# Patient Record
Sex: FEMALE | Race: WHITE | Employment: OTHER | URBAN - METROPOLITAN AREA
[De-identification: names, ages, dates, MRNs, and addresses within clinical notes are randomized per-mention and may not be internally consistent; named-entity substitution may affect disease eponyms.]

---

## 2017-01-04 RX ORDER — SIMVASTATIN 20 MG/1
TABLET, FILM COATED ORAL
Qty: 90 TAB | Refills: 0 | Status: SHIPPED | OUTPATIENT
Start: 2017-01-04 | End: 2017-04-02 | Stop reason: SDUPTHER

## 2017-01-08 DIAGNOSIS — E78.2 MIXED HYPERLIPIDEMIA: ICD-10-CM

## 2017-01-08 DIAGNOSIS — E83.52 HYPERCALCEMIA: ICD-10-CM

## 2017-01-08 DIAGNOSIS — E11.9 DIABETES MELLITUS TYPE 2, NONINSULIN DEPENDENT (HCC): Primary | ICD-10-CM

## 2017-01-08 DIAGNOSIS — R74.8 ELEVATED LIVER ENZYMES: ICD-10-CM

## 2017-01-09 NOTE — TELEPHONE ENCOUNTER
Fasting BS went up to 132 and HgBA1c about same as last check, 6.5 to 6.6 now. HDL still low, goal >50, TG still high, back up some from last check. Liver enzymes are mildly elevated. Calcium level mildly elevated. Recs:  Limit usage of OTC and rx nsaids including Mobic (Meloxicam). Limit etoh to no more than 1 small drink a day at most if any. Work hard on diet, exercise, wt reduction. Decrease vit D to 1000 units daily    Change Maxzide to combination low dose Hyzaar which will control, BP, fluid and give kidney protection and have less impact on calcium. Fasting labs in 12 weeks, OV 1 week prior. Arrange both now. I have pended follow up lab orders. But route back to me after r/w pt so I can send in medication to replace the Maxzide. Verify pharm and qty.

## 2017-01-18 ENCOUNTER — TELEPHONE (OUTPATIENT)
Dept: FAMILY MEDICINE CLINIC | Age: 62
End: 2017-01-18

## 2017-01-18 RX ORDER — LOSARTAN POTASSIUM AND HYDROCHLOROTHIAZIDE 12.5; 5 MG/1; MG/1
1 TABLET ORAL DAILY
Qty: 30 TAB | Refills: 2 | Status: SHIPPED | OUTPATIENT
Start: 2017-01-18 | End: 2017-05-15 | Stop reason: SDUPTHER

## 2017-01-19 NOTE — TELEPHONE ENCOUNTER
Me        7:00 PM   Note      Fasting BS went up to 132 and HgBA1c about same as last check, 6.5 to 6.6 now. HDL still low, goal >50, TG still high, back up some from last check. Liver enzymes are mildly elevated. Calcium level mildly elevated.      Recs:  Limit usage of OTC and rx nsaids including Mobic (Meloxicam).     Limit etoh to no more than 1 small drink a day at most if any.     Work hard on diet, exercise, wt reduction.     Decrease vit D to 1000 units daily     Change Maxzide to combination low dose Hyzaar which will control, BP, fluid and give kidney protection and have less impact on calcium.     Fasting labs in 12 weeks, OV 1 week prior. Arrange both now.      I have pended follow up lab orders. But route back to me after r/w pt so I can send in medication to replace the Maxzide. Verify pharm and qty.                   7:09 PM   You routed this conversation to Me             7:11 PM   You routed this conversation to José Luis Garcia LPN    January 9, 1966   José Luis Memory, LPN        8:31 PM   Note      Attempted to reach patient, left voicemail to call the office to review results and recommendations         January 12, 2017   José Luis Garcia LPN        3:74 PM   Note      LM for return call         January 18, 2017   José Luis Garcia LPN        13:90 AM   Note      PI of results. She states understanding. She prefers to have rx sent to local pharmacy for 30 day until she can find out how much it cost. She has a f/u already set up for 3/29. She request that she get her labs done at where she works b/c it doesn't cost her anything there. If she does it here it cost her money. Advises that she will need to p/u the order for that. I did explain that she won't be able to see labs on mychart. Pt would like to give that a try. José Luis Garcia LPN   to Me           10:25 AM   I pended the low dose hyzaar.     José Luis Memory, LPN        89:06 AM   Note      Addended by: Jamel Padron on: 1/18/2017 10:25 AM       Modules accepted: Khalida Gutiérrez        9:57 PM   Note      Addended by: Leticia Norwood on: 1/18/2017 09:57 PM       Modules accepted: Khalida

## 2017-02-07 ENCOUNTER — TELEPHONE (OUTPATIENT)
Dept: FAMILY MEDICINE CLINIC | Age: 62
End: 2017-02-07

## 2017-02-07 ENCOUNTER — OFFICE VISIT (OUTPATIENT)
Dept: FAMILY MEDICINE CLINIC | Age: 62
End: 2017-02-07

## 2017-02-07 VITALS
RESPIRATION RATE: 18 BRPM | HEIGHT: 66 IN | SYSTOLIC BLOOD PRESSURE: 120 MMHG | HEART RATE: 68 BPM | DIASTOLIC BLOOD PRESSURE: 72 MMHG | TEMPERATURE: 98.2 F | WEIGHT: 281 LBS | OXYGEN SATURATION: 98 % | BODY MASS INDEX: 45.16 KG/M2

## 2017-02-07 DIAGNOSIS — L73.9 HAIR FOLLICLE INFECTION: Primary | ICD-10-CM

## 2017-02-07 DIAGNOSIS — L73.9 FOLLICULITIS: ICD-10-CM

## 2017-02-07 RX ORDER — TRIAMTERENE/HYDROCHLOROTHIAZID 37.5-25 MG
TABLET ORAL
COMMUNITY
Start: 2016-12-07 | End: 2017-03-29

## 2017-02-07 RX ORDER — CLINDAMYCIN HYDROCHLORIDE 300 MG/1
300 CAPSULE ORAL 3 TIMES DAILY
Qty: 21 CAP | Refills: 0 | Status: SHIPPED | OUTPATIENT
Start: 2017-02-07 | End: 2017-02-14

## 2017-02-07 RX ORDER — MUPIROCIN CALCIUM 20 MG/G
CREAM TOPICAL 2 TIMES DAILY
Qty: 30 G | Refills: 1 | Status: SHIPPED | OUTPATIENT
Start: 2017-02-07 | End: 2017-02-14

## 2017-02-07 NOTE — PATIENT INSTRUCTIONS
Folliculitis: Care Instructions  Your Care Instructions    Folliculitis (say \"knm-HWW-isu-LY-tus\") is an infection of the pouches (follicles) in the skin where hair grows. It can occur on any part of the body, but it is most common on the scalp, face, armpits, and groin. Bacteria, such as those found in a hot tub, can cause folliculitis. Folliculitis begins as a red, tender area near a strand of hair. The skin can itch or burn and may drain pus or blood. Sometimes folliculitis can lead to more serious skin infections. Your doctor usually can treat mild folliculitis with an antibiotic cream or ointment. If you have folliculitis on your scalp, you may use a shampoo that kills bacteria. Antibiotics you take as pills can treat infections deeper in the skin. For stubborn cases of folliculitis, laser treatment may be an option. Laser treatment uses strong beams of light to destroy the hair follicle. But hair will no longer grow in the treated area. Follow-up care is a key part of your treatment and safety. Be sure to make and go to all appointments, and call your doctor if you are having problems. It's also a good idea to know your test results and keep a list of the medicines you take. How can you care for yourself at home? · Take your medicine exactly as prescribed. If your doctor prescribed antibiotics, take them as directed. Do not stop taking them just because you feel better. You need to take the full course of antibiotics. · Use a soap that kills bacteria to wash the infected area. If your scalp or beard is infected, use a shampoo with selenium or propylene glycol. Be careful. Do not scrub too long or too hard. · Mix 1 1/3 cup warm water and 1 tablespoon vinegar. Soak a cloth in the mixture, and place it over the infected skin until it cools off (usually 5 to 10 minutes). You can do this 3 to 6 times a day. · Do not share your razor, towel, or washcloth. That can spread folliculitis.   · Use a new blade in your razor each time you shave to keep from re-infecting your skin. · If you tend to get folliculitis, avoid using hot tubs. They can contain bacteria that cause folliculitis. When should you call for help? Call your doctor now or seek immediate medical care if:  · You have a fever not caused by the flu or some other known illness. · You have signs of infection such as:  ¨ Pain, warmth, or swelling in the skin. ¨ Red streaks near a hair follicle. ¨ Pus coming from a hair follicle. ¨ A fever. Watch closely for changes in your health, and be sure to contact your doctor if:  · Your home treatment does not help. Where can you learn more? Go to http://luanne-gail.info/. Enter M257 in the search box to learn more about \"Folliculitis: Care Instructions. \"  Current as of: February 5, 2016  Content Version: 11.1  © 9029-0224 [employer]. Care instructions adapted under license by [employer] (which disclaims liability or warranty for this information). If you have questions about a medical condition or this instruction, always ask your healthcare professional. [place] [address] any warranty or liability for your use of this information.

## 2017-02-07 NOTE — PROGRESS NOTES
HISTORY OF PRESENT ILLNESS  Monster Oconnell is a 64 y.o. female.   HPI    ROS    Physical Exam    ASSESSMENT and PLAN  {ASSESSMENT/PLAN:26691}

## 2017-02-07 NOTE — PROGRESS NOTES
HISTORY OF PRESENT ILLNESS  Tiffanie Valles is a 64 y.o. female. HPI  Awoke 1 week ago w/ sensation that cyst was developing in vaginal area again. She has gotten these in the groin on and off over the past several years. Current area seems to be progressively worsening the past week. Seems to be getting bigger and more sore, tender as the days have gone by. She has been using warm compresses but it is not helping. No drainage. No fevers. Review of Systems   Constitutional: Negative for chills and fever. Problem List  Date Reviewed: 2/7/2017          Codes Class Noted    Mixed hyperlipidemia ICD-10-CM: E78.2  ICD-9-CM: 272.2  5/31/2016        Benign essential hypertension ICD-10-CM: I10  ICD-9-CM: 401.1  9/56/1621        Umbilical hernia without obstruction and without gangrene ICD-10-CM: K42.9  ICD-9-CM: 553.1  4/22/2016        Diabetes mellitus type 2, noninsulin dependent ([facility] ICD-10-CM: E11.9  ICD-9-CM: 250.00  12/3/2015    Overview Signed 1/8/2017  7:03 PM by Dimitry Richter MD     12/2013; Cherylene Buckler Dr Halina Hinds;  Eye doctor: Dr Emanuel Lizama             Hypothyroidism ICD-10-CM: E03.9  ICD-9-CM: 244.9  7/30/2015    Overview Signed 7/30/2015 11:40 AM by Dimitry Richter MD     2002             Actinic keratosis ICD-10-CM: L57.0  ICD-9-CM: 702.0  11/20/2014        Abdominal wall bulge ICD-10-CM: R19.00  ICD-9-CM: 789.30  3/25/2013        Impaired fasting glucose ICD-10-CM: R73.01  ICD-9-CM: 790.21  3/31/2012    Overview Signed 3/31/2012  6:14 PM by Dimitry Richter MD     3/2012             Vitamin D deficiency ICD-10-CM: E55.9  ICD-9-CM: 268.9  3/2/2012    Overview Addendum 3/2/2012  9:46 AM by Dimitry Richter MD     9/2011, s/p 1 month Rx Vit D             FHx: breast cancer ICD-10-CM: Z80.3  ICD-9-CM: V16.3  3/2/2012    Overview Signed 3/2/2012  9:43 AM by Dimitry Richter MD     Several maternal relatives             History of benign left ovarian cyst, 2000 ICD-10-CM: Z87.42  ICD-9-CM: V13.29  3/2/2012        Skin cancer screening exams, Derm Dr Karla Oswald ICD-10-CM: Z12.83  ICD-9-CM: V76.43  3/2/2012        H/O Optic disc edema, [id number] ICD-10-CM: H47.10  ICD-9-CM: 377.00  3/2/2012    Overview Signed 3/2/2012  1:14 PM by Maren Maya MD     Optho Dr Soren Mccrary, MRI orbits             Hyperlipidemia ICD-10-CM: E78.5  ICD-9-CM: 272.4  3/15/2010    Overview Signed 3/2/2012  1:11 PM by Maren Maya MD                  AR (allergic rhinitis) ICD-10-CM: J30.9  ICD-9-CM: 477.9  Unknown        GERD (gastroesophageal reflux disease) ICD-10-CM: K21.9  ICD-9-CM: 530.81  Unknown    Overview Signed 3/2/2012  1:12 PM by Maren Maya MD                  H/O Mild, Intermittent Asthma ICD-10-CM: J45.909  ICD-9-CM: 493.90  Unknown    Overview Signed 3/2/2012  1:11 PM by Maren aMya MD     ~1595, no hosp or ER             Rheumatoid arthritis(714.0) ICD-9-CM: 714.0  Unknown    Overview Addendum 2015 12:30 PM by Maren Maya MD     In her late 's: feet/heels and shoulders             Postmenopause, LMP 2005, age 47 yo, s/p HRT x 6 months ICD-10-CM: Z78.0  ICD-9-CM: V49.81  Unknown    Overview Signed 3/15/2010  9:30 AM by Maren Maya MD     LMP 12/05 (HRT x 6months)             Ovarian cyst ICD-10-CM: N83.209  ICD-9-CM: 620.2  Unknown    Overview Signed 3/15/2010  9:30 AM by Maren Maya MD     Left, benign              (normal spontaneous vaginal delivery) ICD-10-CM: O80  ICD-9-CM: 650  Unknown    Overview Signed 3/15/2010  9:30 AM by Maren Maya MD                  Metabolic syndrome XWY-93-CO: P30.01  ICD-9-CM: 277.7  2010            Past Surgical History   Procedure Laterality Date    Colonoscopy  2005     normal, ok x 10 yrs, Dr Toya Remy    Hx breast lumpectomy       Left, benign (Dr. 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    Hx cervical polypectomy  7/2/14, Dr Jonathan Cisneros     Benign    Hx colonoscopy  5/2014     Dr Lashawn Bethea polyps x 3; repeat 2015    Hx colonoscopy  05/20/2015     poor prep, repeat in one year    Hx lipoma resection  ~2005     removed from left thigh    Hx other surgical  11/5/15, Dr Cline Plants     2 masses removed left lower leg    Hx hernia repair  4/22/16, Dr Sirisha Franks     robotic assisted umbilical hernia repair with mesh     Hx colonoscopy  08/2016     normal, follow up 10 yrs       Current Outpatient Prescriptions   Medication Sig    triamterene-hydroCHLOROthiazide (MAXZIDE) 37.5-25 mg per tablet Finishing this up then will be starting on the Hyzaar    simvastatin (ZOCOR) 20 mg tablet TAKE 1 TABLET NIGHTLY    JANUVIA 100 mg tablet TAKE 1 TABLET BY MOUTH EVERY DAY    INVOKANA 300 mg tablet TAKE 1 TABLET BY MOUTH DAILY    UNITHROID 75 mcg tablet TAKE 1 TABLET DAILY BEFORE BREAKFAST    cholecalciferol, vitamin D3, (VITAMIN D3) 2,000 unit tab Take 2,500 Units by mouth daily.  metoprolol succinate (TOPROL-XL) 50 mg XL tablet TAKE 1 TABLET DAILY FOR HYPERTENSION    CINNAMON BARK (CINNAMON PO) Take 350 mg by mouth daily.  coenzyme q10-vitamin e 100-100 mg-unit cap Take 200 mg by mouth daily.  multivitamin (ONE A DAY) tablet Take 1 Tab by mouth daily.  fish oil-dha-epa (FISH OIL) 1,200-144-216 mg Cap Take 1 Tab by mouth. Takes ~ twice a week    losartan-hydroCHLOROthiazide (HYZAAR) 50-12.5 mg per tablet Take 1 Tab by mouth daily.  HASNT STARTED YET, WILL START ONCE SHE FINISHES HER CURRENT BOTTLE OF MAXZIDE    meloxicam (MOBIC) 15 mg tablet TAKE 1 TABLET DAILY WITH BREAKFAST (Patient taking differently: TAKE 1 TABLET DAILY WITH BREAKFAST AS NEEDED)     No current facility-administered medications for this visit. Allergies   Allergen Reactions    Avelox [Moxifloxacin] Rash and Itching    Bactrim [Sulfamethoxazole-Trimethoprim] Rash    Doxycycline Other (comments)     Tongue discomfort, swollen glands    Lisinopril Diarrhea    Norvasc [Amlodipine] Other (comments)     edema    Penicillin G Hives, Shortness of Breath and Swelling     Social History     Social History    Marital status:      Spouse name: N/A    Number of children: 1    Years of education: N/A     Occupational History          Social History Main Topics    Smoking status: Never Smoker    Smokeless tobacco: Never Used    Alcohol use No      Comment: very rare, 1-2 drinks a year    Drug use: No    Sexual activity: Yes     Partners: Male     Birth control/ protection: None     Other Topics Concern    Caffeine Concern No     decaff tea    Special Diet No    Exercise No     Social History Narrative     Immunization History   Administered Date(s) Administered    Hepatitis A Vaccine 04/24/2001    Influenza Vaccine 10/22/2013, 11/03/2016    Pneumococcal Polysaccharide (PPSV-23) 11/04/2016    TD Vaccine 03/22/2000    TDAP Vaccine 09/20/2011       Visit Vitals    /72 (BP 1 Location: Left arm, BP Patient Position: Sitting)    Pulse 68    Temp 98.2 °F (36.8 °C) (Oral)    Resp 18    Ht 5' 5.5\" (1.664 m)    Wt 281 lb (127.5 kg)    LMP 12/24/2007    SpO2 98%    BMI 46.05 kg/m2       Physical Exam   Constitutional: No distress. Genitourinary:         Genitourinary Comments: Illustration denotes ~ 1 cm pink, inflamed papule. No pustule, crusting or exudate. No abscess formation. Vitals reviewed. ASSESSMENT and PLAN    ICD-10-CM ICD-9-CM    1. Hair follicle infection right perineal region/groin, w/o abscess formation L73.9 704.8 clindamycin (CLEOCIN) 300 mg capsule   2.  Recurrent Folliculitis O94.6 173.0 mupirocin calcium (BACTROBAN) 2 % topical cream Multiple abx allergies  Clinda 300 mg tid x 7 days  Warm compresses, epsom salt soaks  For future early outbreaks, use Bactroban cream bid x 5-7 days prn  F/u INI and prn  Return for I&D if abscess formation ensues which I do not anticipate at this time

## 2017-02-07 NOTE — PROGRESS NOTES
Chief Complaint   Patient presents with    Cyst     cyst like area on right side of groin - has had 3 other times in the past ten years and was thought to be an ingrown hair - states it feels harder this time and feels like it is spreading      1. Have you been to the ER, urgent care clinic since your last visit? Hospitalized since your last visit? No    2. Have you seen or consulted any other health care providers outside of the [address] [place] [address] since your last visit? Include any pap smears or colon screening.  No

## 2017-02-07 NOTE — TELEPHONE ENCOUNTER
Contact # is [phone]    Patient states that she has a boil on her private part and Dr MICHELE is the only one who knows how to take care of it & she is the only physician patient feels comfortable with. She states it is very sore and she has already tried to treat it herself. She is requesting to be seen by Dr Pankaj Witt as soon as possible. She states she live 5 minutes away and could come in any time.  Patient states she is in the process of finding another GYN doctor

## 2017-03-04 RX ORDER — MELOXICAM 15 MG/1
TABLET ORAL
Qty: 90 TAB | Refills: 0 | Status: SHIPPED | OUTPATIENT
Start: 2017-03-04 | End: 2017-04-07

## 2017-03-29 ENCOUNTER — OFFICE VISIT (OUTPATIENT)
Dept: FAMILY MEDICINE CLINIC | Age: 62
End: 2017-03-29

## 2017-03-29 VITALS
OXYGEN SATURATION: 98 % | BODY MASS INDEX: 45.13 KG/M2 | RESPIRATION RATE: 18 BRPM | HEART RATE: 63 BPM | TEMPERATURE: 98.1 F | HEIGHT: 66 IN | WEIGHT: 280.8 LBS | SYSTOLIC BLOOD PRESSURE: 110 MMHG | DIASTOLIC BLOOD PRESSURE: 74 MMHG

## 2017-03-29 DIAGNOSIS — E03.9 HYPOTHYROIDISM, ADULT: ICD-10-CM

## 2017-03-29 DIAGNOSIS — R74.8 ELEVATED LIVER ENZYMES: ICD-10-CM

## 2017-03-29 DIAGNOSIS — E11.9 DIABETES MELLITUS TYPE 2, NONINSULIN DEPENDENT (HCC): Primary | ICD-10-CM

## 2017-03-29 DIAGNOSIS — E83.52 HYPERCALCEMIA: ICD-10-CM

## 2017-03-29 DIAGNOSIS — I10 BENIGN ESSENTIAL HYPERTENSION: ICD-10-CM

## 2017-03-29 DIAGNOSIS — E78.2 MIXED HYPERLIPIDEMIA: ICD-10-CM

## 2017-03-29 DIAGNOSIS — R60.0 BILATERAL EDEMA OF LOWER EXTREMITY: ICD-10-CM

## 2017-03-29 NOTE — PROGRESS NOTES
Chief Complaint   Patient presents with    Hypertension     3 month fasting follow up    Diabetes    Cholesterol Problem     1. Have you been to the ER, urgent care clinic since your last visit? Hospitalized since your last visit? No    2. Have you seen or consulted any other health care providers outside of the [address] [place] [address] since your last visit? Include any pap smears or colon screening.  No

## 2017-03-29 NOTE — PATIENT INSTRUCTIONS
Leg and Ankle Edema: Care Instructions  Your Care Instructions  Swelling in the legs, ankles, and feet is called edema. It is common after you sit or stand for a while. Long plane flights or car rides often cause swelling in the legs and feet. You may also have swelling if you have to stand for long periods of time at your job. Problems with the veins in the legs (varicose veins) and changes in hormones can also cause swelling. Sometimes the swelling in the ankles and feet is caused by a more serious problem, such as heart failure, infection, blood clots, or liver or kidney disease. Follow-up care is a key part of your treatment and safety. Be sure to make and go to all appointments, and call your doctor if you are having problems. Its also a good idea to know your test results and keep a list of the medicines you take. How can you care for yourself at home? · If your doctor gave you medicine, take it as prescribed. Call your doctor if you think you are having a problem with your medicine. · Whenever you are resting, raise your legs up. Try to keep the swollen area higher than the level of your heart. · Take breaks from standing or sitting in one position. ¨ Walk around to increase the blood flow in your lower legs. ¨ Move your feet and ankles often while you stand, or tighten and relax your leg muscles. · Wear support stockings. Put them on in the morning, before swelling gets worse. · Eat a balanced diet. Lose weight if you need to. · Limit the amount of salt (sodium) in your diet. Salt holds fluid in the body and may increase swelling. When should you call for help? Call 911 anytime you think you may need emergency care. For example, call if:  · You have symptoms of a blood clot in your lung (called a pulmonary embolism). These may include:  ¨ Sudden chest pain. ¨ Trouble breathing. ¨ Coughing up blood.   Call your doctor now or seek immediate medical care if:  · You have signs of a blood clot, such as:  ¨ Pain in your calf, back of the knee, thigh, or groin. ¨ Redness and swelling in your leg or groin. · You have symptoms of infection, such as:  ¨ Increased pain, swelling, warmth, or redness. ¨ Red streaks or pus. ¨ A fever. Watch closely for changes in your health, and be sure to contact your doctor if:  · Your swelling is getting worse. · You have new or worsening pain in your legs. · You do not get better as expected. Where can you learn more? Go to http://luanne-gail.info/. Enter I267 in the search box to learn more about \"Leg and Ankle Edema: Care Instructions. \"  Current as of: May 27, 2016  Content Version: 11.2  © 3379-1142 [employer]. Care instructions adapted under license by [employer] (which disclaims liability or warranty for this information). If you have questions about a medical condition or this instruction, always ask your healthcare professional. [place] [address] any warranty or liability for your use of this information.

## 2017-03-29 NOTE — PROGRESS NOTES
HISTORY OF PRESENT ILLNESS  Trish Arroyo is a 64 y.o. female. HPI  Fasting follow up diabetes, cholesterol, hypertension, thyroid, labs and med check. Overall has been getting along pretty well. Finally got rid of an infected hair follicle which took a while to resolve. Does not check BS's. Admits she has not been dieting or exercising. Wt about same. Since we stopped Maxzide and changed to Hyzaar w/ lower dose HCTZ due to hypercalcemia, she is retaining more fluid. But she has also been taking Mobic daily the past 3 weeks due to knee pain. Her BP is good however. Review of Systems   Constitutional: Negative. Eyes: Negative. Respiratory: Negative. Negative for cough and shortness of breath. Cardiovascular: Negative for chest pain and palpitations. Gastrointestinal: Negative. Genitourinary: Negative. Musculoskeletal: Negative for myalgias. Neurological: Negative for dizziness, tingling, sensory change and headaches. Problem List  Date Reviewed: 3/29/2017          Codes Class Noted    Mixed hyperlipidemia ICD-10-CM: E78.2  ICD-9-CM: 272.2  5/31/2016        Benign essential hypertension ICD-10-CM: I10  ICD-9-CM: 401.1  7/53/6465        Umbilical hernia without obstruction and without gangrene ICD-10-CM: K42.9  ICD-9-CM: 553.1  4/22/2016        Diabetes mellitus type 2, noninsulin dependent ([facility] ICD-10-CM: E11.9  ICD-9-CM: 250.00  12/3/2015    Overview Signed 1/8/2017  7:03 PM by Susi Multani MD     12/2013; Janina Bernardo;  Eye doctor: Dr Dee Caldwell             Hypothyroidism ICD-10-CM: E03.9  ICD-9-CM: 244.9  7/30/2015    Overview Signed 7/30/2015 11:40 AM by Susi Multani MD     2002             Actinic keratosis ICD-10-CM: L57.0  ICD-9-CM: 702.0  11/20/2014        Abdominal wall bulge ICD-10-CM: R19.00  ICD-9-CM: 789.30  3/25/2013        Impaired fasting glucose ICD-10-CM: R73.01  ICD-9-CM: 790.21  3/31/2012    Overview Signed 3/31/2012  6:14 PM by Nani Green MD     3/2012             Vitamin D deficiency ICD-10-CM: E55.9  ICD-9-CM: 268.9  3/2/2012    Overview Addendum 3/2/2012  9:46 AM by Nani Green MD     2011, s/p 1 month Rx Vit D             FHx: breast cancer ICD-10-CM: Z80.3  ICD-9-CM: V16.3  3/2/2012    Overview Signed 3/2/2012  9:43 AM by Nani Green MD     Several maternal relatives             History of benign left ovarian cyst,  ICD-10-CM: Z87.42  ICD-9-CM: V13.29  3/2/2012        Skin cancer screening exams, Derm Dr Sil Roberts: Z12.83  ICD-9-CM: V76.43  3/2/2012        H/O Optic disc edema, 2793-9829 ICD-10-CM: H47.10  ICD-9-CM: 377.00  3/2/2012    Overview Signed 3/2/2012  1:14 PM by Nani Green MD     Optho Dr Skyla Marsh, MRI orbits             Hyperlipidemia ICD-10-CM: E78.5  ICD-9-CM: 272.4  3/15/2010    Overview Signed 3/2/2012  1:11 PM by Nani Green MD                  AR (allergic rhinitis) ICD-10-CM: J30.9  ICD-9-CM: 477.9  Unknown        GERD (gastroesophageal reflux disease) ICD-10-CM: K21.9  ICD-9-CM: 530.81  Unknown    Overview Signed 3/2/2012  1:12 PM by Nani Green MD                  H/O Mild, Intermittent Asthma ICD-10-CM: J45.909  ICD-9-CM: 493.90  Unknown    Overview Signed 3/2/2012  1:11 PM by Nani Green MD     ~8887, no hosp or ER             Rheumatoid arthritis(714.0) ICD-9-CM: 714.0  Unknown    Overview Addendum 2015 12:30 PM by Nani Green MD     In her late 's: feet/heels and shoulders             Postmenopause, LMP 2005, age 47 yo, s/p HRT x 6 months ICD-10-CM: Z78.0  ICD-9-CM: V49.81  Unknown    Overview Signed 3/15/2010  9:30 AM by Nani Green MD     LMP  (HRT x 6months)             Ovarian cyst ICD-10-CM: N83.209  ICD-9-CM: 620.2  Unknown    Overview Signed 3/15/2010  9:30 AM by Nani Green MD     Left, benign              (normal spontaneous vaginal delivery) ICD-10-CM: O80  ICD-9-CM: 052  Unknown    Overview Signed 3/15/2010  9:30 AM by Lizzeth Chdawick MD                  Metabolic syndrome SSX-18-EI: U54.19  ICD-9-CM: 277.7  2010            Past Surgical History:   Procedure Laterality Date    COLONOSCOPY  2005    normal, ok x 10 yrs, Dr Timothy Bull      Normal    HX BREAST LUMPECTOMY      Left, benign (Dr. Bladimir Tee)    HX CERVICAL POLYPECTOMY  14, Dr Jacklyn Centeno    Benign    HX COLONOSCOPY  2014    Dr Yaya Whitney polyps x 3; repeat     HX COLONOSCOPY  2015    poor prep, repeat in one year    HX COLONOSCOPY  2016    normal, follow up 8 yrs   Carloser Strakimberly 10    childbirth    HX HEENT  2010    tumor on vocal cord removed    HX HERNIA REPAIR  16, Dr Urszula Vivas    robotic assisted umbilical hernia repair with mesh     HX LIPOMA RESECTION  ~    removed from left thigh    HX ORTHOPAEDIC      tumor remved left leg    HX OTHER SURGICAL  11/5/15, Dr Jimmy Maxwell    2 masses removed left lower leg    MISAEL MAMMOGRAM DIGITAL BILATERAL  [facility] per Gyn    at [facility]      1-15% Stenosis Bilaterally     OB History      Para Term  AB TAB SAB Ectopic Multiple Living    1 1                Obstetric Comments     x 1; Gyn Dr. Jacklyn Centeno        Current Outpatient Prescriptions   Medication Sig    meloxicam (MOBIC) 15 mg tablet TAKE 1 TABLET DAILY WITH BREAKFAST    losartan-hydroCHLOROthiazide (HYZAAR) 50-12.5 mg per tablet Take 1 Tab by mouth daily.  simvastatin (ZOCOR) 20 mg tablet TAKE 1 TABLET NIGHTLY    JANUVIA 100 mg tablet TAKE 1 TABLET BY MOUTH EVERY DAY    INVOKANA 300 mg tablet TAKE 1 TABLET BY MOUTH DAILY    UNITHROID 75 mcg tablet TAKE 1 TABLET DAILY BEFORE BREAKFAST    metoprolol succinate (TOPROL-XL) 50 mg XL tablet TAKE 1 TABLET DAILY FOR HYPERTENSION    CINNAMON BARK (CINNAMON PO) Take 350 mg by mouth daily.     coenzyme q10-vitamin e 100-100 mg-unit cap Take 200 mg by mouth daily.  multivitamin (ONE A DAY) tablet Take 1 Tab by mouth daily. Includes vit D 1,000 units    fish oil-dha-epa (FISH OIL) 1,200-144-216 mg Cap Take 1 Tab by mouth. Takes ~ twice a week     No current facility-administered medications for this visit.       Allergies   Allergen Reactions    Avelox [Moxifloxacin] Rash and Itching    Bactrim [Sulfamethoxazole-Trimethoprim] Rash    Doxycycline Other (comments)     Tongue discomfort, swollen glands    Lisinopril Diarrhea    Norvasc [Amlodipine] Other (comments)     edema    Penicillin G Hives, Shortness of Breath and Swelling     Social History     Social History    Marital status:      Spouse name: N/A    Number of children: 1    Years of education: N/A     Occupational History          Social History Main Topics    Smoking status: Never Smoker    Smokeless tobacco: Never Used    Alcohol use No      Comment: very rare, 1-2 drinks a year    Drug use: No    Sexual activity: Yes     Partners: Male     Birth control/ protection: None     Other Topics Concern    Caffeine Concern No     decaff tea    Special Diet No    Exercise No     Social History Narrative     Immunization History   Administered Date(s) Administered    Hepatitis A Vaccine 2001    Influenza Vaccine 10/22/2013, 2016    Pneumococcal Polysaccharide (PPSV-23) 2016    TD Vaccine 2000    TDAP Vaccine 2011       Family History   Problem Relation Age of Onset    Breast Cancer Mother      diagnosed age 72, survived it    Mejia Ena Arthritis-rheumatoid Mother     Stroke Mother     Parkinsonism Mother       in nursing from colon perf or GI bleed age 80    Hypertension Mother    Mejia Sutherland Migraines Mother     Breast Cancer Maternal Grandmother       in her late 42's    Dementia Father     Heart Disease Father      CAD, PTCA    Stroke Father     Cancer Brother 68     soft tissue cancer    Cancer Brother 68 kidney and lungs    Hypertension Sister     Breast Cancer Maternal Aunt     High Cholesterol Son     Cancer Other      maternal first cousin w/ sarcoma of leg    Anesth Problems Neg Hx      Visit Vitals    /74 (BP 1 Location: Left arm, BP Patient Position: Sitting)    Pulse 63    Temp 98.1 °F (36.7 °C) (Oral)    Resp 18    Ht 5' 5.5\" (1.664 m)    Wt 280 lb 12.8 oz (127.4 kg)    LMP 12/24/2007    SpO2 98%    BMI 46.02 kg/m2       Physical Exam   Constitutional: She appears well-developed and well-nourished. No distress. Cardiovascular: Normal rate, regular rhythm and normal heart sounds. No murmur heard. Pulmonary/Chest: Effort normal and breath sounds normal. No respiratory distress. Abdominal: There is no tenderness. Obese    Musculoskeletal:   Mild-moderate edema B ankles and distal lower extremities. Scattered spider veins BLE. Skin dry. Slight stasis derm discoloration. No warmth, weeping, lesions or wounds. Vitals reviewed. ASSESSMENT and PLAN    ICD-10-CM ICD-9-CM    1. Diabetes mellitus type 2, noninsulin dependent (HCC) E11.9 250.00 HM DIABETES EYE EXAM   2. Mixed hyperlipidemia E78.2 272.2    3. Benign essential hypertension I10 401.1    4. Hypothyroidism E03.9 244.9 TSH 3RD GENERATION   5. Bilateral edema of lower extremity R60.0 782. 3      Patient has several pre-ordered fasting labs still pended that will be drawn today along w/ her TSH, see previous lab orders    Reviewed diet, exercise and weight control    Cardiovascular risk and specific lipid/LDL/BS/BP goals reviewed  Reviewed medications and side effects in detail   Since we stopped Maxzide and changed to Hyzaar w/ lower dose HCTZ due to hypercalcemia, she is retaining more fluid. But she has also been taking Mobic daily the past 3 weeks due to knee pain. Her BP is good however.    Further follow up & other recommendations pending review of labs as well

## 2017-03-30 LAB
25(OH)D3+25(OH)D2 SERPL-MCNC: 39.1 NG/ML (ref 30–100)
ALBUMIN SERPL-MCNC: 4.4 G/DL (ref 3.6–4.8)
ALP SERPL-CCNC: 91 IU/L (ref 39–117)
ALT SERPL-CCNC: 29 IU/L (ref 0–32)
AST SERPL-CCNC: 29 IU/L (ref 0–40)
BILIRUB DIRECT SERPL-MCNC: 0.09 MG/DL (ref 0–0.4)
BILIRUB SERPL-MCNC: 0.3 MG/DL (ref 0–1.2)
BUN SERPL-MCNC: 12 MG/DL (ref 8–27)
BUN/CREAT SERPL: 17 (ref 11–26)
CA-I SERPL ISE-MCNC: 5.5 MG/DL (ref 4.5–5.6)
CALCIUM SERPL-MCNC: 10 MG/DL (ref 8.7–10.3)
CHLORIDE SERPL-SCNC: 106 MMOL/L (ref 96–106)
CHOLEST SERPL-MCNC: 164 MG/DL (ref 100–199)
CO2 SERPL-SCNC: 23 MMOL/L (ref 18–29)
CREAT SERPL-MCNC: 0.72 MG/DL (ref 0.57–1)
EST. AVERAGE GLUCOSE BLD GHB EST-MCNC: 131 MG/DL
GLUCOSE SERPL-MCNC: 112 MG/DL (ref 65–99)
HBA1C MFR BLD: 6.2 % (ref 4.8–5.6)
HBV SURFACE AB SER-ACNC: <3.1 MIU/ML
HBV SURFACE AG SERPL QL IA: NEGATIVE
HCV AB S/CO SERPL IA: <0.1 S/CO RATIO (ref 0–0.9)
HDLC SERPL-MCNC: 40 MG/DL
INTERPRETATION, 910389: NORMAL
LDLC SERPL CALC-MCNC: 88 MG/DL (ref 0–99)
POTASSIUM SERPL-SCNC: 4 MMOL/L (ref 3.5–5.2)
PROT SERPL-MCNC: 7.1 G/DL (ref 6–8.5)
PTH-INTACT SERPL-MCNC: 51 PG/ML (ref 15–65)
SODIUM SERPL-SCNC: 145 MMOL/L (ref 134–144)
TRIGL SERPL-MCNC: 180 MG/DL (ref 0–149)
TSH SERPL DL<=0.005 MIU/L-ACNC: 1.25 UIU/ML (ref 0.45–4.5)
VLDLC SERPL CALC-MCNC: 36 MG/DL (ref 5–40)

## 2017-04-04 NOTE — PROGRESS NOTES
Advise pt calcium improved and in normal range now. So cont same BP regimen, that has made a difference. Liver and kidney fnc good and normal.   Hep B & C negative. Hep B non immune. Can get vaccine booster next visit. Lipids MUCH better as well. Cont Zocor, I sent in refill  Fasting BS and HgbA1c also improving! Best A1c she has had the last several checks! Great job! Everything is moving in right direction. Since things are looking so positive, I would cont same regimen. She is already scheduled for follow up in June.

## 2017-04-07 ENCOUNTER — OFFICE VISIT (OUTPATIENT)
Dept: FAMILY MEDICINE CLINIC | Age: 62
End: 2017-04-07

## 2017-04-07 VITALS
TEMPERATURE: 98.6 F | SYSTOLIC BLOOD PRESSURE: 130 MMHG | DIASTOLIC BLOOD PRESSURE: 82 MMHG | OXYGEN SATURATION: 98 % | HEIGHT: 66 IN | BODY MASS INDEX: 45.16 KG/M2 | HEART RATE: 71 BPM | RESPIRATION RATE: 18 BRPM | WEIGHT: 281 LBS

## 2017-04-07 DIAGNOSIS — J45.21 INTERMITTENT ASTHMA, WITH ACUTE EXACERBATION: ICD-10-CM

## 2017-04-07 DIAGNOSIS — G89.29 CHRONIC PAIN OF LEFT KNEE: ICD-10-CM

## 2017-04-07 DIAGNOSIS — M25.562 CHRONIC PAIN OF LEFT KNEE: ICD-10-CM

## 2017-04-07 DIAGNOSIS — J20.8 VIRAL BRONCHITIS: ICD-10-CM

## 2017-04-07 DIAGNOSIS — R05.9 COUGH: Primary | ICD-10-CM

## 2017-04-07 RX ORDER — HYDROCODONE POLISTIREX AND CHLORPHENIRAMINE POLISTIREX 10; 8 MG/5ML; MG/5ML
1 SUSPENSION, EXTENDED RELEASE ORAL
Qty: 115 ML | Refills: 0 | Status: SHIPPED | OUTPATIENT
Start: 2017-04-07 | End: 2017-07-13

## 2017-04-07 RX ORDER — BENZONATATE 100 MG/1
100 CAPSULE ORAL
Qty: 30 CAP | Refills: 0 | Status: SHIPPED | OUTPATIENT
Start: 2017-04-07 | End: 2017-04-17

## 2017-04-07 RX ORDER — NAPROXEN 500 MG/1
500 TABLET ORAL
Qty: 30 TAB | Refills: 0 | Status: SHIPPED | OUTPATIENT
Start: 2017-04-07 | End: 2017-05-31 | Stop reason: ALTCHOICE

## 2017-04-07 RX ORDER — ALBUTEROL SULFATE 90 UG/1
2 AEROSOL, METERED RESPIRATORY (INHALATION)
Qty: 1 INHALER | Refills: 1 | Status: SHIPPED | OUTPATIENT
Start: 2017-04-07 | End: 2019-06-25 | Stop reason: SDUPTHER

## 2017-04-07 NOTE — PROGRESS NOTES
[address] [address] [facility]  [address]. [facility], [address]  [phone]             Date of visit: 4/7/2017   Subjective:      History obtained from:  the patient. Will Curiel is a 64 y.o. female who presents today for sick for a week with a cold. The nasal congestion is getting better but still there. Started with bad sore throat, that is better. Main problem now is lots of cough, having thick yellow sputum. mucinex not helping. Voice raspy. Coughed all night. Chest doesn't feel heavy like when she has had bronchitis. Not terribly run down. 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 Has today off. Did go to bed at 8 last night, very tired. Denies body aches. Does have asthma, has not had it in 3 years. Does have left knee arthritis that flared up from up and down on ladder so using meloxicam prn for that. Tylenol doesn't help knee.   's naproxen worked better for her with meloxicam. Is trying to lose weight    Would like to get shingles shot    Patient Active Problem List    Diagnosis Date Noted    Mixed hyperlipidemia 05/31/2016    Benign essential hypertension 10/09/0922    Umbilical hernia without obstruction and without gangrene 04/22/2016    Diabetes mellitus type 2, noninsulin dependent ([facility] 12/03/2015    Hypothyroidism 07/30/2015    Actinic keratosis 11/20/2014    Abdominal wall bulge 03/25/2013    Impaired fasting glucose 03/31/2012    Vitamin D deficiency 03/02/2012    FHx: breast cancer 03/02/2012    History of benign left ovarian cyst, 2000 03/02/2012    Skin cancer screening exams, Derm Dr Madera Left 03/02/2012    H/O Optic disc edema, [id number] 03/02/2012    Hyperlipidemia 03/15/2010    AR (allergic rhinitis)     GERD (gastroesophageal reflux disease)     H/O Mild, Intermittent Asthma     Rheumatoid arthritis(714.0)     Postmenopause, LMP 2005, age 47 yo, s/p HRT x 6 months     Ovarian cyst      (normal spontaneous vaginal delivery)     Metabolic syndrome      Current Outpatient Prescriptions   Medication Sig Dispense Refill    naproxen (NAPROSYN) 500 mg tablet Take 1 Tab by mouth two (2) times daily as needed. Knee pain 30 Tab 0    chlorpheniramine-HYDROcodone (TUSSIONEX) 10-8 mg/5 mL suspension Take 5 mL by mouth every twelve (12) hours as needed for Cough or Congestion. 115 mL 0    albuterol (PROVENTIL HFA, VENTOLIN HFA, PROAIR HFA) 90 mcg/actuation inhaler Take 2 Puffs by inhalation every four (4) hours as needed for Wheezing. 1 Inhaler 1    benzonatate (TESSALON) 100 mg capsule Take 1 Cap by mouth three (3) times daily as needed for Cough for up to 10 days. 30 Cap 0    varicella zoster vacine live (VARICELLA-ZOSTER VACINE LIVE) 19,400 unit/0.65 mL susr injection 1 Vial by SubCUTAneous route once for 1 dose. 0.65 mL 0    simvastatin (ZOCOR) 20 mg tablet TAKE 1 TABLET NIGHTLY 90 Tab 1    losartan-hydroCHLOROthiazide (HYZAAR) 50-12.5 mg per tablet Take 1 Tab by mouth daily. 30 Tab 2    JANUVIA 100 mg tablet TAKE 1 TABLET BY MOUTH EVERY DAY 30 Tab 5    INVOKANA 300 mg tablet TAKE 1 TABLET BY MOUTH DAILY 30 Tab 3    UNITHROID 75 mcg tablet TAKE 1 TABLET DAILY BEFORE BREAKFAST 90 Tab 3    metoprolol succinate (TOPROL-XL) 50 mg XL tablet TAKE 1 TABLET DAILY FOR HYPERTENSION 90 Tab 3    CINNAMON BARK (CINNAMON PO) Take 350 mg by mouth daily.  coenzyme q10-vitamin e 100-100 mg-unit cap Take 200 mg by mouth daily.  multivitamin (ONE A DAY) tablet Take 1 Tab by mouth daily. Includes vit D 1,000 units      fish oil-dha-epa (FISH OIL) 1,200-144-216 mg Cap Take 1 Tab by mouth.  Takes ~ twice a week       Allergies   Allergen Reactions    Avelox [Moxifloxacin] Rash and Itching    Bactrim [Sulfamethoxazole-Trimethoprim] Rash    Doxycycline Other (comments)     Tongue discomfort, swollen glands    Lisinopril Diarrhea    Norvasc [Amlodipine] Other (comments)     edema    Penicillin G Hives, Shortness of Breath and Swelling     Past Medical History:   Diagnosis Date    Anemia NEC     AR (allergic rhinitis)     Asthma     mild    Benign essential hypertension 2016    Diabetes mellitus type 2, noninsulin dependent ([facility] 12/3/2015    Diabetes mellitus, type 2 ([facility] 2013    FHx: breast cancer 3/2/2012    GERD (gastroesophageal reflux disease) 11/00    H/O Mild, Intermittent Asthma     H/O Optic disc edema, 4475-9397 3/2/2012    History of benign left ovarian cyst 3/2/2012    Hyperlipidemia 2002    Hypertension     Hypothyroidism     Hypothyroidism 2015    2002    Impaired fasting glucose 3/31/2012    Mixed hyperlipidemia 2016    Morbid obesity with BMI of 45.0-49.9, adult (HCC)      (normal spontaneous vaginal delivery)         Ovarian cyst     Left, benign    Postmenopause     LMP  (HRT x 6months)    Rheumatoid arthritis ([facility]     S/P colonoscopy     Normal (10yrs)    Skin cancer screening exams, Derm Dr Acosta Leonard 3/2/2012    Vitamin D deficiency 3/2/2012     Past Surgical History:   Procedure Laterality Date    COLONOSCOPY  2005    normal, ok x 10 yrs, Dr Faheem Grace      Normal    HX BREAST LUMPECTOMY      Left, benign (Dr. Carmela Babinski)    HX CERVICAL POLYPECTOMY  14, Dr Shawn Lynn    Benign    HX COLONOSCOPY  2014    Dr Karen Reed polyps x 3; repeat     HX COLONOSCOPY  2015    poor prep, repeat in one year    HX COLONOSCOPY  2016    normal, follow up 10 yrs   Manoj Boyle 10    childbirth    HX HEENT  2010    tumor on vocal cord removed    HX HERNIA REPAIR  16, Dr Maranda Bailey    robotic assisted umbilical hernia repair with mesh     HX LIPOMA RESECTION  ~    removed from left thigh    HX ORTHOPAEDIC      tumor remved left leg    HX OTHER SURGICAL  11/5/15, Dr Myranda Matthews    2 masses removed left lower leg    MISAEL MAMMOGRAM DIGITAL BILATERAL annually per Gyn    at [facility] [address] LUCERO      1-15% Stenosis Bilaterally     Family History   Problem Relation Age of Onset   Daniel Flynn Breast Cancer Mother      diagnosed age 72, survived it    Daniel Flynn Arthritis-rheumatoid Mother     Stroke Mother     Parkinsonism Mother       in nursing from colon perf or GI bleed age 80    Hypertension Mother    Daniel Flynn Migraines Mother     Breast Cancer Maternal Grandmother       in her late 42's    Dementia Father     Heart Disease Father      CAD, PTCA    Stroke Father     Cancer Brother 68     soft tissue cancer    Cancer Brother 68     kidney and lungs    Hypertension Sister     Breast Cancer Maternal Aunt     High Cholesterol Son     Cancer Other      maternal first cousin w/ sarcoma of leg    Anesth Problems Neg Hx      Social History   Substance Use Topics    Smoking status: Never Smoker    Smokeless tobacco: Never Used    Alcohol use No      Comment: very rare, 1-2 drinks a year      Social History     Social History Narrative    Works as         Review of Systems  Pulm: denies dyspnea  CV denies chest pain  Gen: denies fever     Objective:     Vitals:    17 1048   BP: 130/82   Pulse: 71   Resp: 18   Temp: 98.6 °F (37 °C)   TempSrc: Oral   SpO2: 98%   Weight: 281 lb (127.5 kg)   Height: 5' 5.5\" (1.664 m)     Body mass index is 46.05 kg/(m^2).      General: stated age, obese, and in NAD  Neck: supple, symmetrical, trachea midline, no adenopathy and thyroid: not enlarged, symmetric, no tenderness/mass/nodules  Ears: TMs clear bilaterally, canals patent without inflammation  Nose: no drainage, turbinates red and mildly swollen  Throat: clear, no tonsillar exudate or erthema  Mouth: no lesions noted  Lungs:  Good air movement, mostly clear, just a very few exp wheezes w/normal effort and no use of accessory muscles of respiration   Heart: regular rate and rhythm, S1, S2 normal, no murmur, click, rub or gallop  Lymph: no cervical adenopathy appreciated  Ext: no edema  Skin:  No rashes or lesions     Assessment/Plan:       ICD-10-CM ICD-9-CM    1. Cough R05 786.2    2. Chronic pain of left knee M25.562 719.46     G89.29 338.29    3. Intermittent asthma, with acute exacerbation J45.21 493.92    4. Viral bronchitis J20.8 466.0         Orders Placed This Encounter    naproxen (NAPROSYN) 500 mg tablet    chlorpheniramine-HYDROcodone (TUSSIONEX) 10-8 mg/5 mL suspension    albuterol (PROVENTIL HFA, VENTOLIN HFA, PROAIR HFA) 90 mcg/actuation inhaler    benzonatate (TESSALON) 100 mg capsule    varicella zoster vacine live (VARICELLA-ZOSTER VACINE LIVE) 19,400 unit/0.65 mL susr injection       Seems to have viral bronchitis, likely some of her symptoms are due to asthma  Refill inhaler  Also try tessalon perles  Getting a little better  Advised that cough from bronchitis may take 2 more weeks, but she should not be getting worse in any way  If worsening may have to try steroids for her asthma, so she will hold off on getting zostavax until she is better  Reviewed worrisome signs or symptoms for which to call. Also gave naproxn for prn use for knee. Discussed risks of nsaids, she doesn't think she will use it often, only if pain is unbearable. Discussed the diagnosis and plan and she expressed understanding. Follow-up Disposition:  Return if symptoms worsen or fail to improve.     Ang Fuentes MD

## 2017-04-07 NOTE — PROGRESS NOTES
Chief Complaint   Patient presents with    Cough     started  with sore throat, then cough with clear mucus , now yellow to green ,chest congestion denies fever       Reviewed Record in preparation for visit and have obtained necessary documentation. Identified pt with two pt identifiers (Name @ )    Health Maintenance Due   Topic    ZOSTER VACCINE AGE 60>     EYE EXAM RETINAL OR DILATED Q1          1. Have you been to the ER, urgent care clinic since your last visit? Hospitalized since your last visit? No    2. Have you seen or consulted any other health care providers outside of the [address] [place] [address] since your last visit? Include any pap smears or colon screening.  No

## 2017-04-07 NOTE — MR AVS SNAPSHOT
Visit Information Date & Time Provider Department Dept. Phone Encounter #  
 4/7/2017 10:30 AM Adore Hinson, [address] [facility] [phone] [id number] Follow-up Instructions Return if symptoms worsen or fail to improve. Your Appointments 6/29/2017  8:15 AM  
ROUTINE CARE with Cammy Dang MD  
[facility]) Appt Note: 3 months routine f/u appt  
 [address] [phone]  
[phone]  
  
   
 [address] [address] Upcoming Health Maintenance Date Due ZOSTER VACCINE AGE 60> 8/3/2015 EYE EXAM RETINAL OR DILATED Q1 8/4/2016 MICROALBUMIN Q1 5/31/2017 HEMOGLOBIN A1C Q6M 9/29/2017 FOOT EXAM Q1 12/29/2017 LIPID PANEL Q1 3/29/2018 PAP AKA CERVICAL CYTOLOGY 7/14/2018 BREAST CANCER SCRN MAMMOGRAM 8/4/2018 DTaP/Tdap/Td series (2 - Td) 9/20/2021 COLONOSCOPY 5/20/2025 Allergies as of 4/7/2017  Review Complete On: 4/7/2017 By: Adore Hinson MD  
  
 Severity Noted Reaction Type Reactions Avelox [Moxifloxacin]  03/15/2010   Side Effect Rash, Itching Bactrim [Sulfamethoxazole-trimethoprim]  03/15/2010   Side Effect Rash Doxycycline  03/14/2013    Other (comments) Tongue discomfort, swollen glands Lisinopril  03/15/2010   Intolerance Diarrhea Norvasc [Amlodipine]  03/15/2010   Side Effect Other (comments) edema Penicillin G  02/22/2010    Hives, Shortness of Breath, Swelling Current Immunizations  Reviewed on 3/29/2017 Name Date Hepatitis A Vaccine 4/24/2001 Influenza Vaccine 11/3/2016, 10/22/2013 Pneumococcal Polysaccharide (PPSV-23) 11/4/2016 TD Vaccine 3/22/2000 TDAP Vaccine 9/20/2011 Not reviewed this visit You Were Diagnosed With   
  
 Codes Comments Cough    -  Primary ICD-10-CM: B20 ICD-9-CM: 916. 2  Chronic pain of left knee     ICD-10-CM: M25.562, G89.29 
 ICD-9-CM: 719.46, 338.29 Intermittent asthma, with acute exacerbation     ICD-10-CM: J45.21 ICD-9-CM: 935.16 Viral bronchitis     ICD-10-CM: J20.8 ICD-9-CM: 466.0 Vitals BP Pulse Temp Resp Height(growth percentile) Weight(growth percentile) 130/82 (BP 1 Location: Left arm, BP Patient Position: Sitting) 71 98.6 °F (37 °C) (Oral) 18 5' 5.5\" (1.664 m) 281 lb (127.5 kg) LMP SpO2 BMI OB Status Smoking Status 12/24/2007 98% 46.05 kg/m2 Postmenopausal Never Smoker Vitals History BMI and BSA Data Body Mass Index Body Surface Area 46.05 kg/m 2 2.43 m 2 Preferred Pharmacy Pharmacy Name Phone [id number] [address] [facility] [phone] Your Updated Medication List  
  
   
This list is accurate as of: 4/7/17 11:09 AM.  Always use your most recent med list.  
  
  
  
  
 albuterol 90 mcg/actuation inhaler Commonly known as:  PROVENTIL HFA, VENTOLIN HFA, PROAIR HFA Take 2 Puffs by inhalation every four (4) hours as needed for Wheezing. benzonatate 100 mg capsule Commonly known as:  TESSALON Take 1 Cap by mouth three (3) times daily as needed for Cough for up to 10 days. chlorpheniramine-HYDROcodone 10-8 mg/5 mL suspension Commonly known as:  Arlis Specking Take 5 mL by mouth every twelve (12) hours as needed for Cough or Congestion. CINNAMON PO Take 350 mg by mouth daily. coenzyme q10-vitamin e 100-100 mg-unit Cap Take 200 mg by mouth daily. FISH OIL 1,200-144-216 mg Cap Generic drug:  fish oil-dha-epa Take 1 Tab by mouth. Takes ~ twice a week INVOKANA 300 mg tablet Generic drug:  canagliflozin TAKE 1 TABLET BY MOUTH DAILY JANUVIA 100 mg tablet Generic drug:  SITagliptin TAKE 1 TABLET BY MOUTH EVERY DAY  
  
 losartan-hydroCHLOROthiazide 50-12.5 mg per tablet Commonly known as:  HYZAAR Take 1 Tab by mouth daily. metoprolol succinate 50 mg XL tablet Commonly known as:  TOPROL-XL  
TAKE 1 TABLET DAILY FOR HYPERTENSION  
  
 multivitamin tablet Commonly known as:  ONE A DAY Take 1 Tab by mouth daily. Includes vit D 1,000 units  
  
 naproxen 500 mg tablet Commonly known as:  NAPROSYN Take 1 Tab by mouth two (2) times daily as needed. Knee pain  
  
 simvastatin 20 mg tablet Commonly known as:  ZOCOR  
TAKE 1 TABLET NIGHTLY UNITHROID 75 mcg tablet Generic drug:  levothyroxine TAKE 1 TABLET DAILY BEFORE BREAKFAST Prescriptions Printed Refills  
 chlorpheniramine-HYDROcodone (TUSSIONEX) 10-8 mg/5 mL suspension 0 Sig: Take 5 mL by mouth every twelve (12) hours as needed for Cough or Congestion. Class: Print Route: Oral  
  
Prescriptions Sent to Pharmacy Refills  
 naproxen (NAPROSYN) 500 mg tablet 0 Sig: Take 1 Tab by mouth two (2) times daily as needed. Knee pain  
 Class: Normal  
 Pharmacy: [address] [place] [address] Ph #: [phone] Route: Oral  
 albuterol (PROVENTIL HFA, VENTOLIN HFA, PROAIR HFA) 90 mcg/actuation inhaler 1 Sig: Take 2 Puffs by inhalation every four (4) hours as needed for Wheezing. Class: Normal  
 Pharmacy: [address] [place] [address] Ph #: [phone] Route: Inhalation  
 benzonatate (TESSALON) 100 mg capsule 0 Sig: Take 1 Cap by mouth three (3) times daily as needed for Cough for up to 10 days. Class: Normal  
 Pharmacy: [address] [place] [address] Ph #: [phone] Route: Oral  
  
Follow-up Instructions Return if symptoms worsen or fail to improve. Patient Instructions Bronchitis: Care Instructions Your Care Instructions Bronchitis is inflammation of the bronchial tubes, which carry air to the lungs. The tubes swell and produce mucus, or phlegm. The mucus and inflamed bronchial tubes make you cough. You may have trouble breathing. Most cases of bronchitis are caused by viruses like those that cause colds. Antibiotics usually do not help and they may be harmful. Bronchitis usually develops rapidly and lasts about 2 to 3 weeks in otherwise healthy people. Follow-up care is a key part of your treatment and safety. Be sure to make and go to all appointments, and call your doctor if you are having problems. It's also a good idea to know your test results and keep a list of the medicines you take. How can you care for yourself at home? · Take all medicines exactly as prescribed. Call your doctor if you think you are having a problem with your medicine. · Get some extra rest. 
· Take an over-the-counter pain medicine, such as acetaminophen (Tylenol), ibuprofen (Advil, Motrin), or naproxen (Aleve) to reduce fever and relieve body aches. Read and follow all instructions on the label. · Do not take two or more pain medicines at the same time unless the doctor told you to. Many pain medicines have acetaminophen, which is Tylenol. Too much acetaminophen (Tylenol) can be harmful. · Take an over-the-counter cough medicine that contains dextromethorphan to help quiet a dry, hacking cough so that you can sleep. Avoid cough medicines that have more than one active ingredient. Read and follow all instructions on the label. · Breathe moist air from a humidifier, hot shower, or sink filled with hot water. The heat and moisture will thin mucus so you can cough it out. · Do not smoke. Smoking can make bronchitis worse. If you need help quitting, talk to your doctor about stop-smoking programs and medicines. These can increase your chances of quitting for good. When should you call for help? Call 911 anytime you think you may need emergency care. For example, call if: 
· You have severe trouble breathing. Call your doctor now or seek immediate medical care if: 
· You have new or worse trouble breathing. · You cough up dark brown or bloody mucus (sputum). · You have a new or higher fever. · You have a new rash. Watch closely for changes in your health, and be sure to contact your doctor if: 
· You cough more deeply or more often, especially if you notice more mucus or a change in the color of your mucus. · You are not getting better as expected. Where can you learn more? Go to http://luanne-gail.info/. Enter H333 in the search box to learn more about \"Bronchitis: Care Instructions. \" Current as of: May 23, 2016 Content Version: 11.2 © 4112-5676 Geodelic Systems. Care instructions adapted under license by MiName (which disclaims liability or warranty for this information). If you have questions about a medical condition or this instruction, always ask your healthcare professional. [address] any warranty or liability for your use of this information. Introducing \[facility]\"" & HEALTH SERVICES! Dear Eunice Greer: 
Thank you for requesting a CreditPoint Software account. Our records indicate that you already have an active CreditPoint Software account. You can access your account anytime at [email or web address] Did you know that you can access your hospital and ER discharge instructions at any time in CreditPoint Software? You can also review all of your test results from your hospital stay or ER visit. Additional Information If you have questions, please visit the Frequently Asked Questions section of the CreditPoint Software website at [email or web address] Remember, CreditPoint Software is NOT to be used for urgent needs. For medical emergencies, dial 911. Now available from your iPhone and Android! Please provide this summary of care documentation to your next provider. Your primary care clinician is listed as SONAM BILLY. If you have any questions after today's visit, please call [phone].

## 2017-04-07 NOTE — PATIENT INSTRUCTIONS
Bronchitis: Care Instructions  Your Care Instructions    Bronchitis is inflammation of the bronchial tubes, which carry air to the lungs. The tubes swell and produce mucus, or phlegm. The mucus and inflamed bronchial tubes make you cough. You may have trouble breathing. Most cases of bronchitis are caused by viruses like those that cause colds. Antibiotics usually do not help and they may be harmful. Bronchitis usually develops rapidly and lasts about 2 to 3 weeks in otherwise healthy people. Follow-up care is a key part of your treatment and safety. Be sure to make and go to all appointments, and call your doctor if you are having problems. It's also a good idea to know your test results and keep a list of the medicines you take. How can you care for yourself at home? · Take all medicines exactly as prescribed. Call your doctor if you think you are having a problem with your medicine. · Get some extra rest.  · Take an over-the-counter pain medicine, such as acetaminophen (Tylenol), ibuprofen (Advil, Motrin), or naproxen (Aleve) to reduce fever and relieve body aches. Read and follow all instructions on the label. · Do not take two or more pain medicines at the same time unless the doctor told you to. Many pain medicines have acetaminophen, which is Tylenol. Too much acetaminophen (Tylenol) can be harmful. · Take an over-the-counter cough medicine that contains dextromethorphan to help quiet a dry, hacking cough so that you can sleep. Avoid cough medicines that have more than one active ingredient. Read and follow all instructions on the label. · Breathe moist air from a humidifier, hot shower, or sink filled with hot water. The heat and moisture will thin mucus so you can cough it out. · Do not smoke. Smoking can make bronchitis worse. If you need help quitting, talk to your doctor about stop-smoking programs and medicines. These can increase your chances of quitting for good.   When should you call for help? Call 911 anytime you think you may need emergency care. For example, call if:  · You have severe trouble breathing. Call your doctor now or seek immediate medical care if:  · You have new or worse trouble breathing. · You cough up dark brown or bloody mucus (sputum). · You have a new or higher fever. · You have a new rash. Watch closely for changes in your health, and be sure to contact your doctor if:  · You cough more deeply or more often, especially if you notice more mucus or a change in the color of your mucus. · You are not getting better as expected. Where can you learn more? Go to http://luanne-gail.info/. Enter H333 in the search box to learn more about \"Bronchitis: Care Instructions. \"  Current as of: May 23, 2016  Content Version: 11.2  © 9555-4738 Alignent Software. Care instructions adapted under license by CubeTree (which disclaims liability or warranty for this information). If you have questions about a medical condition or this instruction, always ask your healthcare professional. [address] any warranty or liability for your use of this information.

## 2017-05-31 RX ORDER — MELOXICAM 15 MG/1
TABLET ORAL
Qty: 90 TAB | Refills: 0 | Status: SHIPPED | OUTPATIENT
Start: 2017-05-31 | End: 2017-07-13

## 2017-05-31 NOTE — TELEPHONE ENCOUNTER
Advise pt I sent in refill for the Mobic, but I see that Dr Bob Freedman rx'd Naprosyn for her back in 4-2017. Advise pt she SHOULD NOT be taking both of these.

## 2017-06-24 RX ORDER — CANAGLIFLOZIN 300 MG/1
TABLET, FILM COATED ORAL
Qty: 30 TAB | Refills: 3 | Status: SHIPPED | OUTPATIENT
Start: 2017-06-24 | End: 2017-10-07

## 2017-07-13 ENCOUNTER — OFFICE VISIT (OUTPATIENT)
Dept: FAMILY MEDICINE CLINIC | Age: 62
End: 2017-07-13

## 2017-07-13 VITALS
HEIGHT: 66 IN | OXYGEN SATURATION: 96 % | BODY MASS INDEX: 44.16 KG/M2 | SYSTOLIC BLOOD PRESSURE: 130 MMHG | WEIGHT: 274.8 LBS | RESPIRATION RATE: 18 BRPM | HEART RATE: 61 BPM | DIASTOLIC BLOOD PRESSURE: 67 MMHG | TEMPERATURE: 96.6 F

## 2017-07-13 DIAGNOSIS — E66.01 MORBID OBESITY WITH BMI OF 45.0-49.9, ADULT (HCC): ICD-10-CM

## 2017-07-13 DIAGNOSIS — M25.562 CHRONIC PAIN OF BOTH KNEES: ICD-10-CM

## 2017-07-13 DIAGNOSIS — E11.9 DIABETES MELLITUS TYPE 2, NONINSULIN DEPENDENT (HCC): Primary | ICD-10-CM

## 2017-07-13 DIAGNOSIS — G89.29 CHRONIC PAIN OF BOTH KNEES: ICD-10-CM

## 2017-07-13 DIAGNOSIS — M25.561 CHRONIC PAIN OF BOTH KNEES: ICD-10-CM

## 2017-07-13 DIAGNOSIS — L56.8 PHOTOSENSITIVITY: ICD-10-CM

## 2017-07-13 DIAGNOSIS — R21 SKIN RASH: ICD-10-CM

## 2017-07-13 RX ORDER — NAPROXEN 500 MG/1
TABLET ORAL
Refills: 0 | COMMUNITY
Start: 2017-04-07 | End: 2018-05-17 | Stop reason: SDUPTHER

## 2017-07-13 RX ORDER — TRIAMCINOLONE ACETONIDE 1 MG/G
CREAM TOPICAL
Qty: 45 G | Refills: 1 | Status: SHIPPED | OUTPATIENT
Start: 2017-07-13

## 2017-07-13 NOTE — MR AVS SNAPSHOT
Visit Information Date & Time Provider Department Dept. Phone Encounter #  
 7/13/2017  8:00 AM Billy Awan, [address] [phone] [id number] Follow-up Instructions Return in about 3 months (around 10/13/2017) for recheck weight, sugar. Upcoming Health Maintenance Date Due ZOSTER VACCINE AGE 60> 8/3/2015 EYE EXAM RETINAL OR DILATED Q1 8/4/2016 MICROALBUMIN Q1 5/31/2017 INFLUENZA AGE 9 TO ADULT 8/1/2017 HEMOGLOBIN A1C Q6M 9/29/2017 FOOT EXAM Q1 12/29/2017 LIPID PANEL Q1 3/29/2018 PAP AKA CERVICAL CYTOLOGY 7/14/2018 BREAST CANCER SCRN MAMMOGRAM 8/4/2018 DTaP/Tdap/Td series (2 - Td) 9/20/2021 COLONOSCOPY 5/20/2025 Allergies as of 7/13/2017  Review Complete On: 7/13/2017 By: Billy Awan MD  
  
 Severity Noted Reaction Type Reactions Avelox [Moxifloxacin]  03/15/2010   Side Effect Rash, Itching Bactrim [Sulfamethoxazole-trimethoprim]  03/15/2010   Side Effect Rash Doxycycline  03/14/2013    Other (comments) Tongue discomfort, swollen glands Lisinopril  03/15/2010   Intolerance Diarrhea Norvasc [Amlodipine]  03/15/2010   Side Effect Other (comments) edema Penicillin G  02/22/2010    Hives, Shortness of Breath, Swelling Current Immunizations  Reviewed on 3/29/2017 Name Date Hepatitis A Vaccine 4/24/2001 Influenza Vaccine 11/3/2016, 10/22/2013 Pneumococcal Polysaccharide (PPSV-23) 11/4/2016 TD Vaccine 3/22/2000 TDAP Vaccine 9/20/2011 Zoster Vaccine, Live 7/8/2017 Not reviewed this visit You Were Diagnosed With   
  
 Codes Comments Diabetes mellitus type 2, noninsulin dependent [facility]    -  Primary ICD-10-CM: E11.9 ICD-9-CM: 250.00 Morbid obesity with BMI of 45.0-49.9, adult (HCC)     ICD-10-CM: E66.01, Z68.42 
ICD-9-CM: 278.01, V85.42 Chronic pain of both knees     ICD-10-CM: M25.561, M25.562, G89.29 ICD-9-CM: 719.46, 338.29   
 Skin rash     ICD-10-CM: R21 
ICD-9-CM: 782.1 Photosensitivity     ICD-10-CM: L56.8 ICD-9-CM: 692.72 Vitals BP Pulse Temp Resp Height(growth percentile) Weight(growth percentile) 130/67 (BP 1 Location: Left arm, BP Patient Position: Sitting) 61 96.6 °F (35.9 °C) (Oral) 18 5' 5.5\" (1.664 m) 274 lb 12.8 oz (124.6 kg) LMP SpO2 BMI OB Status Smoking Status 12/24/2007 96% 45.03 kg/m2 Postmenopausal Never Smoker Vitals History BMI and BSA Data Body Mass Index Body Surface Area 45.03 kg/m 2 2.4 m 2 Preferred Pharmacy Pharmacy Name Phone [address] [facility] [phone] Your Updated Medication List  
  
   
This list is accurate as of: 7/13/17  8:36 AM.  Always use your most recent med list.  
  
  
  
  
 albuterol 90 mcg/actuation inhaler Commonly known as:  PROVENTIL HFA, VENTOLIN HFA, PROAIR HFA Take 2 Puffs by inhalation every four (4) hours as needed for Wheezing. CINNAMON PO Take 350 mg by mouth daily. coenzyme q10-vitamin e 100-100 mg-unit Cap Take 200 mg by mouth daily. FISH OIL 1,200-144-216 mg Cap Generic drug:  fish oil-dha-epa Take 1 Tab by mouth. Takes ~ twice a week INVOKANA 300 mg tablet Generic drug:  canagliflozin TAKE 1 TABLET EVERY DAY  
  
 JANUVIA 100 mg tablet Generic drug:  SITagliptin TAKE 1 TABLET BY MOUTH EVERY DAY  
  
 losartan-hydroCHLOROthiazide 50-12.5 mg per tablet Commonly known as:  HYZAAR  
TAKE 1 TABLET DAILY  
  
 metoprolol succinate 50 mg XL tablet Commonly known as:  TOPROL-XL  
TAKE 1 TABLET DAILY FOR HYPERTENSION  
  
 multivitamin tablet Commonly known as:  ONE A DAY Take 1 Tab by mouth daily. Includes vit D 1,000 units  
  
 naproxen 500 mg tablet Commonly known as:  NAPROSYN  
TAKE 1 TAB BY MOUTH TWO (2) TIMES DAILY AS NEEDED FOR KNEE PAIN  
  
 simvastatin 20 mg tablet Commonly known as:  ZOCOR  
 TAKE 1 TABLET NIGHTLY  
  
 triamcinolone acetonide 0.1 % topical cream  
Commonly known as:  KENALOG  
use thin layer on back twice daily UNITHROID 75 mcg tablet Generic drug:  levothyroxine TAKE 1 TABLET DAILY BEFORE BREAKFAST Prescriptions Sent to Pharmacy Refills  
 triamcinolone acetonide (KENALOG) 0.1 % topical cream 1 Sig: use thin layer on back twice daily Class: Normal  
 Pharmacy: [address], [address] [place] [address] #: [phone] We Performed the Following LEELEE QL, W/REFLEX CASCADE [[id number] Custom] C REACTIVE PROTEIN, QT [04752 CPT(R)] CBC WITH AUTOMATED DIFF [93324 CPT(R)] HEMOGLOBIN A1C WITH EAG [71210 CPT(R)] METABOLIC PANEL, BASIC [02241 CPT(R)] MICROALBUMIN, UR, RAND [id number] CPT(R)] SED RATE (ESR) [id number] CPT(R)] Follow-up Instructions Return in about 3 months (around 10/13/2017) for recheck weight, sugar. Patient Instructions DASH Diet: Care Instructions Your Care Instructions The DASH diet is an eating plan that can help lower your blood pressure. DASH stands for Dietary Approaches to Stop Hypertension. Hypertension is high blood pressure. The DASH diet focuses on eating foods that are high in calcium, potassium, and magnesium. These nutrients can lower blood pressure. The foods that are highest in these nutrients are fruits, vegetables, low-fat dairy products, nuts, seeds, and legumes. But taking calcium, potassium, and magnesium supplements instead of eating foods that are high in those nutrients does not have the same effect. The DASH diet also includes whole grains, fish, and poultry. The DASH diet is one of several lifestyle changes your doctor may recommend to lower your high blood pressure. Your doctor may also want you to decrease the amount of sodium in your diet.  Lowering sodium while following the DASH diet can lower blood pressure even further than just the DASH diet alone. Follow-up care is a key part of your treatment and safety. Be sure to make and go to all appointments, and call your doctor if you are having problems. It's also a good idea to know your test results and keep a list of the medicines you take. How can you care for yourself at home? Following the DASH diet · Eat 4 to 5 servings of fruit each day. A serving is 1 medium-sized piece of fruit, ½ cup chopped or canned fruit, 1/4 cup dried fruit, or 4 ounces (½ cup) of fruit juice. Choose fruit more often than fruit juice. · Eat 4 to 5 servings of vegetables each day. A serving is 1 cup of lettuce or raw leafy vegetables, ½ cup of chopped or cooked vegetables, or 4 ounces (½ cup) of vegetable juice. Choose vegetables more often than vegetable juice. · Get 2 to 3 servings of low-fat and fat-free dairy each day. A serving is 8 ounces of milk, 1 cup of yogurt, or 1 ½ ounces of cheese. · Eat 6 to 8 servings of grains each day. A serving is 1 slice of bread, 1 ounce of dry cereal, or ½ cup of cooked rice, pasta, or cooked cereal. Try to choose whole-grain products as much as possible. · Limit lean meat, poultry, and fish to 2 servings each day. A serving is 3 ounces, about the size of a deck of cards. · Eat 4 to 5 servings of nuts, seeds, and legumes (cooked dried beans, lentils, and split peas) each week. A serving is 1/3 cup of nuts, 2 tablespoons of seeds, or ½ cup of cooked beans or peas. · Limit fats and oils to 2 to 3 servings each day. A serving is 1 teaspoon of vegetable oil or 2 tablespoons of salad dressing. · Limit sweets and added sugars to 5 servings or less a week. A serving is 1 tablespoon jelly or jam, ½ cup sorbet, or 1 cup of lemonade. · Eat less than 2,300 milligrams (mg) of sodium a day. If you limit your sodium to 1,500 mg a day, you can lower your blood pressure even more. Tips for success · Start small.  Do not try to make dramatic changes to your diet all at once. You might feel that you are missing out on your favorite foods and then be more likely to not follow the plan. Make small changes, and stick with them. Once those changes become habit, add a few more changes. · Try some of the following: ¨ Make it a goal to eat a fruit or vegetable at every meal and at snacks. This will make it easy to get the recommended amount of fruits and vegetables each day. ¨ Try yogurt topped with fruit and nuts for a snack or healthy dessert. ¨ Add lettuce, tomato, cucumber, and onion to sandwiches. ¨ Combine a ready-made pizza crust with low-fat mozzarella cheese and lots of vegetable toppings. Try using tomatoes, squash, spinach, broccoli, carrots, cauliflower, and onions. ¨ Have a variety of cut-up vegetables with a low-fat dip as an appetizer instead of chips and dip. ¨ Sprinkle sunflower seeds or chopped almonds over salads. Or try adding chopped walnuts or almonds to cooked vegetables. ¨ Try some vegetarian meals using beans and peas. Add garbanzo or kidney beans to salads. Make burritos and tacos with mashed patricio beans or black beans. Where can you learn more? Go to http://luanne-gail.info/. Enter P459 in the search box to learn more about \"DASH Diet: Care Instructions. \" Current as of: April 3, 2017 Content Version: 11.3 © 5564-2482 [employer]ide [employer]. Care instructions adapted under license by NationBuilder (which disclaims liability or warranty for this information). If you have questions about a medical condition or this instruction, always ask your healthcare professional. [place] [address] any warranty or liability for your use of this information. Introducing [facility] & HEALTH SERVICES! Dear Evita Stevens: 
Thank you for requesting a Nextance account. Our records indicate that you already have an active Nextance account. You can access your account anytime at [email or web address] Did you know that you can access your hospital and ER discharge instructions at any time in Zapper? You can also review all of your test results from your hospital stay or ER visit. Additional Information If you have questions, please visit the Frequently Asked Questions section of the Zapper website at [email or web address] Remember, Zapper is NOT to be used for urgent needs. For medical emergencies, dial 911. Now available from your iPhone and Android! Please provide this summary of care documentation to your next provider. Your primary care clinician is listed as SONAM BILLY. If you have any questions after today's visit, please call [phone].

## 2017-07-13 NOTE — PATIENT INSTRUCTIONS

## 2017-07-13 NOTE — PROGRESS NOTES
Kelvin Maya [address] [place], [address]  [phone]             Date of visit: 7/13/2017   Subjective:      History obtained from:  the patient. Edgard Chang is a 64 y.o. female who presents today for f/u chronic problems, due for DM labs  Was out of invokana for 3 weeks, feels better on it  Still on januvia  Doesn't drink enough water; just not a big drinker  Was a little lightheaded last week but resolved  If she drinks tea drinks decaf; occasional diet drinks  Has lost some weight this summer, really trying to eat well  Gained 3 pounds back on vacation    Avoided sun on vacation  Almost a month ago came back from vacation and felt like she had a sunburn on her back because it was itchy  Felt like a rash under bra strap, still hasn't gone away  Physician at work checked her moles and back  Very itchy, has several back scratchers, tries to scratch it very lightly  Feels like on fire, nerve ending feeling  Also in very center of low back has some little bumps that itch occasionally  If she is in the sun more than 30 min gets red skin, doesn't hurt or itch, like now her arms are red from being in the sun yesterday, has had this since she was a teenager  Still has the knee problems, left worse than right, but much worse in the   Does like eating veggies but had to cut out salads due to diarrhea. She was on metformin several years ago but had cramps more than diarrhea, however she did not eat with it and wonders if it would do better if she ate with it.   Naproxen works better than meloxicam, takes it daily in the winter, about every 2-3 days in the summer  Exercises regularly  Got shingles shot on Sat    Patient Active Problem List    Diagnosis Date Noted    Mixed hyperlipidemia 05/31/2016    Benign essential hypertension 43/95/5652    Umbilical hernia without obstruction and without gangrene 04/22/2016    Diabetes mellitus type 2, noninsulin dependent ([facility] 2015    Hypothyroidism 2015    Actinic keratosis 2014    Abdominal wall bulge 2013    Vitamin D deficiency 2012    FHx: breast cancer 2012    History of benign left ovarian cyst, 2000 2012    Skin cancer screening exams, Derm Dr Zhang Yasmin 2012    H/O Optic disc edema, 4717-5211 2012    Hyperlipidemia 03/15/2010    AR (allergic rhinitis)     GERD (gastroesophageal reflux disease)     H/O Mild, Intermittent Asthma     Rheumatoid arthritis(714.0)     Postmenopause, LMP , age 49 yo, s/p HRT x 6 months     Ovarian cyst      (normal spontaneous vaginal delivery)     Metabolic syndrome      Current Outpatient Prescriptions   Medication Sig Dispense Refill    triamcinolone acetonide (KENALOG) 0.1 % topical cream use thin layer on back twice daily 45 g 1    INVOKANA 300 mg tablet TAKE 1 TABLET EVERY DAY 30 Tab 3    losartan-hydroCHLOROthiazide (HYZAAR) 50-12.5 mg per tablet TAKE 1 TABLET DAILY 30 Tab 5    albuterol (PROVENTIL HFA, VENTOLIN HFA, PROAIR HFA) 90 mcg/actuation inhaler Take 2 Puffs by inhalation every four (4) hours as needed for Wheezing. 1 Inhaler 1    simvastatin (ZOCOR) 20 mg tablet TAKE 1 TABLET NIGHTLY 90 Tab 1    JANUVIA 100 mg tablet TAKE 1 TABLET BY MOUTH EVERY DAY 30 Tab 5    UNITHROID 75 mcg tablet TAKE 1 TABLET DAILY BEFORE BREAKFAST 90 Tab 3    metoprolol succinate (TOPROL-XL) 50 mg XL tablet TAKE 1 TABLET DAILY FOR HYPERTENSION 90 Tab 3    CINNAMON BARK (CINNAMON PO) Take 350 mg by mouth daily.  coenzyme q10-vitamin e 100-100 mg-unit cap Take 200 mg by mouth daily.  multivitamin (ONE A DAY) tablet Take 1 Tab by mouth daily. Includes vit D 1,000 units      fish oil-dha-epa (FISH OIL) 1,200-144-216 mg Cap Take 1 Tab by mouth.  Takes ~ twice a week      naproxen (NAPROSYN) 500 mg tablet TAKE 1 TAB BY MOUTH TWO (2) TIMES DAILY AS NEEDED FOR KNEE PAIN  0     Allergies   Allergen Reactions    Avelox [Moxifloxacin] Rash and Itching    Bactrim [Sulfamethoxazole-Trimethoprim] Rash    Doxycycline Other (comments)     Tongue discomfort, swollen glands    Lisinopril Diarrhea    Norvasc [Amlodipine] Other (comments)     edema    Penicillin G Hives, Shortness of Breath and Swelling     Past Medical History:   Diagnosis Date    Anemia NEC     AR (allergic rhinitis)     Asthma     mild    Benign essential hypertension 2016    Diabetes mellitus type 2, noninsulin dependent ([facility] 12/3/2015    Diabetes mellitus, type 2 ([facility] 2013    FHx: breast cancer 3/2/2012    GERD (gastroesophageal reflux disease) 11/00    H/O Mild, Intermittent Asthma     H/O Optic disc edema, 2814-8741 3/2/2012    History of benign left ovarian cyst 3/2/2012    Hyperlipidemia     Hypertension     Hypothyroidism     Hypothyroidism 2015    2002    Impaired fasting glucose 3/31/2012    Mixed hyperlipidemia 2016    Morbid obesity with BMI of 45.0-49.9, [facility]      (normal spontaneous vaginal delivery)         Ovarian cyst     Left, benign    Postmenopause     LMP  (HRT x 6months)    Rheumatoid arthritis ([facility]     S/P colonoscopy     Normal (10yrs)    Skin cancer screening exams, Derm Dr Renetta Aguila 3/2/2012    Vitamin D deficiency 3/2/2012     Past Surgical History:   Procedure Laterality Date    COLONOSCOPY  2005    normal, ok x 10 yrs, Dr Van Roberts      Normal    HX BREAST LUMPECTOMY      Left, benign (Dr. 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 LUCERO      1-15% Stenosis Bilaterally     Family History   Problem Relation Age of Onset   [facility] Breast Cancer Mother      diagnosed age 72, survived it    [facility] Arthritis-rheumatoid Mother     Stroke Mother     Parkinsonism Mother       in nursing from colon perf or GI bleed age 80    Hypertension Mother    [facility] Migraines Mother     Breast Cancer Maternal Grandmother       in her late 42's    Dementia Father     Heart Disease Father      CAD, PTCA    Stroke Father     Cancer Brother 68     soft tissue cancer    Cancer Brother [facility]     kidney and lungs    Hypertension Sister     Breast Cancer Maternal Aunt     High Cholesterol Son     Cancer Other      maternal first cousin w/ sarcoma of leg    Anesth Problems Neg Hx      Social History   Substance Use Topics    Smoking status: Never Smoker    Smokeless tobacco: Never Used    Alcohol use No      Comment: very rare, 1-2 drinks a year      Social History     Social History Narrative    Works as         [employer] Systems  Card: denies chest pain  Pulm: denies shortness of breath  GI: denies hematochezia      Objective:     Vitals:    17 0808   BP: 130/67   Pulse: 61   Resp: 18   Temp: 96.6 °F (35.9 °C)   TempSrc: Oral   SpO2: 96%   Weight: 274 lb 12.8 oz (124.6 kg)   Height: 5' 5.5\" (1.664 m)     Body mass index is 45.03 kg/(m^2).      General: stated age, obese and in NAD  Neck: supple, symmetrical, trachea midline, no adenopathy and thyroid: not enlarged, symmetric, no tenderness/mass/nodules  Lungs:  clear to auscultation w/o rales, rhonchi, wheezes w/normal effort and no use of accessory muscles of respiration   Heart: regular rate and rhythm, S1, S2 normal, no murmur, click, rub or gallop  Abdomen: soft, nontender  Ext:  Trace BLE edema noted. Lymph: no cervical adenopathy appreciated  Skin:  Sun exposed parts of arms are diffusely mildly erythematous without peeling; upper back erythematous and with mild peeling  Psych: alert and oriented to person, place, time and situation and Speech: appropriate quality, quantity and organization of sentences      Lab Results   Component Value Date/Time    Hemoglobin A1c 6.2 03/29/2017 08:58 AM     Lab Results   Component Value Date/Time    Cholesterol, total 164 03/29/2017 08:58 AM    HDL Cholesterol 40 03/29/2017 08:58 AM    LDL, calculated 88 03/29/2017 08:58 AM    VLDL, calculated 36 03/29/2017 08:58 AM    Triglyceride 180 03/29/2017 08:58 AM     Lab Results   Component Value Date/Time    Sodium 145 03/29/2017 08:58 AM    Potassium 4.0 03/29/2017 08:58 AM    Chloride 106 03/29/2017 08:58 AM    CO2 23 03/29/2017 08:58 AM    Anion gap 7 04/15/2016 12:52 PM    Glucose 112 03/29/2017 08:58 AM    BUN 12 03/29/2017 08:58 AM    Creatinine 0.72 03/29/2017 08:58 AM    BUN/Creatinine ratio 17 03/29/2017 08:58 AM    GFR est  03/29/2017 08:58 AM    GFR est non-AA 91 03/29/2017 08:58 AM    Calcium 10.0 03/29/2017 08:58 AM    Bilirubin, total 0.3 03/29/2017 08:58 AM    AST (SGOT) 29 03/29/2017 08:58 AM    Alk.  phosphatase 91 03/29/2017 08:58 AM    Protein, total 7.1 03/29/2017 08:58 AM    Albumin 4.4 03/29/2017 08:58 AM    A-G Ratio 1.7 05/31/2016 01:05 PM    ALT (SGPT) 29 03/29/2017 08:58 AM     Lab Results   Component Value Date/Time    WBC 7.4 04/15/2016 12:52 PM    HGB 13.6 04/15/2016 12:52 PM    HCT 42.2 04/15/2016 12:52 PM    PLATELET 716 62/89/6299 12:52 PM    MCV 90.2 04/15/2016 12:52 PM     Lab Results   Component Value Date/Time    TSH 1.250 03/29/2017 08:58 AM    T4, Free 1.25 10/06/2014 09:23 AM     Lab Results   Component Value Date/Time    Vitamin D 25-Hydroxy 23.7 09/20/2011 11:41 AM    VITAMIN D, 25-HYDROXY 39.1 03/29/2017 08:58 AM           Assessment/Plan:       ICD-10-CM ICD-9-CM    1. Diabetes mellitus type 2, noninsulin dependent (HCC) E11.9 250.00 MICROALBUMIN, UR, RAND      HEMOGLOBIN A1C WITH EAG      CBC WITH AUTOMATED DIFF      METABOLIC PANEL, BASIC   2. Morbid obesity with BMI of 45.0-49.9, adult ([facility] E66.01 278.01     Z68.42 V85.42    3. Chronic pain of both knees M25.561 719.46 LEELEE QL, W/REFLEX CASCADE    M25.562 338.29 SED RATE (ESR)    G89.29  C REACTIVE PROTEIN, QT   4. Skin rash R21 782.1    5. Photosensitivity L56.8 692.72 LEELEE QL, W/REFLEX CASCADE      SED RATE (ESR)      C REACTIVE PROTEIN, QT       Orders Placed This Encounter    MICROALBUMIN, UR, RAND    HEMOGLOBIN A1C WITH EAG    CBC WITH AUTOMATED DIFF    METABOLIC PANEL, BASIC    LEELEE QL, W/REFLEX CASCADE    SED RATE (ESR)    C REACTIVE PROTEIN, QT    naproxen (NAPROSYN) 500 mg tablet    triamcinolone acetonide (KENALOG) 0.1 % topical cream     Sounds like DM doing well  She is really working hard on weight loss--this was encouraged  No change to meds but will check labs  She is motivated by wanting to get off some meds    Uses naproxen occasionally for knees but not needing much in the summer  Encouraged the weight loss so she can hopefuly not need naproxen this winter    The photosensitivity is chronic but rather severe  Will check labs for inflammation  May need to see a dermatologist  Try steroid cream to see if that helps it feel better    Discussed the diagnosis and plan and she expressed understanding. Follow-up Disposition:  Return in about 3 months (around 10/13/2017) for recheck weight, sugar.     Daniela Coley MD

## 2017-07-13 NOTE — PROGRESS NOTES
Chief Complaint   Patient presents with    Diabetes     3 month follow up     Back Pain     back pain, back itching X 1 month      1. Have you been to the ER, urgent care clinic since your last visit? Hospitalized since your last visit? No    2. Have you seen or consulted any other health care providers outside of the [address] [place] [address] since your last visit? Include any pap smears or colon screening.  No

## 2017-07-14 ENCOUNTER — PATIENT MESSAGE (OUTPATIENT)
Dept: FAMILY MEDICINE CLINIC | Age: 62
End: 2017-07-14

## 2017-07-14 LAB
ANA SER QL: NEGATIVE
BASOPHILS # BLD AUTO: 0 X10E3/UL (ref 0–0.2)
BASOPHILS NFR BLD AUTO: 0 %
BUN SERPL-MCNC: 15 MG/DL (ref 8–27)
BUN/CREAT SERPL: 19 (ref 12–28)
CALCIUM SERPL-MCNC: 10 MG/DL (ref 8.7–10.3)
CHLORIDE SERPL-SCNC: 103 MMOL/L (ref 96–106)
CO2 SERPL-SCNC: 23 MMOL/L (ref 18–29)
CREAT SERPL-MCNC: 0.8 MG/DL (ref 0.57–1)
CRP SERPL-MCNC: 9.8 MG/L (ref 0–4.9)
EOSINOPHIL # BLD AUTO: 0.2 X10E3/UL (ref 0–0.4)
EOSINOPHIL NFR BLD AUTO: 2 %
ERYTHROCYTE [DISTWIDTH] IN BLOOD BY AUTOMATED COUNT: 14 % (ref 12.3–15.4)
ERYTHROCYTE [SEDIMENTATION RATE] IN BLOOD BY WESTERGREN METHOD: 33 MM/HR (ref 0–40)
EST. AVERAGE GLUCOSE BLD GHB EST-MCNC: 128 MG/DL
GLUCOSE SERPL-MCNC: 114 MG/DL (ref 65–99)
HBA1C MFR BLD: 6.1 % (ref 4.8–5.6)
HCT VFR BLD AUTO: 41.1 % (ref 34–46.6)
HGB BLD-MCNC: 13.2 G/DL (ref 11.1–15.9)
IMM GRANULOCYTES # BLD: 0 X10E3/UL (ref 0–0.1)
IMM GRANULOCYTES NFR BLD: 0 %
LYMPHOCYTES # BLD AUTO: 1.7 X10E3/UL (ref 0.7–3.1)
LYMPHOCYTES NFR BLD AUTO: 25 %
MCH RBC QN AUTO: 29.7 PG (ref 26.6–33)
MCHC RBC AUTO-ENTMCNC: 32.1 G/DL (ref 31.5–35.7)
MCV RBC AUTO: 93 FL (ref 79–97)
MICROALBUMIN UR-MCNC: <3 UG/ML
MONOCYTES # BLD AUTO: 0.4 X10E3/UL (ref 0.1–0.9)
MONOCYTES NFR BLD AUTO: 6 %
NEUTROPHILS # BLD AUTO: 4.6 X10E3/UL (ref 1.4–7)
NEUTROPHILS NFR BLD AUTO: 67 %
PLATELET # BLD AUTO: 149 X10E3/UL (ref 150–379)
POTASSIUM SERPL-SCNC: 4.1 MMOL/L (ref 3.5–5.2)
RBC # BLD AUTO: 4.44 X10E6/UL (ref 3.77–5.28)
SEE BELOW:, 164879: NORMAL
SODIUM SERPL-SCNC: 142 MMOL/L (ref 134–144)
WBC # BLD AUTO: 6.9 X10E3/UL (ref 3.4–10.8)

## 2017-07-14 RX ORDER — METFORMIN HYDROCHLORIDE 500 MG/1
500 TABLET ORAL 2 TIMES DAILY WITH MEALS
Qty: 180 TAB | Refills: 3 | Status: SHIPPED | OUTPATIENT
Start: 2017-07-14 | End: 2018-02-06 | Stop reason: SINTOL

## 2017-07-14 NOTE — TELEPHONE ENCOUNTER
From: Katherine Louise MD  To: Ahsan Wood  Sent: 7/14/2017 2:08 PM EDT  Subject: labs, please respond    Hi Ms. Davida Dance,  Your labs are really very good and not suggestive of lupus. 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 It might improve over time, but I know you have had it about your whole life. The only real treatment is sun avoidance. You can also try hydrocortisone cream when it happens. I could refer you to a dermatologist for their opinion, too, if you would like. Your a1c sugar test is even better, thanks to your diet and weight loss efforts! I think one medication is plenty. Metformin is really our best diabetes medication, if you are willing to try it again. We could start with a very low dose. Just let me know. It is better for weight loss than Januvia is. Kidney, liver, electrolytes, and blood counts were good.  Keep up the excellent work!! Katherine Louise MD 7/14/2017 2:07 PM

## 2017-07-17 RX ORDER — LOSARTAN POTASSIUM AND HYDROCHLOROTHIAZIDE 12.5; 5 MG/1; MG/1
TABLET ORAL
Qty: 90 TAB | Refills: 1 | Status: SHIPPED | OUTPATIENT
Start: 2017-07-17 | End: 2017-10-07

## 2017-09-28 RX ORDER — SIMVASTATIN 20 MG/1
TABLET, FILM COATED ORAL
Qty: 90 TAB | Refills: 1 | Status: SHIPPED | OUTPATIENT
Start: 2017-09-28 | End: 2018-03-27 | Stop reason: SDUPTHER

## 2017-10-06 ENCOUNTER — OFFICE VISIT (OUTPATIENT)
Dept: FAMILY MEDICINE CLINIC | Age: 62
End: 2017-10-06

## 2017-10-06 VITALS
OXYGEN SATURATION: 97 % | DIASTOLIC BLOOD PRESSURE: 92 MMHG | HEIGHT: 66 IN | BODY MASS INDEX: 44.36 KG/M2 | HEART RATE: 75 BPM | WEIGHT: 276 LBS | TEMPERATURE: 97.3 F | SYSTOLIC BLOOD PRESSURE: 152 MMHG | RESPIRATION RATE: 18 BRPM

## 2017-10-06 DIAGNOSIS — E78.49 OTHER HYPERLIPIDEMIA: ICD-10-CM

## 2017-10-06 DIAGNOSIS — I10 BENIGN ESSENTIAL HYPERTENSION: ICD-10-CM

## 2017-10-06 DIAGNOSIS — E66.01 MORBID OBESITY WITH BMI OF 45.0-49.9, ADULT (HCC): Chronic | ICD-10-CM

## 2017-10-06 DIAGNOSIS — K21.9 GASTROESOPHAGEAL REFLUX DISEASE WITHOUT ESOPHAGITIS: ICD-10-CM

## 2017-10-06 DIAGNOSIS — E11.9 DIABETES MELLITUS TYPE 2, NONINSULIN DEPENDENT (HCC): ICD-10-CM

## 2017-10-06 DIAGNOSIS — M54.2 POSTERIOR NECK PAIN: Primary | ICD-10-CM

## 2017-10-06 DIAGNOSIS — E55.9 VITAMIN D DEFICIENCY: ICD-10-CM

## 2017-10-06 DIAGNOSIS — E03.9 HYPOTHYROIDISM, ADULT: ICD-10-CM

## 2017-10-06 RX ORDER — ZOSTER VACCINE LIVE 19400 [PFU]/.65ML
INJECTION, POWDER, LYOPHILIZED, FOR SUSPENSION SUBCUTANEOUS
Refills: 0 | COMMUNITY
Start: 2017-07-11 | End: 2017-10-07 | Stop reason: ALTCHOICE

## 2017-10-06 RX ORDER — TRIAMTERENE/HYDROCHLOROTHIAZID 37.5-25 MG
TABLET ORAL
COMMUNITY
Start: 2017-07-14 | End: 2018-01-03 | Stop reason: ALTCHOICE

## 2017-10-06 NOTE — PATIENT INSTRUCTIONS
Neck Pain: Care Instructions  Your Care Instructions  You can have neck pain anywhere from the bottom of your head to the top of your shoulders. It can spread to the upper back or arms. Injuries, painting a ceiling, sleeping with your neck twisted, staying in one position for too long, and many other activities can cause neck pain. Most neck pain gets better with home care. Your doctor may recommend medicine to relieve pain or relax your muscles. He or she may suggest exercise and physical therapy to increase flexibility and relieve stress. You may need to wear a special (cervical) collar to support your neck for a day or two. Follow-up care is a key part of your treatment and safety. Be sure to make and go to all appointments, and call your doctor if you are having problems. It's also a good idea to know your test results and keep a list of the medicines you take. How can you care for yourself at home? · Try using a heating pad on a low or medium setting for 15 to 20 minutes every 2 or 3 hours. Try a warm shower in place of one session with the heating pad. · You can also try an ice pack for 10 to 15 minutes every 2 to 3 hours. Put a thin cloth between the ice and your skin. · Take pain medicines exactly as directed. ¨ If the doctor gave you a prescription medicine for pain, take it as prescribed. ¨ If you are not taking a prescription pain medicine, ask your doctor if you can take an over-the-counter medicine. · If your doctor recommends a cervical collar, wear it exactly as directed. When should you call for help? Call your doctor now or seek immediate medical care if:  · You have new or worsening numbness in your arms, buttocks or legs. · You have new or worsening weakness in your arms or legs. (This could make it hard to stand up.)  · You lose control of your bladder or bowels.   Watch closely for changes in your health, and be sure to contact your doctor if:  · Your neck pain is getting worse.  · You are not getting better after 1 week. · You do not get better as expected. Where can you learn more? Go to http://luanne-gail.info/. Enter [id number] in the search box to learn more about \"Neck Pain: Care Instructions. \"  Current as of: March 21, 2017  Content Version: 11.3  © 8105-4878 [employer]. Care instructions adapted under license by [employer] (which disclaims liability or warranty for this information). If you have questions about a medical condition or this instruction, always ask your healthcare professional. [place] [address] any warranty or liability for your use of this information.

## 2017-10-06 NOTE — PROGRESS NOTES
\"Reviewed record in preparation for visit and have obtained the necessary documentation\"  Chief Complaint   Patient presents with    Neck Pain     possible pinched nerve on left side x 1 day. Limited ROM     Arm Pain     \"cannot lift right arm\"           1. Have you been to the ER, urgent care clinic since your last visit? Hospitalized since your last visit? No    2. Have you seen or consulted any other health care providers outside of the [address] [place] [address] since your last visit? Include any pap smears or colon screening.  No

## 2017-10-06 NOTE — MR AVS SNAPSHOT
Visit Information Date & Time Provider Department Dept. Phone Encounter #  
 10/6/2017 10:45 AM Glo Barrett MD [address] [place] [address] Road [phone] [id number] Your Appointments 10/17/2017  8:00 AM  
ROUTINE CARE with Anali Henning MD  
[facility]) Appt Note: follow up-fasting  
 [address] [phone]  
[phone]  
  
   
 [address] [address] Upcoming Health Maintenance Date Due INFLUENZA AGE 9 TO ADULT 8/1/2017 FOOT EXAM Q1 12/29/2017 HEMOGLOBIN A1C Q6M 1/13/2018 LIPID PANEL Q1 3/29/2018 MICROALBUMIN Q1 7/13/2018 PAP AKA CERVICAL CYTOLOGY 7/14/2018 BREAST CANCER SCRN MAMMOGRAM 8/4/2018 EYE EXAM RETINAL OR DILATED Q1 8/9/2018 DTaP/Tdap/Td series (2 - Td) 9/20/2021 COLONOSCOPY 5/20/2025 Allergies as of 10/6/2017  Review Complete On: 10/6/2017 By: Delaney Millard LPN Severity Noted Reaction Type Reactions Avelox [Moxifloxacin]  03/15/2010   Side Effect Rash, Itching Bactrim [Sulfamethoxazole-trimethoprim]  03/15/2010   Side Effect Rash Doxycycline  03/14/2013    Other (comments) Tongue discomfort, swollen glands Lisinopril  03/15/2010   Intolerance Diarrhea Norvasc [Amlodipine]  03/15/2010   Side Effect Other (comments) edema Penicillin G  02/22/2010    Hives, Shortness of Breath, Swelling Current Immunizations  Reviewed on 10/6/2017 Name Date Hepatitis A Vaccine 4/24/2001 Influenza Vaccine 11/3/2016, 10/22/2013 Pneumococcal Polysaccharide (PPSV-23) 11/4/2016 TD Vaccine 3/22/2000 TDAP Vaccine 9/20/2011 Zoster Vaccine, Live 7/8/2017 Reviewed by Jeanne Claudio LPN on 51/4/6642 at 78:86 AM  
Vitals BP Pulse Temp Resp Height(growth percentile) Weight(growth percentile)  (!) 152/92 (BP 1 Location: Right arm, BP Patient Position: Sitting) 75 97.3 °F (36.3 °C) (Oral) 18 5' 5.5\" (1.664 m) 276 lb (125.2 kg) LMP SpO2 BMI OB Status Smoking Status 12/24/2007 97% 45.23 kg/m2 Postmenopausal Never Smoker Vitals History BMI and BSA Data Body Mass Index Body Surface Area  
 45.23 kg/m 2 2.41 m 2 Preferred Pharmacy Pharmacy Name Phone [address] [facility] [phone] Your Updated Medication List  
  
   
This list is accurate as of: 10/6/17 11:43 AM.  Always use your most recent med list.  
  
  
  
  
 albuterol 90 mcg/actuation inhaler Commonly known as:  PROVENTIL HFA, VENTOLIN HFA, PROAIR HFA Take 2 Puffs by inhalation every four (4) hours as needed for Wheezing. CINNAMON PO Take 350 mg by mouth daily. coenzyme q10-vitamin e 100-100 mg-unit Cap Take 200 mg by mouth daily. FISH OIL 1,200-144-216 mg Cap Generic drug:  fish oil-dha-epa Take 1 Tab by mouth. Takes ~ twice a week INVOKANA 300 mg tablet Generic drug:  canagliflozin TAKE 1 TABLET EVERY DAY  
  
 JANUVIA 100 mg tablet Generic drug:  SITagliptin TAKE 1 TABLET EVERY DAY  
  
 losartan-hydroCHLOROthiazide 50-12.5 mg per tablet Commonly known as:  HYZAAR  
TAKE 1 TABLET DAILY  
  
 metFORMIN 500 mg tablet Commonly known as:  GLUCOPHAGE Take 1 Tab by mouth two (2) times daily (with meals). Replaces Januvia  
  
 metoprolol succinate 50 mg XL tablet Commonly known as:  TOPROL-XL  
TAKE 1 TABLET DAILY FOR HYPERTENSION  
  
 multivitamin tablet Commonly known as:  ONE A DAY Take 1 Tab by mouth daily. Includes vit D 1,000 units  
  
 naproxen 500 mg tablet Commonly known as:  NAPROSYN  
TAKE 1 TAB BY MOUTH TWO (2) TIMES DAILY AS NEEDED FOR KNEE PAIN  
  
 simvastatin 20 mg tablet Commonly known as:  ZOCOR  
TAKE 1 TABLET NIGHTLY  
  
 triamcinolone acetonide 0.1 % topical cream  
Commonly known as:  KENALOG  
use thin layer on back twice daily triamterene-hydroCHLOROthiazide 37.5-25 mg per tablet Commonly known as:  Rosa Lula UNITHROID 75 mcg tablet Generic drug:  levothyroxine TAKE 1 TABLET DAILY BEFORE BREAKFAST  
  
 varicella-zoster vacine live 19,400 unit/0.65 mL Susr injection Generic drug:  varicella zoster vacine live  
TO BE ADMINISTERED BY PHARMACIST FOR IMMUNIZATION Patient Instructions Neck Pain: Care Instructions Your Care Instructions You can have neck pain anywhere from the bottom of your head to the top of your shoulders. It can spread to the upper back or arms. Injuries, painting a ceiling, sleeping with your neck twisted, staying in one position for too long, and many other activities can cause neck pain. Most neck pain gets better with home care. Your doctor may recommend medicine to relieve pain or relax your muscles. He or she may suggest exercise and physical therapy to increase flexibility and relieve stress. You may need to wear a special (cervical) collar to support your neck for a day or two. Follow-up care is a key part of your treatment and safety. Be sure to make and go to all appointments, and call your doctor if you are having problems. It's also a good idea to know your test results and keep a list of the medicines you take. How can you care for yourself at home? · Try using a heating pad on a low or medium setting for 15 to 20 minutes every 2 or 3 hours. Try a warm shower in place of one session with the heating pad. · You can also try an ice pack for 10 to 15 minutes every 2 to 3 hours. Put a thin cloth between the ice and your skin. · Take pain medicines exactly as directed. ¨ If the doctor gave you a prescription medicine for pain, take it as prescribed. ¨ If you are not taking a prescription pain medicine, ask your doctor if you can take an over-the-counter medicine. · If your doctor recommends a cervical collar, wear it exactly as directed. When should you call for help? Call your doctor now or seek immediate medical care if: 
· You have new or worsening numbness in your arms, buttocks or legs. · You have new or worsening weakness in your arms or legs. (This could make it hard to stand up.) · You lose control of your bladder or bowels. Watch closely for changes in your health, and be sure to contact your doctor if: 
· Your neck pain is getting worse. · You are not getting better after 1 week. · You do not get better as expected. Where can you learn more? Go to http://luanne-gail.info/. Enter [id number] in the search box to learn more about \"Neck Pain: Care Instructions. \" Current as of: March 21, 2017 Content Version: 11.3 © 9459-1951 [employer]. Care instructions adapted under license by [employer] (which disclaims liability or warranty for this information). If you have questions about a medical condition or this instruction, always ask your healthcare professional. [place] [address] any warranty or liability for your use of this information. Introducing [facility]! Dear Junior Kim: 
Thank you for requesting a Apiphany account. Our records indicate that you already have an active Apiphany account. You can access your account anytime at [email or web address] Did you know that you can access your hospital and ER discharge instructions at any time in Apiphany? You can also review all of your test results from your hospital stay or ER visit. Additional Information If you have questions, please visit the Frequently Asked Questions section of the Apiphany website at [email or web address] Remember, Apiphany is NOT to be used for urgent needs. For medical emergencies, dial 911. Now available from your iPhone and Android! Please provide this summary of care documentation to your next provider. Your primary care clinician is listed as SONAM BILLY. If you have any questions after today's visit, please call [phone].

## 2017-10-06 NOTE — PROGRESS NOTES
HISTORY OF PRESENT ILLNESS  HPI  Stan Mariscal is a 58 y.o. female with history of HTN, GERD, DM-II, hypothyroidism, hyperlipidemia, and vitamin D deficiency who presents to office today for neck pain. Pt complains of left-sided neck pain aggravated by movement and lifting her right arm. She states the pain began yesterday after keeping her neck tilted to the left while she listened to a call from a phone she had balanced on her left shoulder. She denies radiation of the pain.       Past Medical History:   Diagnosis Date    Anemia NEC     AR (allergic rhinitis)     Asthma     mild    Benign essential hypertension 2016    Diabetes mellitus type 2, noninsulin dependent ([address] 12/3/2015    Diabetes mellitus, type 2 ([address] 2013    FHx: breast cancer 3/2/2012    GERD (gastroesophageal reflux disease) 11/00    H/O Mild, Intermittent Asthma     H/O Optic disc edema, [id number] 3/2/2012    History of benign left ovarian cyst 3/2/2012    Hx of complete eye exam 2017    Dr Marcela Carrasquillo intraoular pressure and no evidence of diabetic retinopathy    Hyperlipidemia 2002    Hypertension     Hypothyroidism     Hypothyroidism 2015    2002    Impaired fasting glucose 3/31/2012    Mixed hyperlipidemia 2016    Morbid obesity with BMI of 45.0-49.9, adult (HCC)      (normal spontaneous vaginal delivery)         Ovarian cyst     Left, benign    Postmenopause     LMP  (HRT x 6months)    Rheumatoid arthritis(714.0)     S/P colonoscopy     Normal (10yrs)    Skin cancer screening exams, Derm Dr Sheri Villatoro 3/2/2012    Vitamin D deficiency 3/2/2012     Past Surgical History:   Procedure Laterality Date    COLONOSCOPY  2005    normal, ok x 10 yrs, Dr Ze Fonseca      Normal    HX BREAST LUMPECTOMY      Left, benign (Dr. Radha Barnes)    HX CERVICAL POLYPECTOMY  14, Dr Daniel Lopez HX COLONOSCOPY  2014 Dr Vianca Adams polyps x 3; repeat 2015    HX COLONOSCOPY  05/20/2015    poor prep, repeat in one year    HX COLONOSCOPY  08/2016    normal, follow up 10 yrs   Manoj Boyle 10    childbirth    HX HEENT  5/2010    tumor on vocal cord removed    HX HERNIA REPAIR  4/22/16, Dr Johnny Ibrahim    robotic assisted umbilical hernia repair with mesh     HX LIPOMA RESECTION  ~2005    removed from left thigh    HX ORTHOPAEDIC  1985    tumor remved left leg    HX OTHER SURGICAL  11/5/15, Dr David Alford    2 masses removed left lower leg    MISAEL MAMMOGRAM DIGITAL BILATERAL  annually per Gyn    at [facility]  2007    1-15% Stenosis Bilaterally     Current Outpatient Prescriptions on File Prior to Visit   Medication Sig Dispense Refill    simvastatin (ZOCOR) 20 mg tablet TAKE 1 TABLET NIGHTLY 90 Tab 1    metoprolol succinate (TOPROL-XL) 50 mg XL tablet TAKE 1 TABLET DAILY FOR HYPERTENSION 90 Tab 0    metFORMIN (GLUCOPHAGE) 500 mg tablet Take 1 Tab by mouth two (2) times daily (with meals). Replaces Januvia 180 Tab 3    naproxen (NAPROSYN) 500 mg tablet TAKE 1 TAB BY MOUTH TWO (2) TIMES DAILY AS NEEDED FOR KNEE PAIN  0    triamcinolone acetonide (KENALOG) 0.1 % topical cream use thin layer on back twice daily 45 g 1    albuterol (PROVENTIL HFA, VENTOLIN HFA, PROAIR HFA) 90 mcg/actuation inhaler Take 2 Puffs by inhalation every four (4) hours as needed for Wheezing. 1 Inhaler 1    UNITHROID 75 mcg tablet TAKE 1 TABLET DAILY BEFORE BREAKFAST 90 Tab 3    CINNAMON BARK (CINNAMON PO) Take 350 mg by mouth daily.  coenzyme q10-vitamin e 100-100 mg-unit cap Take 200 mg by mouth daily.  multivitamin (ONE A DAY) tablet Take 1 Tab by mouth daily. Includes vit D 1,000 units      fish oil-dha-epa (FISH OIL) 1,200-144-216 mg Cap Take 1 Tab by mouth. Takes ~ twice a week       No current facility-administered medications on file prior to visit.       Allergies   Allergen Reactions    Avelox [Moxifloxacin] Rash and Itching    Bactrim [Sulfamethoxazole-Trimethoprim] Rash    Doxycycline Other (comments)     Tongue discomfort, swollen glands    Lisinopril Diarrhea    Norvasc [Amlodipine] Other (comments)     edema    Penicillin G Hives, Shortness of Breath and Swelling     Family History   Problem Relation Age of Onset    Breast Cancer Mother      diagnosed age 72, survived it    [facility] Arthritis-rheumatoid Mother    [facility] Stroke Mother     Parkinsonism Mother       in nursing from colon perf or GI bleed age 80    Hypertension Mother    [facility] Migraines Mother     Breast Cancer Maternal Grandmother       in her late 42's    Dementia Father     Heart Disease Father      CAD, PTCA    Stroke Father     Cancer Brother 68     soft tissue cancer    Cancer Brother 68     kidney and lungs    Hypertension Sister     Breast Cancer Maternal Aunt     High Cholesterol Son     Cancer Other      maternal first cousin w/ sarcoma of leg    Anesth Problems Neg Hx      Social History     Social History    Marital status:      Spouse name: N/A    Number of children: 1    Years of education: N/A     Occupational History          Social History Main Topics    Smoking status: Never Smoker    Smokeless tobacco: Never Used    Alcohol use No      Comment: very rare, 1-2 drinks a year    Drug use: No    Sexual activity: Yes     Partners: Male     Birth control/ protection: None     Other Topics Concern    Caffeine Concern No     decaff tea    Special Diet No    Exercise No     Social History Narrative    Works as              Review of Systems   Constitutional: Negative for chills, diaphoresis, fever, malaise/fatigue and weight loss. Eyes: Negative for blurred vision, double vision, pain and redness. Respiratory: Negative for cough, shortness of breath and wheezing. Cardiovascular: Negative for chest pain, palpitations, orthopnea, claudication, leg swelling and PND.    Musculoskeletal: Positive for neck pain (left). Skin: Negative for itching and rash. Neurological: Negative for dizziness, tingling, tremors, sensory change, speech change, focal weakness, seizures, loss of consciousness, weakness and headaches. Results for orders placed or performed in visit on 07/13/17   MICROALBUMIN, UR, RAND   Result Value Ref Range    Microalbumin, urine <3.0 Not Estab. ug/mL   HEMOGLOBIN A1C WITH EAG   Result Value Ref Range    Hemoglobin A1c 6.1 (H) 4.8 - 5.6 %    Estimated average glucose 128 mg/dL   CBC WITH AUTOMATED DIFF   Result Value Ref Range    WBC 6.9 3.4 - 10.8 x10E3/uL    RBC 4.44 3.77 - 5.28 x10E6/uL    HGB 13.2 11.1 - 15.9 g/dL    HCT 41.1 34.0 - 46.6 %    MCV 93 79 - 97 fL    MCH 29.7 26.6 - 33.0 pg    MCHC 32.1 31.5 - 35.7 g/dL    RDW 14.0 12.3 - 15.4 %    PLATELET 030 (L) 239 - 379 x10E3/uL    NEUTROPHILS 67 %    Lymphocytes 25 %    MONOCYTES 6 %    EOSINOPHILS 2 %    BASOPHILS 0 %    ABS. NEUTROPHILS 4.6 1.4 - 7.0 x10E3/uL    Abs Lymphocytes 1.7 0.7 - 3.1 x10E3/uL    ABS. MONOCYTES 0.4 0.1 - 0.9 x10E3/uL    ABS. EOSINOPHILS 0.2 0.0 - 0.4 x10E3/uL    ABS. BASOPHILS 0.0 0.0 - 0.2 x10E3/uL    IMMATURE GRANULOCYTES 0 %    ABS. IMM.  GRANS. 0.0 0.0 - 0.1 I09L3/XT   METABOLIC PANEL, BASIC   Result Value Ref Range    Glucose 114 (H) 65 - 99 mg/dL    BUN 15 8 - 27 mg/dL    Creatinine 0.80 0.57 - 1.00 mg/dL    GFR est non-AA 80 >59 mL/min/1.73    GFR est AA 92 >59 mL/min/1.73    BUN/Creatinine ratio 19 12 - 28    Sodium 142 134 - 144 mmol/L    Potassium 4.1 3.5 - 5.2 mmol/L    Chloride 103 96 - 106 mmol/L    CO2 23 18 - 29 mmol/L    Calcium 10.0 8.7 - 10.3 mg/dL   LEELEE QL, W/REFLEX CASCADE   Result Value Ref Range    LEELEE Direct Negative Negative    See below Comment    SED RATE (ESR)   Result Value Ref Range    Sed rate (ESR) 33 0 - 40 mm/hr   C REACTIVE PROTEIN, QT   Result Value Ref Range    C-Reactive Protein, Qt 9.8 (H) 0.0 - 4.9 mg/L             Physical Exam  Visit Vitals    BP (!) 152/92 (BP 1 Location: Right arm, BP Patient Position: Sitting)    Pulse 75    Temp 97.3 °F (36.3 °C) (Oral)    Resp 18    Ht 5' 5.5\" (1.664 m)    Wt 276 lb (125.2 kg)    LMP 12/24/2007    SpO2 97%    BMI 45.23 kg/m2     Neck: she complains of discomfort in the left posterior neck area with almost any type of rapid ROM but the worst pain comes from rotating to her left side, hyperextension is next then flexion is less painful. She has full ROM of both shoulders without any aggravation of the pain unless she turns her head at the same time, the pain does not go down into the arm at all  Neurologic: grossly non-focal        ASSESSMENT and PLAN    ICD-10-CM ICD-9-CM    1. Posterior neck pain- left side only M54.2 723.1    2. Diabetes mellitus type 2, noninsulin dependent ([facility]) E11.9 250.00    3. Other hyperlipidemia E78.4 272.4    4. Gastroesophageal reflux disease without esophagitis K21.9 530.81    5. Vitamin D deficiency E55.9 268.9    6. Hypothyroidism E03.9 244.9    7. Benign essential hypertension I10 401.1 triamterene-hydroCHLOROthiazide (MAXZIDE) 37.5-25 mg per tablet   8. Morbid obesity with BMI of 45.0-49.9, adult ([facility] E66.01 278.01     Z68.42 V85.42      Diagnoses and all orders for this visit:    1. Posterior neck pain- left side only    2. Diabetes mellitus type 2, noninsulin dependent ([facility]    3. Other hyperlipidemia    4. Gastroesophageal reflux disease without esophagitis    5. Vitamin D deficiency    6. Hypothyroidism    7. Benign essential hypertension    8. Morbid obesity with BMI of 45.0-49.9, adult ([facility]      Follow-up Disposition:  Return if symptoms worsen or fail to improve. reviewed medications and side effects in detail  Please call my office if there are any questions- [phone]. Discussed expected course/resolution/complications of diagnosis in detail with patient. Medication risks/benefits/costs/interactions/alternatives discussed with patient.    Pt was given an after visit summary which includes diagnoses, current medications & vitals. Pt expressed understanding with the diagnosis and plan. Total 25 minutes,60 % counseling re: Patient to call if no better in 3 -4 days and prn new problems. I informed pt that this appears to be due to the cartilage between some of her vertebra getting inflamed from her neck position described above. I suggested that she try ice or heat, whichever seems to help the most, and start some gentle ROM exercises, and I gave her three different types. She may use two Aleve BID. If she's not improving each day, she should call back for a referral to PT. Extra Strength Tylenol 500 mg 1-2 every 6 hours as needed for pain. NSAIDs of choice OTC to add to ES Tylenol prn. The benefit/risk, onset of action, method of taking, and side effects of these medication discussed in detail with the patient. Avoid high impact activities in general for the neck pain. Also, discussed symptoms of concern that were noted today in the note above, treatment options( including doing nothing), when to follow up before recommended time frame. Also, answered all questions. This document was written by Liudmila Keene, as dictated by Melanie Shaver MD.  I have reviewed and agree with the above note and have made corrections where appropriate Mg Lara M.D.

## 2017-10-07 PROBLEM — E66.01 MORBID OBESITY WITH BMI OF 45.0-49.9, ADULT (HCC): Chronic | Status: ACTIVE | Noted: 2017-10-07

## 2017-12-02 RX ORDER — METOPROLOL SUCCINATE 50 MG/1
TABLET, EXTENDED RELEASE ORAL
Qty: 90 TAB | Refills: 0 | Status: SHIPPED | OUTPATIENT
Start: 2017-12-02 | End: 2018-02-28 | Stop reason: SDUPTHER

## 2018-02-06 ENCOUNTER — OFFICE VISIT (OUTPATIENT)
Dept: FAMILY MEDICINE CLINIC | Age: 63
End: 2018-02-06

## 2018-02-06 VITALS
WEIGHT: 275.5 LBS | HEART RATE: 54 BPM | TEMPERATURE: 96.6 F | HEIGHT: 66 IN | OXYGEN SATURATION: 97 % | RESPIRATION RATE: 16 BRPM | SYSTOLIC BLOOD PRESSURE: 138 MMHG | DIASTOLIC BLOOD PRESSURE: 84 MMHG | BODY MASS INDEX: 44.27 KG/M2

## 2018-02-06 DIAGNOSIS — E03.9 HYPOTHYROIDISM, ADULT: ICD-10-CM

## 2018-02-06 DIAGNOSIS — M25.562 CHRONIC PAIN OF LEFT KNEE: ICD-10-CM

## 2018-02-06 DIAGNOSIS — E66.01 MORBID OBESITY WITH BMI OF 45.0-49.9, ADULT (HCC): Chronic | ICD-10-CM

## 2018-02-06 DIAGNOSIS — Z00.00 PREVENTATIVE HEALTH CARE: Primary | ICD-10-CM

## 2018-02-06 DIAGNOSIS — E78.2 MIXED HYPERLIPIDEMIA: ICD-10-CM

## 2018-02-06 DIAGNOSIS — H25.9 SENILE CATARACT OF LEFT EYE, UNSPECIFIED AGE-RELATED CATARACT TYPE: ICD-10-CM

## 2018-02-06 DIAGNOSIS — M25.551 RIGHT-SIDED ISCHIAL PAIN: ICD-10-CM

## 2018-02-06 DIAGNOSIS — G89.29 CHRONIC PAIN OF LEFT KNEE: ICD-10-CM

## 2018-02-06 DIAGNOSIS — E11.9 DIABETES MELLITUS TYPE 2, NONINSULIN DEPENDENT (HCC): ICD-10-CM

## 2018-02-06 DIAGNOSIS — I10 BENIGN ESSENTIAL HYPERTENSION: ICD-10-CM

## 2018-02-06 DIAGNOSIS — E78.49 OTHER HYPERLIPIDEMIA: ICD-10-CM

## 2018-02-06 RX ORDER — LOSARTAN POTASSIUM AND HYDROCHLOROTHIAZIDE 12.5; 1 MG/1; MG/1
1 TABLET ORAL DAILY
Qty: 30 TAB | Refills: 0 | Status: SHIPPED | OUTPATIENT
Start: 2018-02-06 | End: 2018-03-24 | Stop reason: SDUPTHER

## 2018-02-06 RX ORDER — LOSARTAN POTASSIUM AND HYDROCHLOROTHIAZIDE 12.5; 1 MG/1; MG/1
1 TABLET ORAL DAILY
Qty: 90 TAB | Refills: 3 | Status: SHIPPED | OUTPATIENT
Start: 2018-02-06 | End: 2018-02-06 | Stop reason: SDUPTHER

## 2018-02-06 NOTE — PROGRESS NOTES
Chief Complaint   Patient presents with    Complete Physical     fasting today       Reviewed Record in preparation for visit and have obtained necessary documentation. Identified pt with two pt identifiers (Name @ )    Health Maintenance Due   Topic    Influenza Age 5 to Adult     FOOT EXAM Q1     HEMOGLOBIN A1C Q6M          1. Have you been to the ER, urgent care clinic since your last visit? Hospitalized since your last visit? No    2. Have you seen or consulted any other health care providers outside of the [address] [place] [address] since your last visit? Include any pap smears or colon screening.  No

## 2018-02-06 NOTE — MR AVS SNAPSHOT
[address] 
[phone] Patient: Venita Mendez MRN: [id number] SWO:[id number] Visit Information Date & Time Provider Department Dept. Phone Encounter #  
 2/6/2018  9:15 AM Devyn Chung, [address] [facility] [phone] [id number] Follow-up Instructions Return in about 3 months (around 5/6/2018) for Follow up. Upcoming Health Maintenance Date Due  
 FOOT EXAM Q1 12/29/2017 HEMOGLOBIN A1C Q6M 1/13/2018 LIPID PANEL Q1 3/29/2018 MICROALBUMIN Q1 7/13/2018 BREAST CANCER SCRN MAMMOGRAM 8/4/2018 EYE EXAM RETINAL OR DILATED Q1 8/9/2018 PAP AKA CERVICAL CYTOLOGY 7/14/2020 DTaP/Tdap/Td series (2 - Td) 9/20/2021 COLONOSCOPY 5/20/2025 Allergies as of 2/6/2018  Review Complete On: 2/6/2018 By: Devyn Chung MD  
  
 Severity Noted Reaction Type Reactions Avelox [Moxifloxacin]  03/15/2010   Side Effect Rash, Itching Bactrim [Sulfamethoxazole-trimethoprim]  03/15/2010   Side Effect Rash Doxycycline  03/14/2013    Other (comments) Tongue discomfort, swollen glands Lisinopril  03/15/2010   Intolerance Diarrhea Norvasc [Amlodipine]  03/15/2010   Side Effect Other (comments) edema Penicillin G  02/22/2010    Hives, Shortness of Breath, Swelling Current Immunizations  Reviewed on 2/1/2018 Name Date Hepatitis A Vaccine 4/24/2001 Influenza Vaccine 11/3/2016, 10/22/2013 Pneumococcal Polysaccharide (PPSV-23) 11/4/2016 TD Vaccine 3/22/2000 TDAP Vaccine 9/20/2011 Zoster Vaccine, Live 7/11/2017 12:00 AM  
  
 Not reviewed this visit You Were Diagnosed With   
  
 Codes Comments Preventative health care    -  Primary ICD-10-CM: Z00.00 ICD-9-CM: V70.0 Diabetes mellitus type 2, noninsulin dependent ([facility]     ICD-10-CM: E11.9 ICD-9-CM: 250.00  Morbid obesity with BMI of 45.0-49.9, adult (HCC)     ICD-10-CM: E66.01, T54.33 
ICD-9-CM: 278.01, V85.42 Benign essential hypertension     ICD-10-CM: I10 
ICD-9-CM: 401.1 Mixed hyperlipidemia     ICD-10-CM: E78.2 ICD-9-CM: 272.2 Hypothyroidism, adult     ICD-10-CM: E03.9 ICD-9-CM: 570. 9 Chronic pain of left knee     ICD-10-CM: M25.562, G89.29 ICD-9-CM: 719.46, 338.29 Other hyperlipidemia     ICD-10-CM: E78.4 ICD-9-CM: 272.4 Senile cataract of left eye, unspecified age-related cataract type     ICD-10-CM: H25.9 ICD-9-CM: 366.10 Right-sided ischial pain     ICD-10-CM: M25.551 ICD-9-CM: 719.45 Vitals BP Pulse Temp Resp Height(growth percentile) Weight(growth percentile) 138/84 (!) 54 96.6 °F (35.9 °C) (Oral) 16 5' 5.5\" (1.664 m) 275 lb 8 oz (125 kg) LMP SpO2 BMI OB Status Smoking Status 12/24/2007 97% 45.15 kg/m2 Postmenopausal Never Smoker Vitals History BMI and BSA Data Body Mass Index Body Surface Area  
 45.15 kg/m 2 2.4 m 2 Preferred Pharmacy Pharmacy Name Phone [id number] [phone] Your Updated Medication List  
  
   
This list is accurate as of: 2/6/18 10:24 AM.  Always use your most recent med list.  
  
  
  
  
 albuterol 90 mcg/actuation inhaler Commonly known as:  PROVENTIL HFA, VENTOLIN HFA, PROAIR HFA Take 2 Puffs by inhalation every four (4) hours as needed for Wheezing. CINNAMON PO Take 350 mg by mouth daily. coenzyme q10-vitamin e 100-100 mg-unit Cap Take 200 mg by mouth daily. dulaglutide 0.75 mg/0.5 mL sub-q pen Commonly known as:  TRULICITY  
0.5 mL by SubCUTAneous route every seven (7) days. FISH OIL 1,200-144-216 mg Cap Generic drug:  fish oil-dha-epa Take 1 Tab by mouth. Takes ~ twice a week  
  
 losartan-hydroCHLOROthiazide 100-12.5 mg per tablet Commonly known as:  HYZAAR Take 1 Tab by mouth daily. metoprolol succinate 50 mg XL tablet Commonly known as:  TOPROL-XL  
 TAKE 1 TABLET DAILY FOR HYPERTENSION  
  
 multivitamin tablet Commonly known as:  ONE A DAY Take 1 Tab by mouth daily. Includes vit D 1,000 units  
  
 naproxen 500 mg tablet Commonly known as:  NAPROSYN  
TAKE 1 TAB BY MOUTH TWO (2) TIMES DAILY AS NEEDED FOR KNEE PAIN  
  
 simvastatin 20 mg tablet Commonly known as:  ZOCOR  
TAKE 1 TABLET NIGHTLY  
  
 triamcinolone acetonide 0.1 % topical cream  
Commonly known as:  KENALOG  
use thin layer on back twice daily UNITHROID 75 mcg tablet Generic drug:  levothyroxine TAKE 1 TABLET DAILY BEFORE BREAKFAST Prescriptions Sent to Pharmacy Refills  
 dulaglutide (TRULICITY) 5.76 UG/6.7 mL sub-q pen 6 Si.5 mL by SubCUTAneous route every seven (7) days. Class: Normal  
 Pharmacy: [address] [place] [address] Ph #: [phone] Route: SubCUTAneous  
 losartan-hydroCHLOROthiazide (HYZAAR) 100-12.5 mg per tablet 0 Sig: Take 1 Tab by mouth daily. Class: Normal  
 Pharmacy: [address] [place] [address] Ph #: [phone] Route: Oral  
  
We Performed the Following HEMOGLOBIN A1C WITH EAG [38367 CPT(R)]  DIABETES FOOT EXAM [7 Custom] LIPID PANEL [17388 CPT(R)] METABOLIC PANEL, COMPREHENSIVE [97238 CPT(R)] TSH 3RD GENERATION [02040 CPT(R)] Follow-up Instructions Return in about 3 months (around 2018) for Follow up. Patient Instructions Try taking your metoprolol at night and see if that helps Go back to your eye doctor if that doesn't help. Increase the losartan to 100/12.5mg 
 
  
Learning About Meal Planning for Diabetes Why plan your meals? Meal planning can be a key part of managing diabetes. Planning meals and snacks with the right balance of carbohydrate, protein, and fat can help you keep your blood sugar at the target level you set with your doctor. You don't have to eat special foods. You can eat what your family eats, including sweets once in a while. But you do have to pay attention to how often you eat and how much you eat of certain foods. You may want to work with a dietitian or a certified diabetes educator. He or she can give you tips and meal ideas and can answer your questions about meal planning. This health professional can also help you reach a healthy weight if that is one of your goals. What plan is right for you? Your dietitian or diabetes educator may suggest that you start with the plate format or carbohydrate counting. The plate format The plate format is a simple way to help you manage how you eat. You plan meals by learning how much space each food should take on a plate. Using the plate format helps you spread carbohydrate throughout the day. It can make it easier to keep your blood sugar level within your target range. It also helps you see if you're eating healthy portion sizes. To use the plate format, you put non-starchy vegetables on half your plate. Add meat or meat substitutes on one-quarter of the plate. Put a grain or starchy vegetable (such as brown rice or a potato) on the final quarter of the plate. You can add a small piece of fruit and some low-fat or fat-free milk or yogurt, depending on your carbohydrate goal for each meal. 
Here are some tips for using the plate format: · Make sure that you are not using an oversized plate. A 9-inch plate is best. Many restaurants use larger plates. · Get used to using the plate format at home. Then you can use it when you eat out. · Write down your questions about using the plate format. Talk to your doctor, a dietitian, or a diabetes educator about your concerns. Carbohydrate counting With carbohydrate counting, you plan meals based on the amount of carbohydrate in each food.  Carbohydrate raises blood sugar higher and more quickly than any other nutrient. It is found in desserts, breads and cereals, and fruit. It's also found in starchy vegetables such as potatoes and corn, grains such as rice and pasta, and milk and yogurt. Spreading carbohydrate throughout the day helps keep your blood sugar levels within your target range. Your daily amount depends on several things, including your weight, how active you are, which diabetes medicines you take, and what your goals are for your blood sugar levels. A registered dietitian or diabetes educator can help you plan how much carbohydrate to include in each meal and snack. A guideline for your daily amount of carbohydrate is: · 45 to 60 grams at each meal. That's about the same as 3 to 4 carbohydrate servings. · 15 to 20 grams at each snack. That's about the same as 1 carbohydrate serving. The Nutrition Facts label on packaged foods tells you how much carbohydrate is in a serving of the food. First, look at the serving size on the food label. Is that the amount you eat in a serving? All of the nutrition information on a food label is based on that serving size. So if you eat more or less than that, you'll need to adjust the other numbers. Total carbohydrate is the next thing you need to look for on the label. If you count carbohydrate servings, one serving of carbohydrate is 15 grams. For foods that don't come with labels, such as fresh fruits and vegetables, you'll need a guide that lists carbohydrate in these foods. Ask your doctor, dietitian, or diabetes educator about books or other nutrition guides you can use. If you take insulin, you need to know how many grams of carbohydrate are in a meal. This lets you know how much rapid-acting insulin to take before you eat. If you use an insulin pump, you get a constant rate of insulin during the day. So the pump must be programmed at meals to give you extra insulin to cover the rise in blood sugar after meals. When you know how much carbohydrate you will eat, you can take the right amount of insulin. Or, if you always use the same amount of insulin, you need to make sure that you eat the same amount of carbohydrate at meals. If you need more help to understand carbohydrate counting and food labels, ask your doctor, dietitian, or diabetes educator. How do you get started with meal planning? Here are some tips to get started: 
· Plan your meals a week at a time. Don't forget to include snacks too. · Use cookbooks or online recipes to plan several main meals. Plan some quick meals for busy nights. You also can double some recipes that freeze well. Then you can save half for other busy nights when you don't have time to cook. · Make sure you have the ingredients you need for your recipes. If you're running low on basic items, put these items on your shopping list too. · List foods that you use to make breakfasts, lunches, and snacks. List plenty of fruits and vegetables. · Post this list on the refrigerator. Add to it as you think of more things you need. · Take the list to the store to do your weekly shopping. Follow-up care is a key part of your treatment and safety. Be sure to make and go to all appointments, and call your doctor if you are having problems. It's also a good idea to know your test results and keep a list of the medicines you take. Where can you learn more? Go to http://luanne-gail.info/. Abeba Argueta in the search box to learn more about \"Learning About Meal Planning for Diabetes. \" Current as of: March 13, 2017 Content Version: 11.4 © 3273-7773 [employer]. Care instructions adapted under license by [employer] (which disclaims liability or warranty for this information).  If you have questions about a medical condition or this instruction, always ask your healthcare professional. [employer] disclaims any warranty or liability for your use of this information. Introducing [facility] [place] [facility]! Dear Nilda Singh: 
Thank you for requesting a [employer] account. Our records indicate that you already have an active [employer] account. You can access your account anytime at [email or web address] Did you know that you can access your hospital and ER discharge instructions at any time in [employer]? You can also review all of your test results from your hospital stay or ER visit. Additional Information If you have questions, please visit the Frequently Asked Questions section of the [employer] website at [email or web address] Remember, [employer] is NOT to be used for urgent needs. For medical emergencies, dial 911. Now available from your iPhone and Android! Please provide this summary of care documentation to your next provider. Your primary care clinician is listed as SONAM BILLY. If you have any questions after today's visit, please call [phone].

## 2018-02-06 NOTE — PROGRESS NOTES
Kelvin [address] [facility] Kimberly. [place], [address]  [phone]             Date of visit: 2/6/18   Subjective:      History obtained from:  the patient. Aleida Rock is a 58 y.o. female who presents today for physical, diabetes    Used to be on Zoe   had stopped them due to low a1c, no longer needed,w as willing to try metformin again last time I saw her. Switched to metformin but could never get used to it, just lost of diarrhea  Even one per day was too much  Interested in trying trulicity, which several of her friends have done well with    Weight stable but wanting to lose    Doesn't eat a lot of candy  Eating more fruit than she should have, maybe some blueberries daily and at least half an apple  Tries to eat only 2 bananas per week  Occasional pears and peaches  When she snacks she snacks on fruit or a 4 pack nabs that is her lunch    Knows she needs to lose weight    Allergies are starting, this is her time, usually a cough drop takes care of it      Left knee still bothers her but not in the summer  Uses naproxen every other day in the winter, works well, understands it has some risks but needs it to function. meloxicam doesn't work for her  Does still intend to lose weight    Right ischial pain since April on and off but when she is doing heavy work at home up and down the ladder it will flare up. Points to ischial tuberosity    Not much exercise although do 3 flights steps at work regularly, trying to do more, going to 4 flights, several times per day, at least 10x per day, knee doesn't hurt going up but does hurt going down so takes elevator to go down.   Has a gym at work she could use    Patient Active Problem List    Diagnosis Date Noted    Morbid obesity with BMI of 45.0-49.9, adult ([facility] [address] 10/07/2017    Mixed hyperlipidemia 05/31/2016    Benign essential hypertension 01/63/5044    Umbilical hernia without obstruction and without gangrene 2016    Diabetes mellitus type 2, noninsulin dependent ([facility] [address] 2015    Hypothyroidism 2015    Actinic keratosis 2014    Abdominal wall bulge 2013    Vitamin D deficiency 2012    FHx: breast cancer 2012    History of benign left ovarian cyst, 2000 2012    Skin cancer screening exams, Derm Dr Burt Jenkins 2012    H/O Optic disc edema, [id number]    Hyperlipidemia 03/15/2010    AR (allergic rhinitis)     GERD (gastroesophageal reflux disease)     H/O Mild, Intermittent Asthma     Rheumatoid arthritis(714.0)     Postmenopause, LMP , age 49 yo, s/p HRT x 6 months     Ovarian cyst      (normal spontaneous vaginal delivery)     Metabolic syndrome      Current Outpatient Prescriptions on File Prior to Visit   Medication Sig Dispense Refill    metoprolol succinate (TOPROL-XL) 50 mg XL tablet TAKE 1 TABLET DAILY FOR HYPERTENSION 90 Tab 0    UNITHROID 75 mcg tablet TAKE 1 TABLET DAILY BEFORE BREAKFAST 90 Tab 2    simvastatin (ZOCOR) 20 mg tablet TAKE 1 TABLET NIGHTLY 90 Tab 1    naproxen (NAPROSYN) 500 mg tablet TAKE 1 TAB BY MOUTH TWO (2) TIMES DAILY AS NEEDED FOR KNEE PAIN  0    triamcinolone acetonide (KENALOG) 0.1 % topical cream use thin layer on back twice daily 45 g 1    albuterol (PROVENTIL HFA, VENTOLIN HFA, PROAIR HFA) 90 mcg/actuation inhaler Take 2 Puffs by inhalation every four (4) hours as needed for Wheezing. 1 Inhaler 1    CINNAMON BARK (CINNAMON PO) Take 350 mg by mouth daily.  coenzyme q10-vitamin e 100-100 mg-unit cap Take 200 mg by mouth daily.  multivitamin (ONE A DAY) tablet Take 1 Tab by mouth daily. Includes vit D 1,000 units      fish oil-dha-epa (FISH OIL) 1,200-144-216 mg Cap Take 1 Tab by mouth. Takes ~ twice a week       No current facility-administered medications on file prior to visit.       Allergies   Allergen Reactions    Avelox [Moxifloxacin] Rash and Itching    Bactrim [Sulfamethoxazole-Trimethoprim] Rash    Doxycycline Other (comments)     Tongue discomfort, swollen glands    Lisinopril Diarrhea    Norvasc [Amlodipine] Other (comments)     edema    Penicillin G Hives, Shortness of Breath and Swelling     Past Medical History:   Diagnosis Date    Anemia NEC     AR (allergic rhinitis)     Asthma     mild    Benign essential hypertension 2016    Diabetes mellitus type 2, noninsulin dependent ([facility] [address] 12/3/2015    Diabetes mellitus, type 2 ([facility] 2013    FHx: breast cancer 3/2/2012    GERD (gastroesophageal reflux disease) 11/00    H/O Mild, Intermittent Asthma     H/O Optic disc edema, 0548-2712 3/2/2012    History of benign left ovarian cyst 3/2/2012    Hx of complete eye exam 2017    Dr Bailey Churchill intraoular pressure and no evidence of diabetic retinopathy    Hyperlipidemia     Hypertension     Hypothyroidism     Hypothyroidism 2015    Impaired fasting glucose 3/31/2012    Mixed hyperlipidemia 2016    Morbid obesity with BMI of 45.0-49.9, adult ([facility]      (normal spontaneous vaginal delivery)         Ovarian cyst     Left, benign    Postmenopause     LMP  (HRT x 6months)    Rheumatoid arthritis(714.0)     S/P colonoscopy     Normal (10yrs)    Skin cancer screening exams, Derm Dr Mekhi Brock 3/2/2012    Vitamin D deficiency 3/2/2012     Past Surgical History:   Procedure Laterality Date    COLONOSCOPY  2005    normal, ok x 10 yrs, Dr Casandra West      Normal    HX BREAST LUMPECTOMY      Left, benign (Dr. 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 LUCERO      1-15% Stenosis Bilaterally     Family History   Problem Relation Age of Onset   Malcolm Morton Breast Cancer Mother      diagnosed age 72, survived it    Malcolm Morton Arthritis-rheumatoid Mother     Stroke Mother     Parkinsonism Mother       in nursing from colon perf or GI bleed age 80    Hypertension Mother    Malcolm Morton Migraines Mother     Breast Cancer Maternal Grandmother       in her late 42's    Dementia Father     Heart Disease Father      CAD, PTCA    Stroke Father     Cancer Brother 68     soft tissue cancer    Cancer Brother 68     kidney and lungs    Hypertension Sister     Dementia Sister     Breast Cancer Maternal Aunt     High Cholesterol Son     Cancer Other      maternal first cousin w/ sarcoma of leg    Anesth Problems Neg Hx      Social History   Substance Use Topics    Smoking status: Never Smoker    Smokeless tobacco: Never Used    Alcohol use No      Comment: very rare, 1-2 drinks a year      Social History     Social History Narrative    Works as           Review of Systems  Card: denies chest pain  GI: denies hematochezia        Objective:     Vitals:    18 0924 18 1012   BP: 161/69 138/84   Pulse: (!) 54    Resp: 16    Temp: 96.6 °F (35.9 °C)    TempSrc: Oral    SpO2: 97%    Weight: 275 lb 8 oz (125 kg)    Height: 5' 5.5\" (1.664 m)      Body mass index is 45.15 kg/(m^2).      General: stated age, obese and in NAD  Neck: supple, symmetrical, trachea midline, no adenopathy and thyroid: not enlarged, symmetric, no tenderness/mass/nodules  Ears: TMs clear bilaterally, canals patent without inflammation  Nose: no drainage, turbinates normal  Throat: clear, no tonsillar exudate or erthema  Mouth: no lesions noted  Lungs:  clear to auscultation w/o rales, rhonchi, wheezes w/normal effort and no use of accessory muscles of respiration   Heart: regular rate and rhythm, S1, S2 normal, no murmur, click, rub or gallop  Lymph: no cervical adenopathy appreciated  Ext: 1+ BLE edema, feet normal sensation to monofilament, no large callouses or deformities, 2+ DP pulses bilaterally  MSK: no tenderness or swelling of left knee  Skin:  No rashes or lesions     Lab Results   Component Value Date/Time    Hemoglobin A1c 6.7 (H) 02/06/2018 10:50 AM     Lab Results   Component Value Date/Time    Cholesterol, total 169 02/06/2018 10:50 AM    HDL Cholesterol 37 (L) 02/06/2018 10:50 AM    LDL, calculated 92 02/06/2018 10:50 AM    VLDL, calculated 40 02/06/2018 10:50 AM    Triglyceride 199 (H) 02/06/2018 10:50 AM     Lab Results   Component Value Date/Time    Sodium 143 02/06/2018 10:50 AM    Potassium 4.4 02/06/2018 10:50 AM    Chloride 102 02/06/2018 10:50 AM    CO2 27 02/06/2018 10:50 AM    Anion gap 7 04/15/2016 12:52 PM    Glucose 110 (H) 02/06/2018 10:50 AM    BUN 13 02/06/2018 10:50 AM    Creatinine 0.82 02/06/2018 10:50 AM    BUN/Creatinine ratio 16 02/06/2018 10:50 AM    GFR est AA 89 02/06/2018 10:50 AM    GFR est non-AA 77 02/06/2018 10:50 AM    Calcium 10.1 02/06/2018 10:50 AM    Bilirubin, total 0.5 02/06/2018 10:50 AM    AST (SGOT) 30 02/06/2018 10:50 AM    Alk.  phosphatase 98 02/06/2018 10:50 AM    Protein, total 7.1 02/06/2018 10:50 AM    Albumin 4.1 02/06/2018 10:50 AM    A-G Ratio 1.4 02/06/2018 10:50 AM    ALT (SGPT) 24 02/06/2018 10:50 AM     Lab Results   Component Value Date/Time    WBC 6.9 07/13/2017 08:43 AM    HGB 13.2 07/13/2017 08:43 AM    HCT 41.1 07/13/2017 08:43 AM    PLATELET 226 (L) 03/44/2859 08:43 AM    MCV 93 07/13/2017 08:43 AM     Lab Results   Component Value Date/Time    TSH 1.120 02/06/2018 10:50 AM    T4, Free 1.25 10/06/2014 09:23 AM     Lab Results   Component Value Date/Time    Vitamin D 25-Hydroxy 23.7 (L) 09/20/2011 11:41 AM    VITAMIN D, 25-HYDROXY 39.1 03/29/2017 08:58 AM         Assessment/Plan:       ICD-10-CM ICD-9-CM    1. Preventative health care Z00.00 V70.0 HEMOGLOBIN A1C WITH EAG      TSH 3RD GENERATION      LIPID PANEL      METABOLIC PANEL, COMPREHENSIVE   2. Diabetes mellitus type 2, noninsulin dependent (HCC) E11.9 250.00 HEMOGLOBIN A1C WITH EAG      LIPID PANEL      METABOLIC PANEL, COMPREHENSIVE       DIABETES FOOT EXAM   3. Morbid obesity with BMI of 45.0-49.9, adult (HCC) E66.01 278.01     Z68.42 V85.42    4. Benign essential hypertension I10 401.1    5. Mixed hyperlipidemia E78.2 272.2    6. Hypothyroidism E03.9 244.9 TSH 3RD GENERATION   7. Chronic pain of left knee M25.562 719.46     G89.29 338.29    8. Other hyperlipidemia E78.4 272.4    9. Senile cataract of left eye, unspecified age-related cataract type H25.9 366.10    10.  Right-sided ischial pain M25.551 719.45        Orders Placed This Encounter    HEMOGLOBIN A1C WITH EAG    TSH 3RD GENERATION    LIPID PANEL    METABOLIC PANEL, COMPREHENSIVE    CVD REPORT     DIABETES FOOT EXAM    dulaglutide (TRULICITY) 4.58 ZA/3.4 mL sub-q pen    DISCONTD: losartan-hydroCHLOROthiazide (HYZAAR) 100-12.5 mg per tablet    losartan-hydroCHLOROthiazide (HYZAAR) 100-12.5 mg per tablet     Health Maintenance   Topic Date Due    MICROALBUMIN Q1  07/13/2018    BREAST CANCER SCRN MAMMOGRAM  08/04/2018    HEMOGLOBIN A1C Q6M  08/06/2018    EYE EXAM RETINAL OR DILATED Q1  08/09/2018    FOOT EXAM Q1  02/06/2019    LIPID PANEL Q1  02/06/2019    PAP AKA CERVICAL CYTOLOGY  07/14/2020    DTaP/Tdap/Td series (2 - Td) 09/20/2021    COLONOSCOPY  05/20/2025    Hepatitis C Screening  Addressed    ZOSTER VACCINE AGE 60>  Completed    Pneumococcal 19-64 Medium Risk  Addressed    Influenza Age 5 to Adult  Addressed Health maintenance up to date. Sent in Shingrix vaccine    Chronic medical problems stable  Can't tolerate even a little metformin  I agree with her that Trulicity would be great to try, andreia if not too costly for her  She is doing very well with diet, having to limit fruit a bit more but at least not eating junk  Encouraged her continued attempts at weight loss. Winter knee pain/arthritis tolerable with prn naproxen, continue  Other medical problems stable, no med changes made  Routine lab monitoring. Reassured the intermittent ischial pain when she is climbing ladders muscular. Discussed the diagnosis and plan and she expressed understanding. Follow-up Disposition:  Return in about 3 months (around 5/6/2018) for Follow up.     Angel Harper MD

## 2018-02-06 NOTE — PATIENT INSTRUCTIONS
Try taking your metoprolol at night and see if that helps  Go back to your eye doctor if that doesn't help. Increase the losartan to 100/12.5mg       Learning About Meal Planning for Diabetes  Why plan your meals? Meal planning can be a key part of managing diabetes. Planning meals and snacks with the right balance of carbohydrate, protein, and fat can help you keep your blood sugar at the target level you set with your doctor. You don't have to eat special foods. You can eat what your family eats, including sweets once in a while. But you do have to pay attention to how often you eat and how much you eat of certain foods. You may want to work with a dietitian or a certified diabetes educator. He or she can give you tips and meal ideas and can answer your questions about meal planning. This health professional can also help you reach a healthy weight if that is one of your goals. What plan is right for you? Your dietitian or diabetes educator may suggest that you start with the plate format or carbohydrate counting. The plate format  The plate format is a simple way to help you manage how you eat. You plan meals by learning how much space each food should take on a plate. Using the plate format helps you spread carbohydrate throughout the day. It can make it easier to keep your blood sugar level within your target range. It also helps you see if you're eating healthy portion sizes. To use the plate format, you put non-starchy vegetables on half your plate. Add meat or meat substitutes on one-quarter of the plate. Put a grain or starchy vegetable (such as brown rice or a potato) on the final quarter of the plate. You can add a small piece of fruit and some low-fat or fat-free milk or yogurt, depending on your carbohydrate goal for each meal.  Here are some tips for using the plate format:  · Make sure that you are not using an oversized plate. A 9-inch plate is best. Many restaurants use larger plates.   · Get used to using the plate format at home. Then you can use it when you eat out. · Write down your questions about using the plate format. Talk to your doctor, a dietitian, or a diabetes educator about your concerns. Carbohydrate counting  With carbohydrate counting, you plan meals based on the amount of carbohydrate in each food. Carbohydrate raises blood sugar higher and more quickly than any other nutrient. It is found in desserts, breads and cereals, and fruit. It's also found in starchy vegetables such as potatoes and corn, grains such as rice and pasta, and milk and yogurt. Spreading carbohydrate throughout the day helps keep your blood sugar levels within your target range. Your daily amount depends on several things, including your weight, how active you are, which diabetes medicines you take, and what your goals are for your blood sugar levels. A registered dietitian or diabetes educator can help you plan how much carbohydrate to include in each meal and snack. A guideline for your daily amount of carbohydrate is:  · 45 to 60 grams at each meal. That's about the same as 3 to 4 carbohydrate servings. · 15 to 20 grams at each snack. That's about the same as 1 carbohydrate serving. The Nutrition Facts label on packaged foods tells you how much carbohydrate is in a serving of the food. First, look at the serving size on the food label. Is that the amount you eat in a serving? All of the nutrition information on a food label is based on that serving size. So if you eat more or less than that, you'll need to adjust the other numbers. Total carbohydrate is the next thing you need to look for on the label. If you count carbohydrate servings, one serving of carbohydrate is 15 grams. For foods that don't come with labels, such as fresh fruits and vegetables, you'll need a guide that lists carbohydrate in these foods.  Ask your doctor, dietitian, or diabetes educator about books or other nutrition guides you can use.  If you take insulin, you need to know how many grams of carbohydrate are in a meal. This lets you know how much rapid-acting insulin to take before you eat. If you use an insulin pump, you get a constant rate of insulin during the day. So the pump must be programmed at meals to give you extra insulin to cover the rise in blood sugar after meals. When you know how much carbohydrate you will eat, you can take the right amount of insulin. Or, if you always use the same amount of insulin, you need to make sure that you eat the same amount of carbohydrate at meals. If you need more help to understand carbohydrate counting and food labels, ask your doctor, dietitian, or diabetes educator. How do you get started with meal planning? Here are some tips to get started:  · Plan your meals a week at a time. Don't forget to include snacks too. · Use cookbooks or online recipes to plan several main meals. Plan some quick meals for busy nights. You also can double some recipes that freeze well. Then you can save half for other busy nights when you don't have time to cook. · Make sure you have the ingredients you need for your recipes. If you're running low on basic items, put these items on your shopping list too. · List foods that you use to make breakfasts, lunches, and snacks. List plenty of fruits and vegetables. · Post this list on the refrigerator. Add to it as you think of more things you need. · Take the list to the store to do your weekly shopping. Follow-up care is a key part of your treatment and safety. Be sure to make and go to all appointments, and call your doctor if you are having problems. It's also a good idea to know your test results and keep a list of the medicines you take. Where can you learn more? Go to http://luanne-gail.info/. Adilene Buenrostro in the search box to learn more about \"Learning About Meal Planning for Diabetes. \"  Current as of: March 13, 2017  Content Version: 11.4  © 2106-9227 [employer]. Care instructions adapted under license by [employer] (which disclaims liability or warranty for this information). If you have questions about a medical condition or this instruction, always ask your healthcare professional. [address] any warranty or liability for your use of this information.

## 2018-02-07 LAB
ALBUMIN SERPL-MCNC: 4.1 G/DL (ref 3.6–4.8)
ALBUMIN/GLOB SERPL: 1.4 {RATIO} (ref 1.2–2.2)
ALP SERPL-CCNC: 98 IU/L (ref 39–117)
ALT SERPL-CCNC: 24 IU/L (ref 0–32)
AST SERPL-CCNC: 30 IU/L (ref 0–40)
BILIRUB SERPL-MCNC: 0.5 MG/DL (ref 0–1.2)
BUN SERPL-MCNC: 13 MG/DL (ref 8–27)
BUN/CREAT SERPL: 16 (ref 12–28)
CALCIUM SERPL-MCNC: 10.1 MG/DL (ref 8.7–10.3)
CHLORIDE SERPL-SCNC: 102 MMOL/L (ref 96–106)
CHOLEST SERPL-MCNC: 169 MG/DL (ref 100–199)
CO2 SERPL-SCNC: 27 MMOL/L (ref 18–29)
CREAT SERPL-MCNC: 0.82 MG/DL (ref 0.57–1)
EST. AVERAGE GLUCOSE BLD GHB EST-MCNC: 146 MG/DL
GFR SERPLBLD CREATININE-BSD FMLA CKD-EPI: 77 ML/MIN/1.73
GFR SERPLBLD CREATININE-BSD FMLA CKD-EPI: 89 ML/MIN/1.73
GLOBULIN SER CALC-MCNC: 3 G/DL (ref 1.5–4.5)
GLUCOSE SERPL-MCNC: 110 MG/DL (ref 65–99)
HBA1C MFR BLD: 6.7 % (ref 4.8–5.6)
HDLC SERPL-MCNC: 37 MG/DL
INTERPRETATION, 910389: NORMAL
LDLC SERPL CALC-MCNC: 92 MG/DL (ref 0–99)
POTASSIUM SERPL-SCNC: 4.4 MMOL/L (ref 3.5–5.2)
PROT SERPL-MCNC: 7.1 G/DL (ref 6–8.5)
SODIUM SERPL-SCNC: 143 MMOL/L (ref 134–144)
TRIGL SERPL-MCNC: 199 MG/DL (ref 0–149)
TSH SERPL DL<=0.005 MIU/L-ACNC: 1.12 UIU/ML (ref 0.45–4.5)
VLDLC SERPL CALC-MCNC: 40 MG/DL (ref 5–40)

## 2018-02-28 RX ORDER — METOPROLOL SUCCINATE 50 MG/1
TABLET, EXTENDED RELEASE ORAL
Qty: 90 TAB | Refills: 0 | Status: SHIPPED | OUTPATIENT
Start: 2018-02-28 | End: 2018-05-29 | Stop reason: SDUPTHER

## 2018-03-27 RX ORDER — SIMVASTATIN 20 MG/1
TABLET, FILM COATED ORAL
Qty: 90 TAB | Refills: 0 | Status: SHIPPED | OUTPATIENT
Start: 2018-03-27 | End: 2018-06-27 | Stop reason: SDUPTHER

## 2018-03-27 NOTE — TELEPHONE ENCOUNTER
Pharmacy on file verified  Requested Prescriptions     Pending Prescriptions Disp Refills    simvastatin (ZOCOR) 20 mg tablet 90 Tab 1     LOV-2/6/2018  NOV-5/8/2018

## 2018-03-29 RX ORDER — LOSARTAN POTASSIUM AND HYDROCHLOROTHIAZIDE 12.5; 1 MG/1; MG/1
TABLET ORAL
Qty: 90 TAB | Refills: 1 | Status: SHIPPED | OUTPATIENT
Start: 2018-03-29 | End: 2019-01-09 | Stop reason: SDUPTHER

## 2018-05-17 ENCOUNTER — TELEPHONE (OUTPATIENT)
Dept: FAMILY MEDICINE CLINIC | Age: 63
End: 2018-05-17

## 2018-05-17 ENCOUNTER — OFFICE VISIT (OUTPATIENT)
Dept: FAMILY MEDICINE CLINIC | Age: 63
End: 2018-05-17

## 2018-05-17 VITALS
BODY MASS INDEX: 45.48 KG/M2 | WEIGHT: 273 LBS | TEMPERATURE: 98.2 F | OXYGEN SATURATION: 96 % | HEIGHT: 65 IN | HEART RATE: 68 BPM | DIASTOLIC BLOOD PRESSURE: 71 MMHG | SYSTOLIC BLOOD PRESSURE: 127 MMHG | RESPIRATION RATE: 18 BRPM

## 2018-05-17 DIAGNOSIS — I10 BENIGN ESSENTIAL HYPERTENSION: ICD-10-CM

## 2018-05-17 DIAGNOSIS — E66.01 MORBID OBESITY WITH BMI OF 45.0-49.9, ADULT (HCC): Chronic | ICD-10-CM

## 2018-05-17 DIAGNOSIS — I83.93 SPIDER VEINS OF BOTH LOWER EXTREMITIES: ICD-10-CM

## 2018-05-17 DIAGNOSIS — E78.2 MIXED HYPERLIPIDEMIA: ICD-10-CM

## 2018-05-17 DIAGNOSIS — I83.90 VARICOSE VEIN OF LEG: ICD-10-CM

## 2018-05-17 DIAGNOSIS — Z12.31 ENCOUNTER FOR SCREENING MAMMOGRAM FOR BREAST CANCER: ICD-10-CM

## 2018-05-17 DIAGNOSIS — E11.9 DIABETES MELLITUS TYPE 2, NONINSULIN DEPENDENT (HCC): Primary | ICD-10-CM

## 2018-05-17 PROBLEM — E11.3599 TYPE 2 DIABETES MELLITUS WITH PROLIFERATIVE RETINOPATHY (HCC): Status: RESOLVED | Noted: 2018-05-17 | Resolved: 2018-05-17

## 2018-05-17 PROBLEM — E11.3599 TYPE 2 DIABETES MELLITUS WITH PROLIFERATIVE RETINOPATHY (HCC): Status: ACTIVE | Noted: 2018-05-17

## 2018-05-17 RX ORDER — NAPROXEN 500 MG/1
500 TABLET ORAL
Qty: 180 TAB | Refills: 0 | Status: SHIPPED | OUTPATIENT
Start: 2018-05-17 | End: 2018-07-28 | Stop reason: SDUPTHER

## 2018-05-17 NOTE — MR AVS SNAPSHOT
[address] [place] Jayashree Buitrago 13 
[phone] Patient: Dagmar Cunha MRN: [id number] SZB:[id number] Visit Information Date & Time Provider Department Dept. Phone Encounter #  
 5/17/2018  8:00 AM Oswald Jolly, [address] [phone] [id number] Follow-up Instructions Return in about 3 months (around 8/17/2018) for Follow up. Upcoming Health Maintenance Date Due  
 BREAST CANCER SCRN MAMMOGRAM 8/4/2018 MICROALBUMIN Q1 7/13/2018 Influenza Age 5 to Adult 8/1/2018 HEMOGLOBIN A1C Q6M 8/6/2018 EYE EXAM RETINAL OR DILATED Q1 8/9/2018 FOOT EXAM Q1 2/6/2019 LIPID PANEL Q1 2/6/2019 PAP AKA CERVICAL CYTOLOGY 7/14/2020 DTaP/Tdap/Td series (2 - Td) 9/20/2021 COLONOSCOPY 5/20/2025 Allergies as of 5/17/2018  Review Complete On: 5/17/2018 By: Oswald Jolly MD  
  
 Severity Noted Reaction Type Reactions Avelox [Moxifloxacin]  03/15/2010   Side Effect Rash, Itching Bactrim [Sulfamethoxazole-trimethoprim]  03/15/2010   Side Effect Rash Doxycycline  03/14/2013    Other (comments) Tongue discomfort, swollen glands Lisinopril  03/15/2010   Intolerance Diarrhea Norvasc [Amlodipine]  03/15/2010   Side Effect Other (comments) edema Penicillin G  02/22/2010    Hives, Shortness of Breath, Swelling Current Immunizations  Reviewed on 2/1/2018 Name Date Hepatitis A Vaccine 4/24/2001 Influenza Vaccine 11/3/2016, 10/22/2013 Pneumococcal Polysaccharide (PPSV-23) 11/4/2016 TD Vaccine 3/22/2000 TDAP Vaccine 9/20/2011 Zoster Vaccine, Live 7/11/2017 12:00 AM  
  
 Not reviewed this visit You Were Diagnosed With   
  
 Codes Comments Diabetes mellitus type 2, noninsulin dependent ([facility]    -  Primary ICD-10-CM: E11.9 ICD-9-CM: 250.00 Mixed hyperlipidemia     ICD-10-CM: E78.2 ICD-9-CM: 272.2 Benign essential hypertension     ICD-10-CM: I10 
ICD-9-CM: 401.1 Morbid obesity with BMI of 45.0-49.9, adult (HCC)     ICD-10-CM: E66.01, Z68.42 
ICD-9-CM: 278.01, V85.42 Encounter for screening mammogram for breast cancer     ICD-10-CM: Z12.31 
ICD-9-CM: V76.12 Vitals BP Pulse Temp Resp Height(growth percentile) Weight(growth percentile) 127/71 (BP 1 Location: Left arm, BP Patient Position: Sitting) 68 98.2 °F (36.8 °C) (Oral) 18 5' 5\" (1.651 m) 273 lb (123.8 kg) LMP SpO2 BMI OB Status Smoking Status 12/24/2007 96% 45.43 kg/m2 Postmenopausal Never Smoker BMI and BSA Data Body Mass Index Body Surface Area 45.43 kg/m 2 2.38 m 2 Preferred Pharmacy Pharmacy Name Phone [address] [facility] [phone] Your Updated Medication List  
  
   
This list is accurate as of 5/17/18  8:54 AM.  Always use your most recent med list.  
  
  
  
  
 albuterol 90 mcg/actuation inhaler Commonly known as:  PROVENTIL HFA, VENTOLIN HFA, PROAIR HFA Take 2 Puffs by inhalation every four (4) hours as needed for Wheezing. CINNAMON PO Take 350 mg by mouth daily. coenzyme q10-vitamin e 100-100 mg-unit Cap Take 200 mg by mouth daily. dulaglutide 1.5 mg/0.5 mL sub-q pen Commonly known as:  TRULICITY  
0.5 mL by SubCUTAneous route every seven (7) days. FISH OIL 1,200-144-216 mg Cap Generic drug:  fish oil-dha-epa Take 1 Tab by mouth. Takes ~ twice a week  
  
 losartan-hydroCHLOROthiazide 100-12.5 mg per tablet Commonly known as:  HYZAAR  
TAKE 1 TABLET BY MOUTH EVERY DAY  
  
 metoprolol succinate 50 mg XL tablet Commonly known as:  TOPROL-XL  
TAKE 1 TABLET DAILY FOR HYPERTENSION  
  
 multivitamin tablet Commonly known as:  ONE A DAY Take 1 Tab by mouth daily. Includes vit D 1,000 units  
  
 naproxen 500 mg tablet Commonly known as:  NAPROSYN  
 Take 1 Tab by mouth two (2) times daily as needed. simvastatin 20 mg tablet Commonly known as:  ZOCOR  
TAKE 1 TABLET NIGHTLY  
  
 triamcinolone acetonide 0.1 % topical cream  
Commonly known as:  KENALOG  
use thin layer on back twice daily UNITHROID 75 mcg tablet Generic drug:  levothyroxine TAKE 1 TABLET DAILY BEFORE BREAKFAST Prescriptions Sent to Pharmacy Refills  
 dulaglutide (TRULICITY) 1.5 RK/7.1 mL sub-q pen 1 Si.5 mL by SubCUTAneous route every seven (7) days. Class: Normal  
 Pharmacy: [address] [facility], [address] [facility] Ph #: [phone] Route: SubCUTAneous  
 naproxen (NAPROSYN) 500 mg tablet 0 Sig: Take 1 Tab by mouth two (2) times daily as needed. Class: Normal  
 Pharmacy: [address], [address] [facility] Ph #: [phone] Route: Oral  
  
We Performed the Following HEMOGLOBIN A1C WITH EAG [10542 CPT(R)] METABOLIC PANEL, BASIC [12669 CPT(R)] Follow-up Instructions Return in about 3 months (around 2018) for Follow up. To-Do List   
 2018 Imaging:  MISAEL MAMMO BI SCREENING INCL CAD Patient Instructions Learning About Meal Planning for Diabetes Why plan your meals? Meal planning can be a key part of managing diabetes. Planning meals and snacks with the right balance of carbohydrate, protein, and fat can help you keep your blood sugar at the target level you set with your doctor. You don't have to eat special foods. You can eat what your family eats, including sweets once in a while. But you do have to pay attention to how often you eat and how much you eat of certain foods. You may want to work with a dietitian or a certified diabetes educator. He or she can give you tips and meal ideas and can answer your questions about meal planning.  This health professional can also help you reach a healthy weight if that is one of your goals. What plan is right for you? Your dietitian or diabetes educator may suggest that you start with the plate format or carbohydrate counting. The plate format The plate format is a simple way to help you manage how you eat. You plan meals by learning how much space each food should take on a plate. Using the plate format helps you spread carbohydrate throughout the day. It can make it easier to keep your blood sugar level within your target range. It also helps you see if you're eating healthy portion sizes. To use the plate format, you put non-starchy vegetables on half your plate. Add meat or meat substitutes on one-quarter of the plate. Put a grain or starchy vegetable (such as brown rice or a potato) on the final quarter of the plate. You can add a small piece of fruit and some low-fat or fat-free milk or yogurt, depending on your carbohydrate goal for each meal. 
Here are some tips for using the plate format: · Make sure that you are not using an oversized plate. A 9-inch plate is best. Many restaurants use larger plates. · Get used to using the plate format at home. Then you can use it when you eat out. · Write down your questions about using the plate format. Talk to your doctor, a dietitian, or a diabetes educator about your concerns. Carbohydrate counting With carbohydrate counting, you plan meals based on the amount of carbohydrate in each food. Carbohydrate raises blood sugar higher and more quickly than any other nutrient. It is found in desserts, breads and cereals, and fruit. It's also found in starchy vegetables such as potatoes and corn, grains such as rice and pasta, and milk and yogurt. Spreading carbohydrate throughout the day helps keep your blood sugar levels within your target range.  
Your daily amount depends on several things, including your weight, how active you are, which diabetes medicines you take, and what your goals are for your blood sugar levels. A registered dietitian or diabetes educator can help you plan how much carbohydrate to include in each meal and snack. A guideline for your daily amount of carbohydrate is: · 45 to 60 grams at each meal. That's about the same as 3 to 4 carbohydrate servings. · 15 to 20 grams at each snack. That's about the same as 1 carbohydrate serving. The Nutrition Facts label on packaged foods tells you how much carbohydrate is in a serving of the food. First, look at the serving size on the food label. Is that the amount you eat in a serving? All of the nutrition information on a food label is based on that serving size. So if you eat more or less than that, you'll need to adjust the other numbers. Total carbohydrate is the next thing you need to look for on the label. If you count carbohydrate servings, one serving of carbohydrate is 15 grams. For foods that don't come with labels, such as fresh fruits and vegetables, you'll need a guide that lists carbohydrate in these foods. Ask your doctor, dietitian, or diabetes educator about books or other nutrition guides you can use. If you take insulin, you need to know how many grams of carbohydrate are in a meal. This lets you know how much rapid-acting insulin to take before you eat. If you use an insulin pump, you get a constant rate of insulin during the day. So the pump must be programmed at meals to give you extra insulin to cover the rise in blood sugar after meals. When you know how much carbohydrate you will eat, you can take the right amount of insulin. Or, if you always use the same amount of insulin, you need to make sure that you eat the same amount of carbohydrate at meals. If you need more help to understand carbohydrate counting and food labels, ask your doctor, dietitian, or diabetes educator. How do you get started with meal planning? Here are some tips to get started: · Plan your meals a week at a time. Don't forget to include snacks too. · Use cookbooks or online recipes to plan several main meals. Plan some quick meals for busy nights. You also can double some recipes that freeze well. Then you can save half for other busy nights when you don't have time to cook. · Make sure you have the ingredients you need for your recipes. If you're running low on basic items, put these items on your shopping list too. · List foods that you use to make breakfasts, lunches, and snacks. List plenty of fruits and vegetables. · Post this list on the refrigerator. Add to it as you think of more things you need. · Take the list to the store to do your weekly shopping. Follow-up care is a key part of your treatment and safety. Be sure to make and go to all appointments, and call your doctor if you are having problems. It's also a good idea to know your test results and keep a list of the medicines you take. Where can you learn more? Go to http://luanne-gail.info/. Marck Yin in the search box to learn more about \"Learning About Meal Planning for Diabetes. \" Current as of: March 13, 2017 Content Version: 11.4 © 0079-9516 Healthwise, [employer]. Care instructions adapted under license by [employer] (which disclaims liability or warranty for this information). If you have questions about a medical condition or this instruction, always ask your healthcare professional. [place] [address] any warranty or liability for your use of this information. Introducing [address]! Dear Earl Balderas: 
Thank you for requesting a [employer] account. Our records indicate that you already have an active [employer] account. You can access your account anytime at [email or web address] Did you know that you can access your hospital and ER discharge instructions at any time in [employer]?   You can also review all of your test results from your hospital stay or ER visit. Additional Information If you have questions, please visit the Frequently Asked Questions section of the [employer] website at [email or web address] Remember, [employer] is NOT to be used for urgent needs. For medical emergencies, dial 911. Now available from your iPhone and Android! Please provide this summary of care documentation to your next provider. Your primary care clinician is listed as SONAM BILLY. If you have any questions after today's visit, please call [phone].

## 2018-05-17 NOTE — PATIENT INSTRUCTIONS

## 2018-05-17 NOTE — PROGRESS NOTES
[facility]  [address] [facility], [address]  [phone]             Date of visit: 5/17/2018   Subjective:      History obtained from:  the patient. Edgard Chang is a 58 y.o. female who presents today for f/u chronic problems  Started trulicity 2 weeks ago  Cost not an issue but was concerned about giving herself injection, but realizes now that is not a problem  No side effects but can't say she is less hungry  Giving shot on Thursday  Hasn't checked sugars  Feels some better, less tired at night, thinks it is helping  Weight down 2lb  Watching her diet  Weighing at house, since she has been on Trulicity has lost 5lb. Walks stairs at work but getting hot this time of year, usually does 3 flights    Vulvar skin tag removed 7 years ago but coming back, painful when wiping, feels like it is ripping off. Larger than it was before.     Sees eye doc, no retinopathy  Does have vision problems she thinks from cataracts, wants them removed but eye doc didn't think it was time  Considering getting another opinion  Can't read fine print    Patient Active Problem List    Diagnosis Date Noted    Spider veins of both lower extremities 05/17/2018    Varicose vein of leg 05/17/2018    Morbid obesity with BMI of 45.0-49.9, adult ([address] 10/07/2017    Mixed hyperlipidemia 05/31/2016    Benign essential hypertension 79/29/7607    Umbilical hernia without obstruction and without gangrene 04/22/2016    Diabetes mellitus type 2, noninsulin dependent ([address] 12/03/2015    Hypothyroidism 07/30/2015    Actinic keratosis 11/20/2014    Abdominal wall bulge 03/25/2013    Vitamin D deficiency 03/02/2012    FHx: breast cancer 03/02/2012    History of benign left ovarian cyst, 2000 03/02/2012    Skin cancer screening exams, Derm Dr Aime Shelton 03/02/2012    H/O Optic disc edema, [id number] 03/02/2012    Hyperlipidemia 03/15/2010    AR (allergic rhinitis)     GERD (gastroesophageal reflux disease)     H/O Mild, Intermittent Asthma     Rheumatoid arthritis(714.0)     Postmenopause, LMP 2005, age 49 yo, s/p HRT x 6 months     Ovarian cyst      (normal spontaneous vaginal delivery)     Metabolic syndrome      Current Outpatient Prescriptions   Medication Sig Dispense Refill    dulaglutide (TRULICITY) 1.5 WF/8.1 mL sub-q pen 0.5 mL by SubCUTAneous route every seven (7) days. 2 mL 1    naproxen (NAPROSYN) 500 mg tablet Take 1 Tab by mouth two (2) times daily as needed. 180 Tab 0    losartan-hydroCHLOROthiazide (HYZAAR) 100-12.5 mg per tablet TAKE 1 TABLET BY MOUTH EVERY DAY 90 Tab 1    simvastatin (ZOCOR) 20 mg tablet TAKE 1 TABLET NIGHTLY 90 Tab 0    metoprolol succinate (TOPROL-XL) 50 mg XL tablet TAKE 1 TABLET DAILY FOR HYPERTENSION 90 Tab 0    UNITHROID 75 mcg tablet TAKE 1 TABLET DAILY BEFORE BREAKFAST 90 Tab 2    triamcinolone acetonide (KENALOG) 0.1 % topical cream use thin layer on back twice daily 45 g 1    albuterol (PROVENTIL HFA, VENTOLIN HFA, PROAIR HFA) 90 mcg/actuation inhaler Take 2 Puffs by inhalation every four (4) hours as needed for Wheezing. 1 Inhaler 1    CINNAMON BARK (CINNAMON PO) Take 350 mg by mouth daily.  coenzyme q10-vitamin e 100-100 mg-unit cap Take 200 mg by mouth daily.  fish oil-dha-epa (FISH OIL) 1,200-144-216 mg Cap Take 1 Tab by mouth. Takes ~ twice a week      multivitamin (ONE A DAY) tablet Take 1 Tab by mouth daily.  Includes vit D 1,000 units       Allergies   Allergen Reactions    Avelox [Moxifloxacin] Rash and Itching    Bactrim [Sulfamethoxazole-Trimethoprim] Rash    Doxycycline Other (comments)     Tongue discomfort, swollen glands    Lisinopril Diarrhea    Norvasc [Amlodipine] Other (comments)     edema    Penicillin G Hives, Shortness of Breath and Swelling     Past Medical History:   Diagnosis Date    Anemia NEC     AR (allergic rhinitis)     Asthma     mild    Benign essential hypertension 2016    Diabetes mellitus type 2, noninsulin dependent ([facility] 12/3/2015    Diabetes mellitus, type 2 ([facility] 2013    FHx: breast cancer 3/2/2012    GERD (gastroesophageal reflux disease) 11/00    H/O Mild, Intermittent Asthma     H/O Optic disc edema, [id number] 3/2/2012    History of benign left ovarian cyst 3/2/2012    Hx of complete eye exam 2017    Dr Baylee Montalvo intraoular pressure and no evidence of diabetic retinopathy    Hyperlipidemia 2002    Hypertension     Hypothyroidism     Hypothyroidism 2015    2002    Impaired fasting glucose 3/31/2012    Mixed hyperlipidemia 2016    Morbid obesity with BMI of 45.0-49.9, adult (HCC)      (normal spontaneous vaginal delivery)         Ovarian cyst     Left, benign    Postmenopause     LMP  (HRT x 6months)    Rheumatoid arthritis(714.0)     S/P colonoscopy     Normal (10yrs)    Skin cancer screening exams, Derm Dr Juliane Acosta 3/2/2012    Vitamin D deficiency 3/2/2012     Past Surgical History:   Procedure Laterality Date    COLONOSCOPY  2005    normal, ok x 10 yrs, Dr Marquis Burroughs      Normal    HX BREAST LUMPECTOMY      Left, benign (Dr. Sarah Mariano)    HX CERVICAL POLYPECTOMY  14, Dr Satish Raymond    Benign    HX COLONOSCOPY  2014    Dr Micaela Chandler polyps x 3; repeat     HX COLONOSCOPY  2015    poor prep, repeat in one year    HX COLONOSCOPY  2016    normal, follow up 8 yrs   Manoj Boyle 10    childbirth    HX HEENT  2010    tumor on vocal cord removed    HX HERNIA REPAIR  16, Dr Fabi Govea    robotic assisted umbilical hernia repair with mesh     HX LIPOMA RESECTION  ~    removed from left thigh    HX ORTHOPAEDIC      tumor remved left leg    HX OTHER SURGICAL  11/5/15, Dr Celestino Stearns    2 masses removed left lower leg    MISAEL MAMMOGRAM DIGITAL BILATERAL  annually per Gyn    at [address] [facility] [address] LUCERO      1-15% Stenosis Bilaterally     Family History   Problem Relation Age of Onset   [facility] Breast Cancer Mother      diagnosed age 72, survived it    [facility] Arthritis-rheumatoid Mother    [facility] Stroke Mother     Parkinsonism Mother       in nursing from colon perf or GI bleed age 80    Hypertension Mother    [facility] Migraines Mother     Breast Cancer Maternal Grandmother       in her late 42's    Dementia Father     Heart Disease Father      CAD, PTCA    Stroke Father     Cancer Brother 68     soft tissue cancer    Cancer Brother 68     kidney and lungs    Hypertension Sister     Dementia Sister     Breast Cancer Maternal Aunt     High Cholesterol Son     Cancer Other      maternal first cousin w/ sarcoma of leg    Anesth Problems Neg Hx      Social History   Substance Use Topics    Smoking status: Never Smoker    Smokeless tobacco: Never Used    Alcohol use No      Comment: very rare, 1-2 drinks a year      Social History     Social History Narrative    Works as         Review of Systems  Card: denies chest pain  Pulm: denies shortness of breath     Objective:     Vitals:    18 0832   BP: 127/71   Pulse: 68   Resp: 18   Temp: 98.2 °F (36.8 °C)   TempSrc: Oral   SpO2: 96%   Weight: 273 lb (123.8 kg)   Height: 5' 5\" (1.651 m)     Body mass index is 45.43 kg/(m^2). General: stated age, obese and in NAD  Neck: supple, symmetrical, trachea midline, no adenopathy and thyroid: not enlarged, symmetric, no tenderness/mass/nodules  Lungs:  clear to auscultation w/o rales, rhonchi, wheezes w/normal effort and no use of accessory muscles of respiration   Heart: regular rate and rhythm, S1, S2 normal, no murmur, click, rub or gallop  Ext:  No edema noted.    Lymph: no cervical adenopathy appreciated  Skin:  Normal. and no rash or abnormalities other than numerous spider veins and few varicose veins in legs, nontender  Psych: alert and oriented to person, place, time and situation and Speech: appropriate quality, quantity and organization of sentences      Assessment/Plan:       ICD-10-CM ICD-9-CM    1. Diabetes mellitus type 2, noninsulin dependent (HCC) E11.9 250.00 HEMOGLOBIN A1C WITH EAG      METABOLIC PANEL, BASIC   2. Mixed hyperlipidemia E78.2 272.2    3. Benign essential hypertension I10 401.1    4. Morbid obesity with BMI of 45.0-49.9, adult (HCC) E66.01 278.01     Z68.42 V85.42    5. Varicose vein of leg I83.90 454.9    6. Spider veins of both lower extremities I83.93 454.9    7. Encounter for screening mammogram for breast cancer Z12.31 V76.12 MISAEL MAMMO BI SCREENING INCL CAD        Orders Placed This Encounter    MISAEL MAMMO BI SCREENING INCL CAD    HEMOGLOBIN A1C WITH EAG    METABOLIC PANEL, BASIC    dulaglutide (TRULICITY) 1.5 LN/9.2 mL sub-q pen    naproxen (NAPROSYN) 500 mg tablet    DISCONTD: varicella-zoster recombinant, PF, (SHINGRIX, PF,) 50 mcg/0.5 mL susr injection       Doing well on trulicity  Will up dose  Continue diet, exercise plan  Gradually losing weight, and hopefully this will help  Other chronic problems stable, no med changes  discused treatments varicose/spider veins  Going to see another eye doc for another opinion on her catartacts    Discussed the diagnosis and plan and she expressed understanding. Follow-up Disposition:  Return in about 3 months (around 8/17/2018) for Follow up.     Andrea Crawford MD

## 2018-05-18 LAB
BUN SERPL-MCNC: 13 MG/DL (ref 8–27)
BUN/CREAT SERPL: 17 (ref 12–28)
CALCIUM SERPL-MCNC: 10.6 MG/DL (ref 8.7–10.3)
CHLORIDE SERPL-SCNC: 103 MMOL/L (ref 96–106)
CO2 SERPL-SCNC: 24 MMOL/L (ref 18–29)
CREAT SERPL-MCNC: 0.78 MG/DL (ref 0.57–1)
EST. AVERAGE GLUCOSE BLD GHB EST-MCNC: 160 MG/DL
GFR SERPLBLD CREATININE-BSD FMLA CKD-EPI: 82 ML/MIN/1.73
GFR SERPLBLD CREATININE-BSD FMLA CKD-EPI: 94 ML/MIN/1.73
GLUCOSE SERPL-MCNC: 120 MG/DL (ref 65–99)
HBA1C MFR BLD: 7.2 % (ref 4.8–5.6)
POTASSIUM SERPL-SCNC: 4.1 MMOL/L (ref 3.5–5.2)
SODIUM SERPL-SCNC: 144 MMOL/L (ref 134–144)

## 2018-05-30 RX ORDER — METOPROLOL SUCCINATE 50 MG/1
TABLET, EXTENDED RELEASE ORAL
Qty: 90 TAB | Refills: 0 | Status: SHIPPED | OUTPATIENT
Start: 2018-05-30 | End: 2018-08-28 | Stop reason: SDUPTHER

## 2018-05-30 NOTE — TELEPHONE ENCOUNTER
Patient seeing Dr Ernesto Castaneda.   Future Appointments:  8/17/2018  8:00 AM    James Peña MD     [facility]

## 2018-06-27 RX ORDER — SIMVASTATIN 20 MG/1
TABLET, FILM COATED ORAL
Qty: 90 TAB | Refills: 0 | Status: SHIPPED | OUTPATIENT
Start: 2018-06-27 | End: 2018-09-23 | Stop reason: SDUPTHER

## 2018-07-04 RX ORDER — LEVOTHYROXINE SODIUM 75 UG/1
TABLET ORAL
Qty: 90 TAB | Refills: 2 | Status: SHIPPED | OUTPATIENT
Start: 2018-07-04 | End: 2019-03-31 | Stop reason: SDUPTHER

## 2018-07-28 RX ORDER — NAPROXEN 500 MG/1
TABLET ORAL
Qty: 180 TAB | Refills: 0 | Status: SHIPPED | OUTPATIENT
Start: 2018-07-28 | End: 2018-10-26 | Stop reason: SDUPTHER

## 2018-08-22 ENCOUNTER — OFFICE VISIT (OUTPATIENT)
Dept: FAMILY MEDICINE CLINIC | Age: 63
End: 2018-08-22

## 2018-08-22 VITALS
HEART RATE: 60 BPM | TEMPERATURE: 98 F | WEIGHT: 278.4 LBS | OXYGEN SATURATION: 98 % | BODY MASS INDEX: 46.38 KG/M2 | SYSTOLIC BLOOD PRESSURE: 130 MMHG | HEIGHT: 65 IN | DIASTOLIC BLOOD PRESSURE: 57 MMHG | RESPIRATION RATE: 18 BRPM

## 2018-08-22 DIAGNOSIS — J06.9 VIRAL UPPER RESPIRATORY TRACT INFECTION: Primary | ICD-10-CM

## 2018-08-22 NOTE — MR AVS SNAPSHOT
[address] [facility] 
 
 
 [address] [place] [address] 
[phone] Patient: Dante Phoenix MRN: [id number] Visit Information Date & Time Provider Department Dept. Phone Encounter #  
 8/22/2018 11:45 AM Adela Lassiter  [facility] [phone] [id number] Follow-up Instructions Return if symptoms worsen or fail to improve, for Follow Up. Your Appointments 9/11/2018  8:30 AM  
ROUTINE CARE with Asha Jansen MD  
[facility]) Appt Note: 3 months f/u appt; r/s 3 month f/up sc 8/13/18  
 [address] [phone]  
[phone]  
  
   
 [address] [address] Upcoming Health Maintenance Date Due MICROALBUMIN Q1 7/13/2018 BREAST CANCER SCRN MAMMOGRAM 8/4/2018 EYE EXAM RETINAL OR DILATED Q1 8/9/2018 Influenza Age 5 to Adult 3/31/2019* HEMOGLOBIN A1C Q6M 11/17/2018 FOOT EXAM Q1 2/6/2019 LIPID PANEL Q1 2/6/2019 PAP AKA CERVICAL CYTOLOGY 7/14/2020 DTaP/Tdap/Td series (2 - Td) 9/20/2021 COLONOSCOPY 5/20/2025 *Topic was postponed. The date shown is not the original due date. Allergies as of 8/22/2018  Review Complete On: 8/22/2018 By: Ryan Franco LPN Severity Noted Reaction Type Reactions Avelox [Moxifloxacin]  03/15/2010   Side Effect Rash, Itching Bactrim [Sulfamethoxazole-trimethoprim]  03/15/2010   Side Effect Rash Doxycycline  03/14/2013    Other (comments) Tongue discomfort, swollen glands Lisinopril  03/15/2010   Intolerance Diarrhea Norvasc [Amlodipine]  03/15/2010   Side Effect Other (comments) edema Penicillin G  02/22/2010    Hives, Shortness of Breath, Swelling Current Immunizations  Reviewed on 2/1/2018 Name Date Hepatitis A Vaccine 4/24/2001 Influenza Vaccine 11/3/2016, 10/22/2013 Pneumococcal Polysaccharide (PPSV-23) 11/4/2016 TD Vaccine 3/22/2000 TDAP Vaccine 9/20/2011 Zoster Vaccine, Live 7/11/2017 12:00 AM  
  
 Not reviewed this visit You Were Diagnosed With   
  
 Codes Comments Viral upper respiratory tract infection    -  Primary ICD-10-CM: J06.9 ICD-9-CM: 465.9 Vitals BP Pulse Temp Resp Height(growth percentile) Weight(growth percentile) 130/57 (BP 1 Location: Left arm, BP Patient Position: Sitting) 60 98 °F (36.7 °C) (Oral) 18 5' 5\" (1.651 m) 278 lb 6.4 oz (126.3 kg) LMP SpO2 BMI OB Status Smoking Status 12/24/2007 98% 46.33 kg/m2 Postmenopausal Never Smoker BMI and BSA Data Body Mass Index Body Surface Area  
 46.33 kg/m 2 2.41 m 2 Preferred Pharmacy Pharmacy Name Phone [id number]- [id number], [address] [facility] [address] [phone] Your Updated Medication List  
  
   
This list is accurate as of 8/22/18 12:09 PM.  Always use your most recent med list.  
  
  
  
  
 albuterol 90 mcg/actuation inhaler Commonly known as:  PROVENTIL HFA, VENTOLIN HFA, PROAIR HFA Take 2 Puffs by inhalation every four (4) hours as needed for Wheezing. CINNAMON PO Take 350 mg by mouth daily. coenzyme q10-vitamin e 100-100 mg-unit Cap Take 200 mg by mouth daily. dulaglutide 1.5 mg/0.5 mL sub-q pen Commonly known as:  TRULICITY  
0.5 mL by SubCUTAneous route every seven (7) days. FISH OIL 1,200-144-216 mg Cap Generic drug:  fish oil-dha-epa Take 1 Tab by mouth. Takes ~ twice a week  
  
 losartan-hydroCHLOROthiazide 100-12.5 mg per tablet Commonly known as:  HYZAAR  
TAKE 1 TABLET BY MOUTH EVERY DAY  
  
 metoprolol succinate 50 mg XL tablet Commonly known as:  TOPROL-XL  
TAKE 1 TABLET DAILY FOR HYPERTENSION  
  
 multivitamin tablet Commonly known as:  ONE A DAY Take 1 Tab by mouth daily. Includes vit D 1,000 units  
  
 naproxen 500 mg tablet Commonly known as:  NAPROSYN  
 TAKE 1 TABLET TWICE A DAY AS NEEDED  
  
 simvastatin 20 mg tablet Commonly known as:  ZOCOR  
TAKE 1 TABLET NIGHTLY  
  
 triamcinolone acetonide 0.1 % topical cream  
Commonly known as:  KENALOG  
use thin layer on back twice daily UNITHROID 75 mcg tablet Generic drug:  levothyroxine TAKE 1 TABLET DAILY BEFORE BREAKFAST Follow-up Instructions Return if symptoms worsen or fail to improve, for Follow Up. Patient Instructions For your symptoms: Your symptoms may improve with an oral antihistamine. These are available over the counter and include: 
Loratadine/claritin Cetirizine/Zyrtec Fexofenadine/Allegra Levocetirizine/Xyzal 
 
· Your symptoms may improve with a nasal steroid. These are available over the counter and include: · Flonase (aka fluticasone) · Nasocort (aka triamcinolone) · Nasonex (aka mometasone) · Rhinocort (aka budesonide) · Increase fluid intake, especially water to thin mucous and boost the immune system. · Avoid sugar and dairy while congested since they thicken mucous. · Get plenty of rest!   
· Gargle 3 times daily and as needed in Listerine or warm salt water vinegar solutions (1 tsp salt, 1 tsp vinegar in 1 cup lukewarm water.) · Use OTC nasal saline spray up each nostril four times daily. You could also consider using a netipot with distilled water. · Use humidifier at bedtime. · Use OTC Mucinex 600 mg twice daily to loosen mucous. · Use OTC Tylenol (up to 650mg every 6 hours) or Ibuprofen (up to 800 mg every 8 hours) as needed for pain, fever or headaches. ·  Avoid decongestants and Ibuprofen if you have high blood pressure! Return to the doctor for evaluation: · If mucous is consistently discolored yellow or green throughout the day for more than a week · If you develop worsening facial pain · If you develop a fever that will not go away · If your symptoms worsen instead of improve Saline Nasal Washes: Care Instructions Your Care Instructions Saline nasal washes help keep the nasal passages open by washing out thick or dried mucus. This simple remedy can help relieve symptoms of allergies, sinusitis, and colds. It also can make the nose feel more comfortable by keeping the mucous membranes moist. You may notice a little burning sensation in your nose the first few times you use the solution, but this usually gets better in a few days. Follow-up care is a key part of your treatment and safety. Be sure to make and go to all appointments, and call your doctor if you are having problems. It's also a good idea to know your test results and keep a list of the medicines you take. How can you care for yourself at home? · You can buy premixed saline solution in a squeeze bottle or other sinus rinse products at a drugstore. Read and follow the instructions on the label. · You also can make your own saline solution by adding 1 teaspoon of salt and 1 teaspoon of baking soda to 2 cups of distilled water. · If you use a homemade solution, pour a small amount into a clean bowl. Using a rubber bulb syringe, squeeze the syringe and place the tip in the salt water. Pull a small amount of the salt water into the syringe by relaxing your hand. · Sit down with your head tilted slightly back. Do not lie down. Put the tip of the bulb syringe or the squeeze bottle a little way into one of your nostrils. Gently drip or squirt a few drops into the nostril. Repeat with the other nostril. Some sneezing and gagging are normal at first. 
· Gently blow your nose. · Wipe the syringe or bottle tip clean after each use. · Repeat this 2 or 3 times a day. · Use nasal washes gently if you have nosebleeds often. When should you call for help? Watch closely for changes in your health, and be sure to contact your doctor if: 
  · You often get nosebleeds.  
  · You have problems doing the nasal washes. Where can you learn more? Go to http://luanne-gail.info/. Enter [id number] [phone] in the search box to learn more about \"Saline Nasal Washes: Care Instructions. \" Current as of: May 12, 2017 Content Version: 11.7 © 9461-3042 [employer]. Care instructions adapted under license by [employer] (which disclaims liability or warranty for this information). If you have questions about a medical condition or this instruction, always ask your healthcare professional. [address] any warranty or liability for your use of this information. Introducing [facility]! Dear Junior Kim: 
Thank you for requesting a Organ[employer]Wis[employer] account. Our records indicate that you already have an active OrganizedWisdom account. You can access your account anytime at [email or web address] Did you know that you can access your hospital and ER discharge instructions at any time in OrganizedWisdom? You can also review all of your test results from your hospital stay or ER visit. Additional Information If you have questions, please visit the Frequently Asked Questions section of the OrganizedWisdom website at [email or web address] Remember, OrganizedWisdom is NOT to be used for urgent needs. For medical emergencies, dial 911. Now available from your iPhone and Android! Please provide this summary of care documentation to your next provider. Your primary care clinician is listed as Ibrahima Jones. If you have any questions after today's visit, please call [phone].

## 2018-08-22 NOTE — PATIENT INSTRUCTIONS
For your symptoms: Your symptoms may improve with an oral antihistamine. These are available over the counter and include:  Loratadine/claritin  Cetirizine/Zyrtec  Fexofenadine/Allegra  Levocetirizine/Xyzal    · Your symptoms may improve with a nasal steroid. These are available over the counter and include:  · Flonase (aka fluticasone)  · Nasocort (aka triamcinolone)  · Nasonex (aka mometasone)  · Rhinocort (aka budesonide)    · Increase fluid intake, especially water to thin mucous and boost the immune system. · Avoid sugar and dairy while congested since they thicken mucous. · Get plenty of rest!    · Gargle 3 times daily and as needed in Listerine or warm salt water vinegar solutions (1 tsp salt, 1 tsp vinegar in 1 cup lukewarm water.)    · Use OTC nasal saline spray up each nostril four times daily. You could also consider using a netipot with distilled water. · Use humidifier at bedtime. · Use OTC Mucinex 600 mg twice daily to loosen mucous. · Use OTC Tylenol (up to 650mg every 6 hours) or Ibuprofen (up to 800 mg every 8 hours) as needed for pain, fever or headaches. ·  Avoid decongestants and Ibuprofen if you have high blood pressure! Return to the doctor for evaluation:  · If mucous is consistently discolored yellow or green throughout the day for more than a week  · If you develop worsening facial pain  · If you develop a fever that will not go away  · If your symptoms worsen instead of improve             Saline Nasal Washes: Care Instructions  Your Care Instructions  Saline nasal washes help keep the nasal passages open by washing out thick or dried mucus. This simple remedy can help relieve symptoms of allergies, sinusitis, and colds. It also can make the nose feel more comfortable by keeping the mucous membranes moist. You may notice a little burning sensation in your nose the first few times you use the solution, but this usually gets better in a few days.   Follow-up care is a key part of your treatment and safety. Be sure to make and go to all appointments, and call your doctor if you are having problems. It's also a good idea to know your test results and keep a list of the medicines you take. How can you care for yourself at home? · You can buy premixed saline solution in a squeeze bottle or other sinus rinse products at a drugstore. Read and follow the instructions on the label. · You also can make your own saline solution by adding 1 teaspoon of salt and 1 teaspoon of baking soda to 2 cups of distilled water. · If you use a homemade solution, pour a small amount into a clean bowl. Using a rubber bulb syringe, squeeze the syringe and place the tip in the salt water. Pull a small amount of the salt water into the syringe by relaxing your hand. · Sit down with your head tilted slightly back. Do not lie down. Put the tip of the bulb syringe or the squeeze bottle a little way into one of your nostrils. Gently drip or squirt a few drops into the nostril. Repeat with the other nostril. Some sneezing and gagging are normal at first.  · Gently blow your nose. · Wipe the syringe or bottle tip clean after each use. · Repeat this 2 or 3 times a day. · Use nasal washes gently if you have nosebleeds often. When should you call for help? Watch closely for changes in your health, and be sure to contact your doctor if:    · You often get nosebleeds.     · You have problems doing the nasal washes. Where can you learn more? Go to http://luanne-gail.info/. Enter [phone] in the search box to learn more about \"Saline Nasal Washes: Care Instructions. \"  Current as of: May 12, 2017  Content Version: 11.7  © 3306-5275 [employer]. Care instructions adapted under license by [employer] (which disclaims liability or warranty for this information).  If you have questions about a medical condition or this instruction, always ask your healthcare professional. [employer] disclaims any warranty or liability for your use of this information.

## 2018-08-22 NOTE — PROGRESS NOTES
[facility] Note      Subjective:     Chief Complaint   Patient presents with    Sore Throat     x 2 days     Cough     Cough is productive with yellow thick mucus. Patient does have some post nasal drip. Started on x 2 days      Trish Arroyo is a 61y.o. year old female who presents for evaluation of the following:    Sore throat  Onset 1 day ago  Onset Monday  Hoarse  yellow phlegm, think   Post nasal drip  Denies facial pressure, chest pain, shortness of breath      Review of Systems   Pertinent positives and negative per HPI. All other systems  reviewed are negative for a Comprehensive ROS (10+).        Past Medical History:   Diagnosis Date    Anemia NEC     AR (allergic rhinitis)     Asthma     mild    Benign essential hypertension 2016    Diabetes mellitus type 2, noninsulin dependent ([address] 12/3/2015    Diabetes mellitus, type 2 ([address] 2013    FHx: breast cancer 3/2/2012    GERD (gastroesophageal reflux disease) 11/00    H/O Mild, Intermittent Asthma     H/O Optic disc edema, 6815-7428 3/2/2012    History of benign left ovarian cyst 3/2/2012    Hx of complete eye exam 2017    Dr Hammer Race intraoular pressure and no evidence of diabetic retinopathy    Hyperlipidemia     Hypertension     Hypothyroidism     Hypothyroidism 2015    2002    Impaired fasting glucose 3/31/2012    Mixed hyperlipidemia 2016    Morbid obesity with BMI of 45.0-49.9, adult (HCC)      (normal spontaneous vaginal delivery)         Ovarian cyst 2000    Left, benign    Postmenopause     LMP  (HRT x 6months)    Rheumatoid arthritis(714.0)     S/P colonoscopy 7    Normal (10yrs)    Skin cancer screening exams, Derm Dr Chase Ion 3/2/2012    Vitamin D deficiency 3/2/2012        Social History     Social History    Marital status:      Spouse name: N/A    Number of children: 1    Years of education: N/A Occupational History          Social History Main Topics    Smoking status: Never Smoker    Smokeless tobacco: Never Used    Alcohol use No      Comment: very rare, 1-2 drinks a year    Drug use: No    Sexual activity: Yes     Partners: Male     Birth control/ protection: None     Other Topics Concern    Caffeine Concern No     decaff tea    Special Diet No    Exercise No     Social History Narrative    Works as        Current Outpatient Prescriptions   Medication Sig    naproxen (NAPROSYN) 500 mg tablet TAKE 1 TABLET TWICE A DAY AS NEEDED    UNITHROID 75 mcg tablet TAKE 1 TABLET DAILY BEFORE BREAKFAST    simvastatin (ZOCOR) 20 mg tablet TAKE 1 TABLET NIGHTLY    metoprolol succinate (TOPROL-XL) 50 mg XL tablet TAKE 1 TABLET DAILY FOR HYPERTENSION    dulaglutide (TRULICITY) 1.5 AC/7.9 mL sub-q pen 0.5 mL by SubCUTAneous route every seven (7) days.  losartan-hydroCHLOROthiazide (HYZAAR) 100-12.5 mg per tablet TAKE 1 TABLET BY MOUTH EVERY DAY    triamcinolone acetonide (KENALOG) 0.1 % topical cream use thin layer on back twice daily    albuterol (PROVENTIL HFA, VENTOLIN HFA, PROAIR HFA) 90 mcg/actuation inhaler Take 2 Puffs by inhalation every four (4) hours as needed for Wheezing.  CINNAMON BARK (CINNAMON PO) Take 350 mg by mouth daily.  coenzyme q10-vitamin e 100-100 mg-unit cap Take 200 mg by mouth daily.  multivitamin (ONE A DAY) tablet Take 1 Tab by mouth daily. Includes vit D 1,000 units    fish oil-dha-epa (FISH OIL) 1,200-144-216 mg Cap Take 1 Tab by mouth. Takes ~ twice a week     No current facility-administered medications for this visit. Objective:     Vitals:    08/22/18 1156   BP: 130/57   Pulse: 60   Resp: 18   Temp: 98 °F (36.7 °C)   TempSrc: Oral   SpO2: 98%   Weight: 278 lb 6.4 oz (126.3 kg)   Height: 5' 5\" (1.651 m)       Physical Examination:  General: Alert, cooperative, no distress, appears stated age.   Eyes: Conjunctivae clear. PERRL, EOMs intact. Ears: Normal external ear canals both ears. Nose: Nares normal. Septum midline. Mucosa normal. No drainage or sinus tenderness. Mouth/Throat: Lips, mucosa, and tongue normal.   Neck: Supple, symmetrical, trachea midline, no adenopathy. No thyroid enlargement/tenderness/nodules  Back: Symmetric, no curvature. Lungs: Clear to auscultation bilaterally. Normal inspiratory and expiratory ratio. Heart: Regular rate and rhythm, S1, S2 normal, no murmur, click, rub or gallop. Abdomen: Soft, non-tender. Extremities: Extremities normal, atraumatic, no cyanosis or edema. Pulses: 2+ and symmetric all extremities. Skin: Skin color, texture, turgor normal. No rashes or lesions on exposed skin. Lymph nodes: Cervical, supraclavicular nodes normal.  Neurologic: CNII-XII intact. No visits with results within 3 Month(s) from this visit. Latest known visit with results is:    Office Visit on 05/17/2018   Component Date Value Ref Range Status    Hemoglobin A1c 05/17/2018 7.2* 4.8 - 5.6 % Final    Comment:          Pre-diabetes: 5.7 - 6.4           Diabetes: >6.4           Glycemic control for adults with diabetes: <7.0      Estimated average glucose 05/17/2018 160  mg/dL Final    Glucose 05/17/2018 120* 65 - 99 mg/dL Final    BUN 05/17/2018 13  8 - 27 mg/dL Final    Creatinine 05/17/2018 0.78  0.57 - 1.00 mg/dL Final    GFR est non-AA 05/17/2018 82  >59 mL/min/1.73 Final    GFR est AA 05/17/2018 94  >59 mL/min/1.73 Final    BUN/Creatinine ratio 05/17/2018 17  12 - 28 Final    Sodium 05/17/2018 144  134 - 144 mmol/L Final    Potassium 05/17/2018 4.1  3.5 - 5.2 mmol/L Final    Chloride 05/17/2018 103  96 - 106 mmol/L Final    CO2 05/17/2018 24  18 - 29 mmol/L Final    Calcium 05/17/2018 10.6* 8.7 - 10.3 mg/dL Final           Assessment/ Plan:   Diagnoses and all orders for this visit:    1. Viral upper respiratory tract infection      Mild. No SIRS.  Trial otc meds for symptom relief discussed and listed in patient instructions- mucinex + antihistamine + sinus rinse + otc analgesia + humidifier prn      Educated patient on red flag symptoms to warrant return to clinic or emergency room visit. I have discussed the diagnosis with the patient and the intended plan as seen in the above orders. The patient has been offered or received an after-visit summary and questions were answered concerning future plans. I have discussed medication side effects and warnings with the patient as well. Follow-up Disposition:  Return if symptoms worsen or fail to improve, for Follow Up.       Signed,    Montez Norris MD  8/22/2018

## 2018-08-22 NOTE — PROGRESS NOTES
Chief Complaint   Patient presents with    Sore Throat     x 2 days     Cough     Cough is productive with yellow thick mucus. Patient does have some post nasal drip. Started on x 2 days      1. Have you been to the ER, urgent care clinic since your last visit? Hospitalized since your last visit? No    2. Have you seen or consulted any other health care providers outside of the [address] [place] [address] since your last visit? Include any pap smears or colon screening.  No

## 2018-08-28 RX ORDER — METOPROLOL SUCCINATE 50 MG/1
TABLET, EXTENDED RELEASE ORAL
Qty: 90 TAB | Refills: 0 | Status: SHIPPED | OUTPATIENT
Start: 2018-08-28 | End: 2018-11-26 | Stop reason: SDUPTHER

## 2018-09-21 ENCOUNTER — OFFICE VISIT (OUTPATIENT)
Dept: FAMILY MEDICINE CLINIC | Age: 63
End: 2018-09-21

## 2018-09-21 VITALS
HEART RATE: 80 BPM | RESPIRATION RATE: 18 BRPM | OXYGEN SATURATION: 98 % | BODY MASS INDEX: 44.55 KG/M2 | SYSTOLIC BLOOD PRESSURE: 134 MMHG | DIASTOLIC BLOOD PRESSURE: 84 MMHG | TEMPERATURE: 98 F | HEIGHT: 65 IN | WEIGHT: 267.4 LBS

## 2018-09-21 DIAGNOSIS — Z12.31 ENCOUNTER FOR SCREENING MAMMOGRAM FOR BREAST CANCER: ICD-10-CM

## 2018-09-21 DIAGNOSIS — Z23 ENCOUNTER FOR IMMUNIZATION: ICD-10-CM

## 2018-09-21 DIAGNOSIS — J30.2 SEASONAL ALLERGIC RHINITIS, UNSPECIFIED TRIGGER: ICD-10-CM

## 2018-09-21 DIAGNOSIS — E11.9 DIABETES MELLITUS TYPE 2, NONINSULIN DEPENDENT (HCC): Primary | ICD-10-CM

## 2018-09-21 DIAGNOSIS — R63.1 INCREASED THIRST: ICD-10-CM

## 2018-09-21 DIAGNOSIS — E66.01 MORBID OBESITY WITH BMI OF 45.0-49.9, ADULT (HCC): Chronic | ICD-10-CM

## 2018-09-21 DIAGNOSIS — E03.9 HYPOTHYROIDISM, ADULT: ICD-10-CM

## 2018-09-21 DIAGNOSIS — R35.89 POLYURIA: ICD-10-CM

## 2018-09-21 NOTE — MR AVS SNAPSHOT
[address] 
[phone] Patient: Moses Raphael MRN: [id number] SAK:2/0/3800 Visit Information Date & Time Provider Department Dept. Phone Encounter #  
 9/21/2018 10:30 AM Christian Robert [facility] [phone] [id number] Follow-up Instructions Return in about 3 months (around 12/21/2018) for Follow up. Upcoming Health Maintenance Date Due MICROALBUMIN Q1 7/13/2018 BREAST CANCER SCRN MAMMOGRAM 8/4/2018 EYE EXAM RETINAL OR DILATED Q1 8/9/2018 Influenza Age 5 to Adult 3/31/2019* HEMOGLOBIN A1C Q6M 11/17/2018 FOOT EXAM Q1 2/6/2019 LIPID PANEL Q1 2/6/2019 PAP AKA CERVICAL CYTOLOGY 7/14/2020 DTaP/Tdap/Td series (2 - Td) 9/20/2021 COLONOSCOPY 5/20/2025 *Topic was postponed. The date shown is not the original due date. Allergies as of 9/21/2018  Review Complete On: 9/21/2018 By: Heddy Rubinstein, LPN Severity Noted Reaction Type Reactions Avelox [Moxifloxacin]  03/15/2010   Side Effect Rash, Itching Bactrim [Sulfamethoxazole-trimethoprim]  03/15/2010   Side Effect Rash Doxycycline  03/14/2013    Other (comments) Tongue discomfort, swollen glands Lisinopril  03/15/2010   Intolerance Diarrhea Norvasc [Amlodipine]  03/15/2010   Side Effect Other (comments) edema Penicillin G  02/22/2010    Hives, Shortness of Breath, Swelling Current Immunizations  Reviewed on 9/21/2018 Name Date Hepatitis A Vaccine 4/24/2001 Influenza Vaccine 11/3/2016, 10/22/2013 Influenza Vaccine (Quad) PF 9/21/2018 Pneumococcal Polysaccharide (PPSV-23) 11/4/2016 TD Vaccine 3/22/2000 TDAP Vaccine 9/20/2011 Zoster Vaccine, Live 7/11/2017 12:00 AM  
  
 Reviewed by Heddy Rubinstein, LPN on 2/80/7490 at 57:90 AM  
You Were Diagnosed With   
  
 Codes Comments Diabetes mellitus type 2, noninsulin dependent ([facility]    -  Primary ICD-10-CM: E11.9 ICD-9-CM: 250.00 Polyuria     ICD-10-CM: R35.8 ICD-9-CM: 788.42 Increased thirst     ICD-10-CM: R63.1 ICD-9-CM: 783.5 Morbid obesity with BMI of 45.0-49.9, adult (HCC)     ICD-10-CM: E66.01, Z68.42 
ICD-9-CM: 278.01, V85.42 Seasonal allergic rhinitis, unspecified trigger     ICD-10-CM: J30.2 ICD-9-CM: 477.9 Hypothyroidism, adult     ICD-10-CM: E03.9 ICD-9-CM: 244.9 Encounter for immunization     ICD-10-CM: C35 ICD-9-CM: V03.89 Encounter for screening mammogram for breast cancer     ICD-10-CM: Z12.31 
ICD-9-CM: V76.12 Vitals BP Pulse Temp Resp Height(growth percentile) Weight(growth percentile) 134/84 (BP 1 Location: Right arm, BP Patient Position: Sitting) 80 98 °F (36.7 °C) (Oral) 18 5' 5\" (1.651 m) 267 lb 6.4 oz (121.3 kg) LMP SpO2 BMI OB Status Smoking Status 12/24/2007 98% 44.5 kg/m2 Postmenopausal Never Smoker Vitals History BMI and BSA Data Body Mass Index Body Surface Area 44.5 kg/m 2 2.36 m 2 Preferred Pharmacy Pharmacy Name Phone [id number]- [id number], [address] [phone] Your Updated Medication List  
  
   
This list is accurate as of 9/21/18 11:14 AM.  Always use your most recent med list.  
  
  
  
  
 albuterol 90 mcg/actuation inhaler Commonly known as:  PROVENTIL HFA, VENTOLIN HFA, PROAIR HFA Take 2 Puffs by inhalation every four (4) hours as needed for Wheezing. CINNAMON PO Take 350 mg by mouth daily. coenzyme q10-vitamin e 100-100 mg-unit Cap Take 200 mg by mouth daily. dulaglutide 1.5 mg/0.5 mL sub-q pen Commonly known as:  TRULICITY  
0.5 mL by SubCUTAneous route every seven (7) days. FISH OIL 1,200-144-216 mg Cap Generic drug:  fish oil-dha-epa Take 1 Tab by mouth. Takes ~ twice a week  
  
 losartan-hydroCHLOROthiazide 100-12.5 mg per tablet Commonly known as:  HYZAAR  
TAKE 1 TABLET BY MOUTH EVERY DAY  
  
 metoprolol succinate 50 mg XL tablet Commonly known as:  TOPROL-XL  
TAKE 1 TABLET DAILY FOR HYPERTENSION  
  
 multivitamin tablet Commonly known as:  ONE A DAY Take 1 Tab by mouth daily. Includes vit D 1,000 units  
  
 naproxen 500 mg tablet Commonly known as:  NAPROSYN  
TAKE 1 TABLET TWICE A DAY AS NEEDED  
  
 simvastatin 20 mg tablet Commonly known as:  ZOCOR  
TAKE 1 TABLET NIGHTLY  
  
 triamcinolone acetonide 0.1 % topical cream  
Commonly known as:  KENALOG  
use thin layer on back twice daily UNITHROID 75 mcg tablet Generic drug:  levothyroxine TAKE 1 TABLET DAILY BEFORE BREAKFAST We Performed the Following CBC WITH AUTOMATED DIFF [64188 CPT(R)] HEMOGLOBIN A1C WITH EAG [06828 CPT(R)] INFLUENZA VIRUS VAC QUAD,SPLIT,PRESV FREE SYRINGE IM [id number] CPT(R)] METABOLIC PANEL, COMPREHENSIVE [20446 CPT(R)] MICROALBUMIN, UR, RAND W/ MICROALB/CREAT RATIO [id number] CPT(R)] OH IMMUNIZ ADMIN,1 SINGLE/COMB VAC/TOXOID [id number] CPT(R)] TSH 3RD GENERATION [89696 CPT(R)] URINALYSIS W/ RFLX MICROSCOPIC [51149 CPT(R)] Follow-up Instructions Return in about 3 months (around 12/21/2018) for Follow up. To-Do List   
 09/21/2018 Imaging:  MISAEL MAMMO BI SCREENING INCL CAD Patient Instructions Frequent Urination: Care Instructions Your Care Instructions An urge to urinate frequently but usually passing only small amounts of urine is a common symptom of urinary problems, such as urinary tract infections. The bladder may become inflamed. This can cause the urge to urinate. You may try to urinate more often than usual to try to soothe that urge. Frequent urination also may be caused by sexually transmitted infections (STIs) or kidney stones.  Or it may happen when something irritates the tube that carries urine from the bladder to the outside of the body (urethra). It may also be a sign of diabetes. The cause may be hard to find. You may need tests. Follow-up care is a key part of your treatment and safety. Be sure to make and go to all appointments, and call your doctor if you are having problems. It's also a good idea to know your test results and keep a list of the medicines you take. How can you care for yourself at home? · Drink extra water for the next day or two. This will help make the urine less concentrated. (If you have kidney, heart, or liver disease and have to limit fluids, talk with your doctor before you increase the amount of fluids you drink.) · Avoid drinks that are carbonated or have caffeine. They can irritate the bladder. For women: · Urinate right after you have sex. · After you go to the bathroom, wipe from front to back. · Avoid douches, bubble baths, and feminine hygiene sprays. And avoid other feminine hygiene products that have deodorants. When should you call for help? Call your doctor now or seek immediate medical care if: 
  · You have new symptoms, such as fever, nausea, or vomiting.  
  · You have new or worse symptoms of a urinary problem. For example: ¨ You have blood or pus in your urine. ¨ You have chills or body aches. ¨ It hurts to urinate. ¨ You have groin or belly pain. ¨ You have pain in your back just below your rib cage (the flank area).  
 Watch closely for changes in your health, and be sure to contact your doctor if you feel thirstier than usual. 
Where can you learn more? Go to http://luanne-gail.info/. Enter 198 0330 in the search box to learn more about \"Frequent Urination: Care Instructions. \" Current as of: May 12, 2017 Content Version: 11.7 © 9571-3135 Healthwise, Incorporated. Care instructions adapted under license by [employer] (which disclaims liability or warranty for this information).  If you have questions about a medical condition or this instruction, always ask your healthcare professional. [address]  any warranty or liability for your use of this information. Introducing \[facility]\"" & HEALTH SERVICES! Dear Jazmine Smith: 
Thank you for requesting a [employer] Genie account. Our records indicate that you already have an active [employer]ie account. You can access your account anytime at https://"[employer]. [employer] Did you know that you can access your hospital and ER discharge instructions at any time in [employer] Genie? You can also review all of your test results from your hospital stay or ER visit. Additional Information If you have questions, please visit the Frequently Asked Questions section of the [employer]ie website at https://"[employer]. [employer]/. Remember, [employer]ie is NOT to be used for urgent needs. For medical emergencies, dial 911. Now available from your iPhone and Android! Please provide this summary of care documentation to your next provider. Your primary care clinician is listed as Ledy Dickson. If you have any questions after today's visit, please call [phone].

## 2018-09-21 NOTE — PATIENT INSTRUCTIONS
Frequent Urination: Care Instructions  Your Care Instructions  An urge to urinate frequently but usually passing only small amounts of urine is a common symptom of urinary problems, such as urinary tract infections. The bladder may become inflamed. This can cause the urge to urinate. You may try to urinate more often than usual to try to soothe that urge. Frequent urination also may be caused by sexually transmitted infections (STIs) or kidney stones. Or it may happen when something irritates the tube that carries urine from the bladder to the outside of the body (urethra). It may also be a sign of diabetes. The cause may be hard to find. You may need tests. Follow-up care is a key part of your treatment and safety. Be sure to make and go to all appointments, and call your doctor if you are having problems. It's also a good idea to know your test results and keep a list of the medicines you take. How can you care for yourself at home? · Drink extra water for the next day or two. This will help make the urine less concentrated. (If you have kidney, heart, or liver disease and have to limit fluids, talk with your doctor before you increase the amount of fluids you drink.)  · Avoid drinks that are carbonated or have caffeine. They can irritate the bladder. For women:  · Urinate right after you have sex. · After you go to the bathroom, wipe from front to back. · Avoid douches, bubble baths, and feminine hygiene sprays. And avoid other feminine hygiene products that have deodorants. When should you call for help? Call your doctor now or seek immediate medical care if:    · You have new symptoms, such as fever, nausea, or vomiting.     · You have new or worse symptoms of a urinary problem. For example:  ¨ You have blood or pus in your urine. ¨ You have chills or body aches. ¨ It hurts to urinate. ¨ You have groin or belly pain. ¨ You have pain in your back just below your rib cage (the flank area).  Watch closely for changes in your health, and be sure to contact your doctor if you feel thirstier than usual.  Where can you learn more? Go to http://luanne-gail.info/. Enter 562 [id number] in the search box to learn more about \"Frequent Urination: Care Instructions. \"  Current as of: May 12, 2017  Content Version: 11.7  © 8838-1388 [employer]. Care instructions adapted under license by [employer] (which disclaims liability or warranty for this information). If you have questions about a medical condition or this instruction, always ask your healthcare professional. [place] [address] any warranty or liability for your use of this information.

## 2018-09-21 NOTE — PROGRESS NOTES
Kelvin Maya [address] [facility] [place]. [place], [address]  [phone]             Date of visit: 9/21/2018   Subjective:      History obtained from:  the patient. Flip Vaz is a 61 y.o. female who presents today for f/u chronic problems    Has to clear her throat a lot this time of year due to fall allergies but doesn't bother her much    Last week at work was having to urinate 10-12x per day.w as thirsty, couldn't get enough liquid. Was up 6x at night thirsty and having to urinate  taht lasted a whole week  Then last Sat night had cramps and tingling pain in right leg. Felt like electrical impulses. Never had that before  When she got up with granddaughter who was visiting didn't feel right. Her varicose veins and her hands (front and back of legs and hands) veins were all sticking out. The week before this happened was on vacation and was very bad. Gained 11lb. When she got on the scales after seeing her vein her weight was down over 10 pounds. Then started trying to take in more salt. Doesn't usually add salt  Drinking some low aaron gatorade to maintain the weight. Just felt a little tingling in right leg just now, hasn't felt that all week    Has lost another pound this week. Taking in 1gal tea and 1 gal water daily. Urinating a lot,very clear. Does think this week is a little bit better than last week    Just started the trulicity 1 week ago  Didn't cause any side effects    Doesn't want to say dizzy but felt \"off\" and like \"equilibrium was not perfect. \"  No falling or off balance. If anything was hard to clutch tiny items like a pen, can't do multitasking    Doesn't have a glucometer to check her sugar.     Ate well this week; proteins and salads  No carbs    Feels dry in mouth, feels like she is dehydrated    [place]  If needs rx can get at [facility] tomorrow, going to beach next week    Willing to get flu shot today    Had eye exam 1 mo ago, asked them to send records. Patient Active Problem List    Diagnosis Date Noted    Spider veins of both lower extremities 2018    Varicose vein of leg 2018    Morbid obesity with BMI of 45.0-49.9, adult ([facility] 10/07/2017    Mixed hyperlipidemia 2016    Benign essential hypertension     Umbilical hernia without obstruction and without gangrene 2016    Diabetes mellitus type 2, noninsulin dependent ([facility] 2015    Hypothyroidism 2015    Actinic keratosis 2014    Abdominal wall bulge 2013    Vitamin D deficiency 2012    FHx: breast cancer 2012    History of benign left ovarian cyst, 2000 2012    Skin cancer screening exams, Derm Dr James Alonso 2012    H/O Optic disc edema, [id number]    Hyperlipidemia 03/15/2010    AR (allergic rhinitis)     GERD (gastroesophageal reflux disease)     H/O Mild, Intermittent Asthma     Rheumatoid arthritis(714.0)     Postmenopause, LMP , age 49 yo, s/p HRT x 6 months     Ovarian cyst      (normal spontaneous vaginal delivery)     Metabolic syndrome      Current Outpatient Prescriptions   Medication Sig Dispense Refill    metoprolol succinate (TOPROL-XL) 50 mg XL tablet TAKE 1 TABLET DAILY FOR HYPERTENSION 90 Tab 0    naproxen (NAPROSYN) 500 mg tablet TAKE 1 TABLET TWICE A DAY AS NEEDED 180 Tab 0    UNITHROID 75 mcg tablet TAKE 1 TABLET DAILY BEFORE BREAKFAST 90 Tab 2    simvastatin (ZOCOR) 20 mg tablet TAKE 1 TABLET NIGHTLY 90 Tab 0    dulaglutide (TRULICITY) 1.5 CO/6.5 mL sub-q pen 0.5 mL by SubCUTAneous route every seven (7) days.  2 mL 1    losartan-hydroCHLOROthiazide (HYZAAR) 100-12.5 mg per tablet TAKE 1 TABLET BY MOUTH EVERY DAY 90 Tab 1    triamcinolone acetonide (KENALOG) 0.1 % topical cream use thin layer on back twice daily 45 g 1    albuterol (PROVENTIL HFA, VENTOLIN HFA, PROAIR HFA) 90 mcg/actuation inhaler Take 2 Puffs by inhalation every four (4) hours as needed for Wheezing. 1 Inhaler 1    CINNAMON BARK (CINNAMON PO) Take 350 mg by mouth daily.  coenzyme q10-vitamin e 100-100 mg-unit cap Take 200 mg by mouth daily.  multivitamin (ONE A DAY) tablet Take 1 Tab by mouth daily. Includes vit D 1,000 units      fish oil-dha-epa (FISH OIL) 1,200-144-216 mg Cap Take 1 Tab by mouth.  Takes ~ twice a week       Allergies   Allergen Reactions    Avelox [Moxifloxacin] Rash and Itching    Bactrim [Sulfamethoxazole-Trimethoprim] Rash    Doxycycline Other (comments)     Tongue discomfort, swollen glands    Lisinopril Diarrhea    Norvasc [Amlodipine] Other (comments)     edema    Penicillin G Hives, Shortness of Breath and Swelling     Past Medical History:   Diagnosis Date    Anemia NEC     AR (allergic rhinitis)     Asthma     mild    Benign essential hypertension 2016    Diabetes mellitus type 2, noninsulin dependent ([facility] 12/3/2015    Diabetes mellitus, type 2 ([facility] 2013    FHx: breast cancer 3/2/2012    GERD (gastroesophageal reflux disease) 11/00    H/O Mild, Intermittent Asthma     H/O Optic disc edema, [id number] 3/2/2012    History of benign left ovarian cyst 3/2/2012    Hx of complete eye exam 2017    Dr Junie Chowdhury intraoular pressure and no evidence of diabetic retinopathy    Hyperlipidemia 2002    Hypertension     Hypothyroidism     Hypothyroidism 2015    2002    Impaired fasting glucose 3/31/2012    Mixed hyperlipidemia 2016    Morbid obesity with BMI of 45.0-49.9, adult ([facility]      (normal spontaneous vaginal delivery)         Ovarian cyst     Left, benign    Postmenopause     LMP  (HRT x 6months)    Rheumatoid arthritis(714.0)     S/P colonoscopy     Normal (10yrs)    Skin cancer screening exams, Derm Dr Clayton Kay 3/2/2012    Vitamin D deficiency 3/2/2012     Past Surgical History:   Procedure Laterality Date    COLONOSCOPY  2005 normal, ok x 10 yrs, Dr Chacorta Franco      Normal    HX BREAST LUMPECTOMY      Left, benign (Dr. 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 LUCERO      1-15% Stenosis Bilaterally     Family History   Problem Relation Age of Onset   [facility] Breast Cancer Mother      diagnosed age 72, survived it    [facility] Arthritis-rheumatoid Mother     Stroke Mother     Parkinsonism Mother       in nursing from colon perf or GI bleed age 80    Hypertension Mother     Migraines Mother     Breast Cancer Maternal Grandmother       in her late 42's    Dementia Father     Heart Disease Father      CAD, PTCA    Stroke Father     Cancer Brother 68     soft tissue cancer    Cancer Brother 68     kidney and lungs    Hypertension Sister     Dementia Sister     Breast Cancer Maternal Aunt     High Cholesterol Son     Cancer Other      maternal first cousin w/ sarcoma of leg    Anesth Problems Neg Hx      Social History   Substance Use Topics    Smoking status: Never Smoker    Smokeless tobacco: Never Used    Alcohol use No      Comment: very rare, 1-2 drinks a year      Social History     Social History Narrative    Works as         Review of Systems  Gen: denies fever   denies urinary pain  GI denies pain or diarrhea        Objective:     Vitals:    09/21/18 1032   BP: 134/84   Pulse: 80   Resp: 18   Temp: 98 °F (36.7 °C)   TempSrc: Oral   SpO2: 98%   Weight: 267 lb 6.4 oz (121.3 kg)   Height: 5' 5\" (1.651 m)     Body mass index is 44.5 kg/(m^2). General: stated age, well developed, well nourished and in NAD  Mouth: appears dry, no lesions  Throat: no redness or exudate  Eyes: no rednes or drainage, PERRL, EOMI  Neck: supple, symmetrical, trachea midline, no adenopathy and thyroid: not enlarged, symmetric, no tenderness/mass/nodules  Lungs:  clear to auscultation w/o rales, rhonchi, wheezes w/normal effort and no use of accessory muscles of respiration   Heart: regular rate and rhythm, S1, S2 normal, no murmur, click, rub or gallop  Abdomen: soft, nontender  Ext:  No edema noted. Lymph: no cervical adenopathy appreciated  Skin:  Normal. and no rash or abnormalities   Psych: alert and oriented to person, place, time and situation and Speech: appropriate quality, quantity and organization of sentences  Neuro: CN 2-12 intact, strength 5/5, patellar reflexes 2+ bilaterally, sensation intact to light touch bilaterally, gait normal     Assessment/Plan:       ICD-10-CM ICD-9-CM    1. Diabetes mellitus type 2, noninsulin dependent ([facility]) E11.9 250.00 MICROALBUMIN, UR, RAND W/ MICROALB/CREAT RATIO      HEMOGLOBIN A1C WITH EAG      METABOLIC PANEL, COMPREHENSIVE      CBC WITH AUTOMATED DIFF   2. Polyuria R35.8 788.42 URINALYSIS W/ RFLX MICROSCOPIC   3. Increased thirst R63.1 783.5    4. Morbid obesity with BMI of 45.0-49.9, adult ([facility]) E66.01 278.01     Z68.42 V85.42    5. Seasonal allergic rhinitis, unspecified trigger J30.2 477.9    6. Hypothyroidism E03.9 244.9 TSH 3RD GENERATION   7. Encounter for immunization Z23 V03.89 INFLUENZA VIRUS VAC QUAD,SPLIT,PRESV FREE SYRINGE IM      IA IMMUNIZ ADMIN,1 SINGLE/COMB VAC/TOXOID   8.  Encounter for screening mammogram for breast cancer Z12.31 V76.12 [facility] MAMMO BI SCREENING INCL CAD Orders Placed This Encounter    MISAEL MAMMO BI SCREENING INCL CAD    Influenza virus vaccine (QUADRIVALENT PRES FREE SYRINGE) IM (64499)    MICROALBUMIN, UR, RAND W/ MICROALB/CREAT RATIO    URINALYSIS W/ RFLX MICROSCOPIC    HEMOGLOBIN A1C WITH EAG    METABOLIC PANEL, COMPREHENSIVE    CBC WITH AUTOMATED DIFF    TSH 3RD GENERATION    SD IMMUNIZ ADMIN,1 SINGLE/COMB VAC/TOXOID       New symptoms concerning for high sugars  Check labs as above, she will get results tomorrow on mychart and will go from there  Has drastically changed diet this week and is motivated to continue that  Feels some better with cutting out carbs  Tolerating new Trulicity  Advised stopping gatorade; if truly dehydrated we could stop hctz instead  Flu shot today    Discussed the diagnosis and plan and she expressed understanding. Follow-up Disposition:  Return in about 3 months (around 12/21/2018) for Follow up.  or sooner prn if not feeling better    Manuel Jolly MD

## 2018-09-22 DIAGNOSIS — E11.9 TYPE 2 DIABETES MELLITUS WITH INSULIN THERAPY (HCC): Primary | ICD-10-CM

## 2018-09-22 DIAGNOSIS — Z79.4 TYPE 2 DIABETES MELLITUS WITH INSULIN THERAPY (HCC): Primary | ICD-10-CM

## 2018-09-22 LAB
ALBUMIN SERPL-MCNC: 4.3 G/DL (ref 3.6–4.8)
ALBUMIN/CREAT UR: 8.3 MG/G CREAT (ref 0–30)
ALBUMIN/GLOB SERPL: 1.5 {RATIO} (ref 1.2–2.2)
ALP SERPL-CCNC: 120 IU/L (ref 39–117)
ALT SERPL-CCNC: 32 IU/L (ref 0–32)
APPEARANCE UR: CLEAR
AST SERPL-CCNC: 37 IU/L (ref 0–40)
BACTERIA #/AREA URNS HPF: ABNORMAL /[HPF]
BASOPHILS # BLD AUTO: 0 X10E3/UL (ref 0–0.2)
BASOPHILS NFR BLD AUTO: 1 %
BILIRUB SERPL-MCNC: 0.3 MG/DL (ref 0–1.2)
BILIRUB UR QL STRIP: NEGATIVE
BUN SERPL-MCNC: 17 MG/DL (ref 8–27)
BUN/CREAT SERPL: 20 (ref 12–28)
CALCIUM SERPL-MCNC: 10.6 MG/DL (ref 8.7–10.3)
CASTS URNS MICRO: ABNORMAL
CASTS URNS QL MICRO: PRESENT /LPF
CHLORIDE SERPL-SCNC: 97 MMOL/L (ref 96–106)
CO2 SERPL-SCNC: 24 MMOL/L (ref 20–29)
COLOR UR: YELLOW
CREAT SERPL-MCNC: 0.83 MG/DL (ref 0.57–1)
CREAT UR-MCNC: 139.1 MG/DL
CRYSTALS URNS MICRO: ABNORMAL
EOSINOPHIL # BLD AUTO: 0.1 X10E3/UL (ref 0–0.4)
EOSINOPHIL NFR BLD AUTO: 2 %
EPI CELLS #/AREA URNS HPF: ABNORMAL /HPF
ERYTHROCYTE [DISTWIDTH] IN BLOOD BY AUTOMATED COUNT: 13.9 % (ref 12.3–15.4)
EST. AVERAGE GLUCOSE BLD GHB EST-MCNC: 266 MG/DL
GLOBULIN SER CALC-MCNC: 2.9 G/DL (ref 1.5–4.5)
GLUCOSE SERPL-MCNC: 315 MG/DL (ref 65–99)
GLUCOSE UR QL: ABNORMAL
HBA1C MFR BLD: 10.9 % (ref 4.8–5.6)
HCT VFR BLD AUTO: 42.3 % (ref 34–46.6)
HGB BLD-MCNC: 13.2 G/DL (ref 11.1–15.9)
HGB UR QL STRIP: NEGATIVE
IMM GRANULOCYTES # BLD: 0 X10E3/UL (ref 0–0.1)
IMM GRANULOCYTES NFR BLD: 0 %
KETONES UR QL STRIP: NEGATIVE
LEUKOCYTE ESTERASE UR QL STRIP: ABNORMAL
LYMPHOCYTES # BLD AUTO: 1.9 X10E3/UL (ref 0.7–3.1)
LYMPHOCYTES NFR BLD AUTO: 28 %
MCH RBC QN AUTO: 28.9 PG (ref 26.6–33)
MCHC RBC AUTO-ENTMCNC: 31.2 G/DL (ref 31.5–35.7)
MCV RBC AUTO: 93 FL (ref 79–97)
MICRO URNS: ABNORMAL
MICROALBUMIN UR-MCNC: 11.6 UG/ML
MONOCYTES # BLD AUTO: 0.4 X10E3/UL (ref 0.1–0.9)
MONOCYTES NFR BLD AUTO: 6 %
MUCOUS THREADS URNS QL MICRO: PRESENT
NEUTROPHILS # BLD AUTO: 4.3 X10E3/UL (ref 1.4–7)
NEUTROPHILS NFR BLD AUTO: 63 %
NITRITE UR QL STRIP: POSITIVE
PH UR STRIP: 5 [PH] (ref 5–7.5)
PLATELET # BLD AUTO: 141 X10E3/UL (ref 150–379)
POTASSIUM SERPL-SCNC: 4 MMOL/L (ref 3.5–5.2)
PROT SERPL-MCNC: 7.2 G/DL (ref 6–8.5)
PROT UR QL STRIP: NEGATIVE
RBC # BLD AUTO: 4.57 X10E6/UL (ref 3.77–5.28)
RBC #/AREA URNS HPF: ABNORMAL /HPF
SODIUM SERPL-SCNC: 137 MMOL/L (ref 134–144)
SP GR UR: >=1.03 (ref 1–1.03)
TSH SERPL DL<=0.005 MIU/L-ACNC: 1.31 UIU/ML (ref 0.45–4.5)
UNIDENT CRYS URNS QL MICRO: PRESENT
UROBILINOGEN UR STRIP-MCNC: 0.2 MG/DL (ref 0.2–1)
WBC # BLD AUTO: 6.7 X10E3/UL (ref 3.4–10.8)
WBC #/AREA URNS HPF: ABNORMAL /HPF

## 2018-09-22 RX ORDER — LANCETS
EACH MISCELLANEOUS
Qty: 300 EACH | Refills: 3 | Status: SHIPPED | OUTPATIENT
Start: 2018-09-22 | End: 2020-05-01 | Stop reason: SDUPTHER

## 2018-09-22 RX ORDER — INSULIN PUMP SYRINGE, 3 ML
EACH MISCELLANEOUS
Qty: 1 KIT | Refills: 0 | Status: SHIPPED | OUTPATIENT
Start: 2018-09-22

## 2018-09-22 RX ORDER — INSULIN GLARGINE 100 [IU]/ML
16 INJECTION, SOLUTION SUBCUTANEOUS DAILY
Qty: 15 ML | Refills: 3 | Status: SHIPPED | OUTPATIENT
Start: 2018-09-22 | End: 2020-01-14

## 2018-09-24 RX ORDER — SIMVASTATIN 20 MG/1
TABLET, FILM COATED ORAL
Qty: 90 TAB | Refills: 0 | Status: SHIPPED | OUTPATIENT
Start: 2018-09-24 | End: 2018-12-23 | Stop reason: SDUPTHER

## 2018-10-04 ENCOUNTER — PATIENT MESSAGE (OUTPATIENT)
Dept: FAMILY MEDICINE CLINIC | Age: 63
End: 2018-10-04

## 2018-10-05 NOTE — TELEPHONE ENCOUNTER
From: Bertram Cornelius  To: Pernell Strauss MD  Sent: 10/4/2018 9:48 PM EDT  Subject: Non-Urgent Medical Question    Dr. Jayjay Yu - The entire week I have limited my dietary intake to green beans and October beans. I did not realize until tonight that the October beans are high in carbs so I really messed up my diet for this week. My glucose levels were lower on 10/4 because I never had time to eat until I arrived home and, of course, I ate October beans. I cannot take the insulin during the day at work because it upsets my stomach and/or I have diarreha. How late at night can I inject the insulin? Thank you for your response.      Emil Ervin [phone]

## 2018-10-26 RX ORDER — NAPROXEN 500 MG/1
TABLET ORAL
Qty: 180 TAB | Refills: 0 | Status: SHIPPED | OUTPATIENT
Start: 2018-10-26 | End: 2019-01-24 | Stop reason: SDUPTHER

## 2018-11-26 RX ORDER — METOPROLOL SUCCINATE 50 MG/1
TABLET, EXTENDED RELEASE ORAL
Qty: 90 TAB | Refills: 0 | Status: SHIPPED | OUTPATIENT
Start: 2018-11-26 | End: 2019-02-24 | Stop reason: SDUPTHER

## 2018-11-26 NOTE — TELEPHONE ENCOUNTER
Pharmacy requesting medication refill     Requested Prescriptions     Pending Prescriptions Disp Refills    dulaglutide (TRULICITY) 1.5 YP/6.6 mL sub-q pen 2 mL 1     Si.5 mL by SubCUTAneous route every seven (7) days.      Pharmacy on file verified

## 2018-12-24 RX ORDER — SIMVASTATIN 20 MG/1
TABLET, FILM COATED ORAL
Qty: 90 TAB | Refills: 0 | Status: SHIPPED | OUTPATIENT
Start: 2018-12-24 | End: 2019-03-23 | Stop reason: SDUPTHER

## 2019-01-09 RX ORDER — LOSARTAN POTASSIUM AND HYDROCHLOROTHIAZIDE 12.5; 1 MG/1; MG/1
TABLET ORAL
Qty: 90 TAB | Refills: 1 | Status: SHIPPED | OUTPATIENT
Start: 2019-01-09 | End: 2019-06-20 | Stop reason: SDUPTHER

## 2019-01-11 ENCOUNTER — OFFICE VISIT (OUTPATIENT)
Dept: FAMILY MEDICINE CLINIC | Age: 64
End: 2019-01-11

## 2019-01-11 VITALS
WEIGHT: 267 LBS | TEMPERATURE: 97.9 F | RESPIRATION RATE: 16 BRPM | HEIGHT: 65 IN | BODY MASS INDEX: 44.48 KG/M2 | HEART RATE: 83 BPM | DIASTOLIC BLOOD PRESSURE: 86 MMHG | SYSTOLIC BLOOD PRESSURE: 120 MMHG | OXYGEN SATURATION: 98 %

## 2019-01-11 DIAGNOSIS — M79.672 LEFT FOOT PAIN: Primary | ICD-10-CM

## 2019-01-11 DIAGNOSIS — Z79.4 TYPE 2 DIABETES MELLITUS WITH INSULIN THERAPY (HCC): Primary | ICD-10-CM

## 2019-01-11 DIAGNOSIS — J04.0 LARYNGITIS: ICD-10-CM

## 2019-01-11 DIAGNOSIS — J32.9 CHRONIC SINUSITIS, UNSPECIFIED LOCATION: ICD-10-CM

## 2019-01-11 DIAGNOSIS — R09.81 NASAL CONGESTION: ICD-10-CM

## 2019-01-11 DIAGNOSIS — E11.9 TYPE 2 DIABETES MELLITUS WITH INSULIN THERAPY (HCC): Primary | ICD-10-CM

## 2019-01-11 DIAGNOSIS — S76.011A MUSCLE STRAIN OF RIGHT GLUTEAL REGION, INITIAL ENCOUNTER: ICD-10-CM

## 2019-01-11 NOTE — PROGRESS NOTES
[facility] Note Subjective: Chief Complaint Patient presents with  
 Hoarse  
  symptoms of no voice since 19.  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  
  right side buttocks pain with activity pain begins ( severe activity ) pain level at 9   
 Foot Pain  
  left foot callus at ball of foot Justo Paulino is a 61y.o. year old female who presents for evaluation of the following: 
 
Nasal Congestion Onset  - Associated hoarseness since Monday which is imrpvoing Tx: None Endorses history of Vocal Cord Nodule Denies recent yelling Foot Pain: Onset 2018 Left heel States she has a callus there. Has diabetes Buttock pain Chronic since  
(pontinig to inferior right gluteus bob) Occurs at end of the day Tx: None No regular stretching Review of Systems Pertinent positives and negative per HPI. All other systems  reviewed are negative for a Comprehensive ROS (10+). Past Medical History:  
Diagnosis Date  Anemia NEC  AR (allergic rhinitis)  Asthma   
 mild  Benign essential hypertension 2016  Diabetes mellitus type 2, noninsulin dependent ([address] 12/3/2015  Diabetes mellitus, type 2 ([address] 2013  FHx: breast cancer 3/2/2012  GERD (gastroesophageal reflux disease) 11/00  
 H/O Mild, Intermittent Asthma  H/O Optic disc edema, [id number] 3/2/2012  History of benign left ovarian cyst 3/2/2012  Hx of complete eye exam 2017 Dr Anel Cunningham intraoular pressure and no evidence of diabetic retinopathy  Hyperlipidemia 2002  Hypertension  Hypothyroidism 2/  Hypothyroidism 2015  
 2002  Impaired fasting glucose 3/31/2012  Mixed hyperlipidemia 2016  Morbid obesity with BMI of 45.0-49.9, adult ([address]   (normal spontaneous vaginal delivery)   
 Ovarian cyst  Left, benign  Postmenopause LMP 12/05 (HRT x 6months)  Rheumatoid arthritis(714.0)  S/P colonoscopy 7/05 Normal (10yrs)  Skin cancer screening exams, Derm Dr Simeon Farris 3/2/2012  Vitamin D deficiency 3/2/2012 Social History Socioeconomic History  Marital status:  Spouse name: Not on file  Number of children: 1  Years of education: Not on file  Highest education level: Not on file Social Needs  Financial resource strain: Not on file  Food insecurity - worry: Not on file  Food insecurity - inability: Not on file  Transportation needs - medical: Not on file  Transportation needs - non-medical: Not on file Occupational History  Occupation:  Tobacco Use  Smoking status: Never Smoker  Smokeless tobacco: Never Used Substance and Sexual Activity  Alcohol use: No  
  Alcohol/week: 0.0 oz  
  Comment: very rare, 1-2 drinks a year  Drug use: No  
 Sexual activity: Yes  
  Partners: Male Birth control/protection: None Other Topics Concern [address] Service Not Asked  Blood Transfusions Not Asked  Caffeine Concern No  
  Comment: decaff tea  Occupational Exposure Not Asked Simmie June Hazards Not Asked  Sleep Concern Not Asked  Stress Concern Not Asked  Weight Concern Not Asked  Special Diet No  
 Back Care Not Asked  Exercise No  
 Bike Helmet Not Asked  Seat Belt Not Asked  Self-Exams Not Asked Social History Narrative Works as  Current Outpatient Medications Medication Sig  
 losartan-hydroCHLOROthiazide (HYZAAR) 100-12.5 mg per tablet TAKE 1 TABLET DAILY  simvastatin (ZOCOR) 20 mg tablet TAKE 1 TABLET NIGHTLY  metoprolol succinate (TOPROL-XL) 50 mg XL tablet TAKE 1 TABLET DAILY FOR HYPERTENSION  
 naproxen (NAPROSYN) 500 mg tablet TAKE 1 TABLET TWICE A DAY AS NEEDED  
 insulin glargine (LANTUS,BASAGLAR) 100 unit/mL (3 mL) inpn 16 Units by SubCUTAneous route daily. Plan to titrate up gradually  Insulin Needles, Disposable, 30 gauge x 1/3\" Use daily with lantus  Blood-Glucose Meter monitoring kit Test up to 3x daily for type 2 diabetes uncontrolled on insulin, (E11.9,  Z79.4) Type 2 diabetes mellitus with insulin therapy  glucose blood VI test strips (BLOOD GLUCOSE TEST) strip Test up to 3x daily for type 2 diabetes uncontrolled on insulin, (E11.9,  Z79.4) Type 2 diabetes mellitus with insulin therapy  Lancets misc Test up to 3x daily for type 2 diabetes uncontrolled on insulin, (E11.9,  Z79.4) Type 2 diabetes mellitus with insulin therapy  UNITHROID 75 mcg tablet TAKE 1 TABLET DAILY BEFORE BREAKFAST  triamcinolone acetonide (KENALOG) 0.1 % topical cream use thin layer on back twice daily  albuterol (PROVENTIL HFA, VENTOLIN HFA, PROAIR HFA) 90 mcg/actuation inhaler Take 2 Puffs by inhalation every four (4) hours as needed for Wheezing.  CINNAMON BARK (CINNAMON PO) Take 350 mg by mouth daily.  coenzyme q10-vitamin e 100-100 mg-unit cap Take 200 mg by mouth daily.  multivitamin (ONE A DAY) tablet Take 1 Tab by mouth daily. Includes vit D 1,000 units  fish oil-dha-epa (FISH OIL) 1,200-144-216 mg Cap Take 1 Tab by mouth. Takes ~ twice a week  dulaglutide (TRULICITY) 1.5 RA/8.6 mL sub-q pen 0.5 mL by SubCUTAneous route every seven (7) days. No current facility-administered medications for this visit. Objective:  
 
Vitals:  
 01/11/19 0816 BP: 120/86 Pulse: 83 Resp: 16 Temp: 97.9 °F (36.6 °C) TempSrc: Oral  
SpO2: 98% Weight: 267 lb (121.1 kg) Height: 5' 5\" (1.651 m) Physical Examination: 
General: Alert, cooperative, no distress, appears stated age. Obese Eyes: Conjunctivae clear. PERRL, EOMs intact. Ears: Normal external ear canals both ears. Nose: Nares normal. Septum midline. Mucosa normal. No drainage or sinus tenderness. Mouth/Throat: Lips, mucosa, and tongue normal. Minimal hoarse voice Neck: Supple, symmetrical, trachea midline, no adenopathy. No thyroid enlargement/tenderness/nodules Lungs: Clear to auscultation bilaterally. Normal inspiratory and expiratory ratio. Heart: Regular rate and rhythm, S1, S2 normal, no murmur, click, rub or gallop. Extremities: Bilateral ball of foot callus, left tender. BMSK: Full ROM back, hips. Negative straight leg raise. Non tender gluteus Skin: Skin color, texture, turgor normal. No rashes or lesions on exposed skin. Lymph nodes: Cervical, supraclavicular nodes normal. 
Neurologic: CNII-XII intact. No visits with results within 3 Month(s) from this visit. Latest known visit with results is:  
Office Visit on 09/21/2018 Component Date Value Ref Range Status  Specific Gravity 09/21/2018      >=1.030* 1.005 - 1.030 Final  
 pH (UA) 09/21/2018 5.0  5.0 - 7.5 Final  
 Color 09/21/2018 Yellow  Yellow Final  
 Appearance 09/21/2018 Clear  Clear Final  
 Leukocyte Esterase 09/21/2018 Trace* Negative Final  
 Protein 09/21/2018 Negative  Negative/Trace Final  
 Glucose 09/21/2018 3+* Negative Final  
 Ketone 09/21/2018 Negative  Negative Final  
 Blood 09/21/2018 Negative  Negative Final  
 Bilirubin 09/21/2018 Negative  Negative Final  
 Urobilinogen 09/21/2018 0.2  0.2 - 1.0 mg/dL Final  
 Nitrites 09/21/2018 Positive* Negative Final  
 Microscopic Examination 09/21/2018 See additional order   Final  
 Microscopic was indicated and was performed.  Hemoglobin A1c 09/21/2018 10.9* 4.8 - 5.6 % Final  
 Comment:          Prediabetes: 5.7 - 6.4 Diabetes: >6.4 Glycemic control for adults with diabetes: <7.0  Estimated average glucose 09/21/2018 266  mg/dL Final  
 Glucose 09/21/2018 315* 65 - 99 mg/dL Final  
 BUN 09/21/2018 17  8 - 27 mg/dL Final  
 Creatinine 09/21/2018 0.83  0.57 - 1.00 mg/dL Final  
  GFR est non-AA 09/21/2018 75  >59 mL/min/1.73 Final  
 GFR est AA 09/21/2018 87  >59 mL/min/1.73 Final  
 BUN/Creatinine ratio 09/21/2018 20  12 - 28 Final  
 Sodium 09/21/2018 137  134 - 144 mmol/L Final  
 Potassium 09/21/2018 4.0  3.5 - 5.2 mmol/L Final  
 Chloride 09/21/2018 97  96 - 106 mmol/L Final  
 CO2 09/21/2018 24  20 - 29 mmol/L Final  
 Calcium 09/21/2018 10.6* 8.7 - 10.3 mg/dL Final  
 Protein, total 09/21/2018 7.2  6.0 - 8.5 g/dL Final  
 Albumin 09/21/2018 4.3  3.6 - 4.8 g/dL Final  
 GLOBULIN, TOTAL 09/21/2018 2.9  1.5 - 4.5 g/dL Final  
 A-G Ratio 09/21/2018 1.5  1.2 - 2.2 Final  
 Bilirubin, total 09/21/2018 0.3  0.0 - 1.2 mg/dL Final  
 Alk. phosphatase 09/21/2018 120* 39 - 117 IU/L Final  
 AST (SGOT) 09/21/2018 37  0 - 40 IU/L Final  
 ALT (SGPT) 09/21/2018 32  0 - 32 IU/L Final  
 WBC 09/21/2018 6.7  3.4 - 10.8 x10E3/uL Final  
 RBC 09/21/2018 4.57  3.77 - 5.28 x10E6/uL Final  
 HGB 09/21/2018 13.2  11.1 - 15.9 g/dL Final  
 HCT 09/21/2018 42.3  34.0 - 46.6 % Final  
 MCV 09/21/2018 93  79 - 97 fL Final  
 MCH 09/21/2018 28.9  26.6 - 33.0 pg Final  
 MCHC 09/21/2018 31.2* 31.5 - 35.7 g/dL Final  
 RDW 09/21/2018 13.9  12.3 - 15.4 % Final  
 PLATELET 56/78/5769 967* 150 - 379 x10E3/uL Final  
 NEUTROPHILS 09/21/2018 63  Not Estab. % Final  
 Lymphocytes 09/21/2018 28  Not Estab. % Final  
 MONOCYTES 09/21/2018 6  Not Estab. % Final  
 EOSINOPHILS 09/21/2018 2  Not Estab. % Final  
 BASOPHILS 09/21/2018 1  Not Estab. % Final  
 ABS. NEUTROPHILS 09/21/2018 4.3  1.4 - 7.0 x10E3/uL Final  
 Abs Lymphocytes 09/21/2018 1.9  0.7 - 3.1 x10E3/uL Final  
 ABS. MONOCYTES 09/21/2018 0.4  0.1 - 0.9 x10E3/uL Final  
 ABS. EOSINOPHILS 09/21/2018 0.1  0.0 - 0.4 x10E3/uL Final  
 ABS. BASOPHILS 09/21/2018 0.0  0.0 - 0.2 x10E3/uL Final  
 IMMATURE GRANULOCYTES 09/21/2018 0  Not Estab. % Final  
 ABS. IMM.  GRANS. 09/21/2018 0.0  0.0 - 0.1 x10E3/uL Final  
  TSH 09/21/2018 1.310  0.450 - 4.500 uIU/mL Final  
 WBC 09/21/2018 11-30* 0 - 5 /hpf Final  
 RBC 09/21/2018 0-2  0 - 2 /hpf Final  
 Epithelial cells 09/21/2018 0-10  0 - 10 /hpf Final  
 Casts 09/21/2018 Present* None seen /lpf Final  
 Cast type 09/21/2018 Hyaline casts  N/A Final  
 Crystals 09/21/2018 Present* N/A Final  
 Crystal type 09/21/2018 Calcium Oxalate  N/A Final  
 Mucus 09/21/2018 Present  Not Estab. Final  
 Bacteria 09/21/2018 Few  None seen/Few Final  
 Creatinine, urine 09/21/2018 139.1  Not Estab. mg/dL Final  
 Microalbumin, urine 09/21/2018 11.6  Not Estab. ug/mL Final  
 Microalb/Creat ratio (ug/mg creat.) 09/21/2018 8.3  0.0 - 30.0 mg/g creat Final  
 Comment:                      Normal:                0.0 -  30.0 Albuminuria:          31.0 - 300.0 Clinical albuminuria:       >300.0 Assessment/ Plan:  
Diagnoses and all orders for this visit: 1. Left foot pain 
-     REFERRAL TO PODIATRY 2. Muscle strain of right gluteal region, initial encounter 3. Nasal congestion 4. Laryngitis 5. Chronic sinusitis, unspecified location Left foot pain likely callus. See foot specialist and try [employer] store for comfortable shoes  That take pressure off your area of foot pain Muscle strain of right gluteus bob. Start daily stretches. Gluteal strain rehab exercises provided. See foot specialist and try [employer] store for comfortable shoes  That take pressure off your area of foot pain Chronic sinusitis with increased nasal congestion. Unclear if allergic versus viral etiology. Laryngitis improving, suspect will resolve within 1 week. Return to clinic or see ENT if not improved. Educated patient on red flag symptoms to warrant return to clinic or emergency room visit.  
 
I have discussed the diagnosis with the patient and the intended plan as seen in the above orders. The patient has been offered or received an after-visit summary and questions were answered concerning future plans. I have discussed medication side effects and warnings with the patient as well. Follow-up Disposition: 
Return if symptoms worsen or fail to improve. Signed, Scotty Hanson MD 
1/11/2019

## 2019-01-11 NOTE — PATIENT INSTRUCTIONS
See foot specialist and try [employer] store for comfortable shoes  That take pressure off your area of foot pain For your symptoms: Your symptoms may improve with an oral antihistamine. These are available over the counter and include: 
Loratadine/claritin Cetirizine/Zyrtec Fexofenadine/Allegra Levocetirizine/Xyzal 
 
· Your symptoms may improve with a nasal steroid. These are available over the counter and include: · Flonase (aka fluticasone) · Nasocort (aka triamcinolone) · Nasonex (aka mometasone) · Rhinocort (aka budesonide) · Increase fluid intake, especially water to thin mucous and boost the immune system. · Avoid sugar and dairy while congested since they thicken mucous. · Get plenty of rest!   
· Gargle 3 times daily and as needed in Listerine or warm salt water vinegar solutions (1 tsp salt, 1 tsp vinegar in 1 cup lukewarm water.) · Use OTC nasal saline spray up each nostril four times daily. You could also consider using a netipot with distilled water. · Use humidifier at bedtime. · Use OTC Mucinex 600 mg twice daily to loosen mucous. · Use OTC Tylenol  (up to 650mg every 6 hours) or Ibuprofen (up to 800 mg every 8 hours) as needed for pain, fever or headaches. ·  Avoid decongestants and Ibuprofen if you have high blood pressure! Return to the doctor for evaluation: · If mucous is consistently discolored yellow or green throughout the day for more than a week · If you develop worsening facial pain · If you develop a fever that will not go away · If your symptoms worsen instead of improve Gluteal Strain: Rehab Exercises Your Care Instructions Here are some examples of typical rehabilitation exercises for your condition. Start each exercise slowly. Ease off the exercise if you start to have pain. Your doctor or physical therapist will tell you when you can start these exercises and which ones will work best for you. How to do the exercises Hip rotator stretch 5. Lie on your back with both knees bent and your feet flat on the floor. 6. Put the ankle of your affected leg on your opposite thigh near your knee. 7. Use your hand to gently push the knee of your affected leg away from your body until you feel a gentle stretch around your hip. 8. Hold the stretch for 15 to 30 seconds. 9. Repeat 2 to 4 times. 10. Repeat steps 1 through 5, but this time use your hand to gently pull your knee toward your opposite shoulder. 11. Switch legs and repeat steps 1 through 6, even if only one hip is sore. Seated hip rotator stretch 5. Sit in a sturdy chair. 6. Cross your affected leg over your knee, resting your foot on top of your knee. 7. Keep your back straight, and slowly lean forward until you feel a stretch in your hip. 8. Hold for 15 to 30 seconds. 9. Switch legs and repeat steps 1 through 4 on your other side. 10. Repeat 2 to 4 times. Hamstring stretch (lying down) 10. Lie flat on your back with your legs straight. If you feel discomfort in your back, place a small towel roll under your lower back. 11. Holding the back of your affected leg, lift your leg straight up and toward your body until you feel a stretch at the back of your thigh. 12. Hold the stretch for at least 30 seconds. 13. Repeat 2 to 4 times. 14. Switch legs and repeat steps 1 through 4, even if only one hip is sore. Bridging 2. Lie on your back with both knees bent. Your knees should be bent about 90 degrees. 3. Then push your feet into the floor, squeeze your buttocks, and lift your hips off the floor until your shoulders, hips, and knees are all in a straight line. 4. Hold for about 6 seconds as you continue to breathe normally, and then slowly lower your hips back down to the floor and rest for up to 10 seconds. 5. Repeat 8 to 12 times. Single-leg bridge 1. Lie on your back, with your arms at your sides. 2. Bend one knee, and keep that foot flat on the floor. The other leg should be straight. 3. Raise the straight leg up so that the knee is level with the bent knee. 4. Tighten your belly muscles by pulling your belly button in toward your spine. Lift your buttocks up and be careful not to let your hips drop down. 5. Hold for about 6 seconds as you continue to breathe normally, and then slowly lower your hips back down to the floor. 6. Switch legs and repeat steps 1 though 5. 
7. Repeat 8 to 12 times. Follow-up care is a key part of your treatment and safety. Be sure to make and go to all appointments, and call your doctor if you are having problems. It's also a good idea to know your test results and keep a list of the medicines you take. Where can you learn more? Go to http://luanne-gail.info/. Enter N266 in the search box to learn more about \"Gluteal Strain: Rehab Exercises. \" Current as of: November 29, 2017 Content Version: 11.8 © 7122-2617 [employer]. Care instructions adapted under license by [employer] (which disclaims liability or warranty for this information). If you have questions about a medical condition or this instruction, always ask your healthcare professional. [address] any warranty or liability for your use of this information.

## 2019-01-11 NOTE — TELEPHONE ENCOUNTER
----- Message from Nadia Weiner sent at 1/11/2019 12:30 PM EST -----  Regarding: Dr. Brian Campa / Tad Rodriguez  Pt is requesting for \"trulicity\" 5.8DI  90 day supply to be called into [facility] and not local pharmacy.      Best Contact:  [phone]

## 2019-01-11 NOTE — PROGRESS NOTES
Chief Complaint Patient presents with  
 Hoarse  
  symptoms of no voice since Sunday 1/6/19.  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  
  right side buttocks pain with activity pain begins ( severe activity ) pain level at 9   
 Foot Pain  
  left foot callus at ball of foot 1. Have you been to the ER, urgent care clinic since your last visit? Hospitalized since your last visit? No 
 
2. Have you seen or consulted any other health care providers outside of the [address] [place] [address] since your last visit? Include any pap smears or colon screening.  No

## 2019-01-24 RX ORDER — NAPROXEN 500 MG/1
TABLET ORAL
Qty: 180 TAB | Refills: 0 | Status: SHIPPED | OUTPATIENT
Start: 2019-01-24 | End: 2019-04-24 | Stop reason: SDUPTHER

## 2019-01-26 ENCOUNTER — OFFICE VISIT (OUTPATIENT)
Dept: FAMILY MEDICINE CLINIC | Age: 64
End: 2019-01-26

## 2019-01-26 VITALS
WEIGHT: 269 LBS | RESPIRATION RATE: 18 BRPM | SYSTOLIC BLOOD PRESSURE: 129 MMHG | OXYGEN SATURATION: 99 % | BODY MASS INDEX: 44.82 KG/M2 | HEART RATE: 70 BPM | TEMPERATURE: 96.6 F | DIASTOLIC BLOOD PRESSURE: 69 MMHG | HEIGHT: 65 IN

## 2019-01-26 DIAGNOSIS — M79.674 PAIN OF TOE OF RIGHT FOOT: Primary | ICD-10-CM

## 2019-01-26 NOTE — PROGRESS NOTES
Chief Complaint   Patient presents with    Foot Pain     right foot callus was removed on Tuesday saw podiatrist and now its painful, it was oozing, left foot is fine        Spoke to patient Identified pt with two pt identifiers (Name @ )    1. Have you been to the ER, urgent care clinic since your last visit? Hospitalized since your last visit? No    2. Have you seen or consulted any other health care providers outside of the [address] [place] [address] since your last visit? Include any pap smears or colon screening.  Yes Podiatrist 2018    \"REVIEWED RECORD IN PREPARATION FOR VISIT AND HAVE OBTAINED NECESSARY DOCUMENTATION\"

## 2019-01-26 NOTE — PROGRESS NOTES
HISTORY OF PRESENT ILLNESS  Moshe Marsh is a 61 y.o. female. HPI  C/o discomfort in right 5th toe. Had corn removed 5 days ago by podiatrist.  Reports area is still uncomfortable. Had small amount serosanguinous oozing from area. Soaked in epsom salts with some sx relief. No swelling, fever/chills. History of T2DM, well controlled. Past medical history, social history, family history and medications were reviewed and updated. Blood pressure 129/69, pulse 70, temperature 96.6 °F (35.9 °C), temperature source Oral, resp. rate 18, height 5' 5\" (1.651 m), weight 269 lb (122 kg), last menstrual period 12/24/2007, SpO2 99 %. Review of Systems   Constitutional: Negative for chills and fever. Skin:        Right 5th toe wound. All other systems reviewed and are negative. Physical Exam   Constitutional: No distress. Skin:   Right lateral 5th toe with approximately 6mm superficial scabbed area. Mildly TTP. No surrounding erythema, edema or warmth. No wound drainage. ASSESSMENT and PLAN  Diagnoses and all orders for this visit:    1. Pain of toe of right foot    Localized irritation. Recommend wash daily with soap and water. Apply triple antibiotic ointment bid. May continue epsom salt soaks. Contact podiatrist if sx do not improve over the next 48 hours.

## 2019-02-25 RX ORDER — METOPROLOL SUCCINATE 50 MG/1
TABLET, EXTENDED RELEASE ORAL
Qty: 90 TAB | Refills: 0 | Status: SHIPPED | OUTPATIENT
Start: 2019-02-25 | End: 2019-05-25 | Stop reason: SDUPTHER

## 2019-03-23 RX ORDER — SIMVASTATIN 20 MG/1
TABLET, FILM COATED ORAL
Qty: 90 TAB | Refills: 0 | Status: SHIPPED | OUTPATIENT
Start: 2019-03-23 | End: 2019-06-21 | Stop reason: SDUPTHER

## 2019-04-24 RX ORDER — NAPROXEN 500 MG/1
TABLET ORAL
Qty: 180 TAB | Refills: 0 | Status: SHIPPED | OUTPATIENT
Start: 2019-04-24 | End: 2019-07-23 | Stop reason: SDUPTHER

## 2019-05-25 RX ORDER — METOPROLOL SUCCINATE 50 MG/1
TABLET, EXTENDED RELEASE ORAL
Qty: 90 TAB | Refills: 0 | Status: SHIPPED | OUTPATIENT
Start: 2019-05-25 | End: 2019-08-23 | Stop reason: SDUPTHER

## 2019-06-20 RX ORDER — LOSARTAN POTASSIUM AND HYDROCHLOROTHIAZIDE 12.5; 1 MG/1; MG/1
TABLET ORAL
Qty: 90 TAB | Refills: 1 | Status: SHIPPED | OUTPATIENT
Start: 2019-06-20 | End: 2019-12-17 | Stop reason: SDUPTHER

## 2019-06-21 RX ORDER — SIMVASTATIN 20 MG/1
TABLET, FILM COATED ORAL
Qty: 90 TAB | Refills: 0 | Status: SHIPPED | OUTPATIENT
Start: 2019-06-21 | End: 2019-09-19 | Stop reason: SDUPTHER

## 2019-06-25 ENCOUNTER — OFFICE VISIT (OUTPATIENT)
Dept: FAMILY MEDICINE CLINIC | Age: 64
End: 2019-06-25

## 2019-06-25 VITALS
HEART RATE: 66 BPM | DIASTOLIC BLOOD PRESSURE: 56 MMHG | WEIGHT: 268 LBS | HEIGHT: 65 IN | TEMPERATURE: 98.6 F | SYSTOLIC BLOOD PRESSURE: 129 MMHG | RESPIRATION RATE: 18 BRPM | BODY MASS INDEX: 44.65 KG/M2 | OXYGEN SATURATION: 95 %

## 2019-06-25 DIAGNOSIS — E66.01 MORBID OBESITY WITH BMI OF 45.0-49.9, ADULT (HCC): Chronic | ICD-10-CM

## 2019-06-25 DIAGNOSIS — J45.20 MILD INTERMITTENT ASTHMA WITHOUT COMPLICATION: ICD-10-CM

## 2019-06-25 DIAGNOSIS — E11.9 TYPE 2 DIABETES MELLITUS WITH INSULIN THERAPY (HCC): Primary | ICD-10-CM

## 2019-06-25 DIAGNOSIS — E11.3593 TYPE 2 DIABETES MELLITUS WITH PROLIFERATIVE RETINOPATHY OF BOTH EYES, WITHOUT LONG-TERM CURRENT USE OF INSULIN, MACULAR EDEMA PRESENCE UNSPECIFIED, UNSPECIFIED PROLIFERATIVE RETINOPATHY TYPE (HCC): ICD-10-CM

## 2019-06-25 DIAGNOSIS — Z79.4 TYPE 2 DIABETES MELLITUS WITH INSULIN THERAPY (HCC): Primary | ICD-10-CM

## 2019-06-25 DIAGNOSIS — E03.9 HYPOTHYROIDISM, ADULT: ICD-10-CM

## 2019-06-25 DIAGNOSIS — E78.49 OTHER HYPERLIPIDEMIA: ICD-10-CM

## 2019-06-25 DIAGNOSIS — I10 BENIGN ESSENTIAL HYPERTENSION: ICD-10-CM

## 2019-06-25 PROBLEM — E11.3599 TYPE 2 DIABETES MELLITUS WITH PROLIFERATIVE RETINOPATHY (HCC): Status: ACTIVE | Noted: 2019-06-25

## 2019-06-25 NOTE — PATIENT INSTRUCTIONS
It is essential that you stop the orange juice  Herbal tea might be a good substitute  The berries are probably not too bad for your sugar    Keep up the good work exercising  Consider water aerobics, or swimming, which would be a great work out and good for your knees    Let me know when you are ready to change the Trulicity to Victoza (better medicine for weight loss)               Learning About Meal Planning for Diabetes  Why plan your meals? Meal planning can be a key part of managing diabetes. Planning meals and snacks with the right balance of carbohydrate, protein, and fat can help you keep your blood sugar at the target level you set with your doctor. You don't have to eat special foods. You can eat what your family eats, including sweets once in a while. But you do have to pay attention to how often you eat and how much you eat of certain foods. You may want to work with a dietitian or a certified diabetes educator. He or she can give you tips and meal ideas and can answer your questions about meal planning. This health professional can also help you reach a healthy weight if that is one of your goals. What plan is right for you? Your dietitian or diabetes educator may suggest that you start with the plate format or carbohydrate counting. The plate format  The plate format is a simple way to help you manage how you eat. You plan meals by learning how much space each food should take on a plate. Using the plate format helps you spread carbohydrate throughout the day. It can make it easier to keep your blood sugar level within your target range. It also helps you see if you're eating healthy portion sizes. To use the plate format, you put non-starchy vegetables on half your plate. Add meat or meat substitutes on one-quarter of the plate. Put a grain or starchy vegetable (such as brown rice or a potato) on the final quarter of the plate.  You can add a small piece of fruit and some low-fat or fat-free milk or yogurt, depending on your carbohydrate goal for each meal.  Here are some tips for using the plate format:  · Make sure that you are not using an oversized plate. A 9-inch plate is best. Many restaurants use larger plates. · Get used to using the plate format at home. Then you can use it when you eat out. · Write down your questions about using the plate format. Talk to your doctor, a dietitian, or a diabetes educator about your concerns. Carbohydrate counting  With carbohydrate counting, you plan meals based on the amount of carbohydrate in each food. Carbohydrate raises blood sugar higher and more quickly than any other nutrient. It is found in desserts, breads and cereals, and fruit. It's also found in starchy vegetables such as potatoes and corn, grains such as rice and pasta, and milk and yogurt. Spreading carbohydrate throughout the day helps keep your blood sugar levels within your target range. Your daily amount depends on several things, including your weight, how active you are, which diabetes medicines you take, and what your goals are for your blood sugar levels. A registered dietitian or diabetes educator can help you plan how much carbohydrate to include in each meal and snack. A guideline for your daily amount of carbohydrate is:  · 45 to 60 grams at each meal. That's about the same as 3 to 4 carbohydrate servings. · 15 to 20 grams at each snack. That's about the same as 1 carbohydrate serving. The Nutrition Facts label on packaged foods tells you how much carbohydrate is in a serving of the food. First, look at the serving size on the food label. Is that the amount you eat in a serving? All of the nutrition information on a food label is based on that serving size. So if you eat more or less than that, you'll need to adjust the other numbers. Total carbohydrate is the next thing you need to look for on the label.  If you count carbohydrate servings, one serving of carbohydrate is 15 grams.  For foods that don't come with labels, such as fresh fruits and vegetables, you'll need a guide that lists carbohydrate in these foods. Ask your doctor, dietitian, or diabetes educator about books or other nutrition guides you can use. If you take insulin, you need to know how many grams of carbohydrate are in a meal. This lets you know how much rapid-acting insulin to take before you eat. If you use an insulin pump, you get a constant rate of insulin during the day. So the pump must be programmed at meals to give you extra insulin to cover the rise in blood sugar after meals. When you know how much carbohydrate you will eat, you can take the right amount of insulin. Or, if you always use the same amount of insulin, you need to make sure that you eat the same amount of carbohydrate at meals. If you need more help to understand carbohydrate counting and food labels, ask your doctor, dietitian, or diabetes educator. How do you get started with meal planning? Here are some tips to get started:  · Plan your meals a week at a time. Don't forget to include snacks too. · Use cookbooks or online recipes to plan several main meals. Plan some quick meals for busy nights. You also can double some recipes that freeze well. Then you can save half for other busy nights when you don't have time to cook. · Make sure you have the ingredients you need for your recipes. If you're running low on basic items, put these items on your shopping list too. · List foods that you use to make breakfasts, lunches, and snacks. List plenty of fruits and vegetables. · Post this list on the refrigerator. Add to it as you think of more things you need. · Take the list to the store to do your weekly shopping. Follow-up care is a key part of your treatment and safety. Be sure to make and go to all appointments, and call your doctor if you are having problems.  It's also a good idea to know your test results and keep a list of the medicines you take. Where can you learn more? Go to http://luanne-gail.info/. Deshawn Villar in the search box to learn more about \"Learning About Meal Planning for Diabetes. \"  Current as of: July 25, 2018  Content Version: 11.9  © 6536-8653 [employer]. Care instructions adapted under license by [employer] (which disclaims liability or warranty for this information). If you have questions about a medical condition or this instruction, always ask your healthcare professional. [address] any warranty or liability for your use of this information.

## 2019-06-25 NOTE — PROGRESS NOTES
[facility]  [address] [place], [address]  [phone]             Date of visit: 6/25/2019   Subjective:      History obtained from:  the patient.   Mitchel Miller is a 61 y.o. female who presents today for f/u chronic problems, DM    Has to type all day long on her job and is painful with sticking her fingers  Wants to get freestyle Vivek Zhou    About 5 years ago had colonoscopy  Having the same symptoms again with diarrhea with veggies  That symptom resolved with removal of polyps  Also had some diverticular disease  Fruit doesn't bother her  Last colonoscopy 2016 normal told to repeat in 10 years    Doesn't think it is the Trulicity because she has been off it the past 3 weeks due to traveling to [facility] and bowels are the same  Feels better when she is on the Trulicity  Could not tolerate metformin, bad stomach ache    Has been off fish oil for a month as well  Fasting today    Has a lot of Trulicity at home  Did lose some weight and gained about 10 back when she was at [place] lots of fruit, blueberries, strawberries  Drank 1/2 cup oj this am    Has insulin but hasn't used it since January    History of RA    Has done DM edu years ago  Thinks she doesn't need to repeat but might want to if sugars high    Patient Active Problem List    Diagnosis Date Noted    Type 2 diabetes mellitus with proliferative retinopathy ([address] 06/25/2019    Type 2 diabetes mellitus with insulin therapy ([address] 09/22/2018    Spider veins of both lower extremities 05/17/2018    Varicose vein of leg 05/17/2018    Morbid obesity with BMI of 45.0-49.9, adult ([address] 10/07/2017    Mixed hyperlipidemia 05/31/2016    Benign essential hypertension 33/39/7764    Umbilical hernia without obstruction and without gangrene 04/22/2016    Hypothyroidism 07/30/2015    Actinic keratosis 11/20/2014    Abdominal wall bulge 03/25/2013    Vitamin D deficiency 03/02/2012    FHx: breast cancer 03/02/2012  History of benign left ovarian cyst, 2012    Skin cancer screening exams, Derm Dr Kayla Beavers 2012    H/O Optic disc edema, [id number]    Hyperlipidemia 03/15/2010    AR (allergic rhinitis)     GERD (gastroesophageal reflux disease)     H/O Mild, Intermittent Asthma     Rheumatoid arthritis ([address]     Postmenopause, LMP 2005, age 47 yo, s/p HRT x 6 months     Ovarian cyst      (normal spontaneous vaginal delivery)     Metabolic syndrome      Current Outpatient Medications   Medication Sig Dispense Refill    varicella-zoster recombinant, PF, (SHINGRIX, PF,) 50 mcg/0.5 mL susr injection 0.5 mL by IntraMUSCular route once for 1 dose. 0.5 mL 1    flash glucose scanning reader (FREESTYLE ANOOP 14 DAY READER) misc Use for close monitoring sugar, uncontrolled DM(E11.9,  Z79.4) Type 2 diabetes mellitus with insulin therapy 10 Each 12    flash glucose sensor (FREESTYLE ANOOP 14 DAY SENSOR) kit Use for close monitoring sugar, uncontrolled DM(E11.9,  Z79.4) Type 2 diabetes mellitus with insulin therapy 10 Kit 12    albuterol sulfate (PROAIR RESPICLICK) 90 mcg/actuation aepb Take 2 Puffs by inhalation every four (4) hours as needed (sob/wheeze). 1 Inhaler 1    simvastatin (ZOCOR) 20 mg tablet TAKE 1 TABLET NIGHTLY 90 Tab 0    losartan-hydroCHLOROthiazide (HYZAAR) 100-12.5 mg per tablet TAKE 1 TABLET DAILY 90 Tab 1    metoprolol succinate (TOPROL-XL) 50 mg XL tablet TAKE 1 TABLET DAILY FOR HYPERTENSION 90 Tab 0    naproxen (NAPROSYN) 500 mg tablet TAKE 1 TABLET TWICE A DAY AS NEEDED 180 Tab 0    UNITHROID 75 mcg tablet TAKE 1 TABLET DAILY BEFORE BREAKFAST 90 Tab 1    dulaglutide (TRULICITY) 1.5 KH/1.2 mL sub-q pen 0.5 mL by SubCUTAneous route every seven (7) days. 6 mL 1    insulin glargine (LANTUS,BASAGLAR) 100 unit/mL (3 mL) inpn 16 Units by SubCUTAneous route daily.  Plan to titrate up gradually 15 mL 3    Insulin Needles, Disposable, 30 gauge x 1/3\" Use daily with lantus 1 Package 3    Blood-Glucose Meter monitoring kit Test up to 3x daily for type 2 diabetes uncontrolled on insulin, (E11.9,  Z79.4) Type 2 diabetes mellitus with insulin therapy 1 Kit 0    glucose blood VI test strips (BLOOD GLUCOSE TEST) strip Test up to 3x daily for type 2 diabetes uncontrolled on insulin, (E11.9,  Z79.4) Type 2 diabetes mellitus with insulin therapy 300 Strip 3    Lancets misc Test up to 3x daily for type 2 diabetes uncontrolled on insulin, (E11.9,  Z79.4) Type 2 diabetes mellitus with insulin therapy 300 Each 3    triamcinolone acetonide (KENALOG) 0.1 % topical cream use thin layer on back twice daily 45 g 1    CINNAMON BARK (CINNAMON PO) Take 350 mg by mouth daily.  coenzyme q10-vitamin e 100-100 mg-unit cap Take 200 mg by mouth daily.  multivitamin (ONE A DAY) tablet Take 1 Tab by mouth daily. Includes vit D 1,000 units      fish oil-dha-epa (FISH OIL) 1,200-144-216 mg Cap Take 1 Tab by mouth.  Takes ~ twice a week       Allergies   Allergen Reactions    Avelox [Moxifloxacin] Rash and Itching    Bactrim [Sulfamethoxazole-Trimethoprim] Rash    Doxycycline Other (comments)     Tongue discomfort, swollen glands    Lisinopril Diarrhea    Norvasc [Amlodipine] Other (comments)     edema    Penicillin G Hives, Shortness of Breath and Swelling     Past Medical History:   Diagnosis Date    Anemia NEC     AR (allergic rhinitis)     Asthma     mild    Benign essential hypertension 5/31/2016    Diabetes mellitus type 2, noninsulin dependent ([facility] 12/3/2015    Diabetes mellitus, type 2 ([facility] 12/18/2013 12/2013    FHx: breast cancer 3/2/2012    GERD (gastroesophageal reflux disease) 11/00    H/O Mild, Intermittent Asthma     H/O Optic disc edema, [id number] 3/2/2012    History of benign left ovarian cyst 3/2/2012    Hx of complete eye exam 08/09/2017    Dr Carlos Brantley intraoular pressure and no evidence of diabetic retinopathy    Hyperlipidemia 2002    Hypertension     Hypothyroidism     Hypothyroidism 2015    2002    Impaired fasting glucose 3/31/2012    Mixed hyperlipidemia 2016    Morbid obesity with BMI of 45.0-49.9, adult (HCC)      (normal spontaneous vaginal delivery)         Ovarian cyst     Left, benign    Postmenopause     LMP  (HRT x 6months)    Rheumatoid arthritis(714.0)     S/P colonoscopy     Normal (10yrs)    Skin cancer screening exams, Derm Dr Roland Kahn 3/2/2012    Vitamin D deficiency 3/2/2012     Past Surgical History:   Procedure Laterality Date    COLONOSCOPY  2005    normal, ok x 10 yrs, Dr Lela Colin      Normal    HX BREAST LUMPECTOMY      Left, benign (Dr. Debra Lieberman)    HX CERVICAL POLYPECTOMY  14, Dr La Fernandez HX COLONOSCOPY  2014    Dr Olaf Melendez polyps x 3; repeat     HX COLONOSCOPY  2015    poor prep, repeat in one year    HX COLONOSCOPY  2016    normal, follow up 8 yrs   [facility]    childbirth    HX HEENT  2010    tumor on vocal cord removed    HX HERNIA REPAIR  16, Dr Adrian Samaniego    robotic assisted umbilical hernia repair with mesh     HX LIPOMA RESECTION  ~    removed from left thigh    HX ORTHOPAEDIC      tumor remved left leg    HX OTHER SURGICAL  11/5/15, Dr Kathy Watkins    2 masses removed left lower leg    MISAEL MAMMOGRAM DIGITAL BILATERAL  annually per Gyn    at [address] [facility] [address]. LUCERO      1-15% Stenosis Bilaterally     Family History   Problem Relation Age of Onset   [facility] Breast Cancer Mother         diagnosed age 72, survived it    [facility] Arthritis-rheumatoid Mother     Stroke Mother     Parkinsonism Mother          in nursing from colon perf or GI bleed age 80    Hypertension Mother     Migraines Mother     Breast Cancer Maternal Grandmother          in her late 42's    Dementia Father     Heart Disease Father         CAD, PTCA    Stroke Father     Cancer Brother 68 soft tissue cancer    Cancer Brother 68        kidney and lungs    Hypertension Sister     Dementia Sister     Breast Cancer Maternal Aunt     High Cholesterol Son     Cancer Other         maternal first cousin w/ sarcoma of leg    Anesth Problems Neg Hx      Social History     Tobacco Use    Smoking status: Never Smoker    Smokeless tobacco: Never Used   Substance Use Topics    Alcohol use: No     Alcohol/week: 0.0 oz     Comment: very rare, 1-2 drinks a year      Social History     Social History Narrative    Works as         Review of Systems  Card: denies chest pain  Pulm: denies shortness of breath       Objective:     Vitals:    06/25/19 0905   BP: 129/56   Pulse: 66   Resp: 18   Temp: 98.6 °F (37 °C)   TempSrc: Oral   SpO2: 95%   Weight: 268 lb (121.6 kg)   Height: 5' 5\" (1.651 m)     Body mass index is 44.6 kg/m². General: stated age, obese and in NAD  Neck: supple, symmetrical, trachea midline, no adenopathy and thyroid: not enlarged, symmetric, no tenderness/mass/nodules  Lungs:  clear to auscultation w/o rales, rhonchi, wheezes w/normal effort and no use of accessory muscles of respiration   Heart: regular rate and rhythm, S1, S2 normal, no murmur, click, rub or gallop  Abdomen: soft, nontender, no masses  Ext:  Trace BLE edema noted. Lymph: no cervical adenopathy appreciated  Skin:  Normal. and no rash or abnormalities   Psych: alert and oriented to person, place, time and situation and Speech: appropriate quality, quantity and organization of sentences      Assessment/Plan:       ICD-10-CM ICD-9-CM    1. Type 2 diabetes mellitus with insulin therapy ([facility]) O63.5 269.78 METABOLIC PANEL, COMPREHENSIVE    Z79.4 V58.67 LIPID PANEL      HEMOGLOBIN A1C WITH EAG      flash glucose scanning reader (FREESTYLE ANOOP 14 DAY READER) misc      flash glucose sensor (FREESTYLE ANOOP 14 DAY SENSOR) kit   2.  Morbid obesity with BMI of 45.0-49.9, adult ([facility]) E66.01 278.01     Z68.42 V85.42    3. Benign essential hypertension I10 401.1    4. Hypothyroidism E03.9 244.9 TSH 3RD GENERATION   5. Other hyperlipidemia E78.49 272.4    6. Mild intermittent asthma without complication G96.73 342.07    7.  Type 2 diabetes mellitus with proliferative retinopathy of both eyes, without long-term current use of insulin, macular edema presence unspecified, unspecified proliferative retinopathy type ([address] Y76.6594 250.50      362.02         Orders Placed This Encounter    METABOLIC PANEL, COMPREHENSIVE    LIPID PANEL    HEMOGLOBIN A1C WITH EAG    TSH 3RD GENERATION    varicella-zoster recombinant, PF, (SHINGRIX, PF,) 50 mcg/0.5 mL susr injection    flash glucose scanning reader (FREESTYLE ANOOP 14 DAY READER) misc    flash glucose sensor (FREESTYLE ANOOP 14 DAY SENSOR) kit    albuterol sulfate (PROAIR RESPICLICK) 90 mcg/actuation aepb       She is not as concerned about her DM as I am  Has been off her insulin for about 6 mo and off her Trulicity for 3 weeks  Will see what a1c is and go from there but will likely have to put her back on insulin and will encourage her to repeat the DM edu course  Will try to get the freestyle anoop; needs to be checking sugar often, and I think that would be motivational to her  Asked her to update me on sugars weekly on the portal  She has lots of trulicity at home and doesn't want to waste it but will eventually switch to victoza  Refill inhaler for occasional use when in hot/humid weather but usually no pulmonary symptoms    Patient Instructions     It is essential that you stop the orange juice  Herbal tea might be a good substitute  The berries are probably not too bad for your sugar    Keep up the good work exercising  Consider water aerobics, or swimming, which would be a great work out and good for your knees    Let me know when you are ready to change the Trulicity to Victoza (better medicine for weight loss)    Discussed the diagnosis and plan and she expressed understanding. Follow-up and Dispositions    · Return in about 3 months (around 9/25/2019) for Follow up.          Arleen Cervantes MD

## 2019-06-25 NOTE — PROGRESS NOTES
Chief Complaint   Patient presents with    Diabetes     follow up    Cholesterol Problem     follow up         1. Have you been to the ER, urgent care clinic since your last visit? Hospitalized since your last visit? No    2. Have you seen or consulted any other health care providers outside of the [address] [place] [address] since your last visit? Include any pap smears or colon screening.  No

## 2019-06-26 LAB
ALBUMIN SERPL-MCNC: 4 G/DL (ref 3.6–4.8)
ALBUMIN/GLOB SERPL: 1.4 {RATIO} (ref 1.2–2.2)
ALP SERPL-CCNC: 97 IU/L (ref 39–117)
ALT SERPL-CCNC: 18 IU/L (ref 0–32)
AST SERPL-CCNC: 23 IU/L (ref 0–40)
BILIRUB SERPL-MCNC: 0.4 MG/DL (ref 0–1.2)
BUN SERPL-MCNC: 17 MG/DL (ref 8–27)
BUN/CREAT SERPL: 21 (ref 12–28)
CALCIUM SERPL-MCNC: 10.4 MG/DL (ref 8.7–10.3)
CHLORIDE SERPL-SCNC: 108 MMOL/L (ref 96–106)
CHOLEST SERPL-MCNC: 149 MG/DL (ref 100–199)
CO2 SERPL-SCNC: 23 MMOL/L (ref 20–29)
CREAT SERPL-MCNC: 0.82 MG/DL (ref 0.57–1)
EST. AVERAGE GLUCOSE BLD GHB EST-MCNC: 128 MG/DL
GLOBULIN SER CALC-MCNC: 2.9 G/DL (ref 1.5–4.5)
GLUCOSE SERPL-MCNC: 109 MG/DL (ref 65–99)
HBA1C MFR BLD: 6.1 % (ref 4.8–5.6)
HDLC SERPL-MCNC: 38 MG/DL
INTERPRETATION, 910389: NORMAL
LDLC SERPL CALC-MCNC: 80 MG/DL (ref 0–99)
POTASSIUM SERPL-SCNC: 4.5 MMOL/L (ref 3.5–5.2)
PROT SERPL-MCNC: 6.9 G/DL (ref 6–8.5)
SODIUM SERPL-SCNC: 145 MMOL/L (ref 134–144)
TRIGL SERPL-MCNC: 155 MG/DL (ref 0–149)
TSH SERPL DL<=0.005 MIU/L-ACNC: 1.54 UIU/ML (ref 0.45–4.5)
VLDLC SERPL CALC-MCNC: 31 MG/DL (ref 5–40)

## 2019-07-23 RX ORDER — NAPROXEN 500 MG/1
TABLET ORAL
Qty: 180 TAB | Refills: 0 | Status: SHIPPED | OUTPATIENT
Start: 2019-07-23 | End: 2019-12-26 | Stop reason: SDUPTHER

## 2019-07-23 NOTE — TELEPHONE ENCOUNTER
Future Appointments:         Future Appointments   Date Time Provider Nadia Kern   9/26/2019  8:00 AM Sylvester Kaufman MD [facility]

## 2019-08-23 RX ORDER — METOPROLOL SUCCINATE 50 MG/1
TABLET, EXTENDED RELEASE ORAL
Qty: 90 TAB | Refills: 4 | Status: SHIPPED | OUTPATIENT
Start: 2019-08-23 | End: 2020-05-01 | Stop reason: SDUPTHER

## 2019-08-28 ENCOUNTER — PATIENT MESSAGE (OUTPATIENT)
Dept: FAMILY MEDICINE CLINIC | Age: 64
End: 2019-08-28

## 2019-09-19 RX ORDER — SIMVASTATIN 20 MG/1
TABLET, FILM COATED ORAL
Qty: 90 TAB | Refills: 4 | Status: SHIPPED | OUTPATIENT
Start: 2019-09-19 | End: 2020-05-01 | Stop reason: SDUPTHER

## 2019-09-30 RX ORDER — LEVOTHYROXINE SODIUM 75 UG/1
TABLET ORAL
Qty: 90 TAB | Refills: 4 | Status: SHIPPED | OUTPATIENT
Start: 2019-09-30 | End: 2020-05-01 | Stop reason: SDUPTHER

## 2019-10-11 ENCOUNTER — OFFICE VISIT (OUTPATIENT)
Dept: FAMILY MEDICINE CLINIC | Age: 64
End: 2019-10-11

## 2019-10-11 VITALS
HEART RATE: 78 BPM | SYSTOLIC BLOOD PRESSURE: 152 MMHG | OXYGEN SATURATION: 96 % | WEIGHT: 275 LBS | DIASTOLIC BLOOD PRESSURE: 84 MMHG | HEIGHT: 65 IN | RESPIRATION RATE: 19 BRPM | BODY MASS INDEX: 45.82 KG/M2 | TEMPERATURE: 98 F

## 2019-10-11 DIAGNOSIS — E11.9 TYPE 2 DIABETES MELLITUS WITH INSULIN THERAPY (HCC): ICD-10-CM

## 2019-10-11 DIAGNOSIS — R04.2 HEMOPTYSIS: ICD-10-CM

## 2019-10-11 DIAGNOSIS — J20.9 ACUTE BRONCHITIS, UNSPECIFIED ORGANISM: Primary | ICD-10-CM

## 2019-10-11 DIAGNOSIS — I10 BENIGN ESSENTIAL HYPERTENSION: ICD-10-CM

## 2019-10-11 DIAGNOSIS — Z23 ENCOUNTER FOR IMMUNIZATION: ICD-10-CM

## 2019-10-11 DIAGNOSIS — Z79.4 TYPE 2 DIABETES MELLITUS WITH INSULIN THERAPY (HCC): ICD-10-CM

## 2019-10-11 PROBLEM — Z87.39 HISTORY OF RHEUMATOID ARTHRITIS: Status: ACTIVE | Noted: 2019-10-11

## 2019-10-11 RX ORDER — AZITHROMYCIN 250 MG/1
TABLET, FILM COATED ORAL
Qty: 6 TAB | Refills: 0 | Status: SHIPPED | OUTPATIENT
Start: 2019-10-11 | End: 2019-10-14 | Stop reason: SDUPTHER

## 2019-10-11 RX ORDER — BENZONATATE 100 MG/1
100 CAPSULE ORAL
Qty: 30 CAP | Refills: 0 | Status: SHIPPED | OUTPATIENT
Start: 2019-10-11 | End: 2019-10-21

## 2019-10-11 NOTE — PROGRESS NOTES
[facility] includes the [facility]  [place]. [place], [address]  [phone]             Date of visit: 10/11/2019   Subjective:      History obtained from:  the patient.   Mica Haile is a 59 y.o. 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0-49.9, adult ([facility] [facility] 10/07/2017    Mixed hyperlipidemia 2016    Benign essential hypertension     Umbilical hernia without obstruction and without gangrene 2016    Hypothyroidism 2015    Actinic keratosis 2014    Abdominal wall bulge 2013    Vitamin D deficiency 2012    FHx: breast cancer 2012    History of benign left ovarian cyst, 2000 2012    Skin cancer screening exams, Derm Dr Lemus Shows 2012    H/O Optic disc edema, [id number]    Hyperlipidemia 03/15/2010    AR (allergic rhinitis)     GERD (gastroesophageal reflux disease)     H/O Mild, Intermittent Asthma     Postmenopause, LMP 2005, age 47 yo, s/p HRT x 6 months     Ovarian cyst      (normal spontaneous vaginal delivery)     Metabolic syndrome 38/07/2980     Current Outpatient Medications   Medication Sig Dispense Refill    azithromycin (ZITHROMAX) 250 mg tablet Take 2 tablets today, then take 1 tablet daily 6 Tab 0    benzonatate (TESSALON) 100 mg capsule Take 1 Cap by mouth three (3) times daily as needed for Cough for up to 10 days. 30 Cap 0    liraglutide (VICTOZA) 0.6 mg/0.1 mL (18 mg/3 mL) pnij 1.2 mg by SubCUTAneous route daily. Replaces trulicity 9 mL 3    Insulin Needles, Disposable, 30 gauge x 1/3\" Use daily with lantus and victoza 3 Package 3    UNITHROID 75 mcg tablet TAKE 1 TABLET DAILY BEFORE BREAKFAST 90 Tab 4    simvastatin (ZOCOR) 20 mg tablet TAKE 1 TABLET NIGHTLY 90 Tab 4    metoprolol succinate (TOPROL-XL) 50 mg XL tablet TAKE 1 TABLET DAILY FOR HYPERTENSION 90 Tab 4    naproxen (NAPROSYN) 500 mg tablet TAKE 1 TABLET TWICE A DAY AS NEEDED 180 Tab 0    albuterol sulfate (PROAIR RESPICLICK) 90 mcg/actuation aepb Take 2 Puffs by inhalation every four (4) hours as needed (sob/wheeze). 1 Inhaler 1    losartan-hydroCHLOROthiazide (HYZAAR) 100-12.5 mg per tablet TAKE 1 TABLET DAILY 90 Tab 1    CINNAMON BARK (CINNAMON PO) Take 350 mg by mouth daily.  coenzyme q10-vitamin e 100-100 mg-unit cap Take 200 mg by mouth daily.  multivitamin (ONE A DAY) tablet Take 1 Tab by mouth daily. Includes vit D 1,000 units      flash glucose scanning reader (FREESTYLE ANOOP 14 DAY READER) [facility] Use for close monitoring sugar, uncontrolled DM(E11.9,  Z79.4) Type 2 diabetes mellitus with insulin therapy 10 Each 12    flash glucose sensor (FREESTYLE ANOOP 14 DAY SENSOR) kit Use for close monitoring sugar, uncontrolled DM(E11.9,  Z79.4) Type 2 diabetes mellitus with insulin therapy 10 Kit 12    insulin glargine (LANTUS,BASAGLAR) 100 unit/mL (3 mL) inpn 16 Units by SubCUTAneous route daily.  Plan to titrate up gradually 15 mL 3    Blood-Glucose Meter monitoring kit Test up to 3x daily for type 2 diabetes uncontrolled on insulin, (E11.9,  Z79.4) Type 2 diabetes mellitus with insulin therapy 1 Kit 0    glucose blood VI test strips (BLOOD GLUCOSE TEST) strip Test up to 3x daily for type 2 diabetes uncontrolled on insulin, (E11.9,  Z79.4) Type 2 diabetes mellitus with insulin therapy 300 Strip 3    Lancets misc Test up to 3x daily for type 2 diabetes uncontrolled on insulin, (E11.9,  Z79.4) Type 2 diabetes mellitus with insulin therapy 300 Each 3    triamcinolone acetonide (KENALOG) 0.1 % topical cream use thin layer on back twice daily 45 g 1    fish oil-dha-epa (FISH OIL) 1,200-144-216 mg Cap Take 1 Tab by mouth.  Takes ~ twice a week       Allergies   Allergen Reactions    Avelox [Moxifloxacin] Rash and Itching    Bactrim [Sulfamethoxazole-Trimethoprim] Rash    Doxycycline Other (comments)     Tongue discomfort, swollen glands    Lisinopril Diarrhea    Norvasc [Amlodipine] Other (comments)     edema    Penicillin G Hives, Shortness of Breath and Swelling     Past Medical History:   Diagnosis Date    Anemia NEC     AR (allergic rhinitis)     Asthma     mild    Benign essential hypertension 2016    Diabetes mellitus type 2, noninsulin dependent ([facility] 12/3/2015    Diabetes mellitus, type 2 ([facility] 2013    FHx: breast cancer 3/2/2012    GERD (gastroesophageal reflux disease) 11/00    H/O Mild, Intermittent Asthma     H/O Optic disc edema, [id number] 3/2/2012    History of benign left ovarian cyst 3/2/2012    Hx of complete eye exam 2017    Dr Asael Angulo intraoular pressure and no evidence of diabetic retinopathy    Hyperlipidemia 2002    Hypertension     Hypothyroidism /    Hypothyroidism 2015    2002    Impaired fasting glucose 3/31/2012    Mixed hyperlipidemia 2016    Morbid obesity with BMI of 45.0-49.9, adult (HCC)      (normal spontaneous vaginal delivery)         Ovarian cyst     Left, benign    Postmenopause     LMP  (HRT x 6months)    Rheumatoid arthritis(714.0)     S/P colonoscopy     Normal (10yrs)    Skin cancer screening exams, Derm Dr Jessica Zayas 3/2/2012    Vitamin D deficiency 3/2/2012     Past Surgical History:   Procedure Laterality Date    COLONOSCOPY  2005    normal, ok x 10 yrs, Dr Jackson Rojas      Normal    HX BREAST LUMPECTOMY      Left, benign (Dr. Freida Huddleston)    HX CERVICAL POLYPECTOMY  14, Dr Gomez Smaller    Benign    HX COLONOSCOPY  2014    Dr Nix Nurse polyps x 3; repeat     HX COLONOSCOPY  2015    poor prep, repeat in one year    HX COLONOSCOPY  2016    normal, follow up 8 yrs   [address]    childbirth    HX HEENT  2010    tumor on vocal cord removed    HX HERNIA REPAIR  16, Dr Leonard Mart    robotic assisted umbilical hernia repair with mesh     HX LIPOMA RESECTION  ~    removed from left thigh    HX ORTHOPAEDIC      tumor remved left leg    HX OTHER SURGICAL  11/5/15, Dr Juno Lynn    2 masses removed left lower leg    HX REFRACTIVE SURGERY Bilateral 2019    MISAEL MAMMOGRAM DIGITAL BILATERAL  annually per Gyn    at [facility] [address]. LUCERO      1-15% Stenosis Bilaterally     Family History   Problem Relation Age of Onset   Emy Allen Breast Cancer Mother         diagnosed age 72, survived it    Emy Allen Arthritis-rheumatoid Mother     Stroke Mother     Parkinsonism Mother          in nursing from colon perf or GI bleed age 80    Hypertension Mother     Migraines Mother     Breast Cancer Maternal Grandmother          in her late 42's    Dementia Father     Heart Disease Father         CAD, PTCA    Stroke Father     Cancer Brother 68        soft tissue cancer    Cancer Brother 68        kidney and lungs    Hypertension Sister     Dementia Sister     Breast Cancer Maternal Aunt     High Cholesterol Son     Cancer Other         maternal first cousin w/ sarcoma of leg    Anesth Problems Neg Hx      Social History     Tobacco Use    Smoking status: Never Smoker    Smokeless tobacco: Never Used   Substance Use Topics    Alcohol use: No     Alcohol/week: 0.0 standard drinks     Comment: very rare, 1-2 drinks a year      Social History     Social History Narrative    Works as         [employer]  Gen: denies fever  Pulm: denies sob  ENT denies ear pain         Objective:     Vitals:    10/11/19 1441 10/11/19 1536   BP: 141/85 152/84   Pulse: 78    Resp: 19    Temp: 98 °F (36.7 °C)    TempSrc: Oral    SpO2: 96%    Weight: 275 lb (124.7 kg)    Height: 5' 5\" (1.651 m)      Body mass index is 45.76 kg/m². General: stated age, well nourished, and in NAD  Neck: supple, symmetrical, trachea midline, no adenopathy and thyroid: not enlarged, symmetric, no tenderness/mass/nodules  Ears: TMs clear bilaterally, canals patent without inflammation  Nose: no drainage, turbinates normal  Throat: clear, no tonsillar exudate or erthema  Mouth: no lesions noted  Lungs:  clear to auscultation w/o rales, rhonchi, wheezes w/normal effort and no use of accessory muscles of respiration   Heart: regular rate and rhythm, S1, S2 normal, no murmur, click, rub or gallop  Lymph: no cervical adenopathy appreciated  Ext: no edema  Skin:  No rashes or lesions      Assessment/Plan:       ICD-10-CM ICD-9-CM    1. Acute bronchitis, unspecified organism J20.9 466.0    2. Benign essential hypertension I10 401.1    3. Type 2 diabetes mellitus with insulin therapy (HCC) E11.9 250.00 HEMOGLOBIN A1C WITH EAG    L75.8 R72.74 METABOLIC PANEL, BASIC   4. Hemoptysis R04.2 786.30    5.  Encounter for immunization Z23 V03.89         Orders Placed This Encounter    HEMOGLOBIN A1C WITH EAG    METABOLIC PANEL, BASIC    azithromycin (ZITHROMAX) 250 mg tablet    benzonatate (TESSALON) 100 mg capsule    liraglutide (VICTOZA) 0.6 mg/0.1 mL (18 mg/3 mL) pnij    Insulin Needles, Disposable, 30 gauge x 1/3\" Concerning that sputum getting green, occasionally bloody, cough getting worse after over 10 days  Giving abx  Encouraged close follow up on mychart andreia if any more bleeding after finishing antibiotic  Try tessalon for cough, hopefully that will help her not need so much dayquill/nyquil    Dm not controlled  Will come back to check labs, overdue  Still has not changed truliciy to victoza but almost out of trulicity so will do that soon  Discussed starting at medium dose, if feeling ok after 1-2 weeks we could raise it  Start on day she would have been due for trulicity  She expressed understanding. Wanted flu shot but no time, will get at work    bp high today but probably due to dayquil/nyquil, will not change her meds    Discussed the diagnosis and plan and she expressed understanding. Follow-up and Dispositions    · Return in about 3 months (around 1/11/2020) for Follow up diabetes.          Salo Hinson MD

## 2019-10-11 NOTE — PATIENT INSTRUCTIONS
Cough: Care Instructions  Your Care Instructions    A cough is your body's response to something that bothers your throat or airways. Many things can cause a cough. You might cough because of a cold or the flu, bronchitis, or asthma. Smoking, postnasal drip, allergies, and stomach acid that backs up into your throat also can cause coughs. A cough is a symptom, not a disease. Most coughs stop when the cause, such as a cold, goes away. You can take a few steps at home to cough less and feel better. Follow-up care is a key part of your treatment and safety. Be sure to make and go to all appointments, and call your doctor if you are having problems. It's also a good idea to know your test results and keep a list of the medicines you take. How can you care for yourself at home? · Drink lots of water and other fluids. This helps thin the mucus and soothes a dry or sore throat. Honey or lemon juice in hot water or tea may ease a dry cough. · Take cough medicine as directed by your doctor. · Prop up your head on pillows to help you breathe and ease a dry cough. · Try cough drops to soothe a dry or sore throat. Cough drops don't stop a cough. Medicine-flavored cough drops are no better than candy-flavored drops or hard candy. · Do not smoke. Avoid secondhand smoke. If you need help quitting, talk to your doctor about stop-smoking programs and medicines. These can increase your chances of quitting for good. When should you call for help? Call 911 anytime you think you may need emergency care.  For example, call if:    · You have severe trouble breathing.    Call your doctor now or seek immediate medical care if:    · You cough up blood.     · You have new or worse trouble breathing.     · You have a new or higher fever.     · You have a new rash.    Watch closely for changes in your health, and be sure to contact your doctor if:    · You cough more deeply or more often, especially if you notice more mucus or a change in the color of your mucus.     · You have new symptoms, such as a sore throat, an earache, or sinus pain.     · You do not get better as expected. Where can you learn more? Go to http://luanne-gail.info/. Enter D279 in the search box to learn more about \"Cough: Care Instructions. \"  Current as of: June 9, 2019  Content Version: 12.2  © 8519-0075 Dr. TATTOFF. Care instructions adapted under license by [employer] (which disclaims liability or warranty for this information). If you have questions about a medical condition or this instruction, always ask your healthcare professional. [address] any warranty or liability for your use of this information.

## 2019-10-11 NOTE — PROGRESS NOTES
Chief Complaint   Patient presents with    Nasal Congestion     patient states shes had chest congestion x3 weeks    Cough     x3 weeks with dark sputum     Wheezing     1. Have you been to the ER, urgent care clinic since your last visit? Hospitalized since your last visit? No    2. Have you seen or consulted any other health care providers outside of the [address] [place] [address] since your last visit? Include any pap smears or colon screening.  No

## 2019-10-14 ENCOUNTER — TELEPHONE (OUTPATIENT)
Dept: FAMILY MEDICINE CLINIC | Age: 64
End: 2019-10-14

## 2019-10-14 RX ORDER — AZITHROMYCIN 250 MG/1
TABLET, FILM COATED ORAL
Qty: 6 TAB | Refills: 0 | Status: SHIPPED | OUTPATIENT
Start: 2019-10-14 | End: 2019-10-19

## 2019-10-14 NOTE — TELEPHONE ENCOUNTER
PA initiated for medication  liraglutide (VICTOZA) 0.6 mg/0.1 mL (18 mg/3 mL) pnij     Waiting on approval/ denial from insurance
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
## 2019-11-05 ENCOUNTER — OFFICE VISIT (OUTPATIENT)
Dept: FAMILY MEDICINE CLINIC | Age: 64
End: 2019-11-05

## 2019-11-05 VITALS
BODY MASS INDEX: 46.75 KG/M2 | TEMPERATURE: 97.8 F | HEIGHT: 65 IN | SYSTOLIC BLOOD PRESSURE: 142 MMHG | RESPIRATION RATE: 19 BRPM | DIASTOLIC BLOOD PRESSURE: 80 MMHG | HEART RATE: 65 BPM | OXYGEN SATURATION: 97 % | WEIGHT: 280.6 LBS

## 2019-11-05 DIAGNOSIS — E11.3593 TYPE 2 DIABETES MELLITUS WITH PROLIFERATIVE RETINOPATHY OF BOTH EYES, WITHOUT LONG-TERM CURRENT USE OF INSULIN, MACULAR EDEMA PRESENCE UNSPECIFIED, UNSPECIFIED PROLIFERATIVE RETINOPATHY TYPE (HCC): ICD-10-CM

## 2019-11-05 DIAGNOSIS — I78.1 SPIDER VEINS: ICD-10-CM

## 2019-11-05 DIAGNOSIS — Z12.31 ENCOUNTER FOR SCREENING MAMMOGRAM FOR BREAST CANCER: ICD-10-CM

## 2019-11-05 DIAGNOSIS — J41.1 BRONCHITIS, MUCOPURULENT RECURRENT (HCC): Primary | ICD-10-CM

## 2019-11-05 DIAGNOSIS — E66.01 MORBID OBESITY WITH BMI OF 45.0-49.9, ADULT (HCC): Chronic | ICD-10-CM

## 2019-11-05 RX ORDER — AZITHROMYCIN 250 MG/1
TABLET, FILM COATED ORAL
Qty: 6 TAB | Refills: 0 | Status: SHIPPED | OUTPATIENT
Start: 2019-11-05 | End: 2019-11-10

## 2019-11-05 RX ORDER — BENZONATATE 100 MG/1
100 CAPSULE ORAL
Qty: 30 CAP | Refills: 0 | Status: SHIPPED | OUTPATIENT
Start: 2019-11-05 | End: 2019-11-15

## 2019-11-05 NOTE — PROGRESS NOTES
Chief Complaint   Patient presents with    Cough     improved since office visit on 10/11 then worsened again yesterday, coughing up thick, yellow mucus     Sore Throat     x2 days      1. Have you been to the ER, urgent care clinic since your last visit? Hospitalized since your last visit? No    2. Have you seen or consulted any other health care providers outside of the [address] [place] [address] since your last visit? Include any pap smears or colon screening.  No

## 2019-11-05 NOTE — LETTER
NOTIFICATION RETURN TO WORK / SCHOOL 
 
11/5/2019 10:00 AM 
 
Ms. aHmzah Swartz [facility] [place] [phone] To Whom It May Concern: 
 
Hamzah Swartz is currently under the care of JAK Sepulveda. She will return to work/school on: 11/11/19; is ok to work from home this week. If there are questions or concerns please have the patient contact our office. Sincerely, Francis Wilhelm MD

## 2019-11-05 NOTE — PATIENT INSTRUCTIONS

## 2019-11-05 NOTE — PROGRESS NOTES
Kelvin Maya [facility]  [id number] [place] [address] [address] [facility], [address]  [phone]             Date of visit: 11/5/2019   Subjective:      History obtained from:  the patient.   Frances Goddard is a 59 y.o. female who presents today for cough again    I had seen her 3 weeks ago with acute bronchitis  She had gotten mostly better other than still some cough, but then kept her 1year-old granddaughter for 4 days  Didn't sleep well for several nights  Started feeling bad Sunday night  Yesterday decided to wait but feeling bad  No energy    The coughing is the worst symptom  hoarsness a problem at work  Coughing up thick/yellow stuff instead of clear (went that way overnight)    The zpack did work really well for her    After running out of tessalon was using nyquil/dayquil for the cough  Did help some  When it ran out Sunday didn't get more due to the calories in it  Had gained weight despite eating less she thinks from the cough syrups    bp high here  Consistently 120-130 at work  Was a little high when she was taking the dayquill consistently    Did get her flu shot at pharmacy    Has some spider veins she wants to get treated  Some sensitivity in left lateral thigh only when she touches it but radiates down to foot, thought maybe sciatica    Wiling to get mammogram    Patient Active Problem List    Diagnosis Date Noted    History of rheumatoid arthritis 10/11/2019    Type 2 diabetes mellitus with proliferative retinopathy ([facility] 06/25/2019    Type 2 diabetes mellitus with insulin therapy ([address] 09/22/2018    Spider veins of both lower extremities 05/17/2018    Varicose vein of leg 05/17/2018    Morbid obesity with BMI of 45.0-49.9, adult ([address] 10/07/2017    Mixed hyperlipidemia 05/31/2016    Benign essential hypertension 67/11/6878    Umbilical hernia without obstruction and without gangrene 04/22/2016    Hypothyroidism 07/30/2015    Actinic keratosis 11/20/2014    Abdominal wall bulge 2013    Vitamin D deficiency 2012    FHx: breast cancer 2012    History of benign left ovarian cyst, 2000 2012    Skin cancer screening exams, Derm Dr Thomas Holding 2012    H/O Optic disc edema, 5175-7014 2012    Hyperlipidemia 03/15/2010    AR (allergic rhinitis)     GERD (gastroesophageal reflux disease)     H/O Mild, Intermittent Asthma     Postmenopause, LMP 2005, age 49 yo, s/p HRT x 6 months     Ovarian cyst      (normal spontaneous vaginal delivery)     Metabolic syndrome      Current Outpatient Medications   Medication Sig Dispense Refill    azithromycin (ZITHROMAX) 250 mg tablet Take 2 tablets today, then take 1 tablet daily 6 Tab 0    benzonatate (TESSALON) 100 mg capsule Take 1 Cap by mouth three (3) times daily as needed for Cough for up to 10 days. 30 Cap 0    liraglutide (VICTOZA) 0.6 mg/0.1 mL (18 mg/3 mL) pnij 1.2 mg by SubCUTAneous route daily. Replaces trulicity 9 mL 3    Insulin Needles, Disposable, 30 gauge x 1/3\" Use daily with lantus and victoza 3 Package 3    UNITHROID 75 mcg tablet TAKE 1 TABLET DAILY BEFORE BREAKFAST 90 Tab 4    simvastatin (ZOCOR) 20 mg tablet TAKE 1 TABLET NIGHTLY 90 Tab 4    metoprolol succinate (TOPROL-XL) 50 mg XL tablet TAKE 1 TABLET DAILY FOR HYPERTENSION 90 Tab 4    naproxen (NAPROSYN) 500 mg tablet TAKE 1 TABLET TWICE A DAY AS NEEDED 180 Tab 0    flash glucose scanning reader (FREESTYLE ANOOP 14 DAY READER) misc Use for close monitoring sugar, uncontrolled DM(E11.9,  Z79.4) Type 2 diabetes mellitus with insulin therapy 10 Each 12    flash glucose sensor (FREESTYLE ANOOP 14 DAY SENSOR) kit Use for close monitoring sugar, uncontrolled DM(E11.9,  Z79.4) Type 2 diabetes mellitus with insulin therapy 10 Kit 12    albuterol sulfate (PROAIR RESPICLICK) 90 mcg/actuation aepb Take 2 Puffs by inhalation every four (4) hours as needed (sob/wheeze).  1 Inhaler 1    losartan-hydroCHLOROthiazide (HYZAAR) 100-12.5 mg per tablet TAKE 1 TABLET DAILY 90 Tab 1    insulin glargine (LANTUS,BASAGLAR) 100 unit/mL (3 mL) inpn 16 Units by SubCUTAneous route daily. Plan to titrate up gradually 15 mL 3    Blood-Glucose Meter monitoring kit Test up to 3x daily for type 2 diabetes uncontrolled on insulin, (E11.9,  Z79.4) Type 2 diabetes mellitus with insulin therapy 1 Kit 0    glucose blood VI test strips (BLOOD GLUCOSE TEST) strip Test up to 3x daily for type 2 diabetes uncontrolled on insulin, (E11.9,  Z79.4) Type 2 diabetes mellitus with insulin therapy 300 Strip 3    Lancets misc Test up to 3x daily for type 2 diabetes uncontrolled on insulin, (E11.9,  Z79.4) Type 2 diabetes mellitus with insulin therapy 300 Each 3    triamcinolone acetonide (KENALOG) 0.1 % topical cream use thin layer on back twice daily 45 g 1    CINNAMON BARK (CINNAMON PO) Take 350 mg by mouth daily.  coenzyme q10-vitamin e 100-100 mg-unit cap Take 200 mg by mouth daily.  multivitamin (ONE A DAY) tablet Take 1 Tab by mouth daily. Includes vit D 1,000 units      fish oil-dha-epa (FISH OIL) 1,200-144-216 mg Cap Take 1 Tab by mouth.  Takes ~ twice a week       Allergies   Allergen Reactions    Avelox [Moxifloxacin] Rash and Itching    Bactrim [Sulfamethoxazole-Trimethoprim] Rash    Doxycycline Other (comments)     Tongue discomfort, swollen glands    Lisinopril Diarrhea    Norvasc [Amlodipine] Other (comments)     edema    Penicillin G Hives, Shortness of Breath and Swelling     Past Medical History:   Diagnosis Date    Anemia NEC     AR (allergic rhinitis)     Asthma     mild    Benign essential hypertension 5/31/2016    Diabetes mellitus type 2, noninsulin dependent ([facility] [address] 12/3/2015    Diabetes mellitus, type 2 ([facility] [address] 12/18/2013 12/2013    FHx: breast cancer 3/2/2012    GERD (gastroesophageal reflux disease) 11/00    H/O Mild, Intermittent Asthma     H/O Optic disc edema, 6902-8520 3/2/2012    History of benign left ovarian cyst 3/2/2012    Hx of complete eye exam 2017    Dr Rosado Shadow intraoular pressure and no evidence of diabetic retinopathy    Hyperlipidemia     Hypertension     Hypothyroidism     Hypothyroidism 2015    2002    Impaired fasting glucose 3/31/2012    Mixed hyperlipidemia 2016    Morbid obesity with BMI of 45.0-49.9, adult (HCC)      (normal spontaneous vaginal delivery)         Ovarian cyst     Left, benign    Postmenopause     LMP  (HRT x 6months)    Rheumatoid arthritis(714.0)     S/P colonoscopy     Normal (10yrs)    Skin cancer screening exams, Derm Dr Grant April 3/2/2012    Vitamin D deficiency 3/2/2012     Past Surgical History:   Procedure Laterality Date    COLONOSCOPY  2005    normal, ok x 10 yrs, Dr Lola Pagan      Normal    HX BREAST LUMPECTOMY      Left, benign (Dr. Yonny Martinez)    HX CERVICAL POLYPECTOMY  14, Dr Shane Box HX COLONOSCOPY  2014    Dr Fernando Mcleod polyps x 3; repeat     HX COLONOSCOPY  2015    poor prep, repeat in one year    HX COLONOSCOPY  2016    normal, follow up 8 yrs   Manoj Boyle 10    childbirth    HX HEENT  2010    tumor on vocal cord removed    HX HERNIA REPAIR  16, Dr Kennedy Paul    robotic assisted umbilical hernia repair with mesh     HX LIPOMA RESECTION  ~    removed from left thigh    HX ORTHOPAEDIC      tumor remved left leg    HX OTHER SURGICAL  11/5/15, Dr Krysten Watt    2 masses removed left lower leg    HX REFRACTIVE SURGERY Bilateral 2019    MISAEL MAMMOGRAM DIGITAL BILATERAL  annually per Gyn    at [address] [facility] [address] LUCERO      1-15% Stenosis Bilaterally     Family History   Problem Relation Age of Onset   [address] [facility] Breast Cancer Mother         diagnosed age 72, survived it    [address] [facility] Arthritis-rheumatoid Mother     Stroke Mother     Parkinsonism Mother          in nursing from colon perf or GI bleed age 80    Hypertension Mother    [facility] Migraines Mother     Breast Cancer Maternal Grandmother          in her late 42's    Dementia Father     Heart Disease Father         CAD, PTCA    Stroke Father     Cancer Brother 68        soft tissue cancer    Cancer Brother 68        kidney and lungs    Hypertension Sister     Dementia Sister     Breast Cancer Maternal Aunt     High Cholesterol Son     Cancer Other         maternal first cousin w/ sarcoma of leg    Anesth Problems Neg Hx      Social History     Tobacco Use    Smoking status: Never Smoker    Smokeless tobacco: Never Used   Substance Use Topics    Alcohol use: No     Alcohol/week: 0.0 standard drinks     Comment: very rare, 1-2 drinks a year      Social History     Social History Narrative    Works as         Review of Systems  Gen: denies fever  CV denies chest pain, has some pain in neck         Objective:     Vitals:    19 0928 19 0954   BP: 173/68 142/80   Pulse: 65    Resp: 19    Temp: 97.8 °F (36.6 °C)    TempSrc: Oral    SpO2: 97%    Weight: 280 lb 9.6 oz (127.3 kg)    Height: 5' 5\" (1.651 m)      Body mass index is 46.69 kg/m². General: stated age, obese and in NAD  Neck: supple, symmetrical, trachea midline, no adenopathy and thyroid: not enlarged, symmetric, no tenderness/mass/nodules  Nose: no drainage, turbinates normal  Throat: clear, no tonsillar exudate or erthema  Mouth: no lesions noted  Lungs:  clear to auscultation w/o rales, rhonchi, wheezes w/normal effort and no use of accessory muscles of respiration   Heart: regular rate and rhythm, S1, S2 normal, no murmur, click, rub or gallop  Lymph: no cervical adenopathy appreciated  Ext: no edema. Normal sensation bilaterally to monofilament; no callouses or deformities; 2+ DP pulse  Skin:  No rashes or lesions, clusters of large spider veins on right thigh, lower legs    Assessment/Plan:       ICD-10-CM ICD-9-CM    1.  Bronchitis, mucopurulent recurrent ([address] J41.1 491.1 XR CHEST PA LAT   2. Type 2 diabetes mellitus with proliferative retinopathy of both eyes, without long-term current use of insulin, macular edema presence unspecified, unspecified proliferative retinopathy type ([facility] L73.1812 726.44 METABOLIC PANEL, BASIC     362.02 HEMOGLOBIN A1C WITH EAG      MICROALBUMIN, UR, RAND W/ MICROALB/CREAT RATIO       DIABETES FOOT EXAM   3. Spider veins I78.1 448.1 REFERRAL TO VASCULAR SURGERY   4. Morbid obesity with BMI of 45.0-49.9, adult ([facility] E66.01 278.01     Z68.42 V85.42    5. Encounter for screening mammogram for breast cancer Z12.31 V76.12 MISAEL MAMMO BI SCREENING INCL CAD        Orders Placed This Encounter    XR CHEST PA LAT    MISAEL MAMMO BI SCREENING INCL CAD    METABOLIC PANEL, BASIC    HEMOGLOBIN A1C WITH EAG    MICROALBUMIN, UR, RAND W/ MICROALB/CREAT RATIO    REFERRAL TO VASCULAR SURGERY     DIABETES FOOT EXAM    DISCONTD: Phenylephrine-DM-Acetaminophen (VICKS DAYQUIL COLD-FLU RELIEF) 5- mg/15 mL liqd    DISCONTD: DM/p-ephed/acetaminoph/doxylam (NYQUIL PO)    azithromycin (ZITHROMAX) 250 mg tablet    benzonatate (TESSALON) 100 mg capsule       Bronchitis never got all the way better, then suddenly worsened (after keeping gradnddaughter) with purulent mucus  Advised to stay at home and rest this week, she can work from home  Will try zpack again but get xray if not better within a week  Refill tessalon  Advised hold off on dayquil/nyquil for now even though they work better for her cough as her bp is up  If she does take them in the future advised the gel caps so she is not getting all the sugar in the syrups  Weight is up but she is confident she can lose 10lb quickly with cutting out bread again  Dm labs while here  Refer vein specialist for spider veins as requested    Discussed the diagnosis and plan and she expressed understanding. Follow-up and Dispositions    · Return in about 3 months (around 2/5/2020).          Hilda Quach Meryl Najjar, MD

## 2019-11-06 LAB
ALBUMIN/CREAT UR: 23.5 MG/G CREAT (ref 0–30)
BUN SERPL-MCNC: 13 MG/DL (ref 8–27)
BUN/CREAT SERPL: 17 (ref 12–28)
CALCIUM SERPL-MCNC: 9.9 MG/DL (ref 8.7–10.3)
CHLORIDE SERPL-SCNC: 100 MMOL/L (ref 96–106)
CO2 SERPL-SCNC: 25 MMOL/L (ref 20–29)
CREAT SERPL-MCNC: 0.76 MG/DL (ref 0.57–1)
CREAT UR-MCNC: 79 MG/DL
EST. AVERAGE GLUCOSE BLD GHB EST-MCNC: 146 MG/DL
GLUCOSE SERPL-MCNC: 222 MG/DL (ref 65–99)
HBA1C MFR BLD: 6.7 % (ref 4.8–5.6)
MICROALBUMIN UR-MCNC: 18.6 UG/ML
POTASSIUM SERPL-SCNC: 4.1 MMOL/L (ref 3.5–5.2)
SODIUM SERPL-SCNC: 139 MMOL/L (ref 134–144)

## 2019-12-17 RX ORDER — LOSARTAN POTASSIUM AND HYDROCHLOROTHIAZIDE 12.5; 1 MG/1; MG/1
1 TABLET ORAL DAILY
Qty: 90 TAB | Refills: 3 | Status: SHIPPED | OUTPATIENT
Start: 2019-12-17 | End: 2020-05-01 | Stop reason: SDUPTHER

## 2019-12-17 NOTE — TELEPHONE ENCOUNTER
.Pharmacy is requesting a refill on the medication. .  Requested Prescriptions     Pending Prescriptions Disp Refills    losartan-hydroCHLOROthiazide (HYZAAR) 100-12.5 mg per tablet 90 Tab 1           . Pharmacy on file verified        LastRefill:6/20/19        LOV: Tuesday, November 05, 2019   UOV:  Tuesday, January 14, 2020

## 2019-12-17 NOTE — TELEPHONE ENCOUNTER
PCP: Kassy Villalba MD    Last appt: 11/5/2019  Future Appointments   Date Time Provider Nadia Kern   1/14/2020 11:15 AM Vera Kaufman  Sasha Velasquez [facility]   2/4/2020  9:00 AM Vera Kaufman MD [facility]UEL SCHED       Requested Prescriptions     Pending Prescriptions Disp Refills    losartan-hydroCHLOROthiazide (HYZAAR) 100-12.5 mg per tablet 90 Tab 1

## 2019-12-24 RX ORDER — NAPROXEN 500 MG/1
TABLET ORAL
Qty: 180 TAB | Refills: 4 | OUTPATIENT
Start: 2019-12-24

## 2019-12-26 RX ORDER — NAPROXEN 500 MG/1
TABLET ORAL
Qty: 180 TAB | Refills: 0 | Status: SHIPPED | OUTPATIENT
Start: 2019-12-26 | End: 2020-05-01 | Stop reason: SDUPTHER

## 2019-12-30 ENCOUNTER — OFFICE VISIT (OUTPATIENT)
Dept: FAMILY MEDICINE CLINIC | Age: 64
End: 2019-12-30

## 2019-12-30 VITALS
HEART RATE: 75 BPM | SYSTOLIC BLOOD PRESSURE: 130 MMHG | OXYGEN SATURATION: 95 % | HEIGHT: 65 IN | BODY MASS INDEX: 45.32 KG/M2 | TEMPERATURE: 98.4 F | DIASTOLIC BLOOD PRESSURE: 72 MMHG | WEIGHT: 272 LBS | RESPIRATION RATE: 16 BRPM

## 2019-12-30 DIAGNOSIS — E11.65 UNCONTROLLED TYPE 2 DIABETES MELLITUS WITH HYPERGLYCEMIA (HCC): Primary | ICD-10-CM

## 2019-12-31 NOTE — PROGRESS NOTES
HISTORY OF PRESENT ILLNESS  Zoe Fernandes is a 59 y.o. female. HPI  Reports recent elevated blood glucose. Followed by PCP for type 2 DM. Reports she was on trulicity then switched to victoza for better glycemic control. Last A1C was 6.7 about 6 weeks ago. Patient reports she had difficulty with victoza injections so stopped medication completely. Began to feel bad over past few weeks. Check her blood sugar and it was up in the 400 range. Resumed trulicity 2 days ago. Blood sugars have not yet improved so she resumed lantus which she had been on in the past at 8 units bid. FBS has begun to improve but still in 300 range. Reports she is starting to feel better. Has follow up scheduled with PCP in 2 weeks.   Past Medical History:   Diagnosis Date    Anemia NEC     AR (allergic rhinitis)     Asthma     mild    Benign essential hypertension 2016    Diabetes mellitus type 2, noninsulin dependent ([address] 12/3/2015    Diabetes mellitus, type 2 ([address] 2013    FHx: breast cancer 3/2/2012    GERD (gastroesophageal reflux disease) 11/00    H/O Mild, Intermittent Asthma     H/O Optic disc edema, 9669-9960 3/2/2012    History of benign left ovarian cyst 3/2/2012    Hx of complete eye exam 2017    Dr Jennifer Huff intraoular pressure and no evidence of diabetic retinopathy    Hyperlipidemia 2002    Hypertension     Hypothyroidism     Hypothyroidism 2015    2002    Impaired fasting glucose 3/31/2012    Mixed hyperlipidemia 2016    Morbid obesity with BMI of 45.0-49.9, adult (HCC)      (normal spontaneous vaginal delivery)         Ovarian cyst 2000    Left, benign    Postmenopause     LMP  (HRT x 6months)    Rheumatoid arthritis(714.0)     S/P colonoscopy     Normal (10yrs)    Skin cancer screening exams, Derm Dr Florence Ulloa 3/2/2012    Vitamin D deficiency 3/2/2012     Patient Active Problem List   Diagnosis Code    Metabolic syndrome E88.81    Hyperlipidemia E78.5    AR (allergic rhinitis) J30.9    GERD (gastroesophageal reflux disease) K21.9    H/O Mild, Intermittent Asthma J45.909    Postmenopause, LMP 2005, age 47 yo, s/p HRT x 6 months Z78.0    Ovarian cyst N83.209     (normal spontaneous vaginal delivery) O80    Vitamin D deficiency E55.9    FHx: breast cancer Z80.3    History of benign left ovarian cyst,  Z87.42    Skin cancer screening exams, Derm Dr Marin Guallpa Z12.83    H/O Optic disc edema, 9221-7036 H47.10    Abdominal wall bulge R19.00    Actinic keratosis L57.0    Hypothyroidism N22.2    Umbilical hernia without obstruction and without gangrene K42.9    Mixed hyperlipidemia E78.2    Benign essential hypertension I10    Morbid obesity with BMI of 45.0-49.9, adult ([facility]) E66.01, Z68.42    Spider veins of both lower extremities I83.93    Varicose vein of leg I83.90    Type 2 diabetes mellitus with insulin therapy ([facility]) E11.9, Z79.4    Type 2 diabetes mellitus with proliferative retinopathy ([facility] [address] E11.3599    History of rheumatoid arthritis Z87.39     Current Outpatient Medications   Medication Sig    APPLE CIDER VINEGAR PO Take  by mouth.  naproxen (NAPROSYN) 500 mg tablet TAKE 1 TABLET TWICE A DAY AS NEEDED    losartan-hydroCHLOROthiazide (HYZAAR) 100-12.5 mg per tablet Take 1 Tab by mouth daily.  liraglutide (VICTOZA) 0.6 mg/0.1 mL (18 mg/3 mL) pnij 1.2 mg by SubCUTAneous route daily.  Replaces trulicity    Insulin Needles, Disposable, 30 gauge x 1/3\" Use daily with lantus and victoza    UNITHROID 75 mcg tablet TAKE 1 TABLET DAILY BEFORE BREAKFAST    simvastatin (ZOCOR) 20 mg tablet TAKE 1 TABLET NIGHTLY    metoprolol succinate (TOPROL-XL) 50 mg XL tablet TAKE 1 TABLET DAILY FOR HYPERTENSION    flash glucose scanning reader ([employer] ANOOP 14 DAY READER) [facility] Use for close monitoring sugar, uncontrolled DM(E11.9,  Z79.4) Type 2 diabetes mellitus with insulin therapy    flash glucose sensor (FREESTYLE ANOOP 14 DAY SENSOR) kit Use for close monitoring sugar, uncontrolled DM(E11.9,  Z79.4) Type 2 diabetes mellitus with insulin therapy    albuterol sulfate (PROAIR RESPICLICK) 90 mcg/actuation aepb Take 2 Puffs by inhalation every four (4) hours as needed (sob/wheeze).  insulin glargine (LANTUS,BASAGLAR) 100 unit/mL (3 mL) inpn 16 Units by SubCUTAneous route daily. Plan to titrate up gradually    Blood-Glucose Meter monitoring kit Test up to 3x daily for type 2 diabetes uncontrolled on insulin, (E11.9,  Z79.4) Type 2 diabetes mellitus with insulin therapy    glucose blood VI test strips (BLOOD GLUCOSE TEST) strip Test up to 3x daily for type 2 diabetes uncontrolled on insulin, (E11.9,  Z79.4) Type 2 diabetes mellitus with insulin therapy    Lancets misc Test up to 3x daily for type 2 diabetes uncontrolled on insulin, (E11.9,  Z79.4) Type 2 diabetes mellitus with insulin therapy    triamcinolone acetonide (KENALOG) 0.1 % topical cream use thin layer on back twice daily    CINNAMON BARK (CINNAMON PO) Take 350 mg by mouth daily.  coenzyme q10-vitamin e 100-100 mg-unit cap Take 200 mg by mouth daily.  multivitamin (ONE A DAY) tablet Take 1 Tab by mouth daily. Includes vit D 1,000 units    fish oil-dha-epa (FISH OIL) 1,200-144-216 mg Cap Take 1 Tab by mouth. Takes ~ twice a week     No current facility-administered medications for this visit. Social History     Tobacco Use    Smoking status: Never Smoker    Smokeless tobacco: Never Used   Substance Use Topics    Alcohol use: No     Alcohol/week: 0.0 standard drinks     Comment: very rare, 1-2 drinks a year    Drug use: No     Blood pressure 130/72, pulse 75, temperature 98.4 °F (36.9 °C), temperature source Oral, resp. rate 16, height 5' 5\" (1.651 m), weight 272 lb (123.4 kg), last menstrual period 12/24/2007, SpO2 95 %. Review of Systems   Constitutional: Positive for malaise/fatigue. Negative for chills and fever.    Eyes: Negative. Respiratory: Negative for cough and shortness of breath. Cardiovascular: Negative for chest pain, palpitations and leg swelling. Genitourinary: Negative. Neurological: Negative for dizziness and headaches. Endo/Heme/Allergies: Positive for polydipsia. All other systems reviewed and are negative. Physical Exam  Constitutional:       General: She is not in acute distress. Appearance: She is obese. Neck:      Musculoskeletal: Neck supple. Cardiovascular:      Rate and Rhythm: Normal rate and regular rhythm. Heart sounds: Normal heart sounds. Pulmonary:      Effort: Pulmonary effort is normal.      Breath sounds: Normal breath sounds. Abdominal:      General: There is no distension. Palpations: Abdomen is soft. Tenderness: There is no tenderness. Musculoskeletal:      Right lower leg: No edema. Left lower leg: No edema. Lymphadenopathy:      Cervical: No cervical adenopathy. Skin:     General: Skin is warm and dry. Neurological:      General: No focal deficit present. Psychiatric:         Mood and Affect: Mood normal.         ASSESSMENT and PLAN  Diagnoses and all orders for this visit:    1. Uncontrolled type 2 diabetes mellitus with hyperglycemia ([address]    Secondary to non adherence to medication regimen. Continue trulicity as directed. Continue lantus 16 units every pm.  Continue interim BS monitoring. Report if FBS remains above 160 over next 3-5 days. Reviewed diabetic diet and carbohydrate restriction. Follow up with PCP as scheduled. Greater than 50% of 30 minute visit spent counseling regarding importance of taking prescribed medications, potential complications of uncontrolled T2DM and coordinating care.

## 2019-12-31 NOTE — PROGRESS NOTES
Chief Complaint   Patient presents with    Blood sugar problem    Skin Exam     dry lips and mouth     Nasal Discharge     nasal yellow discharge and coughing up mucus     1. Have you been to the ER, urgent care clinic since your last visit? Hospitalized since your last visit? No    2. Have you seen or consulted any other health care providers outside of the [address] [place] [address] since your last visit? Include any pap smears or colon screening.  No\

## 2020-01-13 NOTE — PROGRESS NOTES
[facility]  [place]. [place], [address]  [phone]             Date of visit: 1/14/2020   Subjective:      History obtained from:  the patient. Ara Lane is a 59 y.o. female who presents today for physical, chronic problems  Feeling pretty well now    Was here 2 weeks ago with sugars around 400 after she stopped [employer], had problems with the injections, could not get it to inject right so they think she got a faulty pen.   Previously had been on Trulicity; I had advised the change to see if that helped her weight loss, but back to Trulicity now  Phone would not accept nemo for the Freestyle meter so ordered a new phone  So stilusing old meter  Sugars have been 300-400s at home consistently  Started out with 8 units, got up to 11 and sugars finally came down to low 100s, taking 12 units insulin now  Has had metal taste in mouth  This am was 129    Decided since was coming in was not going to take anything    Metallic taste in her mouth makes her not want to eat  Has had it for 2-3 weeks now  Yesterday was really bad, and her sugar was ok yesterday  Even water tasted bad  No new meds or herbs, not feeling heartburn    Was having urinary frequency when sugar was high,  Could not et enough fluid  Usually not a big fluid drinker    Around 1year old granddaughter a lot  She is not sick right now but pt constantly sick with a cold  Started getting another cold yesterday    Started feeling bad in Nov after she was here  So she finally started checking her sugars and it was so high it would not register    Weight down 10lb in 2 months    Last a1c 6.7 2 mo ago, but has been above 10 in the past    Eating really low carb, almost none  Was not even eating carbs at [place] due to the taste issue    Did take zicam for a couple of days recently but not back in December    Exercise had been until 2 mo ago, feeling too bad    Blood pressure 130/50 range at home, sometimes up to 145      Patient Active Problem List    Diagnosis Date Noted    History of rheumatoid arthritis 10/11/2019    Type 2 diabetes mellitus with proliferative retinopathy ([facility] 2019    Type 2 diabetes mellitus with insulin therapy ([facility] 2018    Spider veins of both lower extremities 2018    Varicose vein of leg 2018    Morbid obesity with BMI of 45.0-49.9, adult ([facility] 10/07/2017    Mixed hyperlipidemia 2016    Benign essential hypertension     Umbilical hernia without obstruction and without gangrene 2016    Hypothyroidism 2015    Actinic keratosis 2014    Abdominal wall bulge 2013    Vitamin D deficiency 2012    FHx: breast cancer 2012    History of benign left ovarian cyst, 2012    Skin cancer screening exams, Derm Dr James Alonso 2012    H/O Optic disc edema, [id number]    AR (allergic rhinitis)     GERD (gastroesophageal reflux disease)     H/O Mild, Intermittent Asthma     Postmenopause, LMP , age 47 yo, s/p HRT x 6 months     Ovarian cyst      (normal spontaneous vaginal delivery)     Metabolic syndrome      Current Outpatient Medications   Medication Sig Dispense Refill    insulin glargine (LANTUS,BASAGLAR) 100 unit/mL (3 mL) inpn 12 Units by SubCUTAneous route daily. 15 mL 3    varicella-zoster recombinant, PF, (SHINGRIX, PF,) 50 mcg/0.5 mL susr injection 0.5 mL by IntraMUSCular route once for 1 dose. 0.5 mL 1    APPLE CIDER VINEGAR PO Take  by mouth.  naproxen (NAPROSYN) 500 mg tablet TAKE 1 TABLET TWICE A DAY AS NEEDED 180 Tab 0    losartan-hydroCHLOROthiazide (HYZAAR) 100-12.5 mg per tablet Take 1 Tab by mouth daily.  90 Tab 3    Insulin Needles, Disposable, 30 gauge x 1/3\" Use daily with lantus and victoza 3 Package 3    UNITHROID 75 mcg tablet TAKE 1 TABLET DAILY BEFORE BREAKFAST 90 Tab 4    simvastatin (ZOCOR) 20 mg tablet TAKE 1 TABLET NIGHTLY 90 Tab 4    metoprolol succinate (TOPROL-XL) 50 mg XL tablet TAKE 1 TABLET DAILY FOR HYPERTENSION 90 Tab 4    albuterol sulfate (PROAIR RESPICLICK) 90 mcg/actuation aepb Take 2 Puffs by inhalation every four (4) hours as needed (sob/wheeze). 1 Inhaler 1    Blood-Glucose Meter monitoring kit Test up to 3x daily for type 2 diabetes uncontrolled on insulin, (E11.9,  Z79.4) Type 2 diabetes mellitus with insulin therapy 1 Kit 0    glucose blood VI test strips (BLOOD GLUCOSE TEST) strip Test up to 3x daily for type 2 diabetes uncontrolled on insulin, (E11.9,  Z79.4) Type 2 diabetes mellitus with insulin therapy 300 Strip 3    Lancets misc Test up to 3x daily for type 2 diabetes uncontrolled on insulin, (E11.9,  Z79.4) Type 2 diabetes mellitus with insulin therapy 300 Each 3    triamcinolone acetonide (KENALOG) 0.1 % topical cream use thin layer on back twice daily 45 g 1    CINNAMON BARK (CINNAMON PO) Take 350 mg by mouth daily.  coenzyme q10-vitamin e 100-100 mg-unit cap Take 200 mg by mouth daily.  multivitamin (ONE A DAY) tablet Take 1 Tab by mouth daily. Includes vit D 1,000 units      fish oil-dha-epa (FISH OIL) 1,200-144-216 mg Cap Take 1 Tab by mouth. Takes ~ twice a week      liraglutide (VICTOZA) 0.6 mg/0.1 mL (18 mg/3 mL) pnij 1.2 mg by SubCUTAneous route daily.  Replaces trulicity 6 mL 3    flash glucose scanning reader (FREESTYLE ANOOP 14 DAY READER) misc Use for close monitoring sugar, uncontrolled DM(E11.9,  Z79.4) Type 2 diabetes mellitus with insulin therapy 10 Each 12    flash glucose sensor (FREESTYLE ANOOP 14 DAY SENSOR) kit Use for close monitoring sugar, uncontrolled DM(E11.9,  Z79.4) Type 2 diabetes mellitus with insulin therapy 10 Kit 12     Allergies   Allergen Reactions    Avelox [Moxifloxacin] Rash and Itching    Bactrim [Sulfamethoxazole-Trimethoprim] Rash    Doxycycline Other (comments)     Tongue discomfort, swollen glands    Lisinopril Diarrhea    Norvasc [Amlodipine] Other (comments)     edema    Penicillin G Hives, Shortness of Breath and Swelling     Past Medical History:   Diagnosis Date    Anemia NEC     AR (allergic rhinitis)     Asthma     mild    Benign essential hypertension 2016    Diabetes mellitus type 2, noninsulin dependent ([facility] 12/3/2015    Diabetes mellitus, type 2 ([facility] 2013    FHx: breast cancer 3/2/2012    GERD (gastroesophageal reflux disease) 11/00    H/O Mild, Intermittent Asthma     H/O Optic disc edema, [id number] 3/2/2012    History of benign left ovarian cyst 3/2/2012    Hx of complete eye exam 2017    Dr Brianna Pineda intraoular pressure and no evidence of diabetic retinopathy    Hyperlipidemia     Hypertension     Hypothyroidism     Hypothyroidism 2015    2002    Impaired fasting glucose 3/31/2012    Mixed hyperlipidemia 2016    Morbid obesity with BMI of 45.0-49.9, adult (HCC)      (normal spontaneous vaginal delivery)         Ovarian cyst     Left, benign    Postmenopause     LMP  (HRT x 6months)    Rheumatoid arthritis(714.0)     S/P colonoscopy     Normal (10yrs)    Skin cancer screening exams, Derm Dr Taylor Atkinson 3/2/2012    Vitamin D deficiency 3/2/2012     Past Surgical History:   Procedure Laterality Date    COLONOSCOPY  2005    normal, ok x 10 yrs, Dr Fernando Cabrera      Normal    HX BREAST LUMPECTOMY      Left, benign (Dr. Delaney Govea)    HX CERVICAL POLYPECTOMY  14, Dr Golden Montanez    Benign    HX COLONOSCOPY  2014    Dr Tor Vaca polyps x 3; repeat     HX COLONOSCOPY  2015    poor prep, repeat in one year    HX COLONOSCOPY  2016    normal, follow up 10 yrs   Manoj Boyle 10    childbirth    HX HEENT  2010    tumor on vocal cord removed    HX HERNIA REPAIR  16, Dr Evan Berry    robotic assisted umbilical hernia repair with mesh     HX LIPOMA RESECTION  ~    removed from left thigh    HX ORTHOPAEDIC  1985    tumor remved left leg    HX OTHER SURGICAL  11/5/15, Dr Evert Hernandez    2 masses removed left lower leg    HX REFRACTIVE SURGERY Bilateral 2019    MISAEL MAMMOGRAM DIGITAL BILATERAL  annually per Gyn    at [address] LUC[address]      1-15% Stenosis Bilaterally     Family History   Problem Relation Age of Onset   [facility] Breast Cancer Mother         diagnosed age 72, survived it    [facility] Arthritis-rheumatoid Mother     Stroke Mother     Parkinsonism Mother          in nursing from colon perf or GI bleed age 80    Hypertension Mother    [facility] Migraines Mother     Breast Cancer Maternal Grandmother          in her late 42's    Dementia Father     Heart Disease Father         CAD, PTCA    Stroke Father     Cancer Brother 68        soft tissue cancer    Cancer Brother 68        kidney and lungs    Hypertension Sister     Dementia Sister     Breast Cancer Maternal Aunt     High Cholesterol Son     Cancer Other         maternal first cousin w/ sarcoma of leg    Anesth Problems Neg Hx      Social History     Tobacco Use    Smoking status: Never Smoker    Smokeless tobacco: Never Used   Substance Use Topics    Alcohol use: No     Alcohol/week: 0.0 standard drinks     Comment: very rare, 1-2 drinks a year      Social History     Patient does not qualify to have social determinant information on file (likely too young). Social History Narrative    Works as         [employer] Systems  GI: denies hematochezia or acid reflux  Card: denies chest pain  Pulm: denies shortness of breath other than being congested with the head cold      Objective:     Vitals:    20 1133 20 1217   BP: 165/85 142/88   Pulse: 67    Resp: 17    Temp: 97.7 °F (36.5 °C)    TempSrc: Oral    SpO2: 97%    Weight: 270 lb 9.6 oz (122.7 kg)    Height: 5' 5\" (1.651 m)      Body mass index is 45.03 kg/m².      General: stated age,obese, and in NAD  Ears: TMs clear, little bit of bubbles bilaterally. No wax  Eyes: PERRL, EOMI, no redness or drainage  Nose: no drainage  Mouth: no lesions  Throat: no erythema, exudate or swelling  Neck: supple, symmetrical, trachea midline, no adenopathy and thyroid: not enlarged, symmetric, no tenderness/mass/nodules  Lungs:  clear to auscultation w/o rales, rhonchi, wheezes w/normal effort and no use of accessory muscles of respiration   Heart: regular rate and rhythm, S1, S2 normal, no murmur, click, rub or gallop  Abdomen: soft, nontender, no masses  Ext:  No edema noted. Lymph: no cervical adenopathy appreciated  Skin:  Normal. and no rash or abnormalities   Neuro: normal gait, CN 2-12 intact  Psych: alert and oriented to person, place, time and situation and Speech: appropriate quality, quantity and organization of sentences    Assessment/Plan:       ICD-10-CM ICD-9-CM    1. Encounter for preventive care Z00.00 V70.0 CULTURE, URINE      URINALYSIS W/ RFLX MICROSCOPIC      CBC WITH AUTOMATED DIFF      METABOLIC PANEL, COMPREHENSIVE   2. Benign essential hypertension I10 401.1    3. Type 2 diabetes mellitus with proliferative retinopathy of both eyes, without long-term current use of insulin, macular edema presence unspecified, unspecified proliferative retinopathy type ([facility] E11.3593 250.50      362.02    4. Mixed hyperlipidemia E78.2 272.2    5. Morbid obesity with BMI of 45.0-49.9, adult ([facility]) E66.01 278.01     Z68.42 V85.42    6. Type 2 diabetes mellitus with insulin therapy ([facility]) E11.9 250.00     Z79.4 V58.67    7. Urinary frequency R35.0 788.41 CULTURE, URINE      URINALYSIS W/ RFLX MICROSCOPIC   8. Altered taste R43.2 781.1 CBC WITH AUTOMATED DIFF      METABOLIC PANEL, COMPREHENSIVE   9.  URI with cough and congestion J06.9 465.9         Orders Placed This Encounter    CULTURE, URINE    URINALYSIS W/ RFLX MICROSCOPIC    CBC WITH AUTOMATED DIFF    METABOLIC PANEL, COMPREHENSIVE    insulin glargine (LANTUS,BASAGLAR) 100 unit/mL (3 mL) inpn    varicella-zoster recombinant, PF, (SHINGRIX, PF,) 50 mcg/0.5 mL susr injection       Preventive mostly up to date  Will schedule mammo  Getting back into exercise, will do elliptical and mild weight lifting at gym  Not sure why sugars skyrocketed but had not been taking insulin consistently  Labs as above, I worry she could have infection  However, doing better back on insulin  Advised to continue that and either victoza or trulicity, victoza preferred, she agreed to do that  Encouraged her to update me on sugars on the portal  Not sure why taste off; started before she used the zycam  Maybe dry mouth from high sugars? Encouraged her to keep me posted  She has also had a lot of colds with taking care of her 1year old granddaughter who goes to   Told her that is to be expected    Discussed the diagnosis and plan and she expressed understanding. Follow-up and Dispositions    · Return in about 4 weeks (around 2/11/2020).          Geraldine Bo MD

## 2020-01-13 NOTE — PATIENT INSTRUCTIONS
To schedule the mammogram, please call:  [phone] [facility] scheduling)           Learning About Diabetes Food Guidelines  Your Care Instructions    Meal planning is important to manage diabetes. It helps keep your blood sugar at a target level (which you set with your doctor). You don't have to eat special foods. You can eat what your family eats, including sweets once in a while. But you do have to pay attention to how often you eat and how much you eat of certain foods. You may want to work with a dietitian or a certified diabetes educator (CDE) to help you plan meals and snacks. A dietitian or CDE can also help you lose weight if that is one of your goals. What should you know about eating carbs? Managing the amount of carbohydrate (carbs) you eat is an important part of healthy meals when you have diabetes. Carbohydrate is found in many foods. · Learn which foods have carbs. And learn the amounts of carbs in different foods. ? Bread, cereal, pasta, and rice have about 15 grams of carbs in a serving. A serving is 1 slice of bread (1 ounce), ½ cup of cooked cereal, or 1/3 cup of cooked pasta or rice. ? Fruits have 15 grams of carbs in a serving. A serving is 1 small fresh fruit, such as an apple or orange; ½ of a banana; ½ cup of cooked or canned fruit; ½ cup of fruit juice; 1 cup of melon or raspberries; or 2 tablespoons of dried fruit. ? Milk and no-sugar-added yogurt have 15 grams of carbs in a serving. A serving is 1 cup of milk or 2/3 cup of no-sugar-added yogurt. ? Starchy vegetables have 15 grams of carbs in a serving. A serving is ½ cup of mashed potatoes or sweet potato; 1 cup winter squash; ½ of a small baked potato; ½ cup of cooked beans; or ½ cup cooked corn or green peas. · Learn how much carbs to eat each day and at each meal. A dietitian or CDE can teach you how to keep track of the amount of carbs you eat. This is called carbohydrate counting.   · If you are not sure how to count carbohydrate grams, use the Plate Method to plan meals. It is a good, quick way to make sure that you have a balanced meal. It also helps you spread carbs throughout the day. ? Divide your plate by types of foods. Put non-starchy vegetables on half the plate, meat or other protein food on one-quarter of the plate, and a grain or starchy vegetable in the final quarter of the plate. To this you can add a small piece of fruit and 1 cup of milk or yogurt, depending on how many carbs you are supposed to eat at a meal.  · Try to eat about the same amount of carbs at each meal. Do not \"save up\" your daily allowance of carbs to eat at one meal.  · Proteins have very little or no carbs per serving. Examples of proteins are beef, chicken, turkey, fish, eggs, tofu, cheese, cottage cheese, and peanut butter. A serving size of meat is 3 ounces, which is about the size of a deck of cards. Examples of meat substitute serving sizes (equal to 1 ounce of meat) are 1/4 cup of cottage cheese, 1 egg, 1 tablespoon of peanut butter, and ½ cup of tofu. How can you eat out and still eat healthy? · Learn to estimate the serving sizes of foods that have carbohydrate. If you measure food at home, it will be easier to estimate the amount in a serving of restaurant food. · If the meal you order has too much carbohydrate (such as potatoes, corn, or baked beans), ask to have a low-carbohydrate food instead. Ask for a salad or green vegetables. · If you use insulin, check your blood sugar before and after eating out to help you plan how much to eat in the future. · If you eat more carbohydrate at a meal than you had planned, take a walk or do other exercise. This will help lower your blood sugar. What else should you know? · Limit saturated fat, such as the fat from meat and dairy products. This is a healthy choice because people who have diabetes are at higher risk of heart disease.  So choose lean cuts of meat and nonfat or low-fat dairy products. Use olive or canola oil instead of butter or shortening when cooking. · Don't skip meals. Your blood sugar may drop too low if you skip meals and take insulin or certain medicines for diabetes. · Check with your doctor before you drink alcohol. Alcohol can cause your blood sugar to drop too low. Alcohol can also cause a bad reaction if you take certain diabetes medicines. Follow-up care is a key part of your treatment and safety. Be sure to make and go to all appointments, and call your doctor if you are having problems. It's also a good idea to know your test results and keep a list of the medicines you take. Where can you learn more? Go to http://luanne-gail.info/. Enter Q654 in the search box to learn more about \"Learning About Diabetes Food Guidelines. \"  Current as of: April 16, 2019  Content Version: 12.2  © 0047-3171 [employer]. Care instructions adapted under license by [employer] (which disclaims liability or warranty for this information). If you have questions about a medical condition or this instruction, always ask your healthcare professional. [place] [address] any warranty or liability for your use of this information. Well Visit, Women 48 to 72: Care Instructions  Your Care Instructions    Physical exams can help you stay healthy. Your doctor has checked your overall health and may have suggested ways to take good care of yourself. He or she also may have recommended tests. At home, you can help prevent illness with healthy eating, regular exercise, and other steps. Follow-up care is a key part of your treatment and safety. Be sure to make and go to all appointments, and call your doctor if you are having problems. It's also a good idea to know your test results and keep a list of the medicines you take. How can you care for yourself at home? · Reach and stay at a healthy weight.  This will lower your risk for many problems, such as obesity, diabetes, heart disease, and high blood pressure. · Get at least 30 minutes of exercise on most days of the week. Walking is a good choice. You also may want to do other activities, such as running, swimming, cycling, or playing tennis or team sports. · Do not smoke. Smoking can make health problems worse. If you need help quitting, talk to your doctor about stop-smoking programs and medicines. These can increase your chances of quitting for good. · Protect your skin from too much sun. When you're outdoors from 10 a.m. to 4 p.m., stay in the shade or cover up with clothing and a hat with a wide brim. Wear sunglasses that block UV rays. Even when it's cloudy, put broad-spectrum sunscreen (SPF 30 or higher) on any exposed skin. · See a dentist one or two times a year for checkups and to have your teeth cleaned. · Wear a seat belt in the car. Follow your doctor's advice about when to have certain tests. These tests can spot problems early. · Cholesterol. Your doctor will tell you how often to have this done based on your age, family history, or other things that can increase your risk for heart attack and stroke. · Blood pressure. Have your blood pressure checked during a routine doctor visit. Your doctor will tell you how often to check your blood pressure based on your age, your blood pressure results, and other factors. · Mammogram. Ask your doctor how often you should have a mammogram, which is an X-ray of your breasts. A mammogram can spot breast cancer before it can be felt and when it is easiest to treat. · Pap test and pelvic exam. Ask your doctor how often you should have a Pap test. You may not need to have a Pap test as often as you used to. · Vision. Have your eyes checked every year or two or as often as your doctor suggests. Some experts recommend that you have yearly exams for glaucoma and other age-related eye problems starting at age 48. · Hearing.  Tell your doctor if you notice any change in your hearing. You can have tests to find out how well you hear. · Diabetes. Ask your doctor whether you should have tests for diabetes. · Colorectal cancer. Your risk for colorectal cancer gets higher as you get older. Some experts say that adults should start regular screening at age 48 and stop at age 76. Others say to start before age 48 or continue after age 76. Talk with your doctor about your risk and when to start and stop screening. · Thyroid disease. Talk to your doctor about whether to have your thyroid checked as part of a regular physical exam. Women have an increased chance of a thyroid problem. · Osteoporosis. You should begin tests for bone density at age 72. If you are younger than 72, ask your doctor whether you have factors that may increase your risk for this disease. You may want to have this test before age 72. · Heart attack and stroke risk. At least every 4 to 6 years, you should have your risk for heart attack and stroke assessed. Your doctor uses factors such as your age, blood pressure, cholesterol, and whether you smoke or have diabetes to show what your risk for a heart attack or stroke is over the next 10 years. When should you call for help? Watch closely for changes in your health, and be sure to contact your doctor if you have any problems or symptoms that concern you. Where can you learn more? Go to http://luanne-gail.info/. Enter F854 in the search box to learn more about \"Well Visit, Women 50 to 72: Care Instructions. \"  Current as of: December 13, 2018  Content Version: 12.2  © 8843-4182 [employer]. Care instructions adapted under license by [employer] (which disclaims liability or warranty for this information).  If you have questions about a medical condition or this instruction, always ask your healthcare professional. [place] [address] any warranty or liability for your use of this information.

## 2020-01-14 ENCOUNTER — OFFICE VISIT (OUTPATIENT)
Dept: FAMILY MEDICINE CLINIC | Age: 65
End: 2020-01-14

## 2020-01-14 VITALS
HEIGHT: 65 IN | OXYGEN SATURATION: 97 % | SYSTOLIC BLOOD PRESSURE: 142 MMHG | RESPIRATION RATE: 17 BRPM | DIASTOLIC BLOOD PRESSURE: 88 MMHG | HEART RATE: 67 BPM | TEMPERATURE: 97.7 F | WEIGHT: 270.6 LBS | BODY MASS INDEX: 45.08 KG/M2

## 2020-01-14 DIAGNOSIS — R43.2 ALTERED TASTE: ICD-10-CM

## 2020-01-14 DIAGNOSIS — E78.2 MIXED HYPERLIPIDEMIA: ICD-10-CM

## 2020-01-14 DIAGNOSIS — Z79.4 TYPE 2 DIABETES MELLITUS WITH INSULIN THERAPY (HCC): ICD-10-CM

## 2020-01-14 DIAGNOSIS — I10 BENIGN ESSENTIAL HYPERTENSION: ICD-10-CM

## 2020-01-14 DIAGNOSIS — J06.9 URI WITH COUGH AND CONGESTION: ICD-10-CM

## 2020-01-14 DIAGNOSIS — R35.0 URINARY FREQUENCY: ICD-10-CM

## 2020-01-14 DIAGNOSIS — E11.3593 TYPE 2 DIABETES MELLITUS WITH PROLIFERATIVE RETINOPATHY OF BOTH EYES, WITHOUT LONG-TERM CURRENT USE OF INSULIN, MACULAR EDEMA PRESENCE UNSPECIFIED, UNSPECIFIED PROLIFERATIVE RETINOPATHY TYPE (HCC): ICD-10-CM

## 2020-01-14 DIAGNOSIS — Z00.00 ENCOUNTER FOR PREVENTIVE CARE: Primary | ICD-10-CM

## 2020-01-14 DIAGNOSIS — E11.9 TYPE 2 DIABETES MELLITUS WITH INSULIN THERAPY (HCC): ICD-10-CM

## 2020-01-14 DIAGNOSIS — E66.01 MORBID OBESITY WITH BMI OF 45.0-49.9, ADULT (HCC): Chronic | ICD-10-CM

## 2020-01-14 RX ORDER — INSULIN GLARGINE 100 [IU]/ML
12 INJECTION, SOLUTION SUBCUTANEOUS DAILY
Qty: 15 ML | Refills: 3 | COMMUNITY
Start: 2020-01-14 | End: 2020-05-01 | Stop reason: SDUPTHER

## 2020-01-14 NOTE — PROGRESS NOTES
Chief Complaint   Patient presents with    Complete Physical     fasting        1. Have you been to the ER, urgent care clinic since your last visit? Hospitalized since your last visit? No    2. Have you seen or consulted any other health care providers outside of the [address] [place] [address] since your last visit? Include any pap smears or colon screening.  No Symptoms sound more viral in nature, likely not due to medication side effect. Fine to stop Buspar for now, and then would try restarting once symptoms resolve, but anticipate that she shouldn't have these issues once she restarts it.     Alva Viera MD  Internal Medicine-Pediatrics

## 2020-01-16 LAB
ALBUMIN SERPL-MCNC: 4.6 G/DL (ref 3.6–4.8)
ALBUMIN/GLOB SERPL: 1.8 {RATIO} (ref 1.2–2.2)
ALP SERPL-CCNC: 111 IU/L (ref 39–117)
ALT SERPL-CCNC: 30 IU/L (ref 0–32)
APPEARANCE UR: CLEAR
AST SERPL-CCNC: 30 IU/L (ref 0–40)
BACTERIA #/AREA URNS HPF: ABNORMAL /[HPF]
BACTERIA UR CULT: NORMAL
BASOPHILS # BLD AUTO: 0.1 X10E3/UL (ref 0–0.2)
BASOPHILS NFR BLD AUTO: 1 %
BILIRUB SERPL-MCNC: 0.5 MG/DL (ref 0–1.2)
BILIRUB UR QL STRIP: NEGATIVE
BUN SERPL-MCNC: 11 MG/DL (ref 8–27)
BUN/CREAT SERPL: 15 (ref 12–28)
CALCIUM SERPL-MCNC: 10 MG/DL (ref 8.7–10.3)
CASTS URNS QL MICRO: ABNORMAL /LPF
CHLORIDE SERPL-SCNC: 104 MMOL/L (ref 96–106)
CO2 SERPL-SCNC: 23 MMOL/L (ref 20–29)
COLOR UR: YELLOW
CREAT SERPL-MCNC: 0.71 MG/DL (ref 0.57–1)
EOSINOPHIL # BLD AUTO: 0.2 X10E3/UL (ref 0–0.4)
EOSINOPHIL NFR BLD AUTO: 2 %
EPI CELLS #/AREA URNS HPF: ABNORMAL /HPF (ref 0–10)
ERYTHROCYTE [DISTWIDTH] IN BLOOD BY AUTOMATED COUNT: 13.4 % (ref 11.7–15.4)
GLOBULIN SER CALC-MCNC: 2.6 G/DL (ref 1.5–4.5)
GLUCOSE SERPL-MCNC: 95 MG/DL (ref 65–99)
GLUCOSE UR QL: NEGATIVE
HCT VFR BLD AUTO: 40.3 % (ref 34–46.6)
HGB BLD-MCNC: 13.2 G/DL (ref 11.1–15.9)
HGB UR QL STRIP: NEGATIVE
IMM GRANULOCYTES # BLD AUTO: 0 X10E3/UL (ref 0–0.1)
IMM GRANULOCYTES NFR BLD AUTO: 0 %
KETONES UR QL STRIP: NEGATIVE
LEUKOCYTE ESTERASE UR QL STRIP: ABNORMAL
LYMPHOCYTES # BLD AUTO: 2 X10E3/UL (ref 0.7–3.1)
LYMPHOCYTES NFR BLD AUTO: 27 %
MCH RBC QN AUTO: 29.2 PG (ref 26.6–33)
MCHC RBC AUTO-ENTMCNC: 32.8 G/DL (ref 31.5–35.7)
MCV RBC AUTO: 89 FL (ref 79–97)
MICRO URNS: ABNORMAL
MONOCYTES # BLD AUTO: 0.5 X10E3/UL (ref 0.1–0.9)
MONOCYTES NFR BLD AUTO: 7 %
MUCOUS THREADS URNS QL MICRO: PRESENT
NEUTROPHILS # BLD AUTO: 4.6 X10E3/UL (ref 1.4–7)
NEUTROPHILS NFR BLD AUTO: 63 %
NITRITE UR QL STRIP: NEGATIVE
PH UR STRIP: 5.5 [PH] (ref 5–7.5)
PLATELET # BLD AUTO: 145 X10E3/UL (ref 150–450)
POTASSIUM SERPL-SCNC: 4 MMOL/L (ref 3.5–5.2)
PROT SERPL-MCNC: 7.2 G/DL (ref 6–8.5)
PROT UR QL STRIP: ABNORMAL
RBC # BLD AUTO: 4.52 X10E6/UL (ref 3.77–5.28)
RBC #/AREA URNS HPF: ABNORMAL /HPF (ref 0–2)
SODIUM SERPL-SCNC: 143 MMOL/L (ref 134–144)
SP GR UR: 1.02 (ref 1–1.03)
UROBILINOGEN UR STRIP-MCNC: 0.2 MG/DL (ref 0.2–1)
WBC # BLD AUTO: 7.2 X10E3/UL (ref 3.4–10.8)
WBC #/AREA URNS HPF: >30 /HPF (ref 0–5)

## 2020-02-03 NOTE — PROGRESS NOTES
[facility]  [place]. [place], [address]  [phone]             Date of visit: 2/4/2020   Subjective:      History obtained from:  the patient. Anupam Lang is a 59 y.o. female who presents today for f/u chronic  Was exposed all weekend to granddaughter Kj Jurado, 1years old) she was keeping  She was dx Flu A on Sunday (2 days ago)  Wonders if anything to do    When I saw her a few weeks ago sugars were running high, in the 400s  Had gotten back on trulicity   D5L today 8.6  Has been checking sugar, although not every day  In the mornings 110-156, usually on the lower side  Still has some dizziness at times even when sugar is not high  Thinks maybe sinus.  Not really dizzy, more of an \"off balance\" she says, like when she goes to turn feels a little off balance  Doesn't feel like she will fall    Likes the fit for life program for weight loss and trying to get back on that  Is fruit in AM and lunch, snack, and dinner, no eating after 8pm    Still on Trulicity, will switch to Victoza when it runs out    Usually can manage sinuses well with neti pot but not using it consistently recently    Metal taste in mouth for about a month; did finally go away  But some things still now don't taste good to her, andreia meat    Eating really low carb, almost none, does pretty well with that  Really avoids sweets (took her 5 years to really get off them but not having cravings now)  Goal this year is to cut back bread even more    Exercise had been until 2 mo ago, feeling too bad, but going to get started again with that soon    Blood pressure 130/50 range at home consistently    Has swelling in ankles chronically with spider veins but never too bad  The bp med seems to help it  Would not want support hose  Tries to elevate them    Patient Active Problem List    Diagnosis Date Noted    Chronic sinusitis 02/04/2020    History of rheumatoid arthritis 10/11/2019    Type 2 diabetes mellitus with proliferative retinopathy ([facility] 2019    Type 2 diabetes mellitus with insulin therapy ([facility] 2018    Spider veins of both lower extremities 2018    Varicose vein of leg 2018    Morbid obesity with BMI of 45.0-49.9, adult ([facility] 10/07/2017    Mixed hyperlipidemia 2016    Benign essential hypertension     Umbilical hernia without obstruction and without gangrene 2016    Hypothyroidism 2015    Actinic keratosis 2014    Abdominal wall bulge 2013    Vitamin D deficiency 2012    FHx: breast cancer 2012    History of benign left ovarian cyst, 2000 2012    Skin cancer screening exams, Derm Dr Sheri Villatoro 2012    H/O Optic disc edema, [id number]    AR (allergic rhinitis)     GERD (gastroesophageal reflux disease)     H/O Mild, Intermittent Asthma     Postmenopause, LMP , age 47 yo, s/p HRT x 6 months     Ovarian cyst      (normal spontaneous vaginal delivery)     Metabolic syndrome      Current Outpatient Medications   Medication Sig Dispense Refill    oseltamivir (TAMIFLU) 75 mg capsule Take 1 Cap by mouth daily for 10 days. 10 Cap 0    insulin glargine (LANTUS,BASAGLAR) 100 unit/mL (3 mL) inpn 12 Units by SubCUTAneous route daily. 15 mL 3    APPLE CIDER VINEGAR PO Take  by mouth.  naproxen (NAPROSYN) 500 mg tablet TAKE 1 TABLET TWICE A DAY AS NEEDED 180 Tab 0    losartan-hydroCHLOROthiazide (HYZAAR) 100-12.5 mg per tablet Take 1 Tab by mouth daily.  90 Tab 3    Insulin Needles, Disposable, 30 gauge x 1/3\" Use daily with lantus and victoza 3 Package 3    UNITHROID 75 mcg tablet TAKE 1 TABLET DAILY BEFORE BREAKFAST 90 Tab 4    simvastatin (ZOCOR) 20 mg tablet TAKE 1 TABLET NIGHTLY 90 Tab 4    metoprolol succinate (TOPROL-XL) 50 mg XL tablet TAKE 1 TABLET DAILY FOR HYPERTENSION 90 Tab 4    albuterol sulfate (PROAIR RESPICLICK) 90 mcg/actuation aepb Take 2 Puffs by inhalation every four (4) hours as needed (sob/wheeze). 1 Inhaler 1    Blood-Glucose Meter monitoring kit Test up to 3x daily for type 2 diabetes uncontrolled on insulin, (E11.9,  Z79.4) Type 2 diabetes mellitus with insulin therapy 1 Kit 0    glucose blood VI test strips (BLOOD GLUCOSE TEST) strip Test up to 3x daily for type 2 diabetes uncontrolled on insulin, (E11.9,  Z79.4) Type 2 diabetes mellitus with insulin therapy 300 Strip 3    Lancets misc Test up to 3x daily for type 2 diabetes uncontrolled on insulin, (E11.9,  Z79.4) Type 2 diabetes mellitus with insulin therapy 300 Each 3    triamcinolone acetonide (KENALOG) 0.1 % topical cream use thin layer on back twice daily 45 g 1    CINNAMON BARK (CINNAMON PO) Take 350 mg by mouth daily.  coenzyme q10-vitamin e 100-100 mg-unit cap Take 200 mg by mouth daily.  multivitamin (ONE A DAY) tablet Take 1 Tab by mouth daily. Includes vit D 1,000 units      fish oil-dha-epa (FISH OIL) 1,200-144-216 mg Cap Take 1 Tab by mouth. Takes ~ twice a week      liraglutide (VICTOZA) 0.6 mg/0.1 mL (18 mg/3 mL) pnij 1.2 mg by SubCUTAneous route daily.  Replaces trulicity 6 mL 3    flash glucose scanning reader (FREESTYLE ANOOP 14 DAY READER) [facility] Use for close monitoring sugar, uncontrolled DM(E11.9,  Z79.4) Type 2 diabetes mellitus with insulin therapy 10 Each 12    flash glucose sensor (FREESTYLE ANOOP 14 DAY SENSOR) kit Use for close monitoring sugar, uncontrolled DM(E11.9,  Z79.4) Type 2 diabetes mellitus with insulin therapy 10 Kit 12     Allergies   Allergen Reactions    Avelox [Moxifloxacin] Rash and Itching    Bactrim [Sulfamethoxazole-Trimethoprim] Rash    Doxycycline Other (comments)     Tongue discomfort, swollen glands    Lisinopril Diarrhea    Norvasc [Amlodipine] Other (comments)     edema    Penicillin G Hives, Shortness of Breath and Swelling     Past Medical History:   Diagnosis Date    Anemia NEC     AR (allergic rhinitis)     Asthma mild    Benign essential hypertension 2016    Diabetes mellitus type 2, noninsulin dependent ([facility] 12/3/2015    Diabetes mellitus, type 2 ([facility] 2013    FHx: breast cancer 3/2/2012    GERD (gastroesophageal reflux disease) 11/00    H/O Mild, Intermittent Asthma     H/O Optic disc edema, [id number] 3/2/2012    History of benign left ovarian cyst 3/2/2012    Hx of complete eye exam 2017    Dr Carlos rBantley intraoular pressure and no evidence of diabetic retinopathy    Hyperlipidemia 2002    Hypertension     Hypothyroidism     Hypothyroidism 2015    2002    Impaired fasting glucose 3/31/2012    Mixed hyperlipidemia 2016    Morbid obesity with BMI of 45.0-49.9, adult (HCC)      (normal spontaneous vaginal delivery)         Ovarian cyst     Left, benign    Postmenopause     LMP  (HRT x 6months)    Rheumatoid arthritis(714.0)     S/P colonoscopy     Normal (10yrs)    Skin cancer screening exams, Derm Dr Andres Patiño 3/2/2012    Vitamin D deficiency 3/2/2012     Past Surgical History:   Procedure Laterality Date    COLONOSCOPY  2005    normal, ok x 10 yrs, Dr Roberto Romero      Normal    HX BREAST LUMPECTOMY      Left, benign (Dr. Earl Niño)    HX CERVICAL POLYPECTOMY  14, Dr Abel Sheridan    Benign    HX COLONOSCOPY  2014    Dr Hira Moreau polyps x 3; repeat     HX COLONOSCOPY  2015    poor prep, repeat in one year    HX COLONOSCOPY  2016    normal, follow up 8 yrs   Manoj Boyle 10    childbirth    HX HEENT  2010    tumor on vocal cord removed    HX HERNIA REPAIR  16, Dr Julieta Hoang    robotic assisted umbilical hernia repair with mesh     HX LIPOMA RESECTION  ~    removed from left thigh    HX ORTHOPAEDIC      tumor remved left leg    HX OTHER SURGICAL  11/5/15, Dr Neymar Ballard    2 masses removed left lower leg    HX REFRACTIVE SURGERY Bilateral 2019    MISAEL MAMMOGRAM DIGITAL BILATERAL  annually per Gyn    at [address] [facility] Dr. LUCERO      1-15% Stenosis Bilaterally     Family History   Problem Relation Age of Onset   [facility] Breast Cancer Mother         diagnosed age 72, survived it    [facility] Arthritis-rheumatoid Mother    [facility] Stroke Mother     Parkinsonism Mother          in nursing from colon perf or GI bleed age 80    Hypertension Mother    [facility] Migraines Mother     Breast Cancer Maternal Grandmother          in her late 42's    Dementia Father     Heart Disease Father         CAD, PTCA    Stroke Father     Cancer Brother 68        soft tissue cancer    Cancer Brother 68        kidney and lungs    Hypertension Sister     Dementia Sister     Breast Cancer Maternal Aunt     High Cholesterol Son     Cancer Other         maternal first cousin w/ sarcoma of leg    Anesth Problems Neg Hx      Social History     Tobacco Use    Smoking status: Never Smoker    Smokeless tobacco: Never Used   Substance Use Topics    Alcohol use: No     Alcohol/week: 0.0 standard drinks     Comment: very rare, 1-2 drinks a year      Social History     Patient does not qualify to have social determinant information on file (likely too young). Social History Narrative    Works as         Review of Systems  Gen: denies fever  GI denies abd pain     Objective:     Vitals:    20 0901   BP: 135/66   Pulse: 63   Resp: 17   Temp: 98 °F (36.7 °C)   TempSrc: Oral   SpO2: 96%   Weight: 273 lb (123.8 kg)   Height: 5' 5\" (1.651 m)     Body mass index is 45.43 kg/m².      General: stated age, obese and in NAD  Neck: supple, symmetrical, trachea midline, no adenopathy and thyroid: not enlarged, symmetric, no tenderness/mass/nodules  Lungs:  clear to auscultation w/o rales, rhonchi, wheezes w/normal effort and no use of accessory muscles of respiration   Heart: regular rate and rhythm, S1, S2 normal, no murmur, click, rub or gallop  Abdomen: soft, nontender, no masses  Ext: Trace bilateral ankle edema noted with spider veins and erythema  Lymph: no cervical adenopathy appreciated  Skin:  Normal. and no rash or abnormalities   Psych: alert and oriented to person, place, time and situation and Speech: appropriate quality, quantity and organization of sentence  Assessment/Plan:       ICD-10-CM ICD-9-CM    1. Type 2 diabetes mellitus with insulin therapy (HCC) E11.9 250.00 AMB POC HEMOGLOBIN A1C    Z79.4 V58.67    2. Morbid obesity with BMI of 45.0-49.9, adult ([facility] E66.01 278.01     Z68.42 V85.42    3. Benign essential hypertension I10 401.1    4. Altered taste R43.2 781.1    5. Chronic sinusitis, unspecified location J32.9 473.9    6. Exposure to the flu Z20.828 V01.79         Orders Placed This Encounter    AMB POC HEMOGLOBIN A1C    DISCONTD: oseltamivir (TAMIFLU) 75 mg capsule    oseltamivir (TAMIFLU) 75 mg capsule     Sugars much improved from December  a1c higher but not terrible considering she was in the 400s in December  No changes to meds, although she will change from Trulicity ot Victoza when Trulicity runs out  I am hoping that will help her weight more  Getting back into exercise, this was encouraged  She really is going to work on the weight loss this year, and I think she will be successful  Other chronic problems stable  Giving tamiflu for prophylaxis as she was exposed closely with granddaughter 2-3 days ago  The altered taste is better but not perfect  Advised to use the neti pot regularly as has chronic sinus symptoms    Discussed the diagnosis and plan and she expressed understanding. Follow-up and Dispositions    · Return in about 3 months (around 5/4/2020) for Follow up.          Arleen Cervantes MD

## 2020-02-03 NOTE — PATIENT INSTRUCTIONS
To schedule the mammogram, please call: 
[phone] [facility] scheduling) Goal sugars: less than 130 fasting and less than 200 all the time (even after eating) Learning About Diabetes Food Guidelines Your Care Instructions Meal planning is important to manage diabetes. It helps keep your blood sugar at a target level (which you set with your doctor). You don't have to eat special foods. You can eat what your family eats, including sweets once in a while. But you do have to pay attention to how often you eat and how much you eat of certain foods. You may want to work with a dietitian or a certified diabetes educator (CDE) to help you plan meals and snacks. A dietitian or CDE can also help you lose weight if that is one of your goals. What should you know about eating carbs? Managing the amount of carbohydrate (carbs) you eat is an important part of healthy meals when you have diabetes. Carbohydrate is found in many foods. · Learn which foods have carbs. And learn the amounts of carbs in different foods. ? Bread, cereal, pasta, and rice have about 15 grams of carbs in a serving. A serving is 1 slice of bread (1 ounce), ½ cup of cooked cereal, or 1/3 cup of cooked pasta or rice. ? Fruits have 15 grams of carbs in a serving. A serving is 1 small fresh fruit, such as an apple or orange; ½ of a banana; ½ cup of cooked or canned fruit; ½ cup of fruit juice; 1 cup of melon or raspberries; or 2 tablespoons of dried fruit. ? Milk and no-sugar-added yogurt have 15 grams of carbs in a serving. A serving is 1 cup of milk or 2/3 cup of no-sugar-added yogurt. ? Starchy vegetables have 15 grams of carbs in a serving. A serving is ½ cup of mashed potatoes or sweet potato; 1 cup winter squash; ½ of a small baked potato; ½ cup of cooked beans; or ½ cup cooked corn or green peas.  
· Learn how much carbs to eat each day and at each meal. A dietitian or CDE can teach you how to keep track of the amount of carbs you eat. This is called carbohydrate counting. · If you are not sure how to count carbohydrate grams, use the Plate Method to plan meals. It is a good, quick way to make sure that you have a balanced meal. It also helps you spread carbs throughout the day. ? Divide your plate by types of foods. Put non-starchy vegetables on half the plate, meat or other protein food on one-quarter of the plate, and a grain or starchy vegetable in the final quarter of the plate. To this you can add a small piece of fruit and 1 cup of milk or yogurt, depending on how many carbs you are supposed to eat at a meal. 
· Try to eat about the same amount of carbs at each meal. Do not \"save up\" your daily allowance of carbs to eat at one meal. 
· Proteins have very little or no carbs per serving. Examples of proteins are beef, chicken, turkey, fish, eggs, tofu, cheese, cottage cheese, and peanut butter. A serving size of meat is 3 ounces, which is about the size of a deck of cards. Examples of meat substitute serving sizes (equal to 1 ounce of meat) are 1/4 cup of cottage cheese, 1 egg, 1 tablespoon of peanut butter, and ½ cup of tofu. How can you eat out and still eat healthy? · Learn to estimate the serving sizes of foods that have carbohydrate. If you measure food at home, it will be easier to estimate the amount in a serving of restaurant food. · If the meal you order has too much carbohydrate (such as potatoes, corn, or baked beans), ask to have a low-carbohydrate food instead. Ask for a salad or green vegetables. · If you use insulin, check your blood sugar before and after eating out to help you plan how much to eat in the future. · If you eat more carbohydrate at a meal than you had planned, take a walk or do other exercise. This will help lower your blood sugar. What else should you know? · Limit saturated fat, such as the fat from meat and dairy products.  This is a healthy choice because people who have diabetes are at higher risk of heart disease. So choose lean cuts of meat and nonfat or low-fat dairy products. Use olive or canola oil instead of butter or shortening when cooking. · Don't skip meals. Your blood sugar may drop too low if you skip meals and take insulin or certain medicines for diabetes. · Check with your doctor before you drink alcohol. Alcohol can cause your blood sugar to drop too low. Alcohol can also cause a bad reaction if you take certain diabetes medicines. Follow-up care is a key part of your treatment and safety. Be sure to make and go to all appointments, and call your doctor if you are having problems. It's also a good idea to know your test results and keep a list of the medicines you take. Where can you learn more? Go to http://luanne-gail.info/. Enter T442 in the search box to learn more about \"Learning About Diabetes Food Guidelines. \" Current as of: April 16, 2019 Content Version: 12.2 © 9996-0524 [employer]. Care instructions adapted under license by [employer] (which disclaims liability or warranty for this information). If you have questions about a medical condition or this instruction, always ask your healthcare professional. [address] any warranty or liability for your use of this information.

## 2020-02-04 ENCOUNTER — OFFICE VISIT (OUTPATIENT)
Dept: FAMILY MEDICINE CLINIC | Age: 65
End: 2020-02-04

## 2020-02-04 VITALS
WEIGHT: 273 LBS | BODY MASS INDEX: 45.48 KG/M2 | OXYGEN SATURATION: 96 % | TEMPERATURE: 98 F | HEIGHT: 65 IN | SYSTOLIC BLOOD PRESSURE: 135 MMHG | HEART RATE: 63 BPM | RESPIRATION RATE: 17 BRPM | DIASTOLIC BLOOD PRESSURE: 66 MMHG

## 2020-02-04 DIAGNOSIS — Z79.4 TYPE 2 DIABETES MELLITUS WITH INSULIN THERAPY (HCC): Primary | ICD-10-CM

## 2020-02-04 DIAGNOSIS — J32.9 CHRONIC SINUSITIS, UNSPECIFIED LOCATION: ICD-10-CM

## 2020-02-04 DIAGNOSIS — R43.2 ALTERED TASTE: ICD-10-CM

## 2020-02-04 DIAGNOSIS — Z20.828 EXPOSURE TO THE FLU: ICD-10-CM

## 2020-02-04 DIAGNOSIS — E11.9 TYPE 2 DIABETES MELLITUS WITH INSULIN THERAPY (HCC): Primary | ICD-10-CM

## 2020-02-04 DIAGNOSIS — E66.01 MORBID OBESITY WITH BMI OF 45.0-49.9, ADULT (HCC): Chronic | ICD-10-CM

## 2020-02-04 DIAGNOSIS — I10 BENIGN ESSENTIAL HYPERTENSION: ICD-10-CM

## 2020-02-04 LAB — HBA1C MFR BLD HPLC: 8.6 %

## 2020-02-04 RX ORDER — OSELTAMIVIR PHOSPHATE 75 MG/1
75 CAPSULE ORAL DAILY
Qty: 10 CAP | Refills: 0 | Status: SHIPPED | OUTPATIENT
Start: 2020-02-04 | End: 2020-02-04 | Stop reason: SDUPTHER

## 2020-02-04 RX ORDER — OSELTAMIVIR PHOSPHATE 75 MG/1
75 CAPSULE ORAL DAILY
Qty: 10 CAP | Refills: 0 | Status: SHIPPED | OUTPATIENT
Start: 2020-02-04 | End: 2020-02-14

## 2020-02-04 NOTE — LETTER
NOTIFICATION RETURN TO WORK / SCHOOL 
 
2/4/2020 9:53 AM 
 
Ms. Emmett Resendiz [phone] To Whom It May Concern: 
 
Emmett Resendiz is currently under the care of JAK Sepulveda. She will return to work/school on: 2/4/2020 she has been exposed to the flu, but I told her ok to be in the office if not having symptoms. She could consider working from home to minimize spread. If there are questions or concerns please have the patient contact our office. Sincerely, Nelson Bernstein MD

## 2020-02-04 NOTE — PROGRESS NOTES
Chief Complaint   Patient presents with    Diabetes     follow up      1. Have you been to the ER, urgent care clinic since your last visit? Hospitalized since your last visit? No    2. Have you seen or consulted any other health care providers outside of the [address] [place] [address] since your last visit? Include any pap smears or colon screening.  No

## 2020-02-21 ENCOUNTER — HOSPITAL ENCOUNTER (OUTPATIENT)
Dept: MAMMOGRAPHY | Age: 65
Discharge: HOME | End: 2020-02-21
Payer: COMMERCIAL

## 2020-02-21 DIAGNOSIS — Z12.31 VISIT FOR SCREENING MAMMOGRAM: ICD-10-CM

## 2020-02-21 PROCEDURE — 77067 SCR MAMMO BI INCL CAD: CPT

## 2020-04-30 RX ORDER — FAMOTIDINE 40 MG/1
40 TABLET, FILM COATED ORAL 2 TIMES DAILY
Qty: 180 TAB | Refills: 0 | Status: SHIPPED | OUTPATIENT
Start: 2020-04-30 | End: 2020-05-01 | Stop reason: SDUPTHER

## 2020-04-30 NOTE — PROGRESS NOTES
Chief Complaint   Patient presents with    Diabetes     follow up    Cholesterol Problem    Hypertension       Reviewed Record in preparation for visit and have obtained necessary documentation. Identified pt with two pt identifiers (Name @ )    Health Maintenance Due   Topic    MICROALBUMIN Q1     BREAST CANCER SCRN MAMMOGRAM     EYE EXAM RETINAL OR DILATED Q1          1. Have you been to the ER, urgent care clinic since your last visit? Hospitalized since your last visit? No    2. Have you seen or consulted any other health care providers outside of the [address] [place] [address] since your last visit? Include any pap smears or colon screening.  No Last read by Pearl Gibson at 3:52 PM on 4/29/2020.

## 2020-05-01 DIAGNOSIS — Z79.4 TYPE 2 DIABETES MELLITUS WITH INSULIN THERAPY (HCC): ICD-10-CM

## 2020-05-01 DIAGNOSIS — E11.9 TYPE 2 DIABETES MELLITUS WITH INSULIN THERAPY (HCC): ICD-10-CM

## 2020-05-01 RX ORDER — FAMOTIDINE 40 MG/1
40 TABLET, FILM COATED ORAL 2 TIMES DAILY
Qty: 180 TAB | Refills: 1 | Status: SHIPPED | OUTPATIENT
Start: 2020-05-01 | End: 2021-06-15 | Stop reason: SDUPTHER

## 2020-05-01 RX ORDER — LEVOTHYROXINE SODIUM 75 UG/1
TABLET ORAL
Qty: 90 TAB | Refills: 1 | Status: SHIPPED | OUTPATIENT
Start: 2020-05-01 | End: 2021-06-15 | Stop reason: SDUPTHER

## 2020-05-01 RX ORDER — FLASH GLUCOSE SENSOR
KIT MISCELLANEOUS
Qty: 10 KIT | Refills: 12 | Status: SHIPPED | OUTPATIENT
Start: 2020-05-01 | End: 2021-09-10 | Stop reason: SDUPTHER

## 2020-05-01 RX ORDER — SIMVASTATIN 20 MG/1
TABLET, FILM COATED ORAL
Qty: 90 TAB | Refills: 3 | Status: SHIPPED | OUTPATIENT
Start: 2020-05-01 | End: 2021-06-15 | Stop reason: SDUPTHER

## 2020-05-01 RX ORDER — METOPROLOL SUCCINATE 50 MG/1
TABLET, EXTENDED RELEASE ORAL
Qty: 90 TAB | Refills: 3 | Status: SHIPPED | OUTPATIENT
Start: 2020-05-01 | End: 2021-06-15 | Stop reason: SDUPTHER

## 2020-05-01 RX ORDER — NAPROXEN 500 MG/1
TABLET ORAL
Qty: 180 TAB | Refills: 0 | Status: SHIPPED | OUTPATIENT
Start: 2020-05-01 | End: 2021-06-15 | Stop reason: SDUPTHER

## 2020-05-01 RX ORDER — LOSARTAN POTASSIUM AND HYDROCHLOROTHIAZIDE 12.5; 1 MG/1; MG/1
1 TABLET ORAL DAILY
Qty: 90 TAB | Refills: 1 | Status: SHIPPED | OUTPATIENT
Start: 2020-05-01 | End: 2021-04-23 | Stop reason: SDUPTHER

## 2020-05-01 RX ORDER — LANCETS
EACH MISCELLANEOUS
Qty: 300 EACH | Refills: 3 | Status: SHIPPED | OUTPATIENT
Start: 2020-05-01 | End: 2021-09-14 | Stop reason: SDUPTHER

## 2020-05-01 RX ORDER — INSULIN GLARGINE 100 [IU]/ML
12 INJECTION, SOLUTION SUBCUTANEOUS DAILY
Qty: 15 ML | Refills: 3 | Status: SHIPPED | OUTPATIENT
Start: 2020-05-01 | End: 2020-05-05

## 2020-05-05 ENCOUNTER — VIRTUAL VISIT (OUTPATIENT)
Dept: FAMILY MEDICINE CLINIC | Age: 65
End: 2020-05-05

## 2020-05-05 VITALS — BODY MASS INDEX: 45.37 KG/M2 | WEIGHT: 272.3 LBS | HEIGHT: 65 IN

## 2020-05-05 DIAGNOSIS — E11.9 TYPE 2 DIABETES MELLITUS WITH INSULIN THERAPY (HCC): Primary | ICD-10-CM

## 2020-05-05 DIAGNOSIS — L84 FOOT CALLUS: ICD-10-CM

## 2020-05-05 DIAGNOSIS — R10.33 PERIUMBILICAL ABDOMINAL PAIN: ICD-10-CM

## 2020-05-05 DIAGNOSIS — I10 BENIGN ESSENTIAL HYPERTENSION: ICD-10-CM

## 2020-05-05 DIAGNOSIS — K21.9 GASTROESOPHAGEAL REFLUX DISEASE, ESOPHAGITIS PRESENCE NOT SPECIFIED: ICD-10-CM

## 2020-05-05 DIAGNOSIS — M79.672 LEFT FOOT PAIN: ICD-10-CM

## 2020-05-05 DIAGNOSIS — G89.29 CHRONIC PAIN OF LEFT KNEE: ICD-10-CM

## 2020-05-05 DIAGNOSIS — Z79.4 TYPE 2 DIABETES MELLITUS WITH INSULIN THERAPY (HCC): Primary | ICD-10-CM

## 2020-05-05 DIAGNOSIS — M25.562 CHRONIC PAIN OF LEFT KNEE: ICD-10-CM

## 2020-05-05 DIAGNOSIS — E66.01 MORBID OBESITY WITH BMI OF 45.0-49.9, ADULT (HCC): ICD-10-CM

## 2020-05-05 NOTE — PROGRESS NOTES
[facility]  [address] [place], [address]  [phone]             Date of visit: 5/5/20   Subjective:      History obtained from:  the patient. Jefferson Krause is a 59 y.o. female who presents today for f/u chronic    This service was provided through telehealth (doxy. me real-time video/audio) due to COVID-19 pandemic, with the patient being at home and the provider being at [facility] [place], [place]. Others assisting in the telehealth encounter included none. 30 minutes were spent with the patient by the provider. she  and/or her healthcare decision maker is aware that this patient-initiated Telehealth encounter is a billable service, with coverage as determined by her insurance carrier. she  is aware that she  may receive a bill and has provided verbal consent to proceed: Yes, per PSR. 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   Likes the fit for life program (mainly veggies and meats, low carb) for weight loss and trying to get back on that  Had lost some weight and gained it back    Sugars had run high in the winter but recently better  In the mornings it varies  Today 118, yesterday 143  Yesterday at lunch 132, by evening 143  Hasn't seen it more than 160 in the past few weeks  Sugars no worse at home than they were when she was at the office    Finally got the Victoza yesterday, still finishing her last Trulicity this Sunday. Added some vitamins, quite a few, said she would send me a list of those on the portal    Still on Trulicity, will switch to Victoza when it runs out    Due for colonoscopy but not sure she wants to get it done right now (goes to facility at [facility], [place], operated by [facility]). Dr. Sue Fields she will wait a couple of months    Also wanting to see podiatry about callous on left foot but nervous to go in due to covid. That doctor is at [facility]. Wants a couple of names, looking for a place it small office. Using naproxen every other day for elbow pain she thinks aggravated by doing gardening  Left knee also hurting at times, she thinks with putting weight back on   Feels like it comes out of its joint at times, very painful, might give out.   Has done it on and off for years    Had her mammogram  Needs pap so we discussed doing that at her next visit    Blood pressure usually good, will send me today's reading    Patient Active Problem List    Diagnosis Date Noted    Chronic sinusitis 02/04/2020    History of rheumatoid arthritis 10/11/2019    Type 2 diabetes mellitus with proliferative retinopathy ([address] 06/25/2019    Type 2 diabetes mellitus with insulin therapy ([address] 09/22/2018    Spider veins of both lower extremities 05/17/2018    Varicose vein of leg 05/17/2018    Morbid obesity with BMI of 45.0-49.9, adult ([address] 10/07/2017    Mixed hyperlipidemia 05/31/2016    Benign essential hypertension 85/18/8039    Umbilical hernia without obstruction and without gangrene 04/22/2016    Hypothyroidism 07/30/2015    Actinic keratosis 11/20/2014    Abdominal wall bulge 03/25/2013    Vitamin D deficiency 03/02/2012    FHx: breast cancer 03/02/2012    History of benign left ovarian cyst, 2000 03/02/2012    Skin cancer screening exams, Derm Dr Kwasi Mackenzie 2012    H/O Optic disc edema, 6508-4159 2012    AR (allergic rhinitis)     GERD (gastroesophageal reflux disease)     H/O Mild, Intermittent Asthma     Postmenopause, LMP 2005, age 49 yo, s/p HRT x 6 months     Ovarian cyst      (normal spontaneous vaginal delivery)     Metabolic syndrome 3350     Current Outpatient Medications   Medication Sig Dispense Refill    famotidine (PEPCID) 40 mg tablet Take 1 Tab by mouth two (2) times a day. 180 Tab 1    naproxen (NAPROSYN) 500 mg tablet TAKE 1 TABLET TWICE A DAY AS NEEDED 180 Tab 0    losartan-hydroCHLOROthiazide (HYZAAR) 100-12.5 mg per tablet Take 1 Tab by mouth daily. 90 Tab 1    liraglutide (VICTOZA) 0.6 mg/0.1 mL (18 mg/3 mL) pnij 1.2 mg by SubCUTAneous route daily. 18 mL 1    Unithroid 75 mcg tablet TAKE 1 TABLET DAILY BEFORE BREAKFAST 90 Tab 1    simvastatin (ZOCOR) 20 mg tablet TAKE 1 TABLET NIGHTLY 90 Tab 3    metoprolol succinate (TOPROL-XL) 50 mg XL tablet TAKE 1 TABLET DAILY FOR HYPERTENSION 90 Tab 3    flash glucose sensor (FreeStyle Teresa 14 Day Sensor) kit Use for close monitoring sugar, uncontrolled DM(E11.9,  Z79.4) Type 2 diabetes mellitus with insulin therapy 10 Kit 12    Insulin Needles, Disposable, 30 gauge x 1/3\" Use daily with lantus and victoza 3 Package 3    glucose blood VI test strips (blood glucose test) strip Test up to 3x daily for type 2 diabetes uncontrolled on insulin, (E11.9,  Z79.4) Type 2 diabetes mellitus with insulin therapy 300 Strip 3    lancets misc Test up to 3x daily for type 2 diabetes uncontrolled on insulin, (E11.9,  Z79.4) Type 2 diabetes mellitus with insulin therapy 300 Each 3    APPLE CIDER VINEGAR PO Take  by mouth.       flash glucose scanning reader (FREESTYLE TERESA 14 DAY READER) misc Use for close monitoring sugar, uncontrolled DM(E11.9,  Z79.4) Type 2 diabetes mellitus with insulin therapy 10 Each 12    albuterol sulfate (PROAIR RESPICLICK) 90 mcg/actuation aepb Take 2 Puffs by inhalation every four (4) hours as needed (sob/wheeze). 1 Inhaler 1    Blood-Glucose Meter monitoring kit Test up to 3x daily for type 2 diabetes uncontrolled on insulin, (E11.9,  Z79.4) Type 2 diabetes mellitus with insulin therapy 1 Kit 0    triamcinolone acetonide (KENALOG) 0.1 % topical cream use thin layer on back twice daily 45 g 1    CINNAMON BARK (CINNAMON PO) Take 350 mg by mouth daily.  coenzyme q10-vitamin e 100-100 mg-unit cap Take 200 mg by mouth daily.  multivitamin (ONE A DAY) tablet Take 1 Tab by mouth daily. Includes vit D 1,000 units      fish oil-dha-epa (FISH OIL) 1,200-144-216 mg Cap Take 1 Tab by mouth.  Takes ~ twice a week       Allergies   Allergen Reactions    Avelox [Moxifloxacin] Rash and Itching    Bactrim [Sulfamethoxazole-Trimethoprim] Rash    Doxycycline Other (comments)     Tongue discomfort, swollen glands    Lisinopril Diarrhea    Norvasc [Amlodipine] Other (comments)     edema    Penicillin G Hives, Shortness of Breath and Swelling     Past Medical History:   Diagnosis Date    Anemia NEC     AR (allergic rhinitis)     Asthma     mild    Benign essential hypertension 2016    Diabetes mellitus type 2, noninsulin dependent ([facility] 12/3/2015    Diabetes mellitus, type 2 ([facility] 2013    FHx: breast cancer 3/2/2012    GERD (gastroesophageal reflux disease) 11/00    H/O Mild, Intermittent Asthma     H/O Optic disc edema, 5495-9700 3/2/2012    History of benign left ovarian cyst 3/2/2012    Hx of complete eye exam 2017    Dr Jez Hussein intraoular pressure and no evidence of diabetic retinopathy    Hyperlipidemia 2002    Hypertension     Hypothyroidism     Hypothyroidism 2015    2002    Impaired fasting glucose 3/31/2012    Menopause     Mixed hyperlipidemia 2016    Morbid obesity with BMI of 45.0-49.9, adult ([facility]      (normal spontaneous vaginal delivery)         Ovarian cyst     Left, benign    Postmenopause     LMP  (HRT x 6months)    Rheumatoid arthritis(714.0)     S/P colonoscopy     Normal (10yrs)    Skin cancer screening exams, Derm Dr Laughlin Horse 3/2/2012    Vitamin D deficiency 3/2/2012     Past Surgical History:   Procedure Laterality Date    COLONOSCOPY  2005    normal, ok x 10 yrs, Dr Amara Levi      Normal    HX BREAST BIOPSY Left     Benign Aspiration    HX BREAST LUMPECTOMY      Left, benign (Dr. 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 LUCERO      1-15% Stenosis Bilaterally     Family History   Problem Relation Age of Onset   [facility] Breast Cancer Mother         diagnosed age 72, survived it    [facility] Arthritis-rheumatoid Mother     Stroke Mother     Parkinsonism Mother          in nursing from colon perf or GI bleed age 80    Hypertension Mother     Migraines Mother     Breast Cancer Maternal Grandmother          in her late 42's    Dementia Father     Heart Disease Father         CAD, PTCA    Stroke Father     Cancer Brother 68        soft tissue cancer    Cancer Brother 68        kidney and lungs    Hypertension Sister     Dementia Sister     Breast Cancer Maternal Aunt     High Cholesterol Son     Cancer Other         maternal first cousin w/ sarcoma of leg    Breast Cancer Maternal Aunt     Ovarian Cancer Maternal Aunt     Anesth Problems Neg Hx      Social History     Tobacco Use    Smoking status: Never Smoker    Smokeless tobacco: Never Used   Substance Use Topics    Alcohol use: No     Alcohol/week: 0.0 standard drinks     Comment: very rare, 1-2 drinks a year      Social History     Social History Narrative    Works as         Review of Systems  GI: denies hematochezia or difficulty swallowing  Gen: denies fever       Objective:     Vitals:    05/05/20 0811   Weight: 272 lb 4.8 oz (123.5 kg)   Height: 5' 5\" (1.651 m)     Body mass index is 45.31 kg/m². General: stated age, well developed, well nourished and in NAD  Skin:  No lesions noted on video  Psych: alert and oriented to person, place, time and situation and Speech: appropriate quality, quantity and organization of sentences    Assessment/Plan:       ICD-10-CM ICD-9-CM    1. Type 2 diabetes mellitus with insulin therapy ([facility]) E11.9 250.00 HEMOGLOBIN A1C WITH EAG    Z79.4 V58.67 CANCELED: METABOLIC PANEL, BASIC   2. Benign essential hypertension I10 401.1    3. Morbid obesity with BMI of 45.0-49.9, adult ([facility]) E66.01 278.01     Z68.42 V85.42    4. Gastroesophageal reflux disease, esophagitis presence not specified K21.9 530.81    5. Left foot pain M79.672 729.5    6. Foot callus L84 700    7. Chronic pain of left knee M25.562 719.46     G89.29 338.29    8. Periumbilical abdominal pain R10.33 789.05 CBC WITH AUTOMATED DIFF      METABOLIC PANEL, COMPREHENSIVE        Orders Placed This Encounter    HEMOGLOBIN A1C WITH EAG    CBC WITH AUTOMATED DIFF    METABOLIC PANEL, COMPREHENSIVE     Chronic problems mostly doing well  Advised pepcid for a few weeks.  If symptoms don't resolve should probably get EGD along with her colonoscopy in 1-2 months (she wants to wait a little longer due to pandemic)  wll get her labs done soon  She will see a podiatrist about the painful callous  Encouraged continued efforts at weight/exercise, weight loss    Discussed the diagnosis and plan and she expressed understanding. Follow-up and Dispositions    · Return in about 3 months (around 8/5/2020) for Follow up, pap smear.          Daisy Bacon MD

## 2020-05-05 NOTE — PATIENT INSTRUCTIONS

## 2020-09-28 ENCOUNTER — PATIENT MESSAGE (OUTPATIENT)
Dept: FAMILY MEDICINE CLINIC | Age: 65
End: 2020-09-28

## 2021-03-19 RX ORDER — NAPROXEN 500 MG/1
500 TABLET ORAL
Qty: 180 TAB | Refills: 0 | Status: CANCELLED | OUTPATIENT
Start: 2021-03-19

## 2021-03-23 ENCOUNTER — TRANSCRIBE ORDER (OUTPATIENT)
Dept: SCHEDULING | Age: 66
End: 2021-03-23

## 2021-03-23 DIAGNOSIS — Z12.31 VISIT FOR SCREENING MAMMOGRAM: Primary | ICD-10-CM

## 2021-03-23 RX ORDER — BLOOD-GLUCOSE METER
KIT MISCELLANEOUS
Qty: 300 STRIP | Refills: 3 | Status: SHIPPED | OUTPATIENT
Start: 2021-03-23 | End: 2021-09-13 | Stop reason: SDUPTHER

## 2021-03-23 NOTE — TELEPHONE ENCOUNTER
MD Howard,    Patient call (stated you are MD) requesting rx for freestyle lite test strips. Entered new rx as previous did not have brand name given. Patient states she is out! Thanks, Cedrick Luna     Last Visit: VV 5/5/20 MD Kaufman  Next Appointment: Not scheduled- appt due  Previous Refill Encounter(s): 5/1/20 300 + 1    Requested Prescriptions     Pending Prescriptions Disp Refills    glucose blood VI test strips (FreeStyle Lite Strips) strip 300 Strip 3     Sig: Use to test blood glucose up to three times daily.   Dx: E11.9, Z79.4

## 2021-04-16 ENCOUNTER — VIRTUAL VISIT (OUTPATIENT)
Dept: FAMILY MEDICINE CLINIC | Age: 66
End: 2021-04-16

## 2021-04-16 NOTE — PROGRESS NOTES
Doxy attempt failed for today's virutal visit. Asked nurse to contact patient that due to her URI symptoms, please direct patient to Urgent Care for further evaluation and management. Also asked her to advise pt that we saw her messages about changing PCP back to me since Dr. Trever Campos departure and that I will change her PCP back to me in system. Thanks.

## 2021-04-23 RX ORDER — LOSARTAN POTASSIUM AND HYDROCHLOROTHIAZIDE 12.5; 1 MG/1; MG/1
1 TABLET ORAL DAILY
Qty: 90 TAB | Refills: 0 | Status: SHIPPED | OUTPATIENT
Start: 2021-04-23 | End: 2021-06-15 | Stop reason: SDUPTHER

## 2021-06-15 DIAGNOSIS — E03.9 HYPOTHYROIDISM, ADULT: ICD-10-CM

## 2021-06-15 DIAGNOSIS — E78.2 MIXED HYPERLIPIDEMIA: Primary | ICD-10-CM

## 2021-06-15 RX ORDER — LOSARTAN POTASSIUM AND HYDROCHLOROTHIAZIDE 12.5; 1 MG/1; MG/1
1 TABLET ORAL DAILY
Qty: 90 TABLET | Refills: 0 | Status: SHIPPED | OUTPATIENT
Start: 2021-06-15 | End: 2021-08-31

## 2021-06-15 RX ORDER — LEVOTHYROXINE SODIUM 75 UG/1
TABLET ORAL
Qty: 90 TABLET | Refills: 0 | Status: SHIPPED | OUTPATIENT
Start: 2021-06-15 | End: 2021-08-31

## 2021-06-15 RX ORDER — SIMVASTATIN 20 MG/1
TABLET, FILM COATED ORAL
Qty: 90 TABLET | Refills: 0 | Status: SHIPPED | OUTPATIENT
Start: 2021-06-15 | End: 2021-08-31

## 2021-06-15 RX ORDER — FAMOTIDINE 40 MG/1
40 TABLET, FILM COATED ORAL 2 TIMES DAILY
Qty: 180 TABLET | Refills: 0 | Status: SHIPPED | OUTPATIENT
Start: 2021-06-15 | End: 2021-08-31

## 2021-06-15 RX ORDER — NAPROXEN 500 MG/1
TABLET ORAL
Qty: 180 TABLET | Refills: 0 | Status: SHIPPED | OUTPATIENT
Start: 2021-06-15 | End: 2021-11-29

## 2021-06-15 RX ORDER — METOPROLOL SUCCINATE 50 MG/1
50 TABLET, EXTENDED RELEASE ORAL DAILY
Qty: 90 TABLET | Refills: 0 | Status: SHIPPED | OUTPATIENT
Start: 2021-06-15 | End: 2021-08-31

## 2021-06-15 NOTE — TELEPHONE ENCOUNTER
MD Howard,    3 additional requests below to [facility].   Thanks, Nila    Last Visit: VV 5/5/20  MD Kaufman  Next Appointment: 7/1/21 MD Howard  Previous Refill Encounter(s):   Naproxen 5/1/20 180  Simvastatin 5/1/20 90 + 3  Unithroid 5/1/20 90 + 1    Requested Prescriptions     Pending Prescriptions Disp Refills    naproxen (NAPROSYN) 500 mg tablet 180 Tablet 0     Sig: TAKE 1 TABLET TWICE A DAY AS NEEDED    simvastatin (ZOCOR) 20 mg tablet 90 Tablet 0     Sig: TAKE 1 TABLET NIGHTLY    Unithroid 75 mcg tablet 90 Tablet 0     Sig: TAKE 1 TABLET DAILY BEFORE BREAKFAST

## 2021-06-15 NOTE — TELEPHONE ENCOUNTER
MD Howard,    Received call from [facility] requesting new rx's for patient.  (previous rx's sent to [facility]). 6 total requests- will send 3 and 3. Thanks, Nila    Last Visit: 5/5/20 MD Kaufman  Next Appointment: 7/1/21 MD Howard  Previous Refill Encounter(s):   Famotidine 5/1/20 180 + 1  Losartan/hctz 4/23/21 90  Metoprolol 5/1/20 90 + 3    Requested Prescriptions     Pending Prescriptions Disp Refills    famotidine (PEPCID) 40 mg tablet 180 Tablet 0     Sig: Take 1 Tablet by mouth two (2) times a day.  losartan-hydroCHLOROthiazide (HYZAAR) 100-12.5 mg per tablet 90 Tablet 0     Sig: Take 1 Tablet by mouth daily.  metoprolol succinate (TOPROL-XL) 50 mg XL tablet 90 Tablet 0     Sig: Take 1 Tablet by mouth daily.  FOR HYPERTENSION

## 2021-06-17 ENCOUNTER — TELEPHONE (OUTPATIENT)
Dept: FAMILY MEDICINE CLINIC | Age: 66
End: 2021-06-17

## 2021-06-17 NOTE — TELEPHONE ENCOUNTER
MD Rosi Valencia is sending clarification request for patient as allergy listed for Ace-inhibitor & Sulfa. Ace- Inhibitor is Lisinopril-diarrhea  Sulfa- rash      Rx for Losartan/hctz 100-25 mg sent 6/15/21 90    I can call them to ok the losartan/hctz as patient has been on for several years.       Fax Id# [phone]  Ref# [id number]    [phone] Option 2    Thanks, Angy Tavarez

## 2021-06-18 NOTE — TELEPHONE ENCOUNTER
MD Howard,    Contacted [facility], spoke with [facility] and advised ok to fill losartan/hctz.   Thanks, Mandy Perdomo

## 2021-07-07 ENCOUNTER — OFFICE VISIT (OUTPATIENT)
Dept: FAMILY MEDICINE CLINIC | Age: 66
End: 2021-07-07
Payer: MEDICARE

## 2021-07-07 VITALS
HEIGHT: 65 IN | BODY MASS INDEX: 44.39 KG/M2 | HEART RATE: 65 BPM | OXYGEN SATURATION: 98 % | TEMPERATURE: 97.8 F | DIASTOLIC BLOOD PRESSURE: 60 MMHG | RESPIRATION RATE: 18 BRPM | SYSTOLIC BLOOD PRESSURE: 138 MMHG | WEIGHT: 266.4 LBS

## 2021-07-07 DIAGNOSIS — E03.9 HYPOTHYROIDISM, ADULT: ICD-10-CM

## 2021-07-07 DIAGNOSIS — E78.2 MIXED HYPERLIPIDEMIA: ICD-10-CM

## 2021-07-07 DIAGNOSIS — I10 BENIGN ESSENTIAL HYPERTENSION: ICD-10-CM

## 2021-07-07 DIAGNOSIS — E11.9 DIABETES MELLITUS TYPE 2, NONINSULIN DEPENDENT (HCC): Primary | ICD-10-CM

## 2021-07-07 LAB
ALBUMIN SERPL-MCNC: 4.1 G/DL (ref 3.5–5)
ALBUMIN/GLOB SERPL: 1.2 {RATIO} (ref 1.1–2.2)
ALP SERPL-CCNC: 111 U/L (ref 45–117)
ALT SERPL-CCNC: 33 U/L (ref 12–78)
ANION GAP SERPL CALC-SCNC: 4 MMOL/L (ref 5–15)
AST SERPL-CCNC: 24 U/L (ref 15–37)
BASOPHILS # BLD: 0 K/UL (ref 0–0.1)
BASOPHILS NFR BLD: 1 % (ref 0–1)
BILIRUB SERPL-MCNC: 0.6 MG/DL (ref 0.2–1)
BUN SERPL-MCNC: 17 MG/DL (ref 6–20)
BUN/CREAT SERPL: 22 (ref 12–20)
CALCIUM SERPL-MCNC: 10.2 MG/DL (ref 8.5–10.1)
CHLORIDE SERPL-SCNC: 106 MMOL/L (ref 97–108)
CHOLEST SERPL-MCNC: 164 MG/DL
CO2 SERPL-SCNC: 29 MMOL/L (ref 21–32)
CREAT SERPL-MCNC: 0.78 MG/DL (ref 0.55–1.02)
CREAT UR-MCNC: 128 MG/DL
DIFFERENTIAL METHOD BLD: ABNORMAL
EOSINOPHIL # BLD: 0.1 K/UL (ref 0–0.4)
EOSINOPHIL NFR BLD: 2 % (ref 0–7)
ERYTHROCYTE [DISTWIDTH] IN BLOOD BY AUTOMATED COUNT: 13 % (ref 11.5–14.5)
EST. AVERAGE GLUCOSE BLD GHB EST-MCNC: 157 MG/DL
GLOBULIN SER CALC-MCNC: 3.3 G/DL (ref 2–4)
GLUCOSE SERPL-MCNC: 199 MG/DL (ref 65–100)
HBA1C MFR BLD: 7.1 % (ref 4–5.6)
HCT VFR BLD AUTO: 39.4 % (ref 35–47)
HDLC SERPL-MCNC: 44 MG/DL
HDLC SERPL: 3.7 {RATIO} (ref 0–5)
HGB BLD-MCNC: 12.7 G/DL (ref 11.5–16)
IMM GRANULOCYTES # BLD AUTO: 0 K/UL (ref 0–0.04)
IMM GRANULOCYTES NFR BLD AUTO: 0 % (ref 0–0.5)
LDLC SERPL CALC-MCNC: 78.8 MG/DL (ref 0–100)
LYMPHOCYTES # BLD: 1.8 K/UL (ref 0.8–3.5)
LYMPHOCYTES NFR BLD: 30 % (ref 12–49)
MCH RBC QN AUTO: 28.8 PG (ref 26–34)
MCHC RBC AUTO-ENTMCNC: 32.2 G/DL (ref 30–36.5)
MCV RBC AUTO: 89.3 FL (ref 80–99)
MICROALBUMIN UR-MCNC: 2.61 MG/DL
MICROALBUMIN/CREAT UR-RTO: 20 MG/G (ref 0–30)
MONOCYTES # BLD: 0.4 K/UL (ref 0–1)
MONOCYTES NFR BLD: 7 % (ref 5–13)
NEUTS SEG # BLD: 3.6 K/UL (ref 1.8–8)
NEUTS SEG NFR BLD: 60 % (ref 32–75)
NRBC # BLD: 0 K/UL (ref 0–0.01)
NRBC BLD-RTO: 0 PER 100 WBC
PLATELET # BLD AUTO: 144 K/UL (ref 150–400)
PMV BLD AUTO: 11.9 FL (ref 8.9–12.9)
POTASSIUM SERPL-SCNC: 4.1 MMOL/L (ref 3.5–5.1)
PROT SERPL-MCNC: 7.4 G/DL (ref 6.4–8.2)
RBC # BLD AUTO: 4.41 M/UL (ref 3.8–5.2)
SODIUM SERPL-SCNC: 139 MMOL/L (ref 136–145)
T4 FREE SERPL-MCNC: 1.1 NG/DL (ref 0.8–1.5)
TRIGL SERPL-MCNC: 206 MG/DL (ref ?–150)
TSH SERPL DL<=0.05 MIU/L-ACNC: 1.13 UIU/ML (ref 0.36–3.74)
VLDLC SERPL CALC-MCNC: 41.2 MG/DL
WBC # BLD AUTO: 6 K/UL (ref 3.6–11)

## 2021-07-07 PROCEDURE — 99214 OFFICE O/P EST MOD 30 MIN: CPT | Performed by: FAMILY MEDICINE

## 2021-07-07 PROCEDURE — 3051F HG A1C>EQUAL 7.0%<8.0%: CPT | Performed by: FAMILY MEDICINE

## 2021-07-07 PROCEDURE — G0463 HOSPITAL OUTPT CLINIC VISIT: HCPCS | Performed by: FAMILY MEDICINE

## 2021-07-07 PROCEDURE — 3017F COLORECTAL CA SCREEN DOC REV: CPT | Performed by: FAMILY MEDICINE

## 2021-07-07 PROCEDURE — 1090F PRES/ABSN URINE INCON ASSESS: CPT | Performed by: FAMILY MEDICINE

## 2021-07-07 PROCEDURE — G8510 SCR DEP NEG, NO PLAN REQD: HCPCS | Performed by: FAMILY MEDICINE

## 2021-07-07 PROCEDURE — G9899 SCRN MAM PERF RSLTS DOC: HCPCS | Performed by: FAMILY MEDICINE

## 2021-07-07 PROCEDURE — G8754 DIAS BP LESS 90: HCPCS | Performed by: FAMILY MEDICINE

## 2021-07-07 PROCEDURE — G8417 CALC BMI ABV UP PARAM F/U: HCPCS | Performed by: FAMILY MEDICINE

## 2021-07-07 PROCEDURE — G8536 NO DOC ELDER MAL SCRN: HCPCS | Performed by: FAMILY MEDICINE

## 2021-07-07 PROCEDURE — 1101F PT FALLS ASSESS-DOCD LE1/YR: CPT | Performed by: FAMILY MEDICINE

## 2021-07-07 PROCEDURE — G8752 SYS BP LESS 140: HCPCS | Performed by: FAMILY MEDICINE

## 2021-07-07 PROCEDURE — 2022F DILAT RTA XM EVC RTNOPTHY: CPT | Performed by: FAMILY MEDICINE

## 2021-07-07 PROCEDURE — G8427 DOCREV CUR MEDS BY ELIG CLIN: HCPCS | Performed by: FAMILY MEDICINE

## 2021-07-07 PROCEDURE — G8400 PT W/DXA NO RESULTS DOC: HCPCS | Performed by: FAMILY MEDICINE

## 2021-07-07 RX ORDER — ACETAMINOPHEN 160 MG/5ML
200 SUSPENSION, ORAL (FINAL DOSE FORM) ORAL DAILY
COMMUNITY

## 2021-07-07 RX ORDER — ACETAMINOPHEN 500 MG
2000 TABLET ORAL DAILY
COMMUNITY
End: 2022-06-09 | Stop reason: ALTCHOICE

## 2021-07-07 NOTE — PROGRESS NOTES
Chief Complaint   Patient presents with    Diabetes     Fasting follow up    Hypertension    Thyroid Problem    Cholesterol Problem       HISTORY OF PRESENT ILLNESS   HPI  Fasting Follow Up Type 2 DM (supposed to be on insulin but patient self dc'd therapy months ago), Hyperlipidemia, Hypertension, Hypothyroidism, Labs and Medication Check. Prior patient of mine several years ago and had been seeing Dr. Charo Whitman in the interim until her departure earlier this year. She has not been seen in the office since 2-2020, had a virtual visit w/ Dr. Charo Whitman 5-2020 during Covid pandemic and has not had fasting labs done in over a year. She admits she self dc'd her Lantus and Victoza months ago. She had been out of her BP medications since 3-2021 and just restarted those about a week ago. Her BP's are much better and her ankle swelling has gone away since being back on the HCTZ part of her regime. She checks her BS's daily, usually multiple times a day. Fasting in the AM runs 148-152.  30 minutes after eating runs 170-220. She doesn't feel well if her sugars get into the 90's and feels better when she runs around 120-140. She is on no glycemic meds. Feeling well at this time. She had been seeing a podiatrist for burning pains on the bottom of her left foot. She has not followed up in over a year. She has seen 2 podiatrist in the past both w/ different diagnoses (callus vs inflammation) and is now seeking a 3rd opinion since she has new insurance. Denies numbness, tingling, change in temperature or color. Diet: just tries to avoid sweets, otherwise nothing special right now  Exercise: walks 4-5 days a week x 25 minutes  Weight: since 8-2020 has been increasing her activity, walking, so her wt has come down from the 270's to 260's     REVIEW OF SYMPTOMS   Review of Systems   Constitutional: Negative. Eyes: Negative. Respiratory: Negative. Cardiovascular: Negative. Gastrointestinal: Negative. Genitourinary: Negative. Musculoskeletal: Negative for myalgias. Neurological: Negative. Negative for dizziness and tingling. Endo/Heme/Allergies: Negative.             PROBLEM LIST/MEDICAL HISTORY     Problem List  Date Reviewed: 7/7/2021        Codes Class Noted    Chronic sinusitis ICD-10-CM: J32.9  ICD-9-CM: 473.9  2/4/2020        History of rheumatoid arthritis ICD-10-CM: Z87.39  ICD-9-CM: V13.4  10/11/2019    Overview Signed 10/11/2019  3:11 PM by Thomas Toscano MD     In her 20s/30s             Type 2 diabetes mellitus with insulin therapy ([facility] [address] ICD-10-CM: E11.9, Z79.4  ICD-9-CM: 250.00, V58.67  9/22/2018        Spider veins of both lower extremities ICD-10-CM: I83.93  ICD-9-CM: 454.9  5/17/2018        Varicose vein of leg ICD-10-CM: I83.90  ICD-9-CM: 454.9  5/17/2018        Morbid obesity with BMI of 45.0-49.9, adult (HCC) (Chronic) ICD-10-CM: E66.01, Z68.42  ICD-9-CM: 278.01, V85.42  10/7/2017        Mixed hyperlipidemia ICD-10-CM: E78.2  ICD-9-CM: 272.2  5/31/2016        Benign essential hypertension ICD-10-CM: I10  ICD-9-CM: 401.1  0/16/0099        Umbilical hernia without obstruction and without gangrene ICD-10-CM: K42.9  ICD-9-CM: 553.1  4/22/2016        Hypothyroidism ICD-10-CM: E03.9  ICD-9-CM: 244.9  7/30/2015    Overview Signed 7/30/2015 11:40 AM by Deb Blanchard MD     2002             Actinic keratosis ICD-10-CM: L57.0  ICD-9-CM: 702.0  11/20/2014        Abdominal wall bulge ICD-10-CM: R19.00  ICD-9-CM: 789.30  3/25/2013        Vitamin D deficiency ICD-10-CM: E55.9  ICD-9-CM: 268.9  3/2/2012    Overview Addendum 3/2/2012  9:46 AM by Deb Blanchard MD     9/2011, s/p 1 month Rx Vit D             FHx: breast cancer ICD-10-CM: Z80.3  ICD-9-CM: V16.3  3/2/2012    Overview Signed 3/2/2012  9:43 AM by Deb Blanchard MD     Several maternal relatives             History of benign left ovarian cyst, 2000 ICD-10-CM: Z87.42  ICD-9-CM: V13.29  3/2/2012        Skin cancer screening exams, Derm Dr Lillie Valladares: Z12.83  ICD-9-CM: V76.43  3/2/2012        H/O Optic disc edema, 4371-2023 ICD-10-CM: H47.10  ICD-9-CM: 377.00  3/2/2012    Overview Signed 3/2/2012  1:14 PM by Roma Mcclelland MD     Optho Dr Nikkie Garza, MRI orbits             AR (allergic rhinitis) ICD-10-CM: J30.9  ICD-9-CM: 477.9  Unknown        GERD (gastroesophageal reflux disease) ICD-10-CM: K21.9  ICD-9-CM: 530.81  Unknown    Overview Signed 3/2/2012  1:12 PM by Roma Mcclelland MD                  H/O Mild, Intermittent Asthma ICD-10-CM: J45.909  ICD-9-CM: 493.90  Unknown    Overview Signed 3/2/2012  1:11 PM by Roma Mcclelland MD     ~3885, no hosp or ER             Postmenopause, LMP 2005, age 47 yo, s/p HRT x 6 months ICD-10-CM: Z78.0  ICD-9-CM: V49.81  Unknown    Overview Signed 3/15/2010  9:30 AM by Roma Mcclelland MD     LMP 12/ (HRT x 6months)             Ovarian cyst ICD-10-CM: N83.209  ICD-9-CM: 620.2  Unknown    Overview Signed 3/15/2010  9:30 AM by Roma Mcclelland MD     Left, benign              (normal spontaneous vaginal delivery) ICD-10-CM: O80  ICD-9-CM: 650  Unknown    Overview Signed 3/15/2010  9:30 AM by Roma Mcclelland MD                  Metabolic syndrome ESB-01-PA: E88.81  ICD-9-CM: 277.7  2010                  PAST SURGICAL HISTORY     Past Surgical History:   Procedure Laterality Date    DEXA BONE DENSITY STUDY AXIAL      Normal    HX BREAST BIOPSY Left     Benign Aspiration    HX BREAST LUMPECTOMY      Left, benign (Dr. Kerwin Turcios)    HX CERVICAL POLYPECTOMY  14, Dr Sanam Nicholson    Benign    HX COLONOSCOPY  2014    Dr Tha Tate polyps x 3; repeat     HX COLONOSCOPY  2015    poor prep, repeat in one year    HX COLONOSCOPY  2016    Normal, follow up 10 yrs    HX COLONOSCOPY  2005    Normal, ok x 10 yrs, Dr Gaudencio Segura    childbirth    HX HEENT  2010    tumor on vocal cord removed  HX HERNIA REPAIR  4/22/16, Dr Chung Rogers    robotic assisted umbilical hernia repair with mesh     HX LIPOMA RESECTION  ~2005    removed from left thigh    HX ORTHOPAEDIC  1985    tumor remved left leg    HX OTHER SURGICAL  11/5/15, Dr Mora Loyd    2 masses removed left lower leg    HX REFRACTIVE SURGERY Bilateral 08/28/2019    HX WISDOM TEETH EXTRACTION      MISAEL MAMMOGRAM DIGITAL BILATERAL  annually per Gyn    at [place] [address]    1-15% Stenosis Bilaterally         MEDICATIONS     Current Outpatient Medications   Medication Sig    coenzyme Q-10 (Co Q-10) 200 mg capsule Take 200 mg by mouth daily.  vitamin E acetate (VITAMIN E PO) Take  by mouth daily.  ascorbic acid (VITAMIN C PO) Take  by mouth daily.  cholecalciferol (VITAMIN D3) (2,000 UNITS /50 MCG) cap capsule Take 2,000 Units by mouth daily.  famotidine (PEPCID) 40 mg tablet Take 1 Tablet by mouth two (2) times a day. (Patient taking differently: Take 40 mg by mouth nightly.)    losartan-hydroCHLOROthiazide (HYZAAR) 100-12.5 mg per tablet Take 1 Tablet by mouth daily.  metoprolol succinate (TOPROL-XL) 50 mg XL tablet Take 1 Tablet by mouth daily. FOR HYPERTENSION    naproxen (NAPROSYN) 500 mg tablet TAKE 1 TABLET TWICE A DAY AS NEEDED    simvastatin (ZOCOR) 20 mg tablet TAKE 1 TABLET NIGHTLY    Unithroid 75 mcg tablet TAKE 1 TABLET DAILY BEFORE BREAKFAST    glucose blood VI test strips (FreeStyle Lite Strips) strip Use to test blood glucose up to three times daily.   Dx: E11.9, Z79.4    Insulin Needles, Disposable, 30 gauge x 1/3\" Use daily with lantus and victoza    glucose blood VI test strips (blood glucose test) strip Test up to 3x daily for type 2 diabetes uncontrolled on insulin, (E11.9,  Z79.4) Type 2 diabetes mellitus with insulin therapy    lancets misc Test up to 3x daily for type 2 diabetes uncontrolled on insulin, (E11.9,  Z79.4) Type 2 diabetes mellitus with insulin therapy    APPLE CIDER VINEGAR PO Take  by mouth daily.  albuterol sulfate (PROAIR RESPICLICK) 90 mcg/actuation aepb Take 2 Puffs by inhalation every four (4) hours as needed (sob/wheeze).  Blood-Glucose Meter monitoring kit Test up to 3x daily for type 2 diabetes uncontrolled on insulin, (E11.9,  Z79.4) Type 2 diabetes mellitus with insulin therapy    triamcinolone acetonide (KENALOG) 0.1 % topical cream use thin layer on back twice daily    CINNAMON BARK (CINNAMON PO) Take 350 mg by mouth daily.  multivitamin (ONE A DAY) tablet Take 1 Tab by mouth daily. Includes vit D 1,000 units    fish oil-dha-epa (FISH OIL) 1,200-144-216 mg Cap Take 1 Tab by mouth. Takes ~ twice a week    flash glucose sensor (FreeStyle Teresa 14 Day Sensor) kit Use for close monitoring sugar, uncontrolled DM(E11.9,  Z79.4) Type 2 diabetes mellitus with insulin therapy (Patient not taking: Reported on 7/7/2021)    flash glucose scanning reader (FREESTYLE TERESA 14 DAY READER) misc Use for close monitoring sugar, uncontrolled DM(E11.9,  Z79.4) Type 2 diabetes mellitus with insulin therapy (Patient not taking: Reported on 7/7/2021)     No current facility-administered medications for this visit.           ALLERGIES     Allergies   Allergen Reactions    Avelox [Moxifloxacin] Rash and Itching    Bactrim [Sulfamethoxazole-Trimethoprim] Rash    Doxycycline Other (comments)     Tongue discomfort, swollen glands    Lisinopril Diarrhea    Norvasc [Amlodipine] Other (comments)     edema    Penicillin G Hives, Shortness of Breath and Swelling          SOCIAL HISTORY     Social History     Tobacco Use    Smoking status: Never Smoker    Smokeless tobacco: Never Used   Substance Use Topics    Alcohol use: No     Alcohol/week: 0.0 standard drinks     Comment: very rare, 1-2 drinks a year     Social History     Social History Narrative    , 1 son    Worked as a  for [employer], Retired 6-2021    Keeps her 10 yo grand daughter since then Diet: just tries to avoid sweets, otherwise nothing special right now    Exercise: walks 4-5 days a week x 25 minutes    Weight: since  has been increasing her activity, walking, so her wt has come down from the 270's to 260's         Social History     Substance and Sexual Activity   Sexual Activity Yes    Partners: Male    Birth control/protection: None       IMMUNIZATIONS     Immunization History   Administered Date(s) Administered    COVID-19, PFIZER, MRNA, LNP-S, PF, 30MCG/0.3ML DOSE 2021, 2021    Hepatitis A Vaccine 2001    Influenza High Dose Vaccine PF 2020    Influenza Vaccine 10/22/2013, 2016    Influenza Vaccine (Quad) Mdck Pf (>4 Yrs Flucelvax QUAD [id number]) 10/11/2019    Influenza Vaccine ZeaKal) PF (>6 Mo Flulaval, Fluarix, and >3 Yrs Afluria, Fluzone [id number]) 2018    Influenza, High-dose, Quadrivalent (>65 Yrs Fluzone High Dose Quad 95525) 2020    Pneumococcal Polysaccharide (PPSV-23) 2016    TD Vaccine 2000    TDAP Vaccine 2011    Zoster Vaccine, Live 2017         FAMILY HISTORY     Family History   Problem Relation Age of Onset    Breast Cancer Mother         diagnosed age 72, survived it    Escobar Karolina Arthritis-rheumatoid Mother     Stroke Mother     Parkinsonism Mother          in nursing from colon perf or GI bleed age 80    Hypertension Mother    Escobar Turcios Migraines Mother     Breast Cancer Maternal Grandmother          in her late 42's    Dementia Father     Heart Disease Father         CAD, PTCA    Stroke Father     Cancer Brother 68        soft tissue cancer    Cancer Brother 68        kidney and lungs    Hypertension Sister     Dementia Sister     Heart Disease Sister     Breast Cancer Maternal Aunt     High Cholesterol Son     Cancer Other         maternal first cousin w/ sarcoma of leg    Breast Cancer Maternal Aunt     Ovarian Cancer Maternal Aunt     Anesth Problems Neg Hx          VITALS     Visit Vitals  /60 (BP 1 Location: Left upper arm, BP Patient Position: Sitting, BP Cuff Size: Large adult)   Pulse 65   Temp 97.8 °F (36.6 °C) (Temporal)   Resp 18   Ht 5' 5\" (1.651 m)   Wt 266 lb 6.4 oz (120.8 kg)   LMP 12/24/2007   SpO2 98%   BMI 44.33 kg/m²          PHYSICAL EXAMINATION   Physical Exam  Vitals reviewed. Constitutional:       General: She is not in acute distress. Eyes:      Conjunctiva/sclera: Conjunctivae normal.   Cardiovascular:      Rate and Rhythm: Normal rate and regular rhythm. Heart sounds: Normal heart sounds. Pulmonary:      Effort: Pulmonary effort is normal.      Breath sounds: Normal breath sounds. Abdominal:      Palpations: Abdomen is soft. Tenderness: There is no abdominal tenderness. Musculoskeletal:         General: No swelling or tenderness. Cervical back: Neck supple. Right lower leg: No edema. Left lower leg: No edema. Skin:     General: Skin is warm and dry. Neurological:      General: No focal deficit present. Mental Status: She is alert. Gait: Gait normal.             ASSESSMENT & PLAN   Diagnoses and all orders for this visit:    1. Diabetes mellitus type 2, noninsulin dependent (HCC)  -     CBC WITH AUTOMATED DIFF; Future  -     METABOLIC PANEL, COMPREHENSIVE; Future  -     LIPID PANEL; Future  -     HEMOGLOBIN A1C WITH EAG; Future  -     MICROALBUMIN, UR, RAND W/ MICROALB/CREAT RATIO; Future  -      DIABETES FOOT EXAM: New Podiatry information given  -      DIABETES EYE EXAM: Scheduled for 8-2021  Key Antihyperglycemic Medications     Patient is on no antihyperglycemic meds. Self dc'd Lantus and Victoza months ago        2. Mixed hyperlipidemia  -     LIPID PANEL; Future  Key CAD CHF Meds             simvastatin (ZOCOR) 20 mg tablet (Taking) TAKE 1 TABLET NIGHTLY    fish oil-dha-epa (FISH OIL) 1,200-144-216 mg Cap (Taking) Take 1 Tab by mouth. Takes ~ twice a week          3.  Hypothyroidism  -     TSH 3RD GENERATION; Future  -     T4, FREE; Future  Key Meds             Unithroid 75 mcg tablet (Taking) TAKE 1 TABLET DAILY BEFORE BREAKFAST            4. Benign essential hypertension  Key Meds             losartan-hydroCHLOROthiazide (HYZAAR) 100-12.5 mg per tablet (Taking) Take 1 Tablet by mouth daily. metoprolol succinate (TOPROL-XL) 50 mg XL tablet (Taking) Take 1 Tablet by mouth daily. FOR HYPERTENSION            Fasting labs checked today  Cardiovascular risk and specific lipid/LDL/A1c/BP goals reviewed  Reviewed medications and side effects in detail  Of note she states that for the past year she has been taking her thyroid medication at night at bedtime w/ all of her other medications. Advised of correct dosing/timing on empty stomach in the morning and spaced from meals and other meds x 1 hr  Specific diabetic recommendations: foot care discussed and Podiatry visits discussed and annual eye examinations at Ophthalmology discussed  Reviewed diet, nutrition, exercise, weight management, BMI/goals. Age/risk based screening recommendations, health maintenance & prevention counseling. Cancer screening USPTFS guidelines reviewed w/ pt today. Discussed benefits/positive/negative outcomes of screening based on age/risk stratification. Informed consent for/against screening based on pt's personal hx/risk factors. Updated in history above and health maintenance. Further follow up & other recommendations pending review of labs.  If all remains good and stable, follow up in 6 months, sooner prn

## 2021-07-07 NOTE — PROGRESS NOTES
Identified pt with two pt identifiers(name and ). Reviewed record in preparation for visit and have obtained necessary documentation. Chief Complaint   Patient presents with    Diabetes     Fasting Lab    Hypertension    Thyroid Problem    Cholesterol Problem        Vitals:    21 1040   BP: 138/60   Pulse: 65   Resp: 18   Temp: 97.8 °F (36.6 °C)   TempSrc: Temporal   SpO2: 98%   Weight: 266 lb 6.4 oz (120.8 kg)   Height: 5' 5\" (1.651 m)   PainSc:   8   PainLoc: Mouth   LMP: 2007       Health Maintenance Due   Topic    Shingrix Vaccine Age 50> (1 of 2)    Lipid Screen     Bone Densitometry (Dexa) Screening     Foot Exam Q1     MICROALBUMIN Q1     A1C test (Diabetic or Prediabetic)     Breast Cancer Screen Mammogram     COVID-19 Vaccine (2 - Pfizer 2-dose series)    Eye Exam Retinal or Dilated        Coordination of Care Questionnaire:  :   1) Have you been to an emergency room, urgent care, or hospitalized since your last visit? If yes, where when, and reason for visit? no       2. Have seen or consulted any other health care provider since your last visit? If yes, where when, and reason for visit? YES Dentist  Ulysses Tooth Removed      Patient is accompanied by self I have received verbal consent from [employer] to discuss any/all medical information while they are present in the room.

## 2021-07-11 ENCOUNTER — PATIENT MESSAGE (OUTPATIENT)
Dept: FAMILY MEDICINE CLINIC | Age: 66
End: 2021-07-11

## 2021-08-23 ENCOUNTER — TELEPHONE (OUTPATIENT)
Dept: FAMILY MEDICINE CLINIC | Age: 66
End: 2021-08-23

## 2021-08-23 NOTE — TELEPHONE ENCOUNTER
Pt is inquiring how soon can she get the flu shot after getting the Covid vaccine on 8/16/21.  Please contact the Pt.      Best contact number(s):  [phone]

## 2021-08-31 DIAGNOSIS — E78.2 MIXED HYPERLIPIDEMIA: ICD-10-CM

## 2021-08-31 DIAGNOSIS — E03.9 HYPOTHYROIDISM, ADULT: ICD-10-CM

## 2021-08-31 RX ORDER — LOSARTAN POTASSIUM AND HYDROCHLOROTHIAZIDE 12.5; 1 MG/1; MG/1
TABLET ORAL
Qty: 90 TABLET | Refills: 0 | Status: SHIPPED | OUTPATIENT
Start: 2021-08-31 | End: 2021-11-29

## 2021-08-31 RX ORDER — METOPROLOL SUCCINATE 50 MG/1
TABLET, EXTENDED RELEASE ORAL
Qty: 90 TABLET | Refills: 0 | Status: SHIPPED | OUTPATIENT
Start: 2021-08-31 | End: 2021-11-29

## 2021-08-31 RX ORDER — LEVOTHYROXINE SODIUM 75 UG/1
TABLET ORAL
Qty: 90 TABLET | Refills: 3 | Status: SHIPPED | OUTPATIENT
Start: 2021-08-31 | End: 2022-09-21 | Stop reason: SDUPTHER

## 2021-08-31 RX ORDER — NAPROXEN 500 MG/1
TABLET ORAL
Qty: 180 TABLET | Refills: 0 | OUTPATIENT
Start: 2021-08-31

## 2021-08-31 RX ORDER — FAMOTIDINE 40 MG/1
40 TABLET, FILM COATED ORAL
Qty: 90 TABLET | Refills: 0 | Status: SHIPPED | OUTPATIENT
Start: 2021-08-31 | End: 2021-11-29

## 2021-08-31 RX ORDER — SIMVASTATIN 20 MG/1
TABLET, FILM COATED ORAL
Qty: 90 TABLET | Refills: 0 | Status: SHIPPED | OUTPATIENT
Start: 2021-08-31 | End: 2021-11-29

## 2021-09-03 DIAGNOSIS — Z79.4 DIABETES MELLITUS TYPE 2, INSULIN DEPENDENT (HCC): Primary | ICD-10-CM

## 2021-09-03 DIAGNOSIS — E11.9 DIABETES MELLITUS TYPE 2, INSULIN DEPENDENT (HCC): Primary | ICD-10-CM

## 2021-09-10 ENCOUNTER — TELEPHONE (OUTPATIENT)
Dept: FAMILY MEDICINE CLINIC | Age: 66
End: 2021-09-10

## 2021-09-10 DIAGNOSIS — E11.9 TYPE 2 DIABETES MELLITUS WITH INSULIN THERAPY (HCC): ICD-10-CM

## 2021-09-10 DIAGNOSIS — Z79.4 TYPE 2 DIABETES MELLITUS WITH INSULIN THERAPY (HCC): ICD-10-CM

## 2021-09-10 RX ORDER — FLASH GLUCOSE SCANNING READER
EACH MISCELLANEOUS
Qty: 10 EACH | Refills: 12 | Status: SHIPPED | OUTPATIENT
Start: 2021-09-10 | End: 2021-10-07

## 2021-09-10 RX ORDER — FLASH GLUCOSE SENSOR
KIT MISCELLANEOUS
Qty: 10 KIT | Refills: 12 | Status: SHIPPED | OUTPATIENT
Start: 2021-09-10 | End: 2021-10-07

## 2021-09-10 NOTE — TELEPHONE ENCOUNTER
I have 5 strips left. [facility] has advised that they do not cover test strips of any kind. Medicare has advised the same. Medicare asked that I contact my primary care physician and have them send a new prescription to [facility] and this request is for a prescription for meter that does not require me to pick my finger. Medicare said they pay for all brands. Thank you. Message from [address] [place] [address]. Ager sent at 9/10/2021  7:27 AM EDT -----  Regarding: RE: Prescription Question  Contact: [phone]  Yes or whatever similar product you suggest.  Please call into [facility] on [address]. Thank you.     Sebas Wahl [phone]      Will send order per verbal order from Dr Jeanne Granda

## 2021-09-13 NOTE — TELEPHONE ENCOUNTER
Previous rx sent to [facility] which Medicare will not cover. Patient needs sent to [facility] with diagnosis codes attached. Nila Rueda    Last Visit: 7/7/21 MD Billy Gustafson  Next Appointment: 10/7/21 MD MICHELE  Previous Refill Encounter(s): 3/23/21 300 + 3    Requested Prescriptions     Pending Prescriptions Disp Refills    glucose blood VI test strips (FreeStyle Lite Strips) strip 300 Strip 3     Sig: Use to test blood glucose up to three times daily.   Dx: E11.9, Z79.4

## 2021-09-14 RX ORDER — BLOOD-GLUCOSE METER
KIT MISCELLANEOUS
Qty: 300 STRIP | Refills: 3 | Status: SHIPPED | OUTPATIENT
Start: 2021-09-14 | End: 2022-06-09

## 2021-09-14 RX ORDER — LANCETS
EACH MISCELLANEOUS
Qty: 300 EACH | Refills: 3 | Status: SHIPPED | OUTPATIENT
Start: 2021-09-14 | End: 2022-06-09

## 2021-09-14 NOTE — TELEPHONE ENCOUNTER
I just sent in a rx for the Freestyle Kits to [facility] on 9/10. So which is she using. Please contact patient to clarify. ..is she using the sensor freestyle or changing to test strips or am I misunderstanding something? Let me know, thanks!

## 2021-09-14 NOTE — TELEPHONE ENCOUNTER
MD Joel Temple the pharmacy to check status of [address]. The [facility] stated that the patient had been told by a rep that the [facility] reader and sensors were covered but unfortunately, they are not. She has Medicare part B. This covers the Freestyle brittany strips for 3.00 copay. She will need the strips and lancets \"Freestyle\" brand. Added lancets also. Nila Rueda      Previous Refill Encounter(s):   [facility]ancets 5/1/20 300 [facility]  Freestyle lite strips 3/23/21 300 + [facility])    Requested Prescriptions     Pending Prescriptions Disp Refills    glucose blood VI test strips (FreeStyle Lite Strips) strip 300 Strip 3     Sig: Use to test blood glucose up to three times daily.   Dx: E11.9, Z79.4    lancets misc 300 Each 3     Sig: Test up to 3x daily for type 2 diabetes uncontrolled on insulin, (E11.9,  Z79.4) Type 2 diabetes mellitus with insulin therapy

## 2021-09-15 NOTE — TELEPHONE ENCOUNTER
----- Message from [facility]. Ager sent at 9/14/2021  7:06 PM EDT -----  Regarding: RE: Prescription Question  Contact: [phone]  Freestyle Lite. [facility] sent in a prescription refill request for the strips on Saturday and Monday so I am not understanding the delay. This was done yesterday.

## 2021-10-07 ENCOUNTER — OFFICE VISIT (OUTPATIENT)
Dept: FAMILY MEDICINE CLINIC | Age: 66
End: 2021-10-07
Payer: MEDICARE

## 2021-10-07 ENCOUNTER — TRANSCRIBE ORDER (OUTPATIENT)
Dept: SCHEDULING | Age: 66
End: 2021-10-07

## 2021-10-07 VITALS
DIASTOLIC BLOOD PRESSURE: 74 MMHG | HEART RATE: 60 BPM | BODY MASS INDEX: 42.95 KG/M2 | RESPIRATION RATE: 16 BRPM | WEIGHT: 257.8 LBS | TEMPERATURE: 98 F | SYSTOLIC BLOOD PRESSURE: 132 MMHG | OXYGEN SATURATION: 97 % | HEIGHT: 65 IN

## 2021-10-07 DIAGNOSIS — E78.2 MIXED HYPERLIPIDEMIA: ICD-10-CM

## 2021-10-07 DIAGNOSIS — E03.9 HYPOTHYROIDISM, ADULT: ICD-10-CM

## 2021-10-07 DIAGNOSIS — E66.01 OBESITY, CLASS III, BMI 40-49.9 (MORBID OBESITY) (HCC): ICD-10-CM

## 2021-10-07 DIAGNOSIS — Z12.31 VISIT FOR SCREENING MAMMOGRAM: Primary | ICD-10-CM

## 2021-10-07 DIAGNOSIS — I10 BENIGN ESSENTIAL HYPERTENSION: ICD-10-CM

## 2021-10-07 DIAGNOSIS — Z00.00 INITIAL MEDICARE ANNUAL WELLNESS VISIT: Primary | ICD-10-CM

## 2021-10-07 DIAGNOSIS — E11.9 DIABETES MELLITUS TYPE 2, NONINSULIN DEPENDENT (HCC): ICD-10-CM

## 2021-10-07 LAB — HBA1C MFR BLD HPLC: 8.3 %

## 2021-10-07 PROCEDURE — G8510 SCR DEP NEG, NO PLAN REQD: HCPCS | Performed by: FAMILY MEDICINE

## 2021-10-07 PROCEDURE — 2022F DILAT RTA XM EVC RTNOPTHY: CPT | Performed by: FAMILY MEDICINE

## 2021-10-07 PROCEDURE — 3052F HG A1C>EQUAL 8.0%<EQUAL 9.0%: CPT | Performed by: FAMILY MEDICINE

## 2021-10-07 PROCEDURE — 83036 HEMOGLOBIN GLYCOSYLATED A1C: CPT | Performed by: FAMILY MEDICINE

## 2021-10-07 PROCEDURE — 3017F COLORECTAL CA SCREEN DOC REV: CPT | Performed by: FAMILY MEDICINE

## 2021-10-07 PROCEDURE — G8417 CALC BMI ABV UP PARAM F/U: HCPCS | Performed by: FAMILY MEDICINE

## 2021-10-07 PROCEDURE — 1090F PRES/ABSN URINE INCON ASSESS: CPT | Performed by: FAMILY MEDICINE

## 2021-10-07 PROCEDURE — G8427 DOCREV CUR MEDS BY ELIG CLIN: HCPCS | Performed by: FAMILY MEDICINE

## 2021-10-07 PROCEDURE — 1101F PT FALLS ASSESS-DOCD LE1/YR: CPT | Performed by: FAMILY MEDICINE

## 2021-10-07 PROCEDURE — G0463 HOSPITAL OUTPT CLINIC VISIT: HCPCS | Performed by: FAMILY MEDICINE

## 2021-10-07 PROCEDURE — G9899 SCRN MAM PERF RSLTS DOC: HCPCS | Performed by: FAMILY MEDICINE

## 2021-10-07 PROCEDURE — G0438 PPPS, INITIAL VISIT: HCPCS | Performed by: FAMILY MEDICINE

## 2021-10-07 PROCEDURE — G8400 PT W/DXA NO RESULTS DOC: HCPCS | Performed by: FAMILY MEDICINE

## 2021-10-07 PROCEDURE — G8536 NO DOC ELDER MAL SCRN: HCPCS | Performed by: FAMILY MEDICINE

## 2021-10-07 PROCEDURE — 99214 OFFICE O/P EST MOD 30 MIN: CPT | Performed by: FAMILY MEDICINE

## 2021-10-07 PROCEDURE — G8754 DIAS BP LESS 90: HCPCS | Performed by: FAMILY MEDICINE

## 2021-10-07 PROCEDURE — G8752 SYS BP LESS 140: HCPCS | Performed by: FAMILY MEDICINE

## 2021-10-07 NOTE — PROGRESS NOTES
Chief Complaint   Patient presents with    Annual Wellness Visit    Diabetes     3 month follow up check       HISTORY OF PRESENT ILLNESS   HPI  3 month follow up Type 2 NIDDM  She was on insulin at one point but weaned herself off  She is on no oral diabetes medications basically due to intolerance to many of them. Over the past year she has used her Victoza taken ~ 3-4 x a week because that is the most she can tolerate it. If she takes it daily it causes significant nausea. She tolerates it well this way. But she has not taken it at all the past month bc she has modified her diet more, has increased her walking, and has gotten her weight down some. Checks BS's fasting qam and stays right around 130-132  Other times checks ~ 1 hr after meals in the afternoon and runs 150-155  If her sugars ever go below 125 she feels very poorly w/ nausea, light headedness, sweats  So she eats light snacks through the day  Diet: cut back on sugars/sweets, milk & fruits and cut back on snacking  Exercise: walks 4-5 days a week x 45 minutes minutes  Weight: since 8-2020 has been increasing her activity, walking, so her wt has come down from the 270's to 257 lbs     REVIEW OF SYMPTOMS   Review of Systems   Constitutional: Negative. Eyes: Negative. Respiratory: Negative. Cardiovascular: Negative. Gastrointestinal: Negative. Genitourinary: Negative. Musculoskeletal: Negative for myalgias. Neurological: Negative. Endo/Heme/Allergies: Negative.             PROBLEM LIST/MEDICAL HISTORY     Problem List  Date Reviewed: 10/7/2021        Codes Class Noted    Chronic sinusitis ICD-10-CM: J32.9  ICD-9-CM: 473.9  2/4/2020        History of rheumatoid arthritis ICD-10-CM: Z87.39  ICD-9-CM: V13.4  10/11/2019    Overview Signed 10/11/2019  3:11 PM by Kylie Alarcon MD     In her 20s/30s             Spider veins of both lower extremities ICD-10-CM: I83.93  ICD-9-CM: 454.9  5/17/2018        Varicose vein of leg ICD-10-CM: I83.90  ICD-9-CM: 454.9  5/17/2018        Morbid obesity with BMI of 45.0-49.9, adult (HCC) (Chronic) ICD-10-CM: E66.01, Z68.42  ICD-9-CM: 278.01, V85.42  10/7/2017        Mixed hyperlipidemia ICD-10-CM: E78.2  ICD-9-CM: 272.2  5/31/2016        Benign essential hypertension ICD-10-CM: I10  ICD-9-CM: 401.1  8/15/1054        Umbilical hernia without obstruction and without gangrene ICD-10-CM: K42.9  ICD-9-CM: 553.1  4/22/2016        Diabetes mellitus type 2, noninsulin dependent ([facility] ICD-10-CM: E11.9  ICD-9-CM: 250.00  12/3/2015    Overview Addendum 7/7/2021  7:38 PM by Harsh Luciano MD     12/2013; Podiatry Dr Miri Soares;  Eye doctor: Dr James Alarcon; was prescribed insulin therapy 2018 Dr. Mortimer Glen[place], patient self dc'd 2020/2021             Hypothyroidism ICD-10-CM: E03.9  ICD-9-CM: 244.9  7/30/2015    Overview Signed 7/30/2015 11:40 AM by Harsh Luciano MD     2002             Actinic keratosis ICD-10-CM: L57.0  ICD-9-CM: 702.0  11/20/2014        Abdominal wall bulge ICD-10-CM: R19.00  ICD-9-CM: 789.30  3/25/2013        Vitamin D deficiency ICD-10-CM: E55.9  ICD-9-CM: 268.9  3/2/2012    Overview Addendum 3/2/2012  9:46 AM by Harsh Luciano MD     9/2011, s/p 1 month Rx Vit D             FHx: breast cancer ICD-10-CM: Z80.3  ICD-9-CM: V16.3  3/2/2012    Overview Signed 3/2/2012  9:43 AM by Harsh Luciano MD     Several maternal relatives             History of benign left ovarian cyst, 2000 ICD-10-CM: Z87.42  ICD-9-CM: V13.29  3/2/2012        Skin cancer screening ICD-10-CM: Z12.83  ICD-9-CM: V76.43  3/2/2012    Overview Signed 10/7/2021  8:36 AM by Harsh Luciano MD     Derm, previously Dr. Lucia Melendez, changed to Dr. Danielle Colon             H/O Optic disc edema, [id number] ICD-10-CM: H47.10  ICD-9-CM: 377.00  3/2/2012    Overview Signed 3/2/2012  1:14 PM by Harsh Luciano MD     Optho Dr Ni Chen, MRI orbits             AR (allergic rhinitis) ICD-10-CM: J30.9  ICD-9-CM: 477.9  Unknown        GERD (gastroesophageal reflux disease) ICD-10-CM: K21.9  ICD-9-CM: 530.81  Unknown    Overview Signed 3/2/2012  1:12 PM by Gerard Chavez MD                  H/O Mild, Intermittent Asthma ICD-10-CM: J45.909  ICD-9-CM: 493.90  Unknown    Overview Signed 3/2/2012  1:11 PM by Geradr Chavez MD     ~8958, no hosp or ER             Postmenopause, LMP 2005, age 49 yo, s/p HRT x 6 months ICD-10-CM: Z78.0  ICD-9-CM: V49.81  Unknown    Overview Signed 3/15/2010  9:30 AM by Gerard Chavez MD     LMP  (HRT x 6months)             Ovarian cyst ICD-10-CM: N83.209  ICD-9-CM: 620.2  Unknown    Overview Signed 3/15/2010  9:30 AM by Gerard Chavez MD     Left, benign              (normal spontaneous vaginal delivery) ICD-10-CM: O80  ICD-9-CM: 293  Unknown    Overview Signed 3/15/2010  9:30 AM by Gerard Chavez MD                  Metabolic syndrome GYO-78-JI: E88.81  ICD-9-CM: 277.7  2010                  PAST SURGICAL HISTORY     Past Surgical History:   Procedure Laterality Date    DEXA BONE DENSITY STUDY AXIAL      Normal    HX BREAST BIOPSY Left     Benign Aspiration    HX BREAST LUMPECTOMY      Left, benign (Dr. 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 LUCERO  2007    1-15% Stenosis Bilaterally         MEDICATIONS     Current Outpatient Medications   Medication Sig    glucose blood VI test strips (FreeStyle Lite Strips) strip Use to test blood glucose up to three times daily. Dx: E11.9, Z79.4    lancets misc Test up to 3x daily for type 2 diabetes uncontrolled on insulin, (E11.9,  Z79.4) Type 2 diabetes mellitus with insulin therapy    Unithroid 75 mcg tablet TAKE 1 TABLET DAILY BEFORE BREAKFAST    losartan-hydroCHLOROthiazide (HYZAAR) 100-12.5 mg per tablet TAKE 1 TABLET DAILY    famotidine (PEPCID) 40 mg tablet Take 1 Tablet by mouth nightly.  metoprolol succinate (TOPROL-XL) 50 mg XL tablet TAKE 1 TABLET DAILY FOR    HYPERTENSION    simvastatin (ZOCOR) 20 mg tablet TAKE 1 TABLET NIGHTLY    coenzyme Q-10 (Co Q-10) 200 mg capsule Take 200 mg by mouth daily.  vitamin E acetate (VITAMIN E PO) Take  by mouth daily.  ascorbic acid (VITAMIN C PO) Take  by mouth daily.  cholecalciferol (VITAMIN D3) (2,000 UNITS /50 MCG) cap capsule Take 2,000 Units by mouth daily.  naproxen (NAPROSYN) 500 mg tablet TAKE 1 TABLET TWICE A DAY AS NEEDED    Insulin Needles, Disposable, 30 gauge x 1/3\" Use daily with lantus and victoza    glucose blood VI test strips (blood glucose test) strip Test up to 3x daily for type 2 diabetes uncontrolled on insulin, (E11.9,  Z79.4) Type 2 diabetes mellitus with insulin therapy    APPLE CIDER VINEGAR PO Take  by mouth daily.  albuterol sulfate (PROAIR RESPICLICK) 90 mcg/actuation aepb Take 2 Puffs by inhalation every four (4) hours as needed (sob/wheeze).     Blood-Glucose Meter monitoring kit Test up to 3x daily for type 2 diabetes uncontrolled on insulin, (E11.9,  Z79.4) Type 2 diabetes mellitus with insulin therapy    triamcinolone acetonide (KENALOG) 0.1 % topical cream use thin layer on back twice daily    CINNAMON BARK (CINNAMON PO) Take 350 mg by mouth daily.  multivitamin (ONE A DAY) tablet Take 1 Tab by mouth daily. Includes vit D 1,000 units    fish oil-dha-epa (FISH OIL) 1,200-144-216 mg Cap Take 1 Tab by mouth. Takes ~ twice a week     No current facility-administered medications for this visit.           ALLERGIES     Allergies   Allergen Reactions    Metformin Diarrhea and Nausea Only     Stomach cramps    Avelox [Moxifloxacin] Rash and Itching    Bactrim [Sulfamethoxazole-Trimethoprim] Rash    Doxycycline Other (comments)     Tongue discomfort, swollen glands    Lisinopril Diarrhea    Norvasc [Amlodipine] Other (comments)     edema    Penicillin G Hives, Shortness of Breath and Swelling          SOCIAL HISTORY     Social History     Tobacco Use    Smoking status: Never Smoker    Smokeless tobacco: Never Used   Substance Use Topics    Alcohol use: No     Alcohol/week: 0.0 standard drinks     Comment: very rare, 1-2 drinks a year     Social History     Social History Narrative    , 1 son    Worked as a  for [employer], Retired 6-2021    Keeps her 12 yo grand daughter since then    Diet: cut back on sugars/sweets, milk & fruits and cut back on snacking    Exercise: walks 4-5 days a week x 45 minutes minutes    Weight: since 8-2020 has been increasing her activity, walking, so her wt has come down from the 270's to 257 lbs         Social History     Substance and Sexual Activity   Sexual Activity Yes    Partners: Male    Birth control/protection: None       IMMUNIZATIONS     Immunization History   Administered Date(s) Administered    COVID-19, PFIZER, MRNA, LNP-S, PF, 30MCG/0.3ML DOSE 02/26/2021, 03/19/2021    Hepatitis A Vaccine 04/24/2001    Influenza High Dose Vaccine PF 09/24/2020, 09/04/2021    Influenza Vaccine 10/22/2013, 11/03/2016    Influenza Vaccine (Quad) Mdck Pf (>2 Yrs Flucelvax QUAD [id number]) 10/11/2019    Influenza Vaccine Study Edge) PF (>6 Mo Flulaval, Fluarix, and >3 [place] [address]63) 2018    Influenza, High-dose, Quadrivalent (>65 Yrs Fluzone High Dose Quad 35946) 2020    Pneumococcal Conjugate (PCV-13) 2021    Pneumococcal Polysaccharide (PPSV-23) 2016    TD Vaccine 2000    TDAP Vaccine 2011    Zoster Vaccine, Live 2017         FAMILY HISTORY     Family History   Problem Relation Age of Onset    Breast Cancer Mother         diagnosed age 72, survived it    Phong Marc Arthritis-rheumatoid Mother    Bernestine Marc Stroke Mother     Parkinsonism Mother          in nursing from colon perf or GI bleed age 80    Hypertension Mother    Bernestine Marc Migraines Mother     Breast Cancer Maternal Grandmother          in her late 42's    Dementia Father     Heart Disease Father         CAD, PTCA    Stroke Father     Cancer Brother 68        soft tissue cancer    Cancer Brother 68        kidney and lungs    Hypertension Sister     Dementia Sister     Heart Disease Sister     Breast Cancer Maternal Aunt     High Cholesterol Son     Cancer Other         maternal first cousin w/ sarcoma of leg    Breast Cancer Maternal Aunt     Ovarian Cancer Maternal Aunt     Anesth Problems Neg Hx          VITALS     Visit Vitals  /74 (BP 1 Location: Left upper arm, BP Patient Position: Sitting, BP Cuff Size: Large adult)   Pulse 60   Temp 98 °F (36.7 °C) (Oral)   Resp 16   Ht 5' 5\" (1.651 m)   Wt 257 lb 12.8 oz (116.9 kg)   LMP 2007   SpO2 97%   BMI 42.90 kg/m²          PHYSICAL EXAMINATION   Physical Exam  Vitals reviewed. Constitutional:       General: She is not in acute distress. HENT:      Right Ear: Tympanic membrane normal.      Left Ear: Tympanic membrane normal.   Neck:      Thyroid: No thyroid mass, thyromegaly or thyroid tenderness. Vascular: No carotid bruit. Cardiovascular:      Rate and Rhythm: Normal rate and regular rhythm. Heart sounds: Normal heart sounds.    Pulmonary: Effort: Pulmonary effort is normal. No respiratory distress. Breath sounds: Normal breath sounds. Abdominal:      Palpations: Abdomen is soft. Tenderness: There is no abdominal tenderness. Musculoskeletal:         General: No swelling or tenderness. Cervical back: Neck supple. Right lower leg: No edema. Left lower leg: No edema. Neurological:      Mental Status: She is alert. LABORATORY DATA/ANCILLARY/IMAGING     Results for orders placed or performed in visit on 07/07/21   T4, FREE   Result Value Ref Range    T4, Free 1.1 0.8 - 1.5 NG/DL   MICROALBUMIN, UR, RAND W/ MICROALB/CREAT RATIO   Result Value Ref Range    Microalbumin,urine random 2.61 MG/DL    Creatinine, urine 128.00 mg/dL    Microalbumin/Creat ratio (mg/g creat) 20 0 - 30 mg/g   HEMOGLOBIN A1C WITH EAG   Result Value Ref Range    Hemoglobin A1c 7.1 (H) 4.0 - 5.6 %    Est. average glucose 157 mg/dL   TSH 3RD GENERATION   Result Value Ref Range    TSH 1.13 0.36 - 3.74 uIU/mL   LIPID PANEL   Result Value Ref Range    Cholesterol, total 164 <200 MG/DL    Triglyceride 206 (H) <150 MG/DL    HDL Cholesterol 44 MG/DL    LDL, calculated 78.8 0 - 100 MG/DL    VLDL, calculated 41.2 MG/DL    CHOL/HDL Ratio 3.7 0.0 - 5.0     METABOLIC PANEL, COMPREHENSIVE   Result Value Ref Range    Sodium 139 136 - 145 mmol/L    Potassium 4.1 3.5 - 5.1 mmol/L    Chloride 106 97 - 108 mmol/L    CO2 29 21 - 32 mmol/L    Anion gap 4 (L) 5 - 15 mmol/L    Glucose 199 (H) 65 - 100 mg/dL    BUN 17 6 - 20 MG/DL    Creatinine 0.78 0.55 - 1.02 MG/DL    BUN/Creatinine ratio 22 (H) 12 - 20      GFR est AA >60 >60 ml/min/1.73m2    GFR est non-AA >60 >60 ml/min/1.73m2    Calcium 10.2 (H) 8.5 - 10.1 MG/DL    Bilirubin, total 0.6 0.2 - 1.0 MG/DL    ALT (SGPT) 33 12 - 78 U/L    AST (SGOT) 24 15 - 37 U/L    Alk.  phosphatase 111 45 - 117 U/L    Protein, total 7.4 6.4 - 8.2 g/dL    Albumin 4.1 3.5 - 5.0 g/dL    Globulin 3.3 2.0 - 4.0 g/dL    A-G Ratio 1. 2 1.1 - 2.2     CBC WITH AUTOMATED DIFF   Result Value Ref Range    WBC 6.0 3.6 - 11.0 K/uL    RBC 4.41 3.80 - 5.20 M/uL    HGB 12.7 11.5 - 16.0 g/dL    HCT 39.4 35.0 - 47.0 %    MCV 89.3 80.0 - 99.0 FL    MCH 28.8 26.0 - 34.0 PG    MCHC 32.2 30.0 - 36.5 g/dL    RDW 13.0 11.5 - 14.5 %    PLATELET 798 (L) 261 - 400 K/uL    MPV 11.9 8.9 - 12.9 FL    NRBC 0.0 0  WBC    ABSOLUTE NRBC 0.00 0.00 - 0.01 K/uL    NEUTROPHILS 60 32 - 75 %    LYMPHOCYTES 30 12 - 49 %    MONOCYTES 7 5 - 13 %    EOSINOPHILS 2 0 - 7 %    BASOPHILS 1 0 - 1 %    IMMATURE GRANULOCYTES 0 0.0 - 0.5 %    ABS. NEUTROPHILS 3.6 1.8 - 8.0 K/UL    ABS. LYMPHOCYTES 1.8 0.8 - 3.5 K/UL    ABS. MONOCYTES 0.4 0.0 - 1.0 K/UL    ABS. EOSINOPHILS 0.1 0.0 - 0.4 K/UL    ABS. BASOPHILS 0.0 0.0 - 0.1 K/UL    ABS. IMM. GRANS. 0.0 0.00 - 0.04 K/UL    DF AUTOMATED         Lab Results   Component Value Date/Time    Hemoglobin A1c 7.1 (H) 07/07/2021 11:28 AM    Hemoglobin A1c 6.7 (H) 11/05/2019 10:16 AM    Hemoglobin A1c 6.1 (H) 06/25/2019 10:07 AM    Glucose 199 (H) 07/07/2021 11:28 AM    Glucose (POC) 120 (H) 04/22/2016 05:03 PM    Microalbumin/Creat ratio (mg/g creat) 20 07/07/2021 11:28 AM    Microalbumin,urine random 2.61 07/07/2021 11:28 AM    LDL, calculated 78.8 07/07/2021 11:28 AM    Creatinine 0.78 07/07/2021 11:28 AM          ASSESSMENT & PLAN   Diagnoses and all orders for this visit:    1. Initial Medicare annual wellness visit  See separate note under this same encounter visit for Medicare Wellness note. 2. Diabetes mellitus type 2, noninsulin dependent ([facility] worsened  -     AMB POC HEMOGLOBIN A1C: 8.3  Lab Results   Component Value Date/Time    Hemoglobin A1c 7.1 (H) 07/07/2021 11:28 AM    Hemoglobin A1c 6.7 (H) 11/05/2019 10:16 AM    Hemoglobin A1c 6.1 (H) 06/25/2019 10:07 AM     -      DIABETES EYE EXAM: Dr. Ibrahima Dupree 9-2021  -     liraglutide (VICTOZA) 0.6 mg/0.1 mL (18 mg/3 mL) pnij; 1.2 mg by SubCUTAneous route daily.  Indications: type 2 diabetes mellitus    3. Obesity, Class III, BMI 40-49.9 (morbid obesity) (HCC)  -     liraglutide (VICTOZA) 0.6 mg/0.1 mL (18 mg/3 mL) pnij; 1.2 mg by SubCUTAneous route daily. Indications: type 2 diabetes mellitus    4. Mixed hyperlipidemia on statin therapy  Lab Results   Component Value Date/Time    Cholesterol, total 164 07/07/2021 11:28 AM    HDL Cholesterol 44 07/07/2021 11:28 AM    LDL, calculated 78.8 07/07/2021 11:28 AM    VLDL, calculated 41.2 07/07/2021 11:28 AM    Triglyceride 206 (H) 07/07/2021 11:28 AM    CHOL/HDL Ratio 3.7 07/07/2021 11:28 AM       5. Benign essential hypertension, controlled stable on ARB    6. Hypothyroidism, clinically stable on TRT  Lab Results   Component Value Date/Time    TSH 1.13 07/07/2021 11:28 AM    T4, Free 1.1 07/07/2021 11:28 AM       Cardiovascular risk and specific lipid/LDL/A1c/BP goals reviewed  Reviewed medications and side effects in detail  She was on insulin at one point but weaned herself off  She is on no oral diabetes medications basically due to intolerance to many of them. Over the past year she has used her Victoza taken ~ 3-4 x a week because that is the most she can tolerate it. If she takes it daily it causes significant nausea. She tolerates it well this way. But she has not taken it at all the past month bc she has modified her diet more, has increased her walking, and has gotten her weight down some. She was disappointed to see her HgbA1c go up today and will go back on the Victoza 1.2 mg 3-4 days a week  RTC for fasting follow up in 3 months to reassess.  Call back or follow up sooner prn

## 2021-10-07 NOTE — PATIENT INSTRUCTIONS
Medicare Wellness Visit, Female     The best way to live healthy is to have a lifestyle where you eat a well-balanced diet, exercise regularly, limit alcohol use, and quit all forms of tobacco/nicotine, if applicable. Regular preventive services are another way to keep healthy. Preventive services (vaccines, screening tests, monitoring & exams) can help personalize your care plan, which helps you manage your own care. Screening tests can find health problems at the earliest stages, when they are easiest to treat. [facility] follows the current, evidence-based guidelines published by the [facility] ([facility]) when recommending preventive services for our patients. Because we follow these guidelines, sometimes recommendations change over time as research supports it. (For example, mammograms used to be recommended annually. Even though Medicare will still pay for an annual mammogram, the newer guidelines recommend a mammogram every two years for women of average risk). Of course, you and your doctor may decide to screen more often for some diseases, based on your risk and your co-morbidities (chronic disease you are already diagnosed with). Preventive services for you include:  - Medicare offers their members a free annual wellness visit, which is time for you and your primary care provider to discuss and plan for your preventive service needs. Take advantage of this benefit every year!  -All adults over the age of 72 should receive the recommended pneumonia vaccines. Current USPSTF guidelines recommend a series of two vaccines for the best pneumonia protection.   -All adults should have a flu vaccine yearly and a tetanus vaccine every 10 years.   -All adults age 48 and older should receive the shingles vaccines (series of two vaccines).       -All adults age 38-68 who are overweight should have a diabetes screening test once every three years.   -All adults born between [birth year] and 1965 should be screened once for Hepatitis C.  -Other screening tests and preventive services for persons with diabetes include: an eye exam to screen for diabetic retinopathy, a kidney function test, a foot exam, and stricter control over your cholesterol.   -Cardiovascular screening for adults with routine risk involves an electrocardiogram (ECG) at intervals determined by your doctor.   -Colorectal cancer screenings should be done for adults age 54-65 with no increased risk factors for colorectal cancer. There are a number of acceptable methods of screening for this type of cancer. Each test has its own benefits and drawbacks. Discuss with your doctor what is most appropriate for you during your annual wellness visit. The different tests include: colonoscopy (considered the best screening method), a fecal occult blood test, a fecal DNA test, and sigmoidoscopy.    -A bone mass density test is recommended when a woman turns 65 to screen for osteoporosis. This test is only recommended one time, as a screening. Some providers will use this same test as a disease monitoring tool if you already have osteoporosis. -Breast cancer screenings are recommended every other year for women of normal risk, age 54-69.  -Cervical cancer screenings for women over age 72 are only recommended with certain risk factors.      Here is a list of your current Health Maintenance items (your personalized list of preventive services) with a due date:  Health Maintenance Due   Topic Date Due    Bone Mineral Density   Never done    Mammogram  02/21/2021    Eye Exam  05/21/2021

## 2021-10-07 NOTE — PROGRESS NOTES
This is an Initial Medicare Annual Wellness Exam (AWV) (Performed 12 months after IPPE or effective date of Medicare Part B enrollment, Once in a lifetime)    I have reviewed the patient's medical history in detail and updated the computerized patient record. Assessment/Plan   Education and counseling provided:  Are appropriate based on today's review and evaluation  Screening Mammography    1. Initial Medicare annual wellness visit       Depression Risk Factor Screening     3 most recent PHQ Screens 10/7/2021   PHQ Not Done -   Little interest or pleasure in doing things Not at all   Feeling down, depressed, irritable, or hopeless Not at all   Total Score PHQ 2 0       Alcohol Risk Screen    Do you average more than 1 drink per night or more than 7 drinks a week:  No    On any one occasion in the past three months have you have had more than 3 drinks containing alcohol:  No         Functional Ability and Level of Safety    Hearing: Hearing is good. Activities of Daily Living: The home contains: no safety equipment. Patient does total self care  ADL Assessment 10/7/2021   Feeding yourself No Help Needed   Getting from bed to chair No Help Needed   Getting dressed No Help Needed   Bathing or showering No Help Needed   Walk across the room (includes cane/walker) No Help Needed   Using the telphone No Help Needed   Taking your medications No Help Needed   Preparing meals No Help Needed   Managing money (expenses/bills) No Help Needed   Moderately strenuous housework (laundry) No Help Needed   Shopping for personal items (toiletries/medicines) No Help Needed   Shopping for groceries No Help Needed   Driving No Help Needed   Climbing a flight of stairs No Help Needed   Getting to places beyond walking distances No Help Needed        Ambulation: with no difficulty      Fall Risk:  Fall Risk Assessment, last 12 mths 10/7/2021   Able to walk? Yes   Fall in past 12 months? 0   Do you feel unsteady?  0   Are you worried about falling 0      Abuse Screen:  Patient is not abused       Cognitive Screening    Has your family/caregiver stated any concerns about your memory: no         Health Maintenance Due     Health Maintenance Due   Topic Date Due    Bone Densitometry (Dexa) Screening  Never done    Breast Cancer Screen Mammogram  2021    Eye Exam Retinal or Dilated  2021       Patient Care Team   Patient Care Team:  Marcio Lemus MD as PCP - General (Family Medicine)  Marcio Lemus MD as PCP - [facility]  Demian Monroe MD (Cardiology)  Grzegorz Velez DPM ([place])  Rubio Ling MD (Ophthalmology)  Forest Cabrera MD (Colon and Rectal Surgery)    History     Patient Active Problem List   Diagnosis Code    Metabolic syndrome W90.46    AR (allergic rhinitis) J30.9    GERD (gastroesophageal reflux disease) K21.9    H/O Mild, Intermittent Asthma J45.909    Postmenopause, LMP , age 47 yo, s/p HRT x 6 months Z78.0    Ovarian cyst N83.209     (normal spontaneous vaginal delivery) O80    Vitamin D deficiency E55.9    FHx: breast cancer Z80.3    History of benign left ovarian cyst,  Z87.42    Skin cancer screening Z12.83    H/O Optic disc edema, 5420-5877 H47.10    Abdominal wall bulge R19.00    Actinic keratosis L57.0    Hypothyroidism E03.9    Diabetes mellitus type 2, noninsulin dependent ([address] M94.5    Umbilical hernia without obstruction and without gangrene K42.9    Mixed hyperlipidemia E78.2    Benign essential hypertension I10    Morbid obesity with BMI of 45.0-49.9, adult ([facility] E66.01, Z68.42    Spider veins of both lower extremities I83.93    Varicose vein of leg I83.90    History of rheumatoid arthritis Z87.39    Chronic sinusitis J32.9     Past Medical History:   Diagnosis Date    AR (allergic rhinitis)     Asthma     mild    Benign essential hypertension 2016    Diabetes mellitus type 2, noninsulin dependent ([address] 2015; Podiatry Dr Renee Coleman;  Eye doctor: Dr Mali Kathleen; was prescribed insulin therapy  Dr. Mary Mark, patient self dc'd     FHx: breast cancer 3/2/2012    GERD (gastroesophageal reflux disease) 11/00    H/O Mild, Intermittent Asthma     H/O Optic disc edema, 0738-4769 3/2/2012    History of benign left ovarian cyst 3/2/2012    Hx of complete eye exam 2017    Dr Espinoza Dew intraoular pressure and no evidence of diabetic retinopathy    Hyperlipidemia 2002    Hypothyroidism     Menopause     Mixed hyperlipidemia 2016    Morbid obesity with BMI of 45.0-49.9, adult ([facility]      (normal spontaneous vaginal delivery)         Ovarian cyst     Left, benign    Postmenopause     LMP  (HRT x 6months)    Rheumatoid arthritis(714.0)     S/P colonoscopy     Normal (10yrs)    Skin cancer screening exams, Derm Dr Sebas Mccollum 3/2/2012    Vitamin D deficiency 3/2/2012      Past Surgical History:   Procedure Laterality Date    DEXA BONE DENSITY STUDY AXIAL      Normal    HX BREAST BIOPSY Left     Benign Aspiration    HX BREAST LUMPECTOMY      Left, benign (Dr. 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 LUCERO  2007    1-15% Stenosis Bilaterally     Current Outpatient Medications   Medication Sig Dispense Refill    glucose blood VI test strips (FreeStyle Lite Strips) strip Use to test blood glucose up to three times daily. Dx: E11.9, Z79.4 300 Strip 3    lancets misc Test up to 3x daily for type 2 diabetes uncontrolled on insulin, (E11.9,  Z79.4) Type 2 diabetes mellitus with insulin therapy 300 Each 3    Unithroid 75 mcg tablet TAKE 1 TABLET DAILY BEFORE BREAKFAST 90 Tablet 3    losartan-hydroCHLOROthiazide (HYZAAR) 100-12.5 mg per tablet TAKE 1 TABLET DAILY 90 Tablet 0    famotidine (PEPCID) 40 mg tablet Take 1 Tablet by mouth nightly. 90 Tablet 0    metoprolol succinate (TOPROL-XL) 50 mg XL tablet TAKE 1 TABLET DAILY FOR    HYPERTENSION 90 Tablet 0    simvastatin (ZOCOR) 20 mg tablet TAKE 1 TABLET NIGHTLY 90 Tablet 0    coenzyme Q-10 (Co Q-10) 200 mg capsule Take 200 mg by mouth daily.  vitamin E acetate (VITAMIN E PO) Take  by mouth daily.  ascorbic acid (VITAMIN C PO) Take  by mouth daily.  cholecalciferol (VITAMIN D3) (2,000 UNITS /50 MCG) cap capsule Take 2,000 Units by mouth daily.  naproxen (NAPROSYN) 500 mg tablet TAKE 1 TABLET TWICE A DAY AS NEEDED 180 Tablet 0    Insulin Needles, Disposable, 30 gauge x 1/3\" Use daily with lantus and victoza 3 Package 3    glucose blood VI test strips (blood glucose test) strip Test up to 3x daily for type 2 diabetes uncontrolled on insulin, (E11.9,  Z79.4) Type 2 diabetes mellitus with insulin therapy 300 Strip 3    APPLE CIDER VINEGAR PO Take  by mouth daily.  albuterol sulfate (PROAIR RESPICLICK) 90 mcg/actuation aepb Take 2 Puffs by inhalation every four (4) hours as needed (sob/wheeze).  1 Inhaler 1    Blood-Glucose Meter monitoring kit Test up to 3x daily for type 2 diabetes uncontrolled on insulin, (E11.9,  Z79.4) Type 2 diabetes mellitus with insulin therapy 1 Kit 0    triamcinolone acetonide (KENALOG) 0.1 % topical cream use thin layer on back twice daily 45 g 1    CINNAMON BARK (CINNAMON PO) Take 350 mg by mouth daily.  multivitamin (ONE A DAY) tablet Take 1 Tab by mouth daily. Includes vit D 1,000 units      fish oil-dha-epa (FISH OIL) 1,200-144-216 mg Cap Take 1 Tab by mouth.  Takes ~ twice a week       Allergies   Allergen Reactions    Metformin Diarrhea and Nausea Only     Stomach cramps    Avelox [Moxifloxacin] Rash and Itching    Bactrim [Sulfamethoxazole-Trimethoprim] Rash    Doxycycline Other (comments)     Tongue discomfort, swollen glands    Lisinopril Diarrhea    Norvasc [Amlodipine] Other (comments)     edema    Penicillin G Hives, Shortness of Breath and Swelling       Family History   Problem Relation Age of Onset   [place] Favorite Breast Cancer Mother         diagnosed age 72, survived it    [facility] Favorite Arthritis-rheumatoid Mother    [place] Favorite Stroke Mother     Parkinsonism Mother          in nursing from colon perf or GI bleed age 80    Hypertension Mother    [facility] Favorite Migraines Mother     Breast Cancer Maternal Grandmother          in her late 42's    Dementia Father     Heart Disease Father         CAD, PTCA    Stroke Father     Cancer Brother 68        soft tissue cancer    Cancer Brother 68        kidney and lungs    Hypertension Sister     Dementia Sister     Heart Disease Sister     Breast Cancer Maternal Aunt     High Cholesterol Son     Cancer Other         maternal first cousin w/ sarcoma of leg    Breast Cancer Maternal Aunt     Ovarian Cancer Maternal Aunt     Anesth Problems Neg Hx      Social History     Tobacco Use    Smoking status: Never Smoker    Smokeless tobacco: Never Used   Substance Use Topics    Alcohol use: No     Alcohol/week: 0.0 standard drinks     Comment: very rare, 1-2 drinks a year       Jason Francis MD

## 2021-10-07 NOTE — PROGRESS NOTES
Chief Complaint   Patient presents with   [facility] Annual Wellness Visit     1. \"Have you been to the ER, urgent care clinic since your last visit? Hospitalized since your last visit? \" No    2. \"Have you seen or consulted any other health care providers outside of the [address] [place] [address] since your last visit? \" Yes Where: eye exam in Sept Dr Leandro Rodriguez     3. For patients over 45: Has the patient had a colonoscopy? In system    If the patient is female:    4. For patients over 40: Has the patient had a mammogram? Due     5. For patients over 21: Has the patient had a pap smear?  Gyn retired wants recommendation

## 2021-11-29 DIAGNOSIS — E78.2 MIXED HYPERLIPIDEMIA: ICD-10-CM

## 2021-11-29 RX ORDER — FAMOTIDINE 40 MG/1
TABLET, FILM COATED ORAL
Qty: 90 TABLET | Refills: 0 | Status: SHIPPED | OUTPATIENT
Start: 2021-11-29

## 2021-11-29 RX ORDER — SIMVASTATIN 20 MG/1
TABLET, FILM COATED ORAL
Qty: 90 TABLET | Refills: 0 | Status: SHIPPED | OUTPATIENT
Start: 2021-11-29

## 2021-11-29 RX ORDER — METOPROLOL SUCCINATE 50 MG/1
TABLET, EXTENDED RELEASE ORAL
Qty: 90 TABLET | Refills: 0 | Status: SHIPPED | OUTPATIENT
Start: 2021-11-29 | End: 2022-04-27 | Stop reason: SDUPTHER

## 2021-11-29 RX ORDER — NAPROXEN 500 MG/1
TABLET ORAL
Qty: 180 TABLET | Refills: 0 | Status: SHIPPED | OUTPATIENT
Start: 2021-11-29 | End: 2022-06-09 | Stop reason: ALTCHOICE

## 2021-11-29 RX ORDER — LOSARTAN POTASSIUM AND HYDROCHLOROTHIAZIDE 12.5; 1 MG/1; MG/1
TABLET ORAL
Qty: 90 TABLET | Refills: 0 | Status: SHIPPED | OUTPATIENT
Start: 2021-11-29 | End: 2022-04-27 | Stop reason: ALTCHOICE

## 2021-12-03 ENCOUNTER — TELEPHONE (OUTPATIENT)
Dept: FAMILY MEDICINE CLINIC | Age: 66
End: 2021-12-03

## 2021-12-07 DIAGNOSIS — E11.9 DIABETES MELLITUS TYPE 2, NONINSULIN DEPENDENT (HCC): ICD-10-CM

## 2021-12-07 DIAGNOSIS — E66.01 OBESITY, CLASS III, BMI 40-49.9 (MORBID OBESITY) (HCC): ICD-10-CM

## 2021-12-07 RX ORDER — LIRAGLUTIDE 6 MG/ML
INJECTION SUBCUTANEOUS
Refills: 5 | OUTPATIENT
Start: 2021-12-07

## 2021-12-09 NOTE — TELEPHONE ENCOUNTER
----- Message from Omero Johns LPN sent at 17/9/9093  8:22 AM EST -----  Regarding: FW: Diabetics    ----- Message -----  From: Soraya Doran  Sent: 12/7/2021  12:43 PM EST  To: [facility] Nurse [place]  Subject: Diabetics                                        I am taking the lantus. I had a supply from a friend that had passed so I did not need a refill but do now.

## 2021-12-09 NOTE — TELEPHONE ENCOUNTER
Called pt and she states that she doesn't know the exact name and she isn't home. She will send me a [employer] [facility] message once she gets home with the name and the dose that she is taking. Spoke to pt today. She states that she had forgotten to get back with me. She will send me a message thru [employer]. She states that she has enough of her lantus. Let her know that we didn't want her to run out.

## 2021-12-09 NOTE — TELEPHONE ENCOUNTER
So what dose of Lantus is she on now???  I see that she used to be on Basaglar Lantus but that has been outdated since spring 2020 so I need to update the pen and correct dose. I already sent in the [address] back on 10-7-21 with refills.

## 2021-12-27 ENCOUNTER — TELEPHONE (OUTPATIENT)
Dept: FAMILY MEDICINE CLINIC | Age: 66
End: 2021-12-27

## 2021-12-27 NOTE — TELEPHONE ENCOUNTER
----- Message from Lorie Matute sent at 2021  3:45 PM EST -----  Regarding: FW: Diabetics    ----- Message -----  From: Bhargav Olea  Sent: 2021  11:50 AM EST  To: [facility] Nurse Pool  Subject: Diabetics                                        I cannot attach any pics. All too big.   Negro Barnes with purple top  100 units 3mL

## 2021-12-27 NOTE — TELEPHONE ENCOUNTER
I agree with you. Thanks for letting her know all that. And you are right, the Lantus was removed from her list because she self dc'd the Lantus and Victoza on her own several months ago. When she saw her A1c going up, she decided to go back on the Victoza and not the Lantus insulin. She needs to stop self medicating and stopping/starting her meds like this. Now she wants to go back on the Lantus and have me prescribe it without knowing which dose I would need her have go back on. This would all be based on how much Victoza she is back on, what her sugars are doing etc. So what I would recommend at this point is tell her to log her blood sugars and come in for a follow up office visit to reassess and discuss her meds/readings and I will determine then. Thank you!

## 2021-12-27 NOTE — TELEPHONE ENCOUNTER
----- Message from Antonia Roblero sent at 12/27/2021  3:45 PM EST -----  Regarding: FW: Diabetics    ----- Message -----  From: Saige Alexander  Sent: 12/27/2021  11:50 AM EST  To: [facility] Nurse Pool  Subject: Diabetics                                        I cannot attach any pics. All too big.   Negro Barnes with purple top  100 units 3mL

## 2021-12-30 ENCOUNTER — PATIENT MESSAGE (OUTPATIENT)
Dept: FAMILY MEDICINE CLINIC | Age: 66
End: 2021-12-30

## 2021-12-30 DIAGNOSIS — E11.9 DIABETES MELLITUS TYPE 2, NONINSULIN DEPENDENT (HCC): ICD-10-CM

## 2021-12-30 DIAGNOSIS — E66.01 OBESITY, CLASS III, BMI 40-49.9 (MORBID OBESITY) (HCC): ICD-10-CM

## 2021-12-30 NOTE — TELEPHONE ENCOUNTER
From: Bereket Garcia  To: Sanjuanita Muñoz MD  Sent: 12/30/2021 10:36 AM EST  Subject: Alray Cancel Morning,   I asked Yanelis Springer, my prescription plan, if Victoza was a Tier 3 drug approved under my plan. They indicated that it is and I have already met my deductible for 2021. Please call in Victoza to the [address] [facility] prior to year end. Otherwise, the cost will be to me $485.00. Please note, as earlier mentioned in a previous email, I have not had Victoza filled since 3/17/21. Thank you for your assistance and have a great day!   Ellen Pal [phone]

## 2022-01-06 ENCOUNTER — OFFICE VISIT (OUTPATIENT)
Dept: FAMILY MEDICINE CLINIC | Age: 67
End: 2022-01-06
Payer: MEDICARE

## 2022-01-06 VITALS
HEIGHT: 65 IN | WEIGHT: 261.2 LBS | HEART RATE: 82 BPM | TEMPERATURE: 97.8 F | RESPIRATION RATE: 16 BRPM | OXYGEN SATURATION: 98 % | SYSTOLIC BLOOD PRESSURE: 140 MMHG | BODY MASS INDEX: 43.52 KG/M2 | DIASTOLIC BLOOD PRESSURE: 78 MMHG

## 2022-01-06 DIAGNOSIS — E78.2 MIXED HYPERLIPIDEMIA: ICD-10-CM

## 2022-01-06 DIAGNOSIS — E11.9 DIABETES MELLITUS TYPE 2, NONINSULIN DEPENDENT (HCC): Primary | ICD-10-CM

## 2022-01-06 DIAGNOSIS — E55.9 VITAMIN D DEFICIENCY: ICD-10-CM

## 2022-01-06 DIAGNOSIS — E83.52 HYPERCALCEMIA: ICD-10-CM

## 2022-01-06 DIAGNOSIS — I10 BENIGN ESSENTIAL HYPERTENSION: ICD-10-CM

## 2022-01-06 LAB
25(OH)D3 SERPL-MCNC: 33.2 NG/ML (ref 30–100)
ALBUMIN SERPL-MCNC: 4 G/DL (ref 3.5–5)
ALBUMIN/GLOB SERPL: 1.1 {RATIO} (ref 1.1–2.2)
ALP SERPL-CCNC: 99 U/L (ref 45–117)
ALT SERPL-CCNC: 34 U/L (ref 12–78)
ANION GAP SERPL CALC-SCNC: 8 MMOL/L (ref 5–15)
AST SERPL-CCNC: 22 U/L (ref 15–37)
BILIRUB SERPL-MCNC: 0.4 MG/DL (ref 0.2–1)
BUN SERPL-MCNC: 20 MG/DL (ref 6–20)
BUN/CREAT SERPL: 24 (ref 12–20)
CALCIUM SERPL-MCNC: 10 MG/DL (ref 8.5–10.1)
CALCIUM SERPL-MCNC: 10.8 MG/DL (ref 8.5–10.1)
CHLORIDE SERPL-SCNC: 107 MMOL/L (ref 97–108)
CHOLEST SERPL-MCNC: 170 MG/DL
CO2 SERPL-SCNC: 25 MMOL/L (ref 21–32)
CREAT SERPL-MCNC: 0.85 MG/DL (ref 0.55–1.02)
EST. AVERAGE GLUCOSE BLD GHB EST-MCNC: 163 MG/DL
GLOBULIN SER CALC-MCNC: 3.7 G/DL (ref 2–4)
GLUCOSE SERPL-MCNC: 172 MG/DL (ref 65–100)
HBA1C MFR BLD: 7.3 % (ref 4–5.6)
HDLC SERPL-MCNC: 45 MG/DL
HDLC SERPL: 3.8 {RATIO} (ref 0–5)
LDLC SERPL CALC-MCNC: 82.2 MG/DL (ref 0–100)
POTASSIUM SERPL-SCNC: 4 MMOL/L (ref 3.5–5.1)
PROT SERPL-MCNC: 7.7 G/DL (ref 6.4–8.2)
PTH-INTACT SERPL-MCNC: 90.5 PG/ML (ref 18.4–88)
SODIUM SERPL-SCNC: 140 MMOL/L (ref 136–145)
TRIGL SERPL-MCNC: 214 MG/DL (ref ?–150)
VLDLC SERPL CALC-MCNC: 42.8 MG/DL

## 2022-01-06 PROCEDURE — G8536 NO DOC ELDER MAL SCRN: HCPCS | Performed by: FAMILY MEDICINE

## 2022-01-06 PROCEDURE — G8510 SCR DEP NEG, NO PLAN REQD: HCPCS | Performed by: FAMILY MEDICINE

## 2022-01-06 PROCEDURE — 2022F DILAT RTA XM EVC RTNOPTHY: CPT | Performed by: FAMILY MEDICINE

## 2022-01-06 PROCEDURE — 3017F COLORECTAL CA SCREEN DOC REV: CPT | Performed by: FAMILY MEDICINE

## 2022-01-06 PROCEDURE — G8417 CALC BMI ABV UP PARAM F/U: HCPCS | Performed by: FAMILY MEDICINE

## 2022-01-06 PROCEDURE — G0463 HOSPITAL OUTPT CLINIC VISIT: HCPCS | Performed by: FAMILY MEDICINE

## 2022-01-06 PROCEDURE — G8427 DOCREV CUR MEDS BY ELIG CLIN: HCPCS | Performed by: FAMILY MEDICINE

## 2022-01-06 PROCEDURE — G9899 SCRN MAM PERF RSLTS DOC: HCPCS | Performed by: FAMILY MEDICINE

## 2022-01-06 PROCEDURE — 1090F PRES/ABSN URINE INCON ASSESS: CPT | Performed by: FAMILY MEDICINE

## 2022-01-06 PROCEDURE — G8400 PT W/DXA NO RESULTS DOC: HCPCS | Performed by: FAMILY MEDICINE

## 2022-01-06 PROCEDURE — 3046F HEMOGLOBIN A1C LEVEL >9.0%: CPT | Performed by: FAMILY MEDICINE

## 2022-01-06 PROCEDURE — 99213 OFFICE O/P EST LOW 20 MIN: CPT | Performed by: FAMILY MEDICINE

## 2022-01-06 PROCEDURE — 1101F PT FALLS ASSESS-DOCD LE1/YR: CPT | Performed by: FAMILY MEDICINE

## 2022-01-06 PROCEDURE — G8753 SYS BP > OR = 140: HCPCS | Performed by: FAMILY MEDICINE

## 2022-01-06 PROCEDURE — G8754 DIAS BP LESS 90: HCPCS | Performed by: FAMILY MEDICINE

## 2022-01-06 NOTE — PROGRESS NOTES
Chief Complaint   Patient presents with    Diabetes     Fasting follow up    Cholesterol Problem    Hypertension       HISTORY OF PRESENT ILLNESS   HPI  3 month fasting follow up Type 2 Diabetes, Hyperlipidemia, Hypertension, Labs and Medication Check. After last visit  she restarted Victoza 1.2 mg but only does half the dose and takes it every other day instead. She tolerates it much better that way. Anything more upsets her stomach. She admits her eating habits were not as good over the holidays and she has not been walking since the end of November. Her wt is back up some. When she noticed her BS's starting to go back up over the holidays she started using some left over Lantus 3-4 units once a day for about 3 weeks from the end of November to mid December. She has not used it anymore since then and is trying to get back on track w/ healthier eating. Checks BS's daily. Fasting in AM ~ 130  Before lunch 160's  Before dinner 170's  Bedtime 148-180  No hypoglycemia  She has not been monitoring home BP's lately     REVIEW OF SYMPTOMS   Review of Systems   Constitutional: Negative. Eyes: Negative. Respiratory: Negative. Cardiovascular: Negative. Gastrointestinal: Negative. Genitourinary: Negative. Musculoskeletal: Negative. Neurological: Negative. Endo/Heme/Allergies: Negative.             PROBLEM LIST/MEDICAL HISTORY     Problem List  Date Reviewed: 1/6/2022          Codes Class Noted    Chronic sinusitis ICD-10-CM: J32.9  ICD-9-CM: 473.9  2/4/2020        History of rheumatoid arthritis ICD-10-CM: Z87.39  ICD-9-CM: V13.4  10/11/2019    Overview Signed 10/11/2019  3:11 PM by Kamila Anderson MD     In her 20s/30s             Spider veins of both lower extremities ICD-10-CM: I83.93  ICD-9-CM: 454.9  5/17/2018        Varicose vein of leg ICD-10-CM: I83.90  ICD-9-CM: 454.9  5/17/2018        Morbid obesity with BMI of 45.0-49.9, adult ([facility] (Chronic) ICD-10-CM: E66.01, F27.23  ICD-9-CM: 278.01, V85.42  10/7/2017        Mixed hyperlipidemia ICD-10-CM: E78.2  ICD-9-CM: 272.2  5/31/2016        Benign essential hypertension ICD-10-CM: I10  ICD-9-CM: 401.1  1/03/3793        Umbilical hernia without obstruction and without gangrene ICD-10-CM: K42.9  ICD-9-CM: 553.1  4/22/2016        Diabetes mellitus type 2, noninsulin dependent ([facility] ICD-10-CM: E11.9  ICD-9-CM: 250.00  12/3/2015    Overview Addendum 7/7/2021  7:38 PM by Harmony Akers MD     12/2013; Podiatry Dr Jillian Granados;  Eye doctor: Dr Homero Adame; was prescribed insulin therapy 2018 Dr. Elmer Chavez, patient self dc'd 2020/2021             Hypothyroidism ICD-10-CM: E03.9  ICD-9-CM: 244.9  7/30/2015    Overview Signed 7/30/2015 11:40 AM by Harmony Akers MD     2002             Actinic keratosis ICD-10-CM: L57.0  ICD-9-CM: 702.0  11/20/2014        Abdominal wall bulge ICD-10-CM: R19.00  ICD-9-CM: 789.30  3/25/2013        Vitamin D deficiency ICD-10-CM: E55.9  ICD-9-CM: 268.9  3/2/2012    Overview Addendum 3/2/2012  9:46 AM by Harmony Akers MD     9/2011, s/p 1 month Rx Vit D             FHx: breast cancer ICD-10-CM: Z80.3  ICD-9-CM: V16.3  3/2/2012    Overview Signed 3/2/2012  9:43 AM by Harmony Akers MD     Several maternal relatives             History of benign left ovarian cyst, 2000 ICD-10-CM: Z87.42  ICD-9-CM: V13.29  3/2/2012        Skin cancer screening ICD-10-CM: Z12.83  ICD-9-CM: V76.43  3/2/2012    Overview Signed 10/7/2021  8:36 AM by Harmony Akers MD     Derm, previously Dr. Rosalina Sepulveda, changed to Dr. Mariah Lujan             H/O Optic disc edema, [id number] ICD-10-CM: H47.10  ICD-9-CM: 377.00  3/2/2012    Overview Signed 3/2/2012  1:14 PM by Harmony Akers MD     Optho Dr Trisha Valdez, MRI orbits             AR (allergic rhinitis) ICD-10-CM: J30.9  ICD-9-CM: 477.9  Unknown        GERD (gastroesophageal reflux disease) ICD-10-CM: K21.9  ICD-9-CM: 530.81  Unknown    Overview Signed 3/2/2012  1:12 PM by Sherrie Valdez MD                  H/O Mild, Intermittent Asthma ICD-10-CM: J45.909  ICD-9-CM: 493.90  Unknown    Overview Signed 3/2/2012  1:11 PM by Sherrie Valdez MD     ~7322, no hosp or ER             Postmenopause, LMP 2005, age 49 yo, s/p HRT x 6 months ICD-10-CM: Z78.0  ICD-9-CM: V49.81  Unknown    Overview Signed 3/15/2010  9:30 AM by Sherrie Valdez MD     LMP  (HRT x 6months)             Ovarian cyst ICD-10-CM: N83.209  ICD-9-CM: 620.2  Unknown    Overview Signed 3/15/2010  9:30 AM by Sherrie Valdez MD     Left, benign              (normal spontaneous vaginal delivery) ICD-10-CM: O80  ICD-9-CM: 650  Unknown    Overview Signed 3/15/2010  9:30 AM by Sherrie Valdez MD                  Metabolic syndrome YTP-35-ZX: E88.81  ICD-9-CM: 277.7  2010                  PAST SURGICAL HISTORY     Past Surgical History:   Procedure Laterality Date    DEXA BONE DENSITY STUDY AXIAL      Normal    HX BREAST BIOPSY Left     Benign Aspiration    HX BREAST LUMPECTOMY      Left, benign (Dr. 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 removed    HX HERNIA REPAIR  16, Dr Deangelo Mccollum    robotic assisted umbilical hernia repair with mesh     HX LIPOMA RESECTION  ~    removed from left thigh    HX ORTHOPAEDIC      tumor remved left leg    HX OTHER SURGICAL  11/5/15, Dr Adela Doshi    2 masses removed left lower leg    HX REFRACTIVE SURGERY Bilateral 2019    HX WISDOM TEETH EXTRACTION      MISAEL MAMMOGRAM DIGITAL BILATERAL  annually per Gyn    at [facility]  BILATERAL  2007    1-15% Stenosis Bilaterally         MEDICATIONS     Current Outpatient Medications   Medication Sig    liraglutide (VICTOZA) 0.6 mg/0.1 mL (18 mg/3 mL) pnij 1.2 mg by SubCUTAneous route daily. Indications: type 2 diabetes mellitus (Patient taking differently: 1.2 mg by SubCUTAneous route every other day. Indications: type 2 diabetes mellitus)    simvastatin (ZOCOR) 20 mg tablet TAKE 1 TABLET NIGHTLY    naproxen (NAPROSYN) 500 mg tablet TAKE 1 TABLET 2 TIMES DAILYAS NEEDED    famotidine (PEPCID) 40 mg tablet TAKE 1 TABLET NIGHTLY    losartan-hydroCHLOROthiazide (HYZAAR) 100-12.5 mg per tablet TAKE 1 TABLET DAILY    metoprolol succinate (TOPROL-XL) 50 mg XL tablet TAKE 1 TABLET DAILY FOR    HYPERTENSION    glucose blood VI test strips (FreeStyle Lite Strips) strip Use to test blood glucose up to three times daily. Dx: E11.9, Z79.4    lancets misc Test up to 3x daily for type 2 diabetes uncontrolled on insulin, (E11.9,  Z79.4) Type 2 diabetes mellitus with insulin therapy    Unithroid 75 mcg tablet TAKE 1 TABLET DAILY BEFORE BREAKFAST    coenzyme Q-10 (Co Q-10) 200 mg capsule Take 200 mg by mouth daily.  ascorbic acid (VITAMIN C PO) Take 1,000 mg by mouth daily.  cholecalciferol (VITAMIN D3) (2,000 UNITS /50 MCG) cap capsule Take 2,000 Units by mouth daily.  Insulin Needles, Disposable, 30 gauge x 1/3\" Use daily with lantus and victoza    APPLE CIDER VINEGAR PO Take  by mouth daily.  albuterol sulfate (PROAIR RESPICLICK) 90 mcg/actuation aepb Take 2 Puffs by inhalation every four (4) hours as needed (sob/wheeze).  Blood-Glucose Meter monitoring kit Test up to 3x daily for type 2 diabetes uncontrolled on insulin, (E11.9,  Z79.4) Type 2 diabetes mellitus with insulin therapy    triamcinolone acetonide (KENALOG) 0.1 % topical cream use thin layer on back twice daily    CINNAMON BARK (CINNAMON PO) Take 350 mg by mouth daily.     multivitamin (ONE A DAY) tablet Take 1 Tab by mouth daily. Includes vit D 1,000 units    fish oil-dha-epa (FISH OIL) 1,200-144-216 mg Cap Take 1 Tab by mouth. Takes ~ twice a week     No current facility-administered medications for this visit.           ALLERGIES     Allergies   Allergen Reactions    Metformin Diarrhea and Nausea Only     Stomach cramps    Avelox [Moxifloxacin] Rash and Itching    Bactrim [Sulfamethoxazole-Trimethoprim] Rash    Doxycycline Other (comments)     Tongue discomfort, swollen glands    Lisinopril Diarrhea    Norvasc [Amlodipine] Other (comments)     edema    Penicillin G Hives, Shortness of Breath and Swelling          SOCIAL HISTORY     Social History     Tobacco Use    Smoking status: Never Smoker    Smokeless tobacco: Never Used   Substance Use Topics    Alcohol use: No     Alcohol/week: 0.0 standard drinks     Comment: very rare, 1-2 drinks a year     Social History     Social History Narrative    , 1 son    Worked as a  for [employer], Retired 6-2021    Keeps her 10 yo grand daughter since then    Diet: cut back on sugars/sweets, bread, milk & fruits; also cut back on snacking    Exercise: not lately since end of November; had been walking 4-5 days a week x 45 minutes    Weight: since 8-2020 has been increasing her activity, walking more, so her wt has come down from the 270's to 257 lbs         Social History     Substance and Sexual Activity   Sexual Activity Yes    Partners: Male    Birth control/protection: None       IMMUNIZATIONS     Immunization History   Administered Date(s) Administered    COVID-19, PFIZER, MRNA, LNP-S, PF, 30MCG/0.3ML DOSE 02/26/2021, 03/19/2021    Hepatitis A Vaccine 04/24/2001    Influenza High Dose Vaccine PF 09/24/2020, 09/04/2021    Influenza Vaccine 10/22/2013, 11/03/2016    Influenza Vaccine (Quad) Mdck Pf (>2 Yrs Flucelvax QUAD [id number]) 10/11/2019    Influenza Vaccine [employer]) PF (>6 Mo Flulaval, Fluarix, and >3 Yrs [place], Fluzone 45066) 09/21/2018    Influenza, High-dose, Quadrivalent (>65 Yrs Fluzone High Dose Quad [id number]) 2020    Pneumococcal Conjugate (PCV-13) 2021    Pneumococcal Polysaccharide (PPSV-23) 2016    TD Vaccine 2000    TDAP Vaccine 2011    Zoster Vaccine, Live 2017         FAMILY HISTORY     Family History   Problem Relation Age of Onset    Breast Cancer Mother         diagnosed age 72, survived it    [facility] Arthritis-rheumatoid Mother    [facility] Stroke Mother     Parkinsonism Mother          in nursing from colon perf or GI bleed age 80    Hypertension Mother    [facility] Migraines Mother     Breast Cancer Maternal Grandmother          in her late 42's    Dementia Father     Heart Disease Father         CAD, PTCA    Stroke Father     Cancer Brother 68        soft tissue cancer    Cancer Brother 68        kidney and lungs    Hypertension Sister     Dementia Sister     Heart Disease Sister     Breast Cancer Maternal Aunt     High Cholesterol Son     Cancer Other         maternal first cousin w/ sarcoma of leg    Breast Cancer Maternal Aunt     Ovarian Cancer Maternal Aunt     Anesth Problems Neg Hx          VITALS     Visit Vitals  BP (!) Initial nurse BP check 146/78 left arm, repeated 140/78 (BP 1 Location: Left upper arm, BP Patient Position: Sitting, BP Cuff Size: Large adult); repeated MD end of exam 144/80 Right arm large cuff   Pulse 82   Temp 97.8 °F (36.6 °C) (Oral)   Resp 16   Ht 5' 5\" (1.651 m)   Wt 261 lb 3.2 oz (118.5 kg)   LMP 2007   SpO2 98%   BMI 43.47 kg/m²          PHYSICAL EXAMINATION   Physical Exam  Vitals reviewed. Constitutional:       General: She is not in acute distress. Appearance: Normal appearance. Cardiovascular:      Rate and Rhythm: Normal rate and regular rhythm. Heart sounds: Normal heart sounds. Pulmonary:      Effort: Pulmonary effort is normal.      Breath sounds: Normal breath sounds. Abdominal:      Tenderness:  There is no abdominal tenderness. Neurological:      Mental Status: She is alert. LABORATORY DATA/ANCILLARY/IMAGING     Results for orders placed or performed in visit on 10/07/21   AMB POC HEMOGLOBIN A1C   Result Value Ref Range    Hemoglobin A1c (POC) 8.3 %       Lab Results   Component Value Date/Time    Hemoglobin A1c 7.1 (H) 07/07/2021 11:28 AM    Hemoglobin A1c 6.7 (H) 11/05/2019 10:16 AM    Hemoglobin A1c 6.1 (H) 06/25/2019 10:07 AM    Glucose 199 (H) 07/07/2021 11:28 AM    Glucose (POC) 120 (H) 04/22/2016 05:03 PM    Microalbumin/Creat ratio (mg/g creat) 20 07/07/2021 11:28 AM    Microalbumin,urine random 2.61 07/07/2021 11:28 AM    LDL, calculated 78.8 07/07/2021 11:28 AM    Creatinine 0.78 07/07/2021 11:28 AM      Lab Results   Component Value Date/Time    Cholesterol, total 164 07/07/2021 11:28 AM    HDL Cholesterol 44 07/07/2021 11:28 AM    LDL, calculated 78.8 07/07/2021 11:28 AM    Triglyceride 206 (H) 07/07/2021 11:28 AM    CHOL/HDL Ratio 3.7 07/07/2021 11:28 AM     Lab Results   Component Value Date/Time    TSH 1.13 07/07/2021 11:28 AM    T4, Free 1.1 07/07/2021 11:28 AM      Lab Results   Component Value Date/Time    Sodium 139 07/07/2021 11:28 AM    Potassium 4.1 07/07/2021 11:28 AM    Chloride 106 07/07/2021 11:28 AM    CO2 29 07/07/2021 11:28 AM    Anion gap 4 (L) 07/07/2021 11:28 AM    Glucose 199 (H) 07/07/2021 11:28 AM    BUN 17 07/07/2021 11:28 AM    Creatinine 0.78 07/07/2021 11:28 AM    BUN/Creatinine ratio 22 (H) 07/07/2021 11:28 AM    GFR est AA >60 07/07/2021 11:28 AM    GFR est non-AA >60 07/07/2021 11:28 AM    Calcium 10.2 (H) 07/07/2021 11:28 AM    Bilirubin, total 0.6 07/07/2021 11:28 AM    ALT (SGPT) 33 07/07/2021 11:28 AM    Alk. phosphatase 111 07/07/2021 11:28 AM    Protein, total 7.4 07/07/2021 11:28 AM    Albumin 4.1 07/07/2021 11:28 AM    Globulin 3.3 07/07/2021 11:28 AM    A-G Ratio 1.2 07/07/2021 11:28 AM          ASSESSMENT & PLAN   Diagnoses and all orders for this visit:    1. Diabetes mellitus type 2, noninsulin dependent ([facility] not at diabetic goal; medication non compliance and poor adherence to medication regimen due to intolerance  -     LIPID PANEL; Future  -     HEMOGLOBIN A1C WITH EAG; Future  -     METABOLIC PANEL, COMPREHENSIVE; Future    2. Mixed hyperlipidemia maintained on statin therapy  -     LIPID PANEL; Future    3. Benign essential hypertension, not at diabetic goal    4. Hypercalcemia, possibly due to thiazide  -     METABOLIC PANEL, COMPREHENSIVE; Future  -     PTH INTACT; Future  -     VITAMIN A; Future  -     VITAMIN D, 25 HYDROXY; Future    5. Vitamin D deficiency  -     VITAMIN D, 25 HYDROXY; Future    Fasting labs checked today  Cardiovascular risk and specific lipid/LDL/A1c/BP goals reviewed  Reviewed diet, nutrition, exercise, weight management, BMI/goals. Reviewed medications and side effects   After last visit  she restarted Victoza 1.2 mg but only does half the dose and takes it every other day instead. She tolerates it much better that way. Anything more upsets her stomach. She admits her eating habits were not as good over the holidays and she has not been walking since the end of November. Her wt is back up some. When she noticed her BS's starting to go back up over the holidays she started using some left over Lantus 3-4 units once a day for about 3 weeks from the end of November to mid December. She has not used it anymore since then and is trying to get back on track w/ healthier eating. She would like to try increasing the Victoza 1/2 dose to once daily instead of every other day to see if she tolerates that  Hopeful to not have to go back on insulin, only as last resort if BS's not improving w/ getting back to healthier eating, regular exercise and getting her weight back down. Further follow up & other recommendations pending review of labs.

## 2022-01-06 NOTE — PROGRESS NOTES
Chief Complaint   Patient presents with    Diabetes     Fasting follow up    Cholesterol Problem    Hypertension     1. \"Have you been to the ER, urgent care clinic since your last visit? Hospitalized since your last visit? \" No    2. \"Have you seen or consulted any other health care providers outside of the [address] [place] [address] since your last visit? \" No     3. For patients over 45: Has the patient had a colonoscopy? In system    If the patient is female:    4. For patients over 40: Has the patient had a mammogram? Scheduled     5. For patients over 21: Has the patient had a pap smear?  In July doesn't remember name and will get that to me    Eye exam in August Dr Katie Zhang

## 2022-01-09 DIAGNOSIS — E21.0 PRIMARY HYPERPARATHYROIDISM (HCC): Primary | ICD-10-CM

## 2022-01-12 LAB — VIT A SERPL-MCNC: 52.9 UG/DL (ref 22–69.5)

## 2022-01-21 ENCOUNTER — HOSPITAL ENCOUNTER (OUTPATIENT)
Dept: MAMMOGRAPHY | Age: 67
Discharge: HOME | End: 2022-01-21
Attending: FAMILY MEDICINE
Payer: MEDICARE

## 2022-01-21 DIAGNOSIS — Z12.31 VISIT FOR SCREENING MAMMOGRAM: ICD-10-CM

## 2022-01-21 PROCEDURE — 77063 BREAST TOMOSYNTHESIS BI: CPT

## 2022-03-07 ENCOUNTER — TELEPHONE (OUTPATIENT)
Dept: FAMILY MEDICINE CLINIC | Age: 67
End: 2022-03-07

## 2022-03-07 ENCOUNTER — NURSE TRIAGE (OUTPATIENT)
Dept: OTHER | Facility: CLINIC | Age: 67
End: 2022-03-07

## 2022-03-07 NOTE — TELEPHONE ENCOUNTER
Received call from [address] at [facility] with Red Flag Complaint. Subjective: Right side swollen gland under chin that is getting bigger. Size of 2 grapes now. Current Symptoms: lymph node swelling under chin, getting bigger. Tender. Warm to touch. Onset: several hours ago; worsening    Associated Symptoms: NA    Pain Severity: 6/10; tender; intermittent    Temperature: doesn't believe has fever by unknown method    What has been tried: nothing    LMP: NA Pregnant: NA    Recommended disposition: Go to [facility]. Pt advised to go to [facility] or ED if no appts available. Care advice provided, patient verbalizes understanding; denies any other questions or concerns; instructed to call back for any new or worsening symptoms. Patient/Caller agrees with recommended disposition; writer provided warm transfer to [facility] at [facility] for appointment scheduling    Attention Provider: Thank you for allowing me to participate in the care of your patient. The patient was connected to triage in response to information provided to the [facility]. Please do not respond through this encounter as the response is not directed to a shared pool.     Reminders:     Call [address] Provider/Office    Care Advice - both instruction and documentation    Routing - PCP     PLEASE DELETE ALL RED TEXT PRIOR TO SIGNING NOTE    Reason for Disposition   Single large node and size > 1 inch (2.5 cm)    Protocols used: LYMPH NODES - SWOLLEN-ADULT-[place]

## 2022-03-19 PROBLEM — I83.90 VARICOSE VEIN OF LEG: Status: ACTIVE | Noted: 2018-05-17

## 2022-03-19 PROBLEM — E66.01 MORBID OBESITY WITH BMI OF 45.0-49.9, ADULT (HCC): Status: ACTIVE | Noted: 2017-10-07

## 2022-03-19 PROBLEM — I83.93 SPIDER VEINS OF BOTH LOWER EXTREMITIES: Status: ACTIVE | Noted: 2018-05-17

## 2022-03-19 PROBLEM — J32.9 CHRONIC SINUSITIS: Status: ACTIVE | Noted: 2020-02-04

## 2022-03-19 PROBLEM — E21.0 PRIMARY HYPERPARATHYROIDISM (HCC): Status: ACTIVE | Noted: 2022-01-09

## 2022-03-20 PROBLEM — Z87.39 HISTORY OF RHEUMATOID ARTHRITIS: Status: ACTIVE | Noted: 2019-10-11

## 2022-04-26 RX ORDER — LOSARTAN POTASSIUM AND HYDROCHLOROTHIAZIDE 12.5; 1 MG/1; MG/1
1 TABLET ORAL DAILY
Qty: 90 TABLET | Refills: 0 | Status: CANCELLED | OUTPATIENT
Start: 2022-04-26

## 2022-04-26 RX ORDER — METOPROLOL SUCCINATE 50 MG/1
50 TABLET, EXTENDED RELEASE ORAL DAILY
Qty: 90 TABLET | Refills: 0 | Status: CANCELLED | OUTPATIENT
Start: 2022-04-26

## 2022-04-26 NOTE — TELEPHONE ENCOUNTER
Patient mychart request for refill. Thanks, Hannah Dupont    Last Visit: 1/6/22 MD Howard  Next Appointment: Tomorrow- MD MICHELE (4/27/22)  Previous Refill Encounter(s): 11/29/21 90 (both)    Requested Prescriptions     Pending Prescriptions Disp Refills    losartan-hydroCHLOROthiazide (HYZAAR) 100-12.5 mg per tablet 90 Tablet 0     Sig: Take 1 Tablet by mouth daily.  metoprolol succinate (TOPROL-XL) 50 mg XL tablet 90 Tablet 0     Sig: Take 1 Tablet by mouth daily.  For hypertension

## 2022-04-27 ENCOUNTER — OFFICE VISIT (OUTPATIENT)
Dept: FAMILY MEDICINE CLINIC | Age: 67
End: 2022-04-27
Payer: MEDICARE

## 2022-04-27 VITALS
TEMPERATURE: 98.1 F | HEIGHT: 65 IN | SYSTOLIC BLOOD PRESSURE: 138 MMHG | DIASTOLIC BLOOD PRESSURE: 74 MMHG | BODY MASS INDEX: 42.28 KG/M2 | HEART RATE: 78 BPM | OXYGEN SATURATION: 97 % | WEIGHT: 253.8 LBS | RESPIRATION RATE: 18 BRPM

## 2022-04-27 DIAGNOSIS — R60.9 DEPENDENT EDEMA: ICD-10-CM

## 2022-04-27 DIAGNOSIS — E78.2 MIXED HYPERLIPIDEMIA: ICD-10-CM

## 2022-04-27 DIAGNOSIS — E83.52 HYPERCALCEMIA: ICD-10-CM

## 2022-04-27 DIAGNOSIS — E21.3 HYPERPARATHYROIDISM (HCC): ICD-10-CM

## 2022-04-27 DIAGNOSIS — E11.9 DIABETES MELLITUS TYPE 2, NONINSULIN DEPENDENT (HCC): Primary | ICD-10-CM

## 2022-04-27 DIAGNOSIS — E03.9 HYPOTHYROIDISM, ADULT: ICD-10-CM

## 2022-04-27 DIAGNOSIS — E21.3 HYPERPARATHYROIDISM (HCC): Primary | ICD-10-CM

## 2022-04-27 DIAGNOSIS — E55.9 VITAMIN D DEFICIENCY: ICD-10-CM

## 2022-04-27 DIAGNOSIS — I10 BENIGN ESSENTIAL HYPERTENSION: ICD-10-CM

## 2022-04-27 LAB — HBA1C MFR BLD HPLC: 6.6 %

## 2022-04-27 PROCEDURE — G8400 PT W/DXA NO RESULTS DOC: HCPCS | Performed by: FAMILY MEDICINE

## 2022-04-27 PROCEDURE — G8417 CALC BMI ABV UP PARAM F/U: HCPCS | Performed by: FAMILY MEDICINE

## 2022-04-27 PROCEDURE — G8754 DIAS BP LESS 90: HCPCS | Performed by: FAMILY MEDICINE

## 2022-04-27 PROCEDURE — G8510 SCR DEP NEG, NO PLAN REQD: HCPCS | Performed by: FAMILY MEDICINE

## 2022-04-27 PROCEDURE — G0463 HOSPITAL OUTPT CLINIC VISIT: HCPCS | Performed by: FAMILY MEDICINE

## 2022-04-27 PROCEDURE — 1101F PT FALLS ASSESS-DOCD LE1/YR: CPT | Performed by: FAMILY MEDICINE

## 2022-04-27 PROCEDURE — G9899 SCRN MAM PERF RSLTS DOC: HCPCS | Performed by: FAMILY MEDICINE

## 2022-04-27 PROCEDURE — 83036 HEMOGLOBIN GLYCOSYLATED A1C: CPT | Performed by: FAMILY MEDICINE

## 2022-04-27 PROCEDURE — 3044F HG A1C LEVEL LT 7.0%: CPT | Performed by: FAMILY MEDICINE

## 2022-04-27 PROCEDURE — G8427 DOCREV CUR MEDS BY ELIG CLIN: HCPCS | Performed by: FAMILY MEDICINE

## 2022-04-27 PROCEDURE — G8752 SYS BP LESS 140: HCPCS | Performed by: FAMILY MEDICINE

## 2022-04-27 PROCEDURE — 3017F COLORECTAL CA SCREEN DOC REV: CPT | Performed by: FAMILY MEDICINE

## 2022-04-27 PROCEDURE — 2022F DILAT RTA XM EVC RTNOPTHY: CPT | Performed by: FAMILY MEDICINE

## 2022-04-27 PROCEDURE — 1090F PRES/ABSN URINE INCON ASSESS: CPT | Performed by: FAMILY MEDICINE

## 2022-04-27 PROCEDURE — 99214 OFFICE O/P EST MOD 30 MIN: CPT | Performed by: FAMILY MEDICINE

## 2022-04-27 PROCEDURE — G8536 NO DOC ELDER MAL SCRN: HCPCS | Performed by: FAMILY MEDICINE

## 2022-04-27 RX ORDER — LOSARTAN POTASSIUM AND HYDROCHLOROTHIAZIDE 12.5; 1 MG/1; MG/1
TABLET ORAL
Qty: 90 TABLET | Refills: 0 | Status: CANCELLED | OUTPATIENT
Start: 2022-04-27

## 2022-04-27 RX ORDER — SPIRONOLACTONE 100 MG/1
100 TABLET, FILM COATED ORAL DAILY
Qty: 90 TABLET | Refills: 0 | Status: SHIPPED | OUTPATIENT
Start: 2022-04-27 | End: 2022-07-23

## 2022-04-27 RX ORDER — METOPROLOL SUCCINATE 50 MG/1
TABLET, EXTENDED RELEASE ORAL
Qty: 90 TABLET | Refills: 0 | Status: SHIPPED | OUTPATIENT
Start: 2022-04-27 | End: 2022-07-23

## 2022-04-27 NOTE — PROGRESS NOTES
Chief Complaint   Patient presents with    Diabetes     3 month follow up    Hypertension    Medication Check       HISTORY OF PRESENT ILLNESS   HPI  3 month follow up Type 2 NIDDM, Hypertension, medication check and lab follow up. Diet: cut back on sugars/sweets, bread, milk & fruits; also cut back on snacking  Exercise: since 4-1-22 exercise video ~ 5 x a week x 1 hr; plans to restart walking outside 5-1-22  Weight: working on losing weight; since 8-2020 has been increasing her activity, walking more, trying to make healthier food choices, so far her wt has come down from the 270's to 253 lbs  Checking home blood sugars daily, sometimes multiple times a day. Fasting in AM runs 100-130  1 hr after meals runs 135-155  She stopped taking the Victoza again as of 2-27-22  It was starting to bother her stomach again. Every time she would take it, within a few hours she got bad GI upset. She re-tried it again about 2 days ago and did ok w/ it this time but is still leery and also hopeful that the dietary modifications she has made are helping enough. She has been monitoring her home BP's recently and readings range 140's-160/78-94. She also tends to retain fluid and gets some occ puffiness in her ankles especially when on her feet a lot or doing a lot of walking or during the summer months. REVIEW OF SYMPTOMS   Review of Systems   Constitutional: Negative. Respiratory: Negative. Cardiovascular: Negative. Gastrointestinal: Negative. Genitourinary: Negative. Musculoskeletal: Negative for myalgias. Neurological: Negative. Endo/Heme/Allergies: Negative. Psychiatric/Behavioral: Negative for depression.            PROBLEM LIST/MEDICAL HISTORY     Problem List  Date Reviewed: 4/27/2022          Codes Class Noted    Dependent edema ICD-10-CM: R60.9  ICD-9-CM: 782.3  4/27/2022    Overview Signed 4/27/2022  1:55 PM by Gibran Don MD     Dependent B ankle edema Hyperparathyroidism ([facility] ICD-10-CM: E21.3  ICD-9-CM: 252.00  1/9/2022    Overview Signed 1/9/2022  8:35 PM by Mee Pires MD     Referred to Endo 1-2022             Chronic sinusitis ICD-10-CM: J32.9  ICD-9-CM: 473.9  2/4/2020        History of rheumatoid arthritis ICD-10-CM: Z87.39  ICD-9-CM: V13.4  10/11/2019    Overview Signed 10/11/2019  3:11 PM by Marietta Schwartz     In her 20s/30s             Spider veins of both lower extremities ICD-10-CM: I83.93  ICD-9-CM: 454.9  5/17/2018        Varicose vein of leg ICD-10-CM: I83.90  ICD-9-CM: 454.9  5/17/2018        Morbid obesity with BMI of 45.0-49.9, adult ([facility] (Chronic) ICD-10-CM: E66.01, Z68.42  ICD-9-CM: 278.01, V85.42  10/7/2017        Mixed hyperlipidemia ICD-10-CM: E78.2  ICD-9-CM: 272.2  5/31/2016        Benign essential hypertension ICD-10-CM: I10  ICD-9-CM: 401.1  2/90/5369        Umbilical hernia without obstruction and without gangrene ICD-10-CM: K42.9  ICD-9-CM: 553.1  4/22/2016        Diabetes mellitus type 2, noninsulin dependent ([facility] ICD-10-CM: E11.9  ICD-9-CM: 250.00  12/3/2015    Overview Addendum 7/7/2021  7:38 PM by Mee Pires MD     12/2013; Podiatry Dr Raine Connelly;  Eye doctor: Dr Mirta Deleon; was prescribed insulin therapy 2018 Dr. Moe Rosas, patient self dc'd 2020/2021             Hypothyroidism ICD-10-CM: E03.9  ICD-9-CM: 244.9  7/30/2015    Overview Signed 7/30/2015 11:40 AM by Mee Pires MD     2002             Actinic keratosis ICD-10-CM: L57.0  ICD-9-CM: 702.0  11/20/2014        Abdominal wall bulge ICD-10-CM: R19.00  ICD-9-CM: 789.30  3/25/2013        Vitamin D deficiency ICD-10-CM: E55.9  ICD-9-CM: 268.9  3/2/2012    Overview Addendum 3/2/2012  9:46 AM by Mee Pires MD     9/2011, s/p 1 month Rx Vit D             FHx: breast cancer ICD-10-CM: Z80.3  ICD-9-CM: V16.3  3/2/2012    Overview Signed 3/2/2012  9:43 AM by Mee Pires MD     Several maternal relatives History of benign left ovarian cyst,  ICD-10-CM: Z87.42  ICD-9-CM: V13.29  3/2/2012        Skin cancer screening ICD-10-CM: Z12.83  ICD-9-CM: V76.43  3/2/2012    Overview Signed 10/7/2021  8:36 AM by Humberto Moran MD     Derm, previously Dr. Ignacia Forman, changed to Dr. Moe Gomez             H/O Optic disc edema, [id number] ICD-10-CM: H47.10  ICD-9-CM: 377.00  3/2/2012    Overview Signed 3/2/2012  1:14 PM by Humberto Moran MD     Optho Dr Jody Toscano, MRI orbits             AR (allergic rhinitis) ICD-10-CM: J30.9  ICD-9-CM: 477.9  Unknown        GERD (gastroesophageal reflux disease) ICD-10-CM: K21.9  ICD-9-CM: 530.81  Unknown    Overview Signed 3/2/2012  1:12 PM by Humberto Moran MD                  H/O Mild, Intermittent Asthma ICD-10-CM: J45.909  ICD-9-CM: 493.90  Unknown    Overview Signed 3/2/2012  1:11 PM by Humberto Moran MD     ~6698, no hosp or ER             Postmenopause, LMP 2005, age 49 yo, s/p HRT x 6 months ICD-10-CM: Z78.0  ICD-9-CM: V49.81  Unknown    Overview Signed 3/15/2010  9:30 AM by Humberto Moran MD     LMP 12/05 (HRT x 6months)             Ovarian cyst ICD-10-CM: N83.209  ICD-9-CM: 620.2  Unknown    Overview Signed 3/15/2010  9:30 AM by Humberto Moran MD     Left, benign              (normal spontaneous vaginal delivery) ICD-10-CM: O80  ICD-9-CM: 025  Unknown    Overview Signed 3/15/2010  9:30 AM by Humberto Moran MD                  Metabolic syndrome JDB-89-YS: E88.81  ICD-9-CM: 277.7  2010                  PAST SURGICAL HISTORY     Past Surgical History:   Procedure Laterality Date    DEXA BONE DENSITY STUDY AXIAL  2006    Normal    HX BREAST BIOPSY Left     Benign Aspiration    HX CERVICAL POLYPECTOMY  14, Dr Yas Stafford    Benign    HX COLONOSCOPY  2014    Dr Elma St polyps x 3; repeat     HX COLONOSCOPY  2015    poor prep, repeat in one year    HX COLONOSCOPY  2016    Normal, follow up 10 yrs  HX COLONOSCOPY  07/2005    Normal, ok x 10 yrs, Dr Gilmer Dutta    childbirth    HX HEENT  5/2010    tumor on vocal cord removed    HX HERNIA REPAIR  4/22/16, Dr Jory Onofre    robotic assisted umbilical hernia repair with mesh     HX LIPOMA RESECTION  ~2005    removed from left thigh    HX ORTHOPAEDIC  1985    tumor remved left leg    HX OTHER SURGICAL  11/5/15, Dr Tim Ang    2 masses removed left lower leg    HX REFRACTIVE SURGERY Bilateral 08/28/2019    HX WISDOM TEETH EXTRACTION      MISAEL MAMMOGRAM DIGITAL BILATERAL  annually per Gyn    at [place] [address]    1-15% Stenosis Bilaterally         MEDICATIONS     Current Outpatient Medications   Medication Sig    metoprolol succinate (TOPROL-XL) 50 mg XL tablet TAKE 1 TABLET DAILY FOR    HYPERTENSION    spironolactone (ALDACTONE) 100 mg tablet Take 1 Tablet by mouth daily.  simvastatin (ZOCOR) 20 mg tablet TAKE 1 TABLET NIGHTLY    naproxen (NAPROSYN) 500 mg tablet TAKE 1 TABLET 2 TIMES DAILYAS NEEDED (Patient taking differently: as needed. Takes 2-3 x a month prn)    famotidine (PEPCID) 40 mg tablet TAKE 1 TABLET NIGHTLY    glucose blood VI test strips (FreeStyle Lite Strips) strip Use to test blood glucose up to three times daily. Dx: E11.9, Z79.4    lancets misc Test up to 3x daily for type 2 diabetes uncontrolled on insulin, (E11.9,  Z79.4) Type 2 diabetes mellitus with insulin therapy    Unithroid 75 mcg tablet TAKE 1 TABLET DAILY BEFORE BREAKFAST    coenzyme Q-10 (Co Q-10) 200 mg capsule Take 200 mg by mouth daily.  ascorbic acid (VITAMIN C PO) Take 1,000 mg by mouth daily.  cholecalciferol (VITAMIN D3) (2,000 UNITS /50 MCG) cap capsule Take 2,000 Units by mouth daily.  APPLE CIDER VINEGAR PO Take  by mouth daily.  albuterol sulfate (PROAIR RESPICLICK) 90 mcg/actuation aepb Take 2 Puffs by inhalation every four (4) hours as needed (sob/wheeze).     Blood-Glucose Meter monitoring kit Test up to 3x daily for type 2 diabetes uncontrolled on insulin, (E11.9,  Z79.4) Type 2 diabetes mellitus with insulin therapy    triamcinolone acetonide (KENALOG) 0.1 % topical cream use thin layer on back twice daily    CINNAMON BARK (CINNAMON PO) Take 350 mg by mouth daily.  multivitamin (ONE A DAY) tablet Take 1 Tab by mouth daily. Includes vit D 1,000 units    fish oil-dha-epa (FISH OIL) 1,200-144-216 mg Cap Take 1 Tablet by mouth daily. No current facility-administered medications for this visit.           ALLERGIES     Allergies   Allergen Reactions    Metformin Diarrhea and Nausea Only     Stomach cramps    Victoza [Liraglutide] Nausea Only    Avelox [Moxifloxacin] Rash and Itching    Bactrim [Sulfamethoxazole-Trimethoprim] Rash    Doxycycline Other (comments)     Tongue discomfort, swollen glands    Lisinopril Diarrhea    Norvasc [Amlodipine] Other (comments)     edema    Penicillin G Hives, Shortness of Breath and Swelling          SOCIAL HISTORY     Social History     Tobacco Use    Smoking status: Never Smoker    Smokeless tobacco: Never Used   Substance Use Topics    Alcohol use: No     Alcohol/week: 0.0 standard drinks     Comment: very rare, 1-2 drinks a year     Social History     Social History Narrative    ,   2022 sudden MI    1 son and one 10 yo grand daughter both local and she helps out w/ her alot    Worked as a  for [employer]    Retired     Stays active w/ her grand daughter, goes to [place], takes her a lot of places    Stays socially engaged w/ friends as well    [facility] [employer] on Sundays        Diet: cut back on sugars/sweets, bread, milk & fruits; also cut back on snacking    Exercise: since 22 exercise video ~ 5 x a week x 1 hr; plans to restart walking outside 22    Weight: working on losing weight; since  has been increasing her activity, walking more, trying to make healthier food choices, so far her wt has come down from the 's to 253 lbs         Social History     Substance and Sexual Activity   Sexual Activity Not Currently    Partners: Male    Comment:          IMMUNIZATIONS     Immunization History   Administered Date(s) Administered    COVID-19, Pfizer Purple top, DILUTE for use, 12+ yrs, 30mcg/0.3mL dose 2021, 2021    Hepatitis A Vaccine 2001    Influenza High Dose Vaccine PF 2020, 2021    Influenza Vaccine 10/22/2013, 2016    Influenza Vaccine (Quad) Mdck Pf (>2 Yrs Flucelvax QUAD 10132) 10/11/2019    Influenza Vaccine (Quad) PF (>6 Mo Flulaval, Fluarix, and >3 Yrs Afluria, Fluzone [id number]) 2018    Influenza, High-dose, Quadrivalent (>65 Yrs Fluzone High Dose Quad 26898) 2020    Pneumococcal Conjugate (PCV-13) 2021    Pneumococcal Polysaccharide (PPSV-23) 2016    TD Vaccine 2000    TDAP Vaccine 2011    Zoster Vaccine, Live 2017         FAMILY HISTORY     Family History   Problem Relation Age of Onset    Breast Cancer Mother         diagnosed age 72, survived it    Can Riddles Arthritis-rheumatoid Mother     Stroke Mother     Parkinsonism Mother          in nursing from colon perf or GI bleed age 80    Hypertension Mother    Can Riddles Migraines Mother     Breast Cancer Maternal Grandmother          in her late 42's    Dementia Father     Heart Disease Father         CAD, PTCA    Stroke Father     Cancer Brother 68        soft tissue cancer    Cancer Brother 68        kidney and lungs    Hypertension Sister     Dementia Sister     Heart Disease Sister     Breast Cancer Maternal Aunt     High Cholesterol Son     Cancer Cousin         maternal first cousin w/ sarcoma of leg    Breast Cancer Maternal Aunt     Ovarian Cancer Maternal Aunt     Anesth Problems Neg Hx          VITALS     Visit Vitals  /78; repeated end of exam 138/74 (BP 1 Location: Left upper arm, BP Patient Position: Sitting, BP Cuff Size: Large adult) Pulse 78   Temp 98.1 °F (36.7 °C) (Oral)   Resp 18   Ht 5' 5\" (1.651 m)   Wt 253 lb 12.8 oz (115.1 kg)   LMP 12/24/2007   SpO2 97%   BMI 42.23 kg/m²          PHYSICAL EXAMINATION   Physical Exam  Vitals reviewed. Constitutional:       General: She is not in acute distress. Appearance: Normal appearance. Cardiovascular:      Rate and Rhythm: Normal rate and regular rhythm. Heart sounds: Normal heart sounds. Pulmonary:      Effort: Pulmonary effort is normal.      Breath sounds: Normal breath sounds. Abdominal:      Palpations: Abdomen is soft. Tenderness: There is no abdominal tenderness. Musculoskeletal:         General: No swelling or tenderness. Right lower leg: No edema. Left lower leg: No edema. Skin:     General: Skin is warm and dry. Neurological:      Mental Status: She is alert. Psychiatric:         Mood and Affect: Mood and affect normal.                LABORATORY DATA/ANCILLARY/IMAGING     Results for orders placed or performed in visit on 01/06/22   VITAMIN D, 25 HYDROXY   Result Value Ref Range    Vitamin D 25-Hydroxy 33.2 30 - 100 ng/mL   PTH INTACT   Result Value Ref Range    Calcium 10.0 8.5 - 10.1 MG/DL    PTH, Intact 90.5 (H) 18.4 - 88.0 pg/mL   VITAMIN A   Result Value Ref Range    Vitamin A, serum 52.9 22.0 - 67.8 ug/dL   METABOLIC PANEL, COMPREHENSIVE   Result Value Ref Range    Sodium 140 136 - 145 mmol/L    Potassium 4.0 3.5 - 5.1 mmol/L    Chloride 107 97 - 108 mmol/L    CO2 25 21 - 32 mmol/L    Anion gap 8 5 - 15 mmol/L    Glucose 172 (H) 65 - 100 mg/dL    BUN 20 6 - 20 MG/DL    Creatinine 0.85 0.55 - 1.02 MG/DL    BUN/Creatinine ratio 24 (H) 12 - 20      GFR est AA >60 >60 ml/min/1.73m2    GFR est non-AA >60 >60 ml/min/1.73m2    Calcium 10.8 (H) 8.5 - 10.1 MG/DL    Bilirubin, total 0.4 0.2 - 1.0 MG/DL    ALT (SGPT) 34 12 - 78 U/L    AST (SGOT) 22 15 - 37 U/L    Alk.  phosphatase 99 45 - 117 U/L    Protein, total 7.7 6.4 - 8.2 g/dL    Albumin 4.0 3.5 - 5.0 g/dL    Globulin 3.7 2.0 - 4.0 g/dL    A-G Ratio 1.1 1.1 - 2.2     HEMOGLOBIN A1C WITH EAG   Result Value Ref Range    Hemoglobin A1c 7.3 (H) 4.0 - 5.6 %    Est. average glucose 163 mg/dL   LIPID PANEL   Result Value Ref Range    Cholesterol, total 170 <200 MG/DL    Triglyceride 214 (H) <150 MG/DL    HDL Cholesterol 45 MG/DL    LDL, calculated 82.2 0 - 100 MG/DL    VLDL, calculated 42.8 MG/DL    CHOL/HDL Ratio 3.8 0.0 - 5.0       Lab Results   Component Value Date/Time    Hemoglobin A1c 7.3 (H) 01/06/2022 08:41 AM    Hemoglobin A1c 7.1 (H) 07/07/2021 11:28 AM    Hemoglobin A1c 6.7 (H) 11/05/2019 10:16 AM           ASSESSMENT & PLAN   Diagnoses and all orders for this visit:    1. Diabetes mellitus type 2, noninsulin dependent ([facility] Improving w/ dietary modification  -     AMB POC HEMOGLOBIN A1C: 6.6  She stopped taking the Victoza again as of 2-27-22 due to GI upset again. She re-tried it again about 2 days ago and did ok w/ it this time but is happy to see today that her A1c has improved nicely w/ her adherence to dietary modifications the past few months so she will stay off of it again for now     2. Mixed hyperlipidemia maintained on statin therapy  Lab Results   Component Value Date/Time    Cholesterol, total 170 01/06/2022 08:41 AM    HDL Cholesterol 45 01/06/2022 08:41 AM    LDL, calculated 82.2 01/06/2022 08:41 AM    VLDL, calculated 42.8 01/06/2022 08:41 AM    Triglyceride 214  01/06/2022 08:41 AM    CHOL/HDL Ratio 3.8 01/06/2022 08:41 AM         3. Benign essential hypertension, borderline control   -     metoprolol succinate (TOPROL-XL) 50 mg XL tablet; TAKE 1 TABLET DAILY FOR    HYPERTENSION  -     spironolactone (ALDACTONE) 100 mg tablet; Take 1 Tablet by mouth daily. 4. Dependent edema  -     spironolactone (ALDACTONE) 100 mg tablet; Take 1 Tablet by mouth daily. 5. Hypothyroidism maintained on TRT, clinically stable  Recheck TFT's w/ next routine labs in 6 weeks    6.  Hypercalcemia, possibly due to Thiazide and current calcium supplements    7. Hyperparathyroidism ([facility] ? secondary    8. BMI 40.0-44.9, adult ([facility]  Reviewed diet, nutrition, exercise, weight management, BMI/goals.   Commended on her progress thus far      Cardiovascular risk and specific Hgba1c/BP/lipid/LDL goals reviewed  Reviewed medications and side effects in detail    Will DC Losartan-HCTZ due to lack of efficacy, availability and recurrent hypercalcemia  Replace w/ Aldactone 100 mg once daily  She will also hold her calcium supplements for now  She will return for follow up BP check, medication evaluation and follow up calcium labs in 6 weeks

## 2022-04-27 NOTE — PROGRESS NOTES
Chief Complaint   Patient presents with    Diabetes     3 month follow up    Hypertension    Medication Check     1. \"Have you been to the ER, urgent care clinic since your last visit? Hospitalized since your last visit? \" No    2. \"Have you seen or consulted any other health care providers outside of the [address] [place] [address] since your last visit? \" No     3. For patients aged 39-70: Has the patient had a colonoscopy / FIT/ Cologuard? Colonoscopy last year Dr Dee Samaniego repeat 10 years      If the patient is female:    4. For patients aged 41-77: Has the patient had a mammogram within the past 2 years? In system       5. For patients aged 21-65: Has the patient had a pap smear?  Gyn [address] last year ~ August No concerns.  Baby is active. Schedule induction for 8/30 for cytotec

## 2022-05-27 ENCOUNTER — HOSPITAL ENCOUNTER (EMERGENCY)
Age: 67
Discharge: HOME | End: 2022-05-27
Attending: EMERGENCY MEDICINE
Payer: MEDICARE

## 2022-05-27 ENCOUNTER — APPOINTMENT (OUTPATIENT)
Dept: GENERAL RADIOLOGY | Age: 67
End: 2022-05-27
Attending: EMERGENCY MEDICINE
Payer: MEDICARE

## 2022-05-27 ENCOUNTER — APPOINTMENT (OUTPATIENT)
Dept: CT IMAGING | Age: 67
End: 2022-05-27
Attending: EMERGENCY MEDICINE
Payer: MEDICARE

## 2022-05-27 VITALS
SYSTOLIC BLOOD PRESSURE: 157 MMHG | TEMPERATURE: 98.2 F | BODY MASS INDEX: 40.5 KG/M2 | HEIGHT: 66 IN | HEART RATE: 75 BPM | WEIGHT: 252 LBS | RESPIRATION RATE: 18 BRPM | OXYGEN SATURATION: 97 % | DIASTOLIC BLOOD PRESSURE: 108 MMHG

## 2022-05-27 DIAGNOSIS — M25.532 ACUTE PAIN OF LEFT WRIST: ICD-10-CM

## 2022-05-27 DIAGNOSIS — M25.562 ACUTE PAIN OF LEFT KNEE: ICD-10-CM

## 2022-05-27 DIAGNOSIS — S09.90XA CLOSED HEAD INJURY, INITIAL ENCOUNTER: Primary | ICD-10-CM

## 2022-05-27 PROCEDURE — 70450 CT HEAD/BRAIN W/O DYE: CPT

## 2022-05-27 PROCEDURE — 73110 X-RAY EXAM OF WRIST: CPT

## 2022-05-27 PROCEDURE — 99284 EMERGENCY DEPT VISIT MOD MDM: CPT

## 2022-05-27 PROCEDURE — 73562 X-RAY EXAM OF KNEE 3: CPT

## 2022-05-27 NOTE — ED TRIAGE NOTES
Pt arrived from home c/o a fall after tripping while trying to catch her granddaughter from falling. Reports hitting her head to a wooden door. Reports injury to L knee and L wrist. Denies LOC Pain 6/10. Pt not on blood thinners.

## 2022-06-09 ENCOUNTER — OFFICE VISIT (OUTPATIENT)
Dept: FAMILY MEDICINE CLINIC | Age: 67
End: 2022-06-09
Payer: MEDICARE

## 2022-06-09 VITALS
RESPIRATION RATE: 16 BRPM | WEIGHT: 252.6 LBS | BODY MASS INDEX: 40.6 KG/M2 | OXYGEN SATURATION: 99 % | HEART RATE: 66 BPM | TEMPERATURE: 98.2 F | SYSTOLIC BLOOD PRESSURE: 134 MMHG | HEIGHT: 66 IN | DIASTOLIC BLOOD PRESSURE: 82 MMHG

## 2022-06-09 DIAGNOSIS — I10 BENIGN ESSENTIAL HYPERTENSION: ICD-10-CM

## 2022-06-09 DIAGNOSIS — E11.9 DIABETES MELLITUS TYPE 2, NONINSULIN DEPENDENT (HCC): Primary | ICD-10-CM

## 2022-06-09 DIAGNOSIS — E21.3 HYPERPARATHYROIDISM (HCC): ICD-10-CM

## 2022-06-09 DIAGNOSIS — E78.2 MIXED HYPERLIPIDEMIA: ICD-10-CM

## 2022-06-09 DIAGNOSIS — E83.52 HYPERCALCEMIA: ICD-10-CM

## 2022-06-09 DIAGNOSIS — E03.9 HYPOTHYROIDISM, ADULT: ICD-10-CM

## 2022-06-09 DIAGNOSIS — E55.9 VITAMIN D DEFICIENCY: ICD-10-CM

## 2022-06-09 PROBLEM — M79.672 PAIN OF LEFT HEEL: Status: ACTIVE | Noted: 2022-06-09

## 2022-06-09 LAB — HBA1C MFR BLD HPLC: 6.3 %

## 2022-06-09 PROCEDURE — G0463 HOSPITAL OUTPT CLINIC VISIT: HCPCS | Performed by: FAMILY MEDICINE

## 2022-06-09 PROCEDURE — G8754 DIAS BP LESS 90: HCPCS | Performed by: FAMILY MEDICINE

## 2022-06-09 PROCEDURE — 2022F DILAT RTA XM EVC RTNOPTHY: CPT | Performed by: FAMILY MEDICINE

## 2022-06-09 PROCEDURE — G9899 SCRN MAM PERF RSLTS DOC: HCPCS | Performed by: FAMILY MEDICINE

## 2022-06-09 PROCEDURE — G8510 SCR DEP NEG, NO PLAN REQD: HCPCS | Performed by: FAMILY MEDICINE

## 2022-06-09 PROCEDURE — G8752 SYS BP LESS 140: HCPCS | Performed by: FAMILY MEDICINE

## 2022-06-09 PROCEDURE — 83036 HEMOGLOBIN GLYCOSYLATED A1C: CPT | Performed by: FAMILY MEDICINE

## 2022-06-09 PROCEDURE — 1123F ACP DISCUSS/DSCN MKR DOCD: CPT | Performed by: FAMILY MEDICINE

## 2022-06-09 PROCEDURE — 3044F HG A1C LEVEL LT 7.0%: CPT | Performed by: FAMILY MEDICINE

## 2022-06-09 PROCEDURE — G8536 NO DOC ELDER MAL SCRN: HCPCS | Performed by: FAMILY MEDICINE

## 2022-06-09 PROCEDURE — G8400 PT W/DXA NO RESULTS DOC: HCPCS | Performed by: FAMILY MEDICINE

## 2022-06-09 PROCEDURE — G8417 CALC BMI ABV UP PARAM F/U: HCPCS | Performed by: FAMILY MEDICINE

## 2022-06-09 PROCEDURE — 99214 OFFICE O/P EST MOD 30 MIN: CPT | Performed by: FAMILY MEDICINE

## 2022-06-09 PROCEDURE — 1090F PRES/ABSN URINE INCON ASSESS: CPT | Performed by: FAMILY MEDICINE

## 2022-06-09 PROCEDURE — 1101F PT FALLS ASSESS-DOCD LE1/YR: CPT | Performed by: FAMILY MEDICINE

## 2022-06-09 PROCEDURE — 3017F COLORECTAL CA SCREEN DOC REV: CPT | Performed by: FAMILY MEDICINE

## 2022-06-09 PROCEDURE — G8427 DOCREV CUR MEDS BY ELIG CLIN: HCPCS | Performed by: FAMILY MEDICINE

## 2022-06-09 RX ORDER — DICLOFENAC SODIUM 75 MG/1
75 TABLET, DELAYED RELEASE ORAL 2 TIMES DAILY
COMMUNITY
End: 2022-11-01 | Stop reason: ALTCHOICE

## 2022-06-09 NOTE — PROGRESS NOTES
Chief Complaint   Patient presents with    Diabetes     6 week follow up    Hypertension    Medication Evaluation       HISTORY OF PRESENT ILLNESS   HPI  6 week follow up medication check, labs, Hypertension, Type 2 NIDDM, Hypercalcemia. After her last visit 4/27/22, she stopped all of her calcium containing supplements. She was taking about 4 different vitamins/supplements that contained calcium and she states after adding it all up, it was a high amount of total calcium. She has been off all those now and is here today for recheck. She also wants to know how her HgBA1c is coming along since she no longer takes any diabetes related medications. She is still hopeful to manage her diabetes w/o medications now that she is eating healthier. She checks BS's fasting qam and they run . Even at random checks in the day the highest she has seen was ~ 120. Diet: cut back on sugars/sweets, milk & fruits; also cut back on snacking; lately eating more sandwiches so she has re-introduced the bread she had cut out for a while but otherwise sticking to low sugar/carbs  Exercise: not as much lately but back in 4-1-22 had been doing exercise video ~ 5 x a week x 1 hr; but now her left foot has been bothering her and she is seeing podiatry for that; trying to stay active as much as possible for now  Weight: working on losing weight; since 8-2020 has been increasing her activity, walking more, trying to make healthier food choices and so far her wt has come down from the 270's to 252 lbs   REVIEW OF SYMPTOMS   Review of Systems   Constitutional: Negative. Eyes: Negative. Respiratory: Negative. Cardiovascular: Negative. Gastrointestinal: Negative. Genitourinary: Negative. Endo/Heme/Allergies: Negative.             PROBLEM LIST/MEDICAL HISTORY     Problem List  Date Reviewed: 6/9/2022          Codes Class Noted    Pain of left heel ICD-10-CM: M79.672  ICD-9-CM: 729.5  6/9/2022    Overview Signed 6/9/2022 8:49 AM by Katrina Jaramillo MD     4/2022 Podiatrist Dr. Fairchild Majestic             Dependent edema ICD-10-CM: R60.9  ICD-9-CM: 782.3  4/27/2022    Overview Signed 4/27/2022  1:55 PM by Katrina Jaramillo MD     Dependent B ankle edema             Hyperparathyroidism ([facility] ICD-10-CM: E21.3  ICD-9-CM: 252.00  1/9/2022    Overview Addendum 4/27/2022  4:12 PM by Katrina Jaramillo MD     Referred to Endo 1-2022 (didn't go yet) will reassess at follow up labs 5-2022             Chronic sinusitis ICD-10-CM: J32.9  ICD-9-CM: 473.9  2/4/2020        History of rheumatoid arthritis ICD-10-CM: Z87.39  ICD-9-CM: V13.4  10/11/2019    Overview Signed 10/11/2019  3:11 PM by [place] [facility]     In her 20s/30s             Spider veins of both lower extremities ICD-10-CM: I83.93  ICD-9-CM: 454.9  5/17/2018        Varicose vein of leg ICD-10-CM: I83.90  ICD-9-CM: 454.9  5/17/2018        Morbid obesity with BMI of 45.0-49.9, adult ([facility] (Chronic) ICD-10-CM: E66.01, Z68.42  ICD-9-CM: 278.01, V85.42  10/7/2017        Mixed hyperlipidemia ICD-10-CM: E78.2  ICD-9-CM: 272.2  5/31/2016        Benign essential hypertension ICD-10-CM: I10  ICD-9-CM: 401.1  1/36/3316        Umbilical hernia without obstruction and without gangrene ICD-10-CM: K42.9  ICD-9-CM: 553.1  4/22/2016        Diabetes mellitus type 2, noninsulin dependent ([facility] ICD-10-CM: E11.9  ICD-9-CM: 250.00  12/3/2015    Overview Addendum 7/7/2021  7:38 PM by Katrina Jaramillo MD     12/2013; Podiatry Dr Devorah Gross;  Eye doctor: Dr Reinaldo Loving; was prescribed insulin therapy 2018 Dr. Eulogio Chadwick, patient self dc'd 2020/2021             Hypothyroidism ICD-10-CM: E03.9  ICD-9-CM: 244.9  7/30/2015    Overview Signed 7/30/2015 11:40 AM by Katrina Jaramillo MD     2002             Actinic keratosis ICD-10-CM: L57.0  ICD-9-CM: 702.0  11/20/2014        Abdominal wall bulge ICD-10-CM: R19.00  ICD-9-CM: 789.30  3/25/2013        Vitamin D deficiency ICD-10-CM: E55.9  ICD-9-CM: 268.9  3/2/2012    Overview Addendum 3/2/2012  9:46 AM by Nara Price MD     2011, s/p 1 month Rx Vit D             FHx: breast cancer ICD-10-CM: Z80.3  ICD-9-CM: V16.3  3/2/2012    Overview Signed 3/2/2012  9:43 AM by Nara Price MD     Several maternal relatives             History of benign left ovarian cyst,  ICD-10-CM: Z87.42  ICD-9-CM: V13.29  3/2/2012        Skin cancer screening ICD-10-CM: Z12.83  ICD-9-CM: V76.43  3/2/2012    Overview Signed 10/7/2021  8:36 AM by Nara Price MD     Derm, previously Dr. Shira Wan, changed to Dr. Fernanda Ken             H/O Optic disc edema, [id number] ICD-10-CM: H47.10  ICD-9-CM: 377.00  3/2/2012    Overview Signed 3/2/2012  1:14 PM by Nara Price MD     Optho Dr Luis Flores, MRI orbits             AR (allergic rhinitis) ICD-10-CM: J30.9  ICD-9-CM: 477.9  Unknown        GERD (gastroesophageal reflux disease) ICD-10-CM: K21.9  ICD-9-CM: 530.81  Unknown    Overview Signed 3/2/2012  1:12 PM by Nara Price MD                  H/O Mild, Intermittent Asthma ICD-10-CM: J45.909  ICD-9-CM: 493.90  Unknown    Overview Signed 3/2/2012  1:11 PM by Nara Price MD     ~4916, no hosp or ER             Postmenopause, LMP 2005, age 47 yo, s/p HRT x 6 months ICD-10-CM: Z78.0  ICD-9-CM: V49.81  Unknown    Overview Signed 3/15/2010  9:30 AM by Nara Price MD     LMP 12/ (HRT x 6months)             Ovarian cyst ICD-10-CM: N83.209  ICD-9-CM: 620.2  Unknown    Overview Signed 3/15/2010  9:30 AM by Nara Price MD     Left, benign              (normal spontaneous vaginal delivery) ICD-10-CM: O80  ICD-9-CM: 650  Unknown    Overview Signed 3/15/2010  9:30 AM by Nara Price MD                  Metabolic syndrome KOP-55-ZZ: Q93.81  ICD-9-CM: 277.7  2010                  PAST SURGICAL HISTORY     Past Surgical History:   Procedure Laterality Date    DEXA BONE DENSITY STUDY AXIAL  2006    Normal    HX BREAST BIOPSY Left     Benign Aspiration    HX CERVICAL POLYPECTOMY  7/2/14, Dr Moreland Speaks    Benign    HX COLONOSCOPY  5/2014    Dr Denise Jones polyps x 3; repeat 2015    HX COLONOSCOPY  05/20/2015    poor prep, repeat in one year    HX COLONOSCOPY  08/2016    Normal, follow up 10 yrs    HX COLONOSCOPY  07/2005    Normal, ok x 10 yrs, Dr Duy Cutler    childbirth    HX HEENT  5/2010    tumor on vocal cord removed    HX HERNIA REPAIR  4/22/16, Dr Shelbie Morales    robotic assisted umbilical hernia repair with mesh     HX LIPOMA RESECTION  ~2005    removed from left thigh    HX ORTHOPAEDIC  1985    tumor remved left leg    HX OTHER SURGICAL  11/5/15, Dr Froylan Hu    2 masses removed left lower leg    HX REFRACTIVE SURGERY Bilateral 08/28/2019    HX WISDOM TEETH EXTRACTION      MISAEL MAMMOGRAM DIGITAL BILATERAL  [facility] per Gyn    at [facility]  2007    1-15% Stenosis Bilaterally         MEDICATIONS     Current Outpatient Medications   Medication Sig    diclofenac EC (VOLTAREN) 75 mg EC tablet Take 75 mg by mouth two (2) times a day. Per Ortho for neck pain/strain, cervical radiculopathy and also taking for left heel pain until sees Podiatrist    metoprolol succinate (TOPROL-XL) 50 mg XL tablet TAKE 1 TABLET DAILY FOR    HYPERTENSION    spironolactone (ALDACTONE) 100 mg tablet Take 1 Tablet by mouth daily.  simvastatin (ZOCOR) 20 mg tablet TAKE 1 TABLET NIGHTLY    famotidine (PEPCID) 40 mg tablet TAKE 1 TABLET NIGHTLY    Unithroid 75 mcg tablet TAKE 1 TABLET DAILY BEFORE BREAKFAST    coenzyme Q-10 (Co Q-10) 200 mg capsule Take 200 mg by mouth daily.  albuterol sulfate (PROAIR RESPICLICK) 90 mcg/actuation aepb Take 2 Puffs by inhalation every four (4) hours as needed (sob/wheeze).     Blood-Glucose Meter monitoring kit Test up to 3x daily for type 2 diabetes uncontrolled on insulin, (E11.9,  Z79.4) Type 2 diabetes mellitus with insulin therapy    triamcinolone acetonide (KENALOG) 0.1 % topical cream use thin layer on back twice daily    CINNAMON BARK (CINNAMON PO) Take 350 mg by mouth daily. No current facility-administered medications for this visit.           ALLERGIES     Allergies   Allergen Reactions    Metformin Diarrhea and Nausea Only     Stomach cramps    Victoza [Liraglutide] Nausea Only    Avelox [Moxifloxacin] Rash and Itching    Bactrim [Sulfamethoxazole-Trimethoprim] Rash    Doxycycline Other (comments)     Tongue discomfort, swollen glands    Lisinopril Diarrhea    Norvasc [Amlodipine] Other (comments)     edema    Penicillin G Hives, Shortness of Breath and Swelling          SOCIAL HISTORY     Social History     Tobacco Use    Smoking status: Never Smoker    Smokeless tobacco: Never Used   Substance Use Topics    Alcohol use: No     Alcohol/week: 0.0 standard drinks     Comment: very rare, 1-2 drinks a year     Social History     Social History Narrative    ,   2022 sudden MI    1 son and one 10 yo grand daughter both local and she helps out w/ her [employer]cal[employer]    Worked as a  for [employer]    Retired     Stays active w/ her grand daughter, goes to [place], takes her a lot of places    Stays socially engaged w/ friends as well    [place] on Sundays    Traveling a lot this summer to various amusement [place] w/ her son and grand daughter        Diet: cut back on sugars/sweets, milk & fruits; also cut back on snacking; lately eating more sandwiches so she has re-introduced the bread she had cut out for a while but otherwise sticking to low sugar/carbs    Exercise: not as much lately but back in 22 had been doing exercise video ~ 5 x a week x 1 hr; but now her left foot has been bothering her and she is seeing podiatry for that; trying to stay active as much as possible for now    Weight: working on losing weight; since  has been increasing her activity, walking more, trying to make healthier food choices and so far her wt has come down from the 270's to 252 lbs         Social History     Substance and Sexual Activity   Sexual Activity Not Currently    Partners: Male    Comment:   -       IMMUNIZATIONS     Immunization History   Administered Date(s) Administered    COVID-19, Pfizer Purple top, DILUTE for use, 12+ yrs, 30mcg/0.3mL dose 2021, 2021, 2021    Hepatitis A Vaccine 2001    Influenza High Dose Vaccine PF 2020, 2021    Influenza Vaccine 10/22/2013, 2016    Influenza Vaccine (Quad) Mdck Pf (>2 Yrs Flucelvax QUAD [id number]) 10/11/2019    Influenza Vaccine (Quad) PF (>6 Mo Flulaval, Fluarix, and >3 Yrs Afluria, Fluzone [id number]) 2018    Influenza, High-dose, Quadrivalent (>65 Yrs Fluzone High Dose Quad [id number]) 2020    Pneumococcal Conjugate (PCV-13) 2021    Pneumococcal Polysaccharide (PPSV-23) 2016    TD Vaccine 2000    TDAP Vaccine 2011    Zoster Vaccine, Live 2017         FAMILY HISTORY     Family History   Problem Relation Age of Onset    Breast Cancer Mother         diagnosed age 72, survived it    Harlan Outhouse Arthritis-rheumatoid Mother     Stroke Mother     Parkinsonism Mother          in nursing from colon perf or GI bleed age 80    Hypertension Mother    Harlan Outhouse Migraines Mother     Breast Cancer Maternal Grandmother          in her late 42's    Dementia Father     Heart Disease Father         CAD, PTCA    Stroke Father     Cancer Brother 68        soft tissue cancer    Cancer Brother 68        kidney and lungs    Hypertension Sister     Dementia Sister     Heart Disease Sister     Breast Cancer Maternal Aunt     High Cholesterol Son     Cancer Cousin         maternal first cousin w/ sarcoma of leg    Breast Cancer Maternal Aunt     Ovarian Cancer Maternal Aunt     Anesth Problems Neg Hx          VITALS     Visit Vitals  /82 (BP 1 Location: Left upper arm, BP Patient Position: Sitting, BP Cuff Size: Large adult)   Pulse 66   Temp 98.2 °F (36.8 °C) (Oral)   Resp 16   Ht 5' 6\" (1.676 m)   Wt 252 lb 9.6 oz (114.6 kg)   LMP 12/24/2007   SpO2 99%   BMI 40.77 kg/m²          PHYSICAL EXAMINATION   Physical Exam  Vitals reviewed. Constitutional:       General: She is not in acute distress. Appearance: Normal appearance. Cardiovascular:      Rate and Rhythm: Normal rate and regular rhythm. Heart sounds: Normal heart sounds. Pulmonary:      Effort: Pulmonary effort is normal.      Breath sounds: Normal breath sounds. Musculoskeletal:      Right lower leg: No edema. Left lower leg: No edema. Neurological:      Mental Status: She is alert.                 LABORATORY DATA/ANCILLARY/IMAGING     Results for orders placed or performed in visit on 06/09/22   AMB POC HEMOGLOBIN A1C   Result Value Ref Range    Hemoglobin A1c (POC) 6.3 %       Lab Results   Component Value Date/Time    WBC 6.0 07/07/2021 11:28 AM    HGB 12.7 07/07/2021 11:28 AM    HCT 39.4 07/07/2021 11:28 AM    PLATELET 658 (L) 08/95/0524 11:28 AM    MCV 89.3 07/07/2021 11:28 AM     Lab Results   Component Value Date/Time    Hemoglobin A1c 7.3 (H) 01/06/2022 08:41 AM    Hemoglobin A1c 7.1 (H) 07/07/2021 11:28 AM    Hemoglobin A1c 6.7 (H) 11/05/2019 10:16 AM    Glucose 172 (H) 01/06/2022 08:41 AM    Glucose (POC) 120 (H) 04/22/2016 05:03 PM    Microalbumin/Creat ratio (mg/g creat) 20 07/07/2021 11:28 AM    Microalbumin,urine random 2.61 07/07/2021 11:28 AM    LDL, calculated 82.2 01/06/2022 08:41 AM    Creatinine 0.85 01/06/2022 08:41 AM      Lab Results   Component Value Date/Time    Cholesterol, total 170 01/06/2022 08:41 AM    HDL Cholesterol 45 01/06/2022 08:41 AM    LDL, calculated 82.2 01/06/2022 08:41 AM    Triglyceride 214 (H) 01/06/2022 08:41 AM    CHOL/HDL Ratio 3.8 01/06/2022 08:41 AM     Lab Results   Component Value Date/Time    TSH 1.13 07/07/2021 11:28 AM    T4, Free 1.1 07/07/2021 11:28 AM      Lab Results   Component Value Date/Time    Sodium 140 01/06/2022 08:41 AM    Potassium 4.0 01/06/2022 08:41 AM    Chloride 107 01/06/2022 08:41 AM    CO2 25 01/06/2022 08:41 AM    Anion gap 8 01/06/2022 08:41 AM    Glucose 172 (H) 01/06/2022 08:41 AM    BUN 20 01/06/2022 08:41 AM    Creatinine 0.85 01/06/2022 08:41 AM    BUN/Creatinine ratio 24 (H) 01/06/2022 08:41 AM    GFR est AA >60 01/06/2022 08:41 AM    GFR est non-AA >60 01/06/2022 08:41 AM    Calcium 10.0 01/06/2022 08:41 AM    Calcium 10.8 (H) 01/06/2022 08:41 AM    Bilirubin, total 0.4 01/06/2022 08:41 AM    ALT (SGPT) 34 01/06/2022 08:41 AM    Alk. phosphatase 99 01/06/2022 08:41 AM    Protein, total 7.7 01/06/2022 08:41 AM    Albumin 4.0 01/06/2022 08:41 AM    Globulin 3.7 01/06/2022 08:41 AM    A-G Ratio 1.1 01/06/2022 08:41 AM          ASSESSMENT & PLAN   Diagnoses and all orders for this visit:    1. Diabetes mellitus type 2, noninsulin dependent ([facility] improving w/ dietary modification w/o medications anymore  -     AMB POC HEMOGLOBIN A1C: 6.3  Lab Results   Component Value Date/Time    Hemoglobin A1c 7.3 (H) 01/06/2022 08:41 AM    Hemoglobin A1c 7.1 (H) 07/07/2021 11:28 AM    Hemoglobin A1c 6.7 (H) 11/05/2019 10:16 AM         2. Benign essential hypertension, controlled, stable    3. Mixed hyperlipidemia, not treated w/ medication  Lab Results   Component Value Date/Time    Cholesterol, total 170 01/06/2022 08:41 AM    HDL Cholesterol 45 01/06/2022 08:41 AM    LDL, calculated 82.2 01/06/2022 08:41 AM    VLDL, calculated 42.8 01/06/2022 08:41 AM    Triglyceride 214 (H) 01/06/2022 08:41 AM    CHOL/HDL Ratio 3.8 01/06/2022 08:41 AM       4. BMI 40.0-44.9, adult ([address]  Reviewed diet, nutrition, exercise, weight management, BMI/goals. Commended on her progress thus far    5. Hypothyroidism  -     T4, FREE  -     TSH 3RD GENERATION    6.  Hypercalcemia  -     PTH INTACT  -     METABOLIC PANEL, BASIC  Since last visit 4/2022 she has stopped all calcium containing vitamins/supplements    7. Hyperparathyroidism ([facility] asymptomatic   -     PTH INTACT    8. Vitamin D deficiency  -     VITAMIN D, 25 HYDROXY      Cardiovascular risk and specific lipid/LDL/BP/HgbA1c goals assessed  Reviewed medications, effects, risks/benefits, precautions and possible side effects  Reviewed diet, nutrition, exercise, weight management, BMI/goals. Age/risk based screening recommendations, health maintenance & prevention counseling. Cancer screening USPTFS guidelines reviewed w/ pt today. Discussed benefits/positive/negative outcomes of screening based on age/risk stratification. Informed consent for/against screening based on pt's personal hx/risk factors. Updated in history above and health maintenance. Further follow up & other recommendations pending review of labs.   If all remains good and stable, she would like to follow up again in 3 months to keep close watch on her HgbA1c since she is controlling w/ diet alone now

## 2022-06-09 NOTE — PROGRESS NOTES
Chief Complaint   Patient presents with    Blood Pressure Check    Medication Evaluation    Labs     1. \"Have you been to the ER, urgent care clinic since your last visit? Hospitalized since your last visit? \" Yes Where: [facility]    2. \"Have you seen or consulted any other health care providers outside of the [address] [place] [address] since your last visit? \" Yes Where: [facility]       3. For patients aged 39-70: Has the patient had a colonoscopy / FIT/ Cologuard? Colonoscopy last year Dr Ama Hodges repeat 10 years        If the patient is female:     4. For patients aged 41-77: Has the patient had a mammogram within the past 2 years? In system        5. For patients aged 21-65: Has the patient had a pap smear?  Gyn [facility] last year ~ August

## 2022-06-10 LAB
25(OH)D3 SERPL-MCNC: 29.7 NG/ML (ref 30–100)
ANION GAP SERPL CALC-SCNC: 3 MMOL/L (ref 5–15)
BUN SERPL-MCNC: 23 MG/DL (ref 6–20)
BUN/CREAT SERPL: 22 (ref 12–20)
CALCIUM SERPL-MCNC: 10.6 MG/DL (ref 8.5–10.1)
CALCIUM SERPL-MCNC: 10.6 MG/DL (ref 8.5–10.1)
CHLORIDE SERPL-SCNC: 112 MMOL/L (ref 97–108)
CO2 SERPL-SCNC: 27 MMOL/L (ref 21–32)
CREAT SERPL-MCNC: 1.03 MG/DL (ref 0.55–1.02)
GLUCOSE SERPL-MCNC: 102 MG/DL (ref 65–100)
POTASSIUM SERPL-SCNC: 4.4 MMOL/L (ref 3.5–5.1)
PTH-INTACT SERPL-MCNC: 82.1 PG/ML (ref 18.4–88)
SODIUM SERPL-SCNC: 142 MMOL/L (ref 136–145)
T4 FREE SERPL-MCNC: 1.2 NG/DL (ref 0.8–1.5)
TSH SERPL DL<=0.05 MIU/L-ACNC: 1.24 UIU/ML (ref 0.36–3.74)

## 2022-06-13 NOTE — PROGRESS NOTES
HPI: Gaby Pennington (: 1955) is a 77 y.o. female, patient, here for evaluation of the following chief complaint(s):    Wrist Pain (Left wrist pain after falling on 22. Right hand dominant.)  Patient is seen today to evaluate her left wrist.  Patient was walking up steps at her daughter-in-law's home when the last step was 12 rather than 8 inches tall and she caught her foot falling forward injuring her left wrist on 2022. X-rays were obtained at [facility] that were felt to represent a sprain although they do clearly show a chronic appearing avulsion off the ulnar styloid and the potential for metaphyseal distal radius nondisplaced fracture. She has been utilizing a copper wrist splint. She denied any significant prior problems with the left wrist.  She is right-hand dominant. She is now seen for further treatment of her left wrist.    Vitals:  LMP 2007    There is no height or weight on file to calculate BMI. Allergies   Allergen Reactions    Metformin Diarrhea and Nausea Only     Stomach cramps    Victoza [Liraglutide] Nausea Only    Avelox [Moxifloxacin] Rash and Itching    Bactrim [Sulfamethoxazole-Trimethoprim] Rash    Doxycycline Other (comments)     Tongue discomfort, swollen glands    Lisinopril Diarrhea    Norvasc [Amlodipine] Other (comments)     edema    Penicillin G Hives, Shortness of Breath and Swelling       Current Outpatient Medications   Medication Sig    diclofenac EC (VOLTAREN) 75 mg EC tablet Take 75 mg by mouth two (2) times a day. Per Ortho for neck pain/strain, cervical radiculopathy and also taking for left heel pain until sees Podiatrist    metoprolol succinate (TOPROL-XL) 50 mg XL tablet TAKE 1 TABLET DAILY FOR    HYPERTENSION    spironolactone (ALDACTONE) 100 mg tablet Take 1 Tablet by mouth daily.     simvastatin (ZOCOR) 20 mg tablet TAKE 1 TABLET NIGHTLY    famotidine (PEPCID) 40 mg tablet TAKE 1 TABLET NIGHTLY    Unithroid 75 mcg tablet TAKE 1 TABLET DAILY BEFORE BREAKFAST    coenzyme Q-10 (Co Q-10) 200 mg capsule Take 200 mg by mouth daily.  albuterol sulfate (PROAIR RESPICLICK) 90 mcg/actuation aepb Take 2 Puffs by inhalation every four (4) hours as needed (sob/wheeze).  Blood-Glucose Meter monitoring kit Test up to 3x daily for type 2 diabetes uncontrolled on insulin, (E11.9,  Z79.4) Type 2 diabetes mellitus with insulin therapy    triamcinolone acetonide (KENALOG) 0.1 % topical cream use thin layer on back twice daily    CINNAMON BARK (CINNAMON PO) Take 350 mg by mouth daily. No current facility-administered medications for this visit. Past Medical History:   Diagnosis Date    AR (allergic rhinitis)     Asthma     mild    Benign essential hypertension 2016    Dependent edema 2022    Dependent B ankle edema    Diabetes mellitus type 2, noninsulin dependent ([facility] [address] 2015; Podiatry Dr Virginia Sanford;  Eye doctor: Dr Jazmín Vick; was prescribed insulin therapy  Dr. Kamaljit Bacon, patient self dc'd     FHx: breast cancer 3/2/2012    GERD (gastroesophageal reflux disease) 11/00    H/O Mild, Intermittent Asthma     H/O Optic disc edema, 0717-4511 3/2/2012    History of benign left ovarian cyst 3/2/2012    Hx of complete eye exam 2017    Dr Castro Luo intraoular pressure and no evidence of diabetic retinopathy    Hyperlipidemia 2002    Hyperparathyroidism ([facility] [address] 2022    Hypothyroidism /    Menopause     Mixed hyperlipidemia 2016    Morbid obesity with BMI of 45.0-49.9, adult (HCC)      (normal spontaneous vaginal delivery)         Ovarian cyst     Left, benign    Pain of left heel 2022 Podiatrist Dr. Lewis Champagne     LMP  (HRT x 6months)    Primary hyperparathyroidism ([address] 2022    Referred to Endo -    Rheumatoid arthritis(714.0)     S/P colonoscopy     Normal (10yrs)    Skin cancer screening exams, Derm Dr Elke Riley 3/2/2012    Vitamin D deficiency 3/2/2012        Past Surgical History:   Procedure Laterality Date    DEXA BONE DENSITY STUDY AXIAL  2006    Normal    HX BREAST BIOPSY Left     Benign Aspiration    HX CERVICAL POLYPECTOMY  14, Dr Paulett Nyhan    Benign    HX COLONOSCOPY  2014    Dr Keysha Gunn polyps x 3; repeat     HX COLONOSCOPY  2015    poor prep, repeat in one year    HX COLONOSCOPY  2016    Normal, follow up 10 yrs    HX COLONOSCOPY  2005    Normal, ok x 10 yrs, Dr Joao Duke    childbirth    HX HEENT  2010    tumor on vocal cord removed    HX HERNIA REPAIR  16, Dr Jason Craven    robotic assisted umbilical hernia repair with mesh     HX LIPOMA RESECTION  ~    removed from left thigh    HX ORTHOPAEDIC      tumor remved left leg    HX OTHER SURGICAL  11/5/15, Dr Mckeon [place]    2 masses removed left lower leg    HX REFRACTIVE SURGERY Bilateral 2019    HX WISDOM TEETH EXTRACTION      MISAEL MAMMOGRAM DIGITAL BILATERAL  annually per Gyn    at [address] [facility] Dr. LUCERO      1-15% Stenosis Bilaterally       Family History   Problem Relation Age of Onset   [employer] Breast Cancer Mother         diagnosed age 72, survived it    [employer] Arthritis-rheumatoid Mother     Stroke Mother     Parkinsonism Mother          in nursing from colon perf or GI bleed age 80    Hypertension Mother     Migraines Mother     Breast Cancer Maternal Grandmother          in her late 42's    Dementia Father     Heart Disease Father         CAD, PTCA    Stroke Father     Cancer Brother 68        soft tissue cancer    Cancer Brother 68        kidney and lungs    Hypertension Sister     Dementia Sister     Heart Disease Sister     Breast Cancer Maternal Aunt     High Cholesterol Son     Cancer Cousin         maternal first cousin w/ sarcoma of leg    Breast Cancer Maternal Aunt     Ovarian Cancer Maternal Aunt     Anesth Problems Neg Hx         Social History     Tobacco Use    Smoking status: Never Smoker    Smokeless tobacco: Never Used   Substance Use Topics    Alcohol use: No     Alcohol/week: 0.0 standard drinks     Comment: very rare, 1-2 drinks a year    Drug use: No        Review of Systems   All other systems reviewed and are negative. ROS     Positive for: Musculoskeletal    Last edited by Wendy Mcadams on 6/14/2022  1:57 PM. (History)             Physical Exam    Overall the patient guards with motion of her left wrist and cannot achieve full flexion and extension. Swelling has improved by her admission as it was rather dramatic initially. She does have good intact thumb EPL tendon function. No cyst or mass. No complaints with the right wrist and hand. Imaging:    Study Result    Narrative & Impression   EXAM: XR WRIST LT AP/LAT/OBL MIN 3V     INDICATION: fall.     COMPARISON: None.     FINDINGS: Three views of the left wrist demonstrate no displaced fracture or  dislocation. The soft tissues are within normal limits.     IMPRESSION  No displaced fracture. Imaging    XR WRIST LT AP/LAT/OBL MIN 3V (Order: [id number]) - 5/27/2022    XR Results (most recent):  Results from Appointment encounter on 06/14/22    XR WRIST LT AP/LAT/OBL MIN 3V    Narrative  AP, lateral, oblique and scaphoid x-ray views of the left wrist shows a nondisplaced predominantly extra-articular left distal radius fracture with likely a component into the lunate fossa of the distal radius but overall no loss of radial length inclination or volar tilt. ASSESSMENT/PLAN:  Below is the assessment and plan developed based on review of pertinent history, physical exam, labs, studies, and medications. Patient examination was consistent with an left distal radius nondisplaced extra-articular fracture likely with slight intra-articular extension from a fall that occurred on a porch step on 5/27/2022.   She has been healing well in a splint and did for the cast. She is traveling to [facility] and will be careful with her left wrist and hand. I will see her back in 3 to 4 weeks for further clinical and x-ray assessment. 1. Left wrist sprain, initial encounter  2. Left wrist pain  3. Other closed extra-articular fracture of distal end of left radius, initial encounter  -     XR WRIST LT AP/LAT/OBL MIN 3V; Future      Return in about 4 weeks (around 7/12/2022). An electronic signature was used to authenticate this note.   -- Zamzam Gold MD

## 2022-06-14 ENCOUNTER — OFFICE VISIT (OUTPATIENT)
Dept: ORTHOPEDIC SURGERY | Age: 67
End: 2022-06-14

## 2022-06-14 ENCOUNTER — OFFICE VISIT (OUTPATIENT)
Dept: ORTHOPEDIC SURGERY | Age: 67
End: 2022-06-14
Payer: MEDICARE

## 2022-06-14 VITALS — HEIGHT: 66 IN | BODY MASS INDEX: 40.82 KG/M2 | WEIGHT: 254 LBS

## 2022-06-14 DIAGNOSIS — S80.12XA CONTUSION OF KNEE AND LOWER LEG, LEFT, INITIAL ENCOUNTER: ICD-10-CM

## 2022-06-14 DIAGNOSIS — S52.552A OTHER CLOSED EXTRA-ARTICULAR FRACTURE OF DISTAL END OF LEFT RADIUS, INITIAL ENCOUNTER: ICD-10-CM

## 2022-06-14 DIAGNOSIS — S80.02XA CONTUSION OF KNEE AND LOWER LEG, LEFT, INITIAL ENCOUNTER: ICD-10-CM

## 2022-06-14 DIAGNOSIS — M25.532 LEFT WRIST PAIN: Primary | ICD-10-CM

## 2022-06-14 DIAGNOSIS — M25.562 LEFT KNEE PAIN, UNSPECIFIED CHRONICITY: Primary | ICD-10-CM

## 2022-06-14 PROCEDURE — G8417 CALC BMI ABV UP PARAM F/U: HCPCS | Performed by: ORTHOPAEDIC SURGERY

## 2022-06-14 PROCEDURE — 25600 CLTX DST RDL FX/EPHYS SEP WO: CPT | Performed by: ORTHOPAEDIC SURGERY

## 2022-06-14 PROCEDURE — 99202 OFFICE O/P NEW SF 15 MIN: CPT | Performed by: ORTHOPAEDIC SURGERY

## 2022-06-14 PROCEDURE — 1123F ACP DISCUSS/DSCN MKR DOCD: CPT | Performed by: ORTHOPAEDIC SURGERY

## 2022-06-14 PROCEDURE — G8756 NO BP MEASURE DOC: HCPCS | Performed by: ORTHOPAEDIC SURGERY

## 2022-06-14 PROCEDURE — G8400 PT W/DXA NO RESULTS DOC: HCPCS | Performed by: ORTHOPAEDIC SURGERY

## 2022-06-14 PROCEDURE — 3017F COLORECTAL CA SCREEN DOC REV: CPT | Performed by: ORTHOPAEDIC SURGERY

## 2022-06-14 PROCEDURE — G8432 DEP SCR NOT DOC, RNG: HCPCS | Performed by: ORTHOPAEDIC SURGERY

## 2022-06-14 PROCEDURE — G8427 DOCREV CUR MEDS BY ELIG CLIN: HCPCS | Performed by: ORTHOPAEDIC SURGERY

## 2022-06-14 PROCEDURE — 1090F PRES/ABSN URINE INCON ASSESS: CPT | Performed by: ORTHOPAEDIC SURGERY

## 2022-06-14 PROCEDURE — 1101F PT FALLS ASSESS-DOCD LE1/YR: CPT | Performed by: ORTHOPAEDIC SURGERY

## 2022-06-14 PROCEDURE — G8536 NO DOC ELDER MAL SCRN: HCPCS | Performed by: ORTHOPAEDIC SURGERY

## 2022-06-14 PROCEDURE — G9899 SCRN MAM PERF RSLTS DOC: HCPCS | Performed by: ORTHOPAEDIC SURGERY

## 2022-06-14 PROCEDURE — 99213 OFFICE O/P EST LOW 20 MIN: CPT | Performed by: ORTHOPAEDIC SURGERY

## 2022-06-14 NOTE — LETTER
6/14/2022    Patient: Gaby Pennington   YOB: 1955   Date of Visit: 6/14/2022     Pearl Holguin MD  [address] [facility]    Dear Pearl Holguin MD,      Thank you for referring Ms. Lalita Rosario to [facility] for evaluation. My notes for this consultation are attached. If you have questions, please do not hesitate to call me. I look forward to following your patient along with you.       Sincerely,    Allison Ennis MD

## 2022-06-14 NOTE — PROGRESS NOTES
Chacho Arenas (: 1955) is a 77 y.o. female, patient, here for evaluation of the following chief complaint(s):  Knee Pain (left knee pain, sprained knee  referred by [facility] )       HPI:    Left knee pain. Injured about 2 weeks ago. Diagnosed with a sprain. Had nonweightbearing x-rays at the emergency room. Reviewed them and they showed an effusion no fractures but tricompartmental osteophytes and bone-on-bone in the patellofemoral joint. Allergies   Allergen Reactions    Metformin Diarrhea and Nausea Only     Stomach cramps    Victoza [Liraglutide] Nausea Only    Avelox [Moxifloxacin] Rash and Itching    Bactrim [Sulfamethoxazole-Trimethoprim] Rash    Doxycycline Other (comments)     Tongue discomfort, swollen glands    Lisinopril Diarrhea    Norvasc [Amlodipine] Other (comments)     edema    Penicillin G Hives, Shortness of Breath and Swelling       Current Outpatient Medications   Medication Sig    diclofenac EC (VOLTAREN) 75 mg EC tablet Take 75 mg by mouth two (2) times a day. Per Ortho for neck pain/strain, cervical radiculopathy and also taking for left heel pain until sees Podiatrist    metoprolol succinate (TOPROL-XL) 50 mg XL tablet TAKE 1 TABLET DAILY FOR    HYPERTENSION    spironolactone (ALDACTONE) 100 mg tablet Take 1 Tablet by mouth daily.  simvastatin (ZOCOR) 20 mg tablet TAKE 1 TABLET NIGHTLY    famotidine (PEPCID) 40 mg tablet TAKE 1 TABLET NIGHTLY    Unithroid 75 mcg tablet TAKE 1 TABLET DAILY BEFORE BREAKFAST    coenzyme Q-10 (Co Q-10) 200 mg capsule Take 200 mg by mouth daily.  albuterol sulfate (PROAIR RESPICLICK) 90 mcg/actuation aepb Take 2 Puffs by inhalation every four (4) hours as needed (sob/wheeze).     Blood-Glucose Meter monitoring kit Test up to 3x daily for type 2 diabetes uncontrolled on insulin, (E11.9,  Z79.4) Type 2 diabetes mellitus with insulin therapy    triamcinolone acetonide (KENALOG) 0.1 % topical cream use thin layer on back twice daily    CINNAMON BARK (CINNAMON PO) Take 350 mg by mouth daily. No current facility-administered medications for this visit. Past Medical History:   Diagnosis Date    AR (allergic rhinitis)     Asthma     mild    Benign essential hypertension 2016    Dependent edema 2022    Dependent B ankle edema    Diabetes mellitus type 2, noninsulin dependent ([facility] [address] 2015; Podiatry Dr Geneva Boyd;  Eye doctor: Dr Jocelin Carroll; was prescribed insulin therapy  Dr. Cristian Healy, patient self dc'd     FHx: breast cancer 3/2/2012    GERD (gastroesophageal reflux disease) 11/00    H/O Mild, Intermittent Asthma     H/O Optic disc edema, 2882-6923 3/2/2012    History of benign left ovarian cyst 3/2/2012    Hx of complete eye exam 2017    Dr Zully Mello intraoular pressure and no evidence of diabetic retinopathy    Hyperlipidemia 2002    Hyperparathyroidism ([facility] [address] 2022    Hypothyroidism     Menopause     Mixed hyperlipidemia 2016    Morbid obesity with BMI of 45.0-49.9, adult (HCC)      (normal spontaneous vaginal delivery)         Ovarian cyst     Left, benign    Pain of left heel 2022 Podiatrist Dr. Mahendra Elias     LMP  (HRT x 6months)    Primary hyperparathyroidism ([facility] [address] 2022    Referred to Endo -    Rheumatoid arthritis(714.0)     S/P colonoscopy     Normal (10yrs)    Skin cancer screening exams, Derm Dr Senora Curling 3/2/2012    Vitamin D deficiency 3/2/2012        Past Surgical History:   Procedure Laterality Date    DEXA BONE DENSITY STUDY AXIAL      Normal    HX BREAST BIOPSY Left     Benign Aspiration    HX CERVICAL POLYPECTOMY  14, Dr Tate Marker    Benign    HX COLONOSCOPY  2014    Dr David Balbuena polyps x 3; repeat     HX COLONOSCOPY  2015    poor prep, repeat in one year    HX COLONOSCOPY  2016    Normal, follow up 10 yrs    HX COLONOSCOPY  2005 Normal, ok x 10 yrs, Dr Ashwin Zavala    childbirth    HX HEENT  2010    tumor on vocal cord removed    HX HERNIA REPAIR  16, Dr Joellen Rodriguez    robotic assisted umbilical hernia repair with mesh     HX LIPOMA RESECTION  ~    removed from left thigh    HX ORTHOPAEDIC      tumor remved left leg    HX OTHER SURGICAL  11/5/15, Dr Marivel Curtis    2 masses removed left lower leg    HX REFRACTIVE SURGERY Bilateral 2019    HX WISDOM TEETH EXTRACTION      MISAEL MAMMOGRAM DIGITAL BILATERAL  annually per Gyn    at [address] [facility] Dr. LUCERO      1-15% Stenosis Bilaterally       Family History   Problem Relation Age of Onset   [employer] Manual Breast Cancer Mother         diagnosed age 72, survived it    Hellen Manual Arthritis-rheumatoid Mother     Stroke Mother     Parkinsonism Mother          in nursing from colon perf or GI bleed age 80    Hypertension Mother    Hellen Manual Migraines Mother     Breast Cancer Maternal Grandmother          in her late 42's    Dementia Father     Heart Disease Father         CAD, PTCA    Stroke Father     Cancer Brother 68        soft tissue cancer    Cancer Brother 68        kidney and lungs    Hypertension Sister     Dementia Sister     Heart Disease Sister     Breast Cancer Maternal Aunt     High Cholesterol Son     Cancer Cousin         maternal first cousin w/ sarcoma of leg    Breast Cancer Maternal Aunt     Ovarian Cancer Maternal Aunt     Anesth Problems Neg Hx         Social History     Socioeconomic History    Marital status:      Spouse name: Patric Ager    Number of children: 1    Years of education: Not on file    Highest education level: Not on file   Occupational History    Occupation: Worked as  for [employer], Retired    Tobacco Use    Smoking status: Never Smoker    Smokeless tobacco: Never Used   Substance and Sexual Activity    Alcohol use: No     Alcohol/week: 0.0 standard drinks     Comment: very rare, 1-2 drinks a year    Drug use: No    Sexual activity: Not Currently     Partners: Male     Comment:      Other Topics Concern     Service Not Asked    Blood Transfusions Not Asked    Caffeine Concern No     Comment: decaff tea    Occupational Exposure Not Asked    Hobby Hazards Not Asked    Sleep Concern Not Asked    Stress Concern Not Asked    Weight Concern Not Asked    Special Diet No    Back Care Not Asked    Exercise No    Bike Helmet Not Asked    [address] Not Asked    Self-Exams Not Asked   Social History Narrative    ,   2022 sudden MI    1 son and one 10 yo grand daughter both local and she helps out w/ her alcalos    Worked as a  for [employer]    Retired     Stays active w/ her grand daughter, goes to [place], takes her a lot of places    Stays socially engaged w/ friends as well    Michelle Meals on Sundays    Traveling a lot this summer to various amusement [place] w/ her son and grand daughter        Diet: cut back on sugars/sweets, milk & fruits; also cut back on snacking; lately eating more sandwiches so she has re-introduced the bread she had cut out for a while but otherwise sticking to low sugar/carbs    Exercise: not as much lately but back in 22 had been doing exercise video ~ 5 x a week x 1 hr; but now her left foot has been bothering her and she is seeing podiatry for that; trying to stay active as much as possible for now    Weight: working on losing weight; since  has been increasing her activity, walking more, trying to make healthier food choices and so far her wt has come down from the 270's to 252 lbs         Social Determinants of Health     Financial Resource Strain:     Difficulty of Paying Living Expenses: Not on file   Food Insecurity:     Worried About [address] in the Last Year: Not on file    [place] of Food in the Last Year: Not on file   Transportation Needs:     Lack of Transportation (Medical): Not on file    Lack of Transportation (Non-Medical): Not on file   Physical Activity:     Days of Exercise per Week: Not on file    Minutes of Exercise per Session: Not on file   Stress:     Feeling of Stress : Not on file   Social Connections:     Frequency of Communication with Friends and Family: Not on file    Frequency of Social Gatherings with Friends and Family: Not on file    Attends Orthodoxy Services: Not on file    Active Member of [address] [place] [address] [employer] or Organizations: Not on file    Attends Club or Organization Meetings: Not on file    Marital Status: Not on file   Intimate Partner Violence:     Fear of Current or Ex-Partner: Not on file    Emotionally Abused: Not on file    Physically Abused: Not on file    Sexually Abused: Not on file   Housing Stability:     Unable to Pay for Housing in the Last Year: Not on file    Number of [place] in the Last Year: Not on file    Unstable Housing in the Last Year: Not on file       ROS     Positive for: Musculoskeletal    Last edited by Jeremias Singh on 6/14/2022  1:28 PM. (History)            Vitals:  Ht 5' 6\" (1.676 m)   Wt 254 lb (115.2 kg)   LMP 12/24/2007   BMI 41.00 kg/m²    Body mass index is 41 kg/m². PHYSICAL EXAM:  Examined the patient's left knee. She has intact extensor mechanism. Pain is over her tibial tubercle. Knee is ligamentously stable x4. Small effusion is noted. Roland's testing is negative. Left hip has painless range of motion. IMAGING:  Reviewed x-rays from the emergency room. Tricompartmental osteophytes are present with bone-on-bone in the patellofemoral joint. No fractures. Small effusion    ASSESSMENT/PLAN:  1. Left knee pain, unspecified chronicity  2. Contusion of knee and lower leg, left, initial encounter    Reassured the patient. She can weight-bear and resume activities as tolerated. No fractures are noted. Will improve over time. Therapy was prescribed.   Going to use over-the-counter Voltaren gel as well as a cane patches to get her through her upcoming trip. An electronic signature was used to authenticate this note.   --Kaylee Bhakta MD

## 2022-07-01 NOTE — TELEPHONE ENCOUNTER
Error medication requested has been discontinued by provider. HISTORY OF PRESENT ILLNESS: This is a 73 year old, male who is well known to the orthopedics department.    The patient underwent a right total hip arthroplasty REVISIOIN on 09/27/2021.    The patient comes in for  follow-up hip pain.  Patient reports right hip is slightly better from last injection.  Still having pain about 3/10.  Patient reports right knee pain 4/10 that is chronic but reports getting worse.  Patient reports left ankle pain since last night.  Patient reports left foot/ ankle pain with weight-bearing activity.    Patient reports history of gout.  No gout flare ups for long time and not in any medications for it.    Patient denies any falls or trauma to right knee, right hip, or left foot or ankle.   Patient denies any numbness or tingling bilaterally.    Patient denies any fevers or chills.       Hip examination    GENERAL:  This is a healthy-appearing 73 year old, male who is well nourished, well developed, in no acute distress, pleasant and cooperative during the examination.  VITAL SIGNS:   There were no vitals taken for this visit.  PSYCH:  Alert, awake and oriented x 3.  MUSCULOSKELETAL:   There is pain with hip flexion to the anterior lateral aspect of hip.  No pain with hip abduction and extension.    There is a well healed surgical scar over the ANT/lateral aspect of the right  Hip.   Temperature:   RT: WARM          LEFT:  WARM   LEG LENGTHS:     APPEAR EQUAL  Mildly tender to palpation to greater trochanteric region.  No tenderness to IT band or Gerdy's tubercle.    Tibialis anterior and extensor hallucis longus being 5/5.    Motor strength of hamstrings, quads, adductors, abductor musculature 4/5.     Right knee:  Full range of motion, positive crepitus.  No pain with hyperflexion or hyperextension.  Negative anterior posterior drawer test.  Stable to valgus and varus stress of right knee ligaments.  Sensation intact.    Strength: 4+/5.      Left ankle:  No edema or erythema.  Slightly  limited dorsiflexion, full plantar flexion.  Difficulty with supination and pronation.  Nontender to palpation of lateral and medial malleolus.  Nontender to palpation to 5th metatarsal base.  Nontender to palpation to proximal tibia and fibula.  Squeeze test: Negative  calcaneal squeeze test: Negative    Nontender to CFL, posterior talofibular ligament, or deltoid ligament.  Negative talar tilt test.  Negative anterior drawer test.  Tender to palpation over the anterior inferior talo fibular leg.  Compartments are soft and supple to bilateral lower extremities  Vandana’s bilaterally:  RT:  NEGATIVE       LEFT:  NEGATIVE  No signs or symptoms of deep venous thrombosis.  Varicosities:  RT few      LEFT:  few  NEUROLOGIC:  Sensation is completely intact to light touch to all dermatomes of the bilateral lower extremities.    STATION:  STABLE    X-rays left ankle:  No acute fractures or dislocation.  Osteoporotic bone. degenerative changes  Right knee:  No acute findings.  Moderate osteoarthritis.      Assessment:  1.  History of  RT  total hip arthroplasty Revision via: ANT approach, doing well.  2.  Right greater trochanteric bursitis, improved.  3.  Right knee osteoarthritis  4.  Left ankle pain    PLAN:    Today we discussed x-rays findings.  Patient doing slightly better with right hip pain.    We discussed following up with hip specialist in [place] for further evaluation continuous pain.  The rest of the right knee pain, this is mild.  X-ray demonstrates moderate osteoarthritis.  Patient may need a joint injection in the near future.    Left ankle pain:  I do not see any evidence of gout flare up, no acute findings on x-ray.  Possible acute on chronic exacerbation of pain.   We will send Dosepak for patient to help with left ankle and right knee pain.  We will hold referral for foot/ankle specialist unless pain persists.   Discussed taking anti-inflammatories such as ibuprofen 600 milligrams 3 times a day,  patient may take Tylenol.  Patient will call our orthopedic department at [phone] for any questions or concerns.    All questions were answered to the best of my ability.

## 2022-07-13 NOTE — PROGRESS NOTES
HPI: Romario Hoyt (: 1955) is a 77 y.o. female, patient, here for evaluation of the following chief complaint(s):    Wrist Pain (Left wrist nondisplaced distal radius fracture sustained when tripping on steps on 22.)  Patient is seen today to evaluate her left wrist.  Patient was walking up steps at her daughter-in-law's home when the last step was 12 rather than 8 inches tall and she caught her foot falling forward injuring her left wrist on 2022. X-rays were obtained at [facility] that were felt to represent a sprain although they do clearly show a chronic appearing avulsion off the ulnar styloid and metaphyseal distal radius nondisplaced fracture. She has been utilizing a copper wrist splint. She denied any significant prior problems with the left wrist.  She is right-hand dominant. Vitals:  [facility] 2007    There is no height or weight on file to calculate BMI. Allergies   Allergen Reactions    Metformin Diarrhea and Nausea Only     Stomach cramps    Victoza [Liraglutide] Nausea Only    Avelox [Moxifloxacin] Rash and Itching    Bactrim [Sulfamethoxazole-Trimethoprim] Rash    Doxycycline Other (comments)     Tongue discomfort, swollen glands    Lisinopril Diarrhea    Norvasc [Amlodipine] Other (comments)     edema    Penicillin G Hives, Shortness of Breath and Swelling       Current Outpatient Medications   Medication Sig    diclofenac EC (VOLTAREN) 75 mg EC tablet Take 75 mg by mouth two (2) times a day. Per Ortho for neck pain/strain, cervical radiculopathy and also taking for left heel pain until sees Podiatrist    metoprolol succinate (TOPROL-XL) 50 mg XL tablet TAKE 1 TABLET DAILY FOR    HYPERTENSION    spironolactone (ALDACTONE) 100 mg tablet Take 1 Tablet by mouth daily.     simvastatin (ZOCOR) 20 mg tablet TAKE 1 TABLET NIGHTLY    famotidine (PEPCID) 40 mg tablet TAKE 1 TABLET NIGHTLY    Unithroid 75 mcg tablet TAKE 1 TABLET DAILY BEFORE BREAKFAST    coenzyme Q-10 (Co Q-10) 200 mg capsule Take 200 mg by mouth daily.  albuterol sulfate (PROAIR RESPICLICK) 90 mcg/actuation aepb Take 2 Puffs by inhalation every four (4) hours as needed (sob/wheeze).  Blood-Glucose Meter monitoring kit Test up to 3x daily for type 2 diabetes uncontrolled on insulin, (E11.9,  Z79.4) Type 2 diabetes mellitus with insulin therapy    triamcinolone acetonide (KENALOG) 0.1 % topical cream use thin layer on back twice daily    CINNAMON BARK (CINNAMON PO) Take 350 mg by mouth daily. No current facility-administered medications for this visit. Past Medical History:   Diagnosis Date    AR (allergic rhinitis)     Asthma     mild    Benign essential hypertension 2016    Dependent edema 2022    Dependent B ankle edema    Diabetes mellitus type 2, noninsulin dependent ([facility] [address] 2015; Podiatry Dr Yong Zapien;  Eye doctor: Dr Moody Ring; was prescribed insulin therapy  Dr. Vikki Gross, patient self dc'd     FHx: breast cancer 3/2/2012    GERD (gastroesophageal reflux disease) 11/00    H/O Mild, Intermittent Asthma     H/O Optic disc edema, [id number] 3/2/2012    History of benign left ovarian cyst 3/2/2012    Hx of complete eye exam 2017    Dr Ryland Mosher intraoular pressure and no evidence of diabetic retinopathy    Hyperlipidemia 2002    Hyperparathyroidism ([facility] [address] 2022    Hypothyroidism /    Menopause     Mixed hyperlipidemia 2016    Morbid obesity with BMI of 45.0-49.9, adult (HCC)      (normal spontaneous vaginal delivery)         Ovarian cyst     Left, benign    Pain of left heel 2022 Podiatrist Dr. Satnam Ellis     LMP  (HRT x 6months)    Primary hyperparathyroidism ([address] 2022    Referred to Endo -    Rheumatoid arthritis(714.0)     S/P colonoscopy     Normal (10yrs)    Skin cancer screening exams, Derm Dr Iker Shepherd 3/2/2012    Vitamin D deficiency 3/2/2012        Past Surgical History:   Procedure Laterality Date    DEXA BONE DENSITY STUDY AXIAL  2006    Normal    HX BREAST BIOPSY Left     Benign Aspiration    HX CERVICAL POLYPECTOMY  14, Dr Micheline Alexander    Benign    HX COLONOSCOPY  2014    Dr Bree García polyps x 3; repeat     HX COLONOSCOPY  2015    poor prep, repeat in one year    HX COLONOSCOPY  2016    Normal, follow up 10 yrs    HX COLONOSCOPY  2005    Normal, ok x 10 yrs, Dr Luis Antonio Bustillos    childbirth    HX HEENT  2010    tumor on vocal cord removed    HX HERNIA REPAIR  16, Dr Mary Campuzano    robotic assisted umbilical hernia repair with mesh     HX LIPOMA RESECTION  ~    removed from left thigh    HX ORTHOPAEDIC      tumor remved left leg    HX OTHER SURGICAL  11/5/15, Dr Zelda Cerrato    2 masses removed left lower leg    HX REFRACTIVE SURGERY Bilateral 2019    HX WISDOM TEETH EXTRACTION      MISAEL MAMMOGRAM DIGITAL BILATERAL  annually per Gyn    at [address] [facility] [address] LUCERO      1-15% Stenosis Bilaterally       Family History   Problem Relation Age of Onset   Mauricio Spina Breast Cancer Mother         diagnosed age 72, survived it    Mauricio Spina Arthritis-rheumatoid Mother     Stroke Mother     Parkinsonism Mother          in nursing from colon perf or GI bleed age 80    Hypertension Mother    Mauricio Spina Migraines Mother     Breast Cancer Maternal Grandmother          in her late 42's    Dementia Father     Heart Disease Father         CAD, PTCA    Stroke Father     Cancer Brother 68        soft tissue cancer    Cancer Brother 68        kidney and lungs    Hypertension Sister     Dementia Sister     Heart Disease Sister     Breast Cancer Maternal Aunt     High Cholesterol Son     Cancer Cousin         maternal first cousin w/ sarcoma of leg    Breast Cancer Maternal Aunt     Ovarian Cancer Maternal Aunt     Anesth Problems Neg Hx         Social History     Tobacco Use    Smoking status: Never Smoker    Smokeless tobacco: Never Used   Substance Use Topics    Alcohol use: No     Alcohol/week: 0.0 standard drinks     Comment: very rare, 1-2 drinks a year    Drug use: No        Review of Systems   All other systems reviewed and are negative. ROS     Positive for: Musculoskeletal    Last edited by Rosette Nelson on 7/14/2022  8:14 AM. (History)             Physical Exam    Overall the patient guards has improved motion in her wrist.  Swelling has improved by her admission as it was rather dramatic initially. She does have good intact thumb EPL tendon function. No cyst or mass. No complaints with the right wrist and hand. Imaging:    Study Result    Narrative & Impression   EXAM: XR WRIST LT AP/LAT/OBL MIN 3V     INDICATION: fall.     COMPARISON: None.     FINDINGS: Three views of the left wrist demonstrate no displaced fracture or  dislocation. The soft tissues are within normal limits.     IMPRESSION  No displaced fracture. Imaging    XR WRIST LT AP/LAT/OBL MIN 3V (Order: [id number]) - 5/27/2022    XR Results (most recent):  Results from Appointment encounter on 07/14/22    XR WRIST LT AP/LAT/OBL MIN 3V    Narrative  AP, lateral oblique x-ray of the left wrist shows good interval healing of the distal radius nondisplaced extra-articular fracture with significant interosseous callus production and a tiny avulsion of the tip of the ulnar styloid. Good alignment. ASSESSMENT/PLAN:  Below is the assessment and plan developed based on review of pertinent history, physical exam, labs, studies, and medications. Patient examination was consistent with an left distal radius nondisplaced extra-articular fracture likely with slight intra-articular extension from a fall that occurred on a porch step on 5/27/2022. She has a splint for comfort and support when needed. Continue motion and strength. F/U in 6 weeks final visit.     1. Other closed extra-articular fracture of distal end of left radius with routine healing, subsequent encounter  -     XR WRIST LT AP/LAT/OBL MIN 3V; Future  2. Left wrist pain      No follow-ups on file. An electronic signature was used to authenticate this note.   -- Bereket Sanders MD

## 2022-07-14 ENCOUNTER — OFFICE VISIT (OUTPATIENT)
Dept: ORTHOPEDIC SURGERY | Age: 67
End: 2022-07-14
Payer: MEDICARE

## 2022-07-14 DIAGNOSIS — S52.552D OTHER CLOSED EXTRA-ARTICULAR FRACTURE OF DISTAL END OF LEFT RADIUS WITH ROUTINE HEALING, SUBSEQUENT ENCOUNTER: Primary | ICD-10-CM

## 2022-07-14 DIAGNOSIS — M25.532 LEFT WRIST PAIN: ICD-10-CM

## 2022-07-14 PROCEDURE — 99024 POSTOP FOLLOW-UP VISIT: CPT | Performed by: ORTHOPAEDIC SURGERY

## 2022-07-14 NOTE — PATIENT INSTRUCTIONS
Wrist Fracture: Rehab Exercises  Introduction  Here are some examples of exercises for you to try. The exercises may be suggested for a condition or for rehabilitation. Start each exercise slowly. Ease off the exercises if you start to have pain. You will be told when to start these exercises and which ones will work best for you. How to do the exercises  Wrist flexion and extension    1. Place your forearm on a table, with your hand and affected wrist extended beyond the table, palm down. 2. Bend your wrist to move your hand upward and allow your hand to close into a fist, then lower your hand and allow your fingers to relax. Hold each position for about 6 seconds. 3. Repeat 8 to 12 times. Hand flips    1. While seated, place your forearm and affected wrist on your thigh, palm down. 2. Flip your hand over so the back of your hand rests on your thigh and your palm is up. Alternate between palm up and palm down while keeping your forearm on your thigh. 3. Repeat 8 to 12 times. Wrist radial and ulnar deviation    1. Hold your affected hand out in front of you, palm down. 2. Slowly bend your wrist as far as you can from side to side. Hold each position for about 6 seconds. 3. Repeat 8 to 12 times. Wrist extensor stretch    1. Extend the arm with the affected wrist in front of you and point your fingers toward the floor. 2. With your other hand, gently bend your wrist farther until you feel a mild to moderate stretch in your forearm. 3. Hold the stretch for at least 15 to 30 seconds. 4. Repeat 2 to 4 times. 5. When you can do this stretch with ease and no pain, repeat steps 1 through 4. But this time extend your affected arm in front of you and make a fist with your palm facing down. Then bend your wrist, pointing your fist toward the floor. Wrist flexor stretch    1. Extend the arm with the affected wrist in front of you with your palm facing away from your body.   2. Bend back your wrist, pointing your hand up toward the ceiling. 3. With your other hand, gently bend your wrist farther until you feel a mild to moderate stretch in your forearm. 4. Hold the stretch for at least 15 to 30 seconds. 5. Repeat 2 to 4 times. 6. Repeat steps 1 through 5, but this time extend your affected arm in front of you with your palm facing up. Then bend back your wrist, pointing your hand toward the floor. Intrinsic flexion    1. Rest the hand with the affected wrist on a table and bend the large joints where your fingers connect to your hand. Keep your thumb and the other joints in your fingers straight. 2. Slowly straighten your fingers. Your wrist should be relaxed, following the line of your fingers and thumb. 3. Move back to your starting position, with your hand bent. 4. Repeat 8 to 12 times. MP extension    1. Place your good hand on a table, palm up. Put the hand with the affected wrist on top of your good hand with your fingers wrapped around the thumb of your good hand like you are making a fist.  2. Slowly uncurl the joints of the hand with the affected wrist where your fingers connect to your hand so that only the top two joints of your fingers are bent. Your fingers will look like a hook. Hold the position for about 6 seconds. 3. Move back to your starting position, with your fingers wrapped around your good thumb. 4. Repeat 8 to 12 times. Follow-up care is a key part of your treatment and safety. Be sure to make and go to all appointments, and call your doctor if you are having problems. It's also a good idea to know your test results and keep a list of the medicines you take. Where can you learn more? Go to http://www.gray.com/  Enter Z454 in the search box to learn more about \"Wrist Fracture: Rehab Exercises. \"  Current as of: July 1, 2021               Content Version: 13.2  © 0598-2454 [employer]wise[employer].    Care instructions adapted under license by [employer] (which disclaims liability or warranty for this information). If you have questions about a medical condition or this instruction, always ask your healthcare professional. [address] any warranty or liability for your use of this information.

## 2022-07-14 NOTE — LETTER
7/14/2022    Patient: Eric Quiroz   YOB: 1955   Date of Visit: 7/14/2022     Brandy Singletary MD  [address]    Dear Brandy Singletary MD,      Thank you for referring Ms. Jodie Vasquez to [facility] for evaluation. My notes for this consultation are attached. If you have questions, please do not hesitate to call me. I look forward to following your patient along with you.       Sincerely,    Cortez Ball MD

## 2022-07-22 DIAGNOSIS — I10 BENIGN ESSENTIAL HYPERTENSION: ICD-10-CM

## 2022-07-22 DIAGNOSIS — R60.9 DEPENDENT EDEMA: ICD-10-CM

## 2022-07-23 RX ORDER — METOPROLOL SUCCINATE 50 MG/1
TABLET, EXTENDED RELEASE ORAL
Qty: 90 TABLET | Refills: 0 | Status: SHIPPED | OUTPATIENT
Start: 2022-07-23 | End: 2022-09-24

## 2022-07-23 RX ORDER — SPIRONOLACTONE 100 MG/1
TABLET, FILM COATED ORAL
Qty: 90 TABLET | Refills: 0 | Status: SHIPPED | OUTPATIENT
Start: 2022-07-23 | End: 2022-09-24

## 2022-08-02 ENCOUNTER — OFFICE VISIT (OUTPATIENT)
Dept: ORTHOPEDIC SURGERY | Age: 67
End: 2022-08-02
Payer: MEDICARE

## 2022-08-02 DIAGNOSIS — M54.12 CERVICAL RADICULITIS: ICD-10-CM

## 2022-08-02 DIAGNOSIS — M54.2 CERVICAL PAIN (NECK): Primary | ICD-10-CM

## 2022-08-02 DIAGNOSIS — M25.512 ACUTE PAIN OF LEFT SHOULDER: ICD-10-CM

## 2022-08-02 DIAGNOSIS — M43.12 ACQUIRED SPONDYLOLISTHESIS OF CERVICAL VERTEBRA: ICD-10-CM

## 2022-08-02 DIAGNOSIS — S46.012A TRAUMATIC INCOMPLETE TEAR OF LEFT ROTATOR CUFF, INITIAL ENCOUNTER: ICD-10-CM

## 2022-08-02 PROCEDURE — G8536 NO DOC ELDER MAL SCRN: HCPCS | Performed by: PHYSICIAN ASSISTANT

## 2022-08-02 PROCEDURE — G8400 PT W/DXA NO RESULTS DOC: HCPCS | Performed by: PHYSICIAN ASSISTANT

## 2022-08-02 PROCEDURE — 99214 OFFICE O/P EST MOD 30 MIN: CPT | Performed by: PHYSICIAN ASSISTANT

## 2022-08-02 PROCEDURE — 3017F COLORECTAL CA SCREEN DOC REV: CPT | Performed by: PHYSICIAN ASSISTANT

## 2022-08-02 PROCEDURE — 1101F PT FALLS ASSESS-DOCD LE1/YR: CPT | Performed by: PHYSICIAN ASSISTANT

## 2022-08-02 PROCEDURE — 1090F PRES/ABSN URINE INCON ASSESS: CPT | Performed by: PHYSICIAN ASSISTANT

## 2022-08-02 PROCEDURE — G8417 CALC BMI ABV UP PARAM F/U: HCPCS | Performed by: PHYSICIAN ASSISTANT

## 2022-08-02 PROCEDURE — G8756 NO BP MEASURE DOC: HCPCS | Performed by: PHYSICIAN ASSISTANT

## 2022-08-02 PROCEDURE — 1123F ACP DISCUSS/DSCN MKR DOCD: CPT | Performed by: PHYSICIAN ASSISTANT

## 2022-08-02 PROCEDURE — G8432 DEP SCR NOT DOC, RNG: HCPCS | Performed by: PHYSICIAN ASSISTANT

## 2022-08-02 PROCEDURE — G9899 SCRN MAM PERF RSLTS DOC: HCPCS | Performed by: PHYSICIAN ASSISTANT

## 2022-08-02 PROCEDURE — G8427 DOCREV CUR MEDS BY ELIG CLIN: HCPCS | Performed by: PHYSICIAN ASSISTANT

## 2022-08-02 RX ORDER — METHYLPREDNISOLONE 4 MG/1
TABLET ORAL
Qty: 1 DOSE PACK | Refills: 0 | Status: SHIPPED | OUTPATIENT
Start: 2022-08-02 | End: 2022-11-01 | Stop reason: ALTCHOICE

## 2022-08-03 VITALS — BODY MASS INDEX: 40.82 KG/M2 | HEIGHT: 66 IN | WEIGHT: 254 LBS

## 2022-08-03 NOTE — PROGRESS NOTES
Salo Blanc (: 1955) is a 79 y.o. female, established  patient, here for evaluation of the following chief complaint(s):  Neck Pain and Shoulder Pain         SUBJECTIVE/OBJECTIVE:    Salo Blanc (: 1955) is a 79 y.o. female who presents for evaluation of neck pain with radiation to the left upper extremity. Patient states symptoms initially began secondary to a fall up stairs in May 2022. Patient states she fell on concrete stairs and fractured her left hand. Patient states since that injury she has been experiencing posterior neck pain with radiation to the left shoulder, upper arm and forearm. Patient describes numbness and tingling in the left arm and hand as well as left arm weakness. The patient was initially seen at an urgent care facility and was evaluated by Dr. Coleen Dorsey. Patient states she has inability to raise left arm overhead since the injury. Patient has been taking Tylenol and occasional diclofenac for pain relief and rates her average daily pain level as a 5/10. States when she attempts to raise her arm overhead her pain level is rated as a 10/10. The patient denies gait or balance disturbance. .          No flowsheet data found. ROS    The patient denies fevers, chills, chest pain, shortness of breath, nausea, vomiting. Positive for musculoskeletal issues as described in the HPI. Vitals:  Ht 5' 6\" (1.676 m)   Wt 254 lb (115.2 kg)   LMP 2007   BMI 41.00 kg/m²    Body mass index is 41 kg/m². PHYSICAL EXAM:    Patient is alert and oriented x3 and in no acute distress. Patient ambulates with normal gait without gait aids. Sensation to light touch in all major nerve distributions in the upper extremities is intact in the right upper extremity is considered diminished in the left thumb index finger and forearm. .  Manual motor testing of the major muscle groups of the upper extremities including  strength, hand intrinsics, wrist flexion/extension, biceps/triceps, deltoid intact in the right upper extremity. Testing of the left upper extremity demonstrates left  strength weakness, wrist flexion weakness and hand intrinsic weakness 4/5. Patient noted to have left deltoid weakness 3/5. Deep tendon reflexes in the biceps, triceps and brachioradialis are +2/4 and symmetric. Range of motion of the cervical spine mildly diminished with full forward flexion chin to chest, cervical extension to 40 degrees, lateral rotation to 70 degrees bilaterally, lateral bending to 30 degrees bilaterally. Negative Cynthia's, negative Spurling's maneuver. There is tenderness to palpation of the cervical paraspinals and upper trapezius bilaterally. Physical examination of the left shoulder demonstrates patient has 80 degrees active forward flexion 70 degrees active abduction and 60 degrees active external rotation with the elbow at the side. Physical examination of the left supraspinatus and deltoid demonstrates 3/5 motor weakness. Positive drop arm test.  Pain with attempted passive forward flexion beyond 90 degrees. Pulses intact. Brisk capillary refill. IMAGING:      XR Results (maximum last 3): Results from Appointment encounter on 08/02/22    XR SHOULDER LT AP/LAT MIN 2 V    Narrative  AP, outlet and Zapata view digital radiographs of the left shoulder obtained in the office today were reviewed and demonstrate mild erosion of glenohumeral joint space. There is loss of subacromial joint space. No evidence of fracture or acute bony abnormality. XR SPINE CERV 4 OR 5 V    Narrative  AP, lateral, flexion and extension view digital radiographs of the cervical spine obtained in the office today were reviewed and demonstrate preservation of vertebral body height and intervertebral disc height. Grade 1 anterolisthesis C3 on C4 of 2 mm which is mobile on flexion/extension views.   There is no evidence of fracture, lytic lesion or acute bony abnormality. Results from Appointment encounter on 07/14/22    XR WRIST LT AP/LAT/OBL MIN 3V    Narrative  AP, lateral oblique x-ray of the left wrist shows good interval healing of the distal radius nondisplaced extra-articular fracture with significant interosseous callus production and a tiny avulsion of the tip of the ulnar styloid. Good alignment. Allergies   Allergen Reactions    Metformin Diarrhea and Nausea Only     Stomach cramps    Victoza [Liraglutide] Nausea Only    Avelox [Moxifloxacin] Rash and Itching    Bactrim [Sulfamethoxazole-Trimethoprim] Rash    Doxycycline Other (comments)     Tongue discomfort, swollen glands    Lisinopril Diarrhea    Norvasc [Amlodipine] Other (comments)     edema    Penicillin G Hives, Shortness of Breath and Swelling         Current Outpatient Medications   Medication Sig    methylPREDNISolone (MEDROL DOSEPACK) 4 mg tablet Per dose pack instructions    metoprolol succinate (TOPROL-XL) 50 mg XL tablet TAKE 1 TABLET BY MOUTH EVERY DAY FOR BLOOD PRESSURE    spironolactone (ALDACTONE) 100 mg tablet TAKE 1 TABLET BY MOUTH EVERY DAY    diclofenac EC (VOLTAREN) 75 mg EC tablet Take 75 mg by mouth two (2) times a day. Per Ortho for neck pain/strain, cervical radiculopathy and also taking for left heel pain until sees Podiatrist    simvastatin (ZOCOR) 20 mg tablet TAKE 1 TABLET NIGHTLY    famotidine (PEPCID) 40 mg tablet TAKE 1 TABLET NIGHTLY    Unithroid 75 mcg tablet TAKE 1 TABLET DAILY BEFORE BREAKFAST    coenzyme Q-10 (Co Q-10) 200 mg capsule Take 200 mg by mouth daily. albuterol sulfate (PROAIR RESPICLICK) 90 mcg/actuation aepb Take 2 Puffs by inhalation every four (4) hours as needed (sob/wheeze).     Blood-Glucose Meter monitoring kit Test up to 3x daily for type 2 diabetes uncontrolled on insulin, (E11.9,  Z79.4) Type 2 diabetes mellitus with insulin therapy    triamcinolone acetonide (KENALOG) 0.1 % topical cream use thin layer on back twice daily    CINNAMON BARK (CINNAMON PO) Take 350 mg by mouth daily. No current facility-administered medications for this visit. Past Medical History:   Diagnosis Date    AR (allergic rhinitis)     Asthma     mild    Benign essential hypertension 2016    Dependent edema 2022    Dependent B ankle edema    Diabetes mellitus type 2, noninsulin dependent ([facility] [address] 2015; Podiatry Dr Nataliia Vences;  Eye doctor: Dr Scotty García; was prescribed insulin therapy  Dr. Skylar Guzman, patient self dc'd     FHx: breast cancer 3/2/2012    GERD (gastroesophageal reflux disease) 11/00    H/O Mild, Intermittent Asthma     H/O Optic disc edema, [id number] 3/2/2012    History of benign left ovarian cyst 3/2/2012    Hx of complete eye exam 2017    Dr Yuriy Casarez intraoular pressure and no evidence of diabetic retinopathy    Hyperlipidemia 2002    Hyperparathyroidism ([facility] [address] 2022    Hypothyroidism /    Menopause     Mixed hyperlipidemia 2016    Morbid obesity with BMI of 45.0-49.9, adult ([facility] [address]      (normal spontaneous vaginal delivery)         Ovarian cyst     Left, benign    Pain of left heel 2022 Podiatrist Dr. Too Omalley     LMP  (HRT x 6months)    Primary hyperparathyroidism ([facility] [address] 2022    Referred to Endo -    Rheumatoid arthritis(714.0)     S/P colonoscopy     Normal (10yrs)    Skin cancer screening exams, Derm Dr Kwasi Mackenzie 3/2/2012    Vitamin D deficiency 3/2/2012          Past Surgical History:   Procedure Laterality Date    DEXA BONE DENSITY STUDY AXIAL      Normal    HX BREAST BIOPSY Left     Benign Aspiration    HX CERVICAL POLYPECTOMY  14, Dr Lalita Amezcua    Benign    HX COLONOSCOPY  2014    Dr Mercedes Hernández polyps x 3; repeat     HX COLONOSCOPY  2015    poor prep, repeat in one year    HX COLONOSCOPY  2016    Normal, follow up 10 yrs    HX COLONOSCOPY  2005    Normal, ok x 10 yrs, Dr Sam Kerr childbirth    HX HEENT  2010    tumor on vocal cord removed    HX HERNIA REPAIR  16, Dr Mami Edwards    robotic assisted umbilical hernia repair with mesh     HX LIPOMA RESECTION  ~    removed from left thigh    HX ORTHOPAEDIC      tumor remved left leg    HX OTHER SURGICAL  11/5/15, Dr Maday Sanchez    2 masses removed left lower leg    HX REFRACTIVE SURGERY Bilateral 2019    HX WISDOM TEETH EXTRACTION      MISAEL MAMMOGRAM DIGITAL BILATERAL  annually per Gyn    at [facility] [place] [facility] BILATERAL      1-15% Stenosis Bilaterally         Family History   Problem Relation Age of Onset    Breast Cancer Mother         diagnosed age 72, survived it     Arthritis-rheumatoid Mother     Stroke Mother     Parkinsonism Mother          in nursing from colon perf or GI bleed age 80    Hypertension Mother     Migraines Mother     Breast Cancer Maternal Grandmother          in her late 42's    Dementia Father     Heart Disease Father         CAD, PTCA    Stroke Father     Cancer Brother 68        soft tissue cancer    Cancer Brother 68        kidney and lungs    Hypertension Sister     Dementia Sister     Heart Disease Sister     Breast Cancer Maternal Aunt     High Cholesterol Son     Cancer Cousin         maternal first cousin w/ sarcoma of leg    Breast Cancer Maternal Aunt     Ovarian Cancer Maternal Aunt     Anesth Problems Neg Hx           Social History     Tobacco Use    Smoking status: Never    Smokeless tobacco: Never   Substance Use Topics    Alcohol use: No     Alcohol/week: 0.0 standard drinks     Comment: very rare, 1-2 drinks a year    Drug use: No                 ASSESSMENT/PLAN:      1. Cervical pain (neck)  -     XR SPINE CERV 4 OR 5 V; Future  -     REFERRAL TO PHYSICAL THERAPY  2. Acute pain of left shoulder  -     XR SHOULDER LT AP/LAT MIN 2 V; Future  -     REFERRAL TO PHYSICAL THERAPY  3.  Traumatic incomplete tear of left rotator cuff, initial encounter  -     REFERRAL TO PHYSICAL THERAPY  4. Acquired spondylolisthesis of cervical vertebra  -     REFERRAL TO PHYSICAL THERAPY  5. Cervical radiculitis  -     REFERRAL TO PHYSICAL THERAPY      Below is the assessment and plan developed based on review of pertinent history, physical exam, labs, studies, and medications. Have discussed the patients diagnosis and radiographic findings at length and have answered all patient questions to her questions to her satisfaction. Have sent a prescription for a steroid taper electronically to the patient's preferred pharmacy. Have provided the patient with a prescription for outpatient physical therapy for rotator cuff, upper back and neck strengthening, modalities and instruction in a home exercise program.  Advised patient schedule follow-up appointment with a member of the sports medicine team for evaluation of the left rotator cuff. Will plan on seeing the patient back for reevaluation in 4 to 6 weeks time should symptoms persist or worsen. The patient understands and agrees to the treatment plan as outlined above. Return if symptoms worsen or fail to improve. Dr. Beth Mejia was available for immediate consult during this encounter. An electronic signature was used to authenticate this note.     -- Diego Sen PA-C

## 2022-08-05 DIAGNOSIS — I10 BENIGN ESSENTIAL HYPERTENSION: ICD-10-CM

## 2022-08-05 DIAGNOSIS — R60.9 DEPENDENT EDEMA: ICD-10-CM

## 2022-08-07 RX ORDER — BLOOD-GLUCOSE METER
KIT MISCELLANEOUS
Qty: 300 STRIP | Refills: 3 | Status: SHIPPED | OUTPATIENT
Start: 2022-08-07

## 2022-08-07 RX ORDER — SPIRONOLACTONE 100 MG/1
TABLET, FILM COATED ORAL
Qty: 90 TABLET | Refills: 0 | OUTPATIENT
Start: 2022-08-07

## 2022-08-24 NOTE — PROGRESS NOTES
HPI: Gisele Faith (: 1955) is a 79 y.o. female, patient, here for evaluation of the following chief complaint(s):    Wrist Pain (Left wrist distal radius fracture sustained when she tripped on steps on 22.)  Patient is seen today to evaluate her left wrist.  Patient was walking up steps at her daughter-in-law's home when the last step was 12 rather than 8 inches tall and she caught her foot falling forward injuring her left wrist on 2022. X-rays were obtained at [facility] that were felt to represent a sprain although they do clearly show a chronic appearing avulsion off the ulnar styloid and metaphyseal distal radius nondisplaced fracture. She had been utilizing a copper wrist splint. She denied any significant prior problems with the left wrist.  She is right-hand dominant. She has recovered well but has experienced some mild dorsal right wrist and hand pain that may be compensatory in nature as she recovered on the left side. Vitals:  [facility] 2007    There is no height or weight on file to calculate BMI. Allergies   Allergen Reactions    Metformin Diarrhea and Nausea Only     Stomach cramps    Victoza [Liraglutide] Nausea Only    Avelox [Moxifloxacin] Rash and Itching    Bactrim [Sulfamethoxazole-Trimethoprim] Rash    Doxycycline Other (comments)     Tongue discomfort, swollen glands    Lisinopril Diarrhea    Norvasc [Amlodipine] Other (comments)     edema    Penicillin G Hives, Shortness of Breath and Swelling       Current Outpatient Medications   Medication Sig    FreeStyle Lite Strips strip USE TO TEST BLOOD GLUCOSE UP TO THREE TIMES DAILY.  DX: E11.9, Z79.4    methylPREDNISolone (MEDROL DOSEPACK) 4 mg tablet Per dose pack instructions    metoprolol succinate (TOPROL-XL) 50 mg XL tablet TAKE 1 TABLET BY MOUTH EVERY DAY FOR BLOOD PRESSURE    spironolactone (ALDACTONE) 100 mg tablet TAKE 1 TABLET BY MOUTH EVERY DAY    diclofenac EC (VOLTAREN) 75 mg EC tablet Take 75 mg by mouth two (2) times a day. Per Ortho for neck pain/strain, cervical radiculopathy and also taking for left heel pain until sees Podiatrist    simvastatin (ZOCOR) 20 mg tablet TAKE 1 TABLET NIGHTLY    famotidine (PEPCID) 40 mg tablet TAKE 1 TABLET NIGHTLY    Unithroid 75 mcg tablet TAKE 1 TABLET DAILY BEFORE BREAKFAST    coenzyme Q-10 (Co Q-10) 200 mg capsule Take 200 mg by mouth daily. albuterol sulfate (PROAIR RESPICLICK) 90 mcg/actuation aepb Take 2 Puffs by inhalation every four (4) hours as needed (sob/wheeze). Blood-Glucose Meter monitoring kit Test up to 3x daily for type 2 diabetes uncontrolled on insulin, (E11.9,  Z79.4) Type 2 diabetes mellitus with insulin therapy    triamcinolone acetonide (KENALOG) 0.1 % topical cream use thin layer on back twice daily    CINNAMON BARK (CINNAMON PO) Take 350 mg by mouth daily. No current facility-administered medications for this visit. Past Medical History:   Diagnosis Date    AR (allergic rhinitis)     Asthma     mild    Benign essential hypertension 2016    Dependent edema 2022    Dependent B ankle edema    Diabetes mellitus type 2, noninsulin dependent ([facility] [address] 2015; Podiatry Dr Melo Dawn;  Eye doctor: Dr Milton Sales; was prescribed insulin therapy  Dr. Nuzhat Diaz, patient self dc'd     FHx: breast cancer 3/2/2012    GERD (gastroesophageal reflux disease) 11/00    H/O Mild, Intermittent Asthma     H/O Optic disc edema, 0881-1231 3/2/2012    History of benign left ovarian cyst 3/2/2012    Hx of complete eye exam 2017    Dr Zhen London intraoular pressure and no evidence of diabetic retinopathy    Hyperlipidemia 2002    Hyperparathyroidism ([facility] [address] 2022    Hypothyroidism     Menopause     Mixed hyperlipidemia 2016    Morbid obesity with BMI of 45.0-49.9, adult ([facility] [address]      (normal spontaneous vaginal delivery)         Ovarian cyst     Left, benign    Pain of left heel 2022 Podiatrist Dr. Julio Garcias     LMP  (HRT x 6months)    Primary hyperparathyroidism ([address] 2022    Referred to Endo -    Rheumatoid arthritis(714.0)     S/P colonoscopy     Normal (10yrs)    Skin cancer screening exams, Derm Dr Luigi Malagon 3/2/2012    Vitamin D deficiency 3/2/2012        Past Surgical History:   Procedure Laterality Date    DEXA BONE DENSITY STUDY AXIAL  2006    Normal    HX BREAST BIOPSY Left     Benign Aspiration    HX CERVICAL POLYPECTOMY  14, Dr Smith Matter    Benign    HX COLONOSCOPY  2014    Dr Yuriy Bonilla polyps x 3; repeat     HX COLONOSCOPY  2015    poor prep, repeat in one year    HX COLONOSCOPY  2016    Normal, follow up 10 yrs    HX COLONOSCOPY  2005    Normal, ok x 10 yrs, Dr Arnoldo Corbin    childbirth    HX HEENT  2010    tumor on vocal cord removed    HX HERNIA REPAIR  16, Dr Stephanie Sinclair    robotic assisted umbilical hernia repair with mesh     HX LIPOMA RESECTION  ~    removed from left thigh    HX ORTHOPAEDIC      tumor remved left leg    HX OTHER SURGICAL  11/5/15, Dr Valdo Mcclain    2 masses removed left lower leg    HX REFRACTIVE SURGERY Bilateral 2019    HX WISDOM TEETH EXTRACTION      MISAEL MAMMOGRAM DIGITAL BILATERAL  annually per Gyn    at [facility] BILATERAL      1-15% Stenosis Bilaterally       Family History   Problem Relation Age of Onset    Breast Cancer Mother         diagnosed age 72, survived it     Arthritis-rheumatoid Mother     Stroke Mother     Parkinsonism Mother          in nursing from colon perf or GI bleed age 80    Hypertension Mother     Migraines Mother     Breast Cancer Maternal Grandmother          in her late 42's    Dementia Father     Heart Disease Father         CAD, PTCA    Stroke Father     Cancer Brother 68        soft tissue cancer    Cancer Brother 68        kidney and lungs    Hypertension Sister     Dementia Sister     Heart Disease Sister     Breast Cancer Maternal Aunt     High Cholesterol Son     Cancer Cousin         maternal first cousin w/ sarcoma of leg    Breast Cancer Maternal Aunt     Ovarian Cancer Maternal Aunt     Anesth Problems Neg Hx         Social History     Tobacco Use    Smoking status: Never    Smokeless tobacco: Never   Substance Use Topics    Alcohol use: No     Alcohol/week: 0.0 standard drinks     Comment: very rare, 1-2 drinks a year    Drug use: No        Review of Systems   All other systems reviewed and are negative. Physical Exam    Overall the patient has improved motion in her wrist.  Swelling has resolved -  as it was rather dramatic initially. She does have good intact thumb EPL tendon function. No cyst or mass. She had mild discomfort over the mid dorsal and somewhat ulnar aspect of the right wrist and hand but demonstrated symmetric motion. No cyst or mass. She thinks this may have been aggravated as she overcompensated on the right side for the left wrist fracture. Imaging:    Study Result    Narrative & Impression   EXAM: XR WRIST LT AP/LAT/OBL MIN 3V     INDICATION: fall. COMPARISON: None. FINDINGS: Three views of the left wrist demonstrate no displaced fracture or  dislocation. The soft tissues are within normal limits. IMPRESSION  No displaced fracture. Imaging    XR WRIST LT AP/LAT/OBL MIN 3V (Order: [id number]) - 5/27/2022    XR Results (maximum last 2): Results from Appointment encounter on 08/25/22    XR WRIST RT AP/LAT/OBL MIN 3V    Narrative  AP, lateral and oblique x-ray of the right wrist shows some osteopenic findings and a spur on the distal pole the scaphoid with minimal degenerative changes STT region but no profound arthritis or fracture. XR WRIST LT AP/LAT/OBL MIN 3V    Narrative  AP, lateral and oblique x-ray of the left wrist shows solid healing of the transverse metaphyseal extra-articular left distal radius fracture. Chronic ossicle tip of ulnar styloid.   Slight osteopenia but no advanced arthritis. ASSESSMENT/PLAN:  Below is the assessment and plan developed based on review of pertinent history, physical exam, labs, studies, and medications. Patient examination was consistent with an left distal radius nondisplaced extra-articular fracture likely with slight intra-articular extension from a fall that occurred on a porch step on 5/27/2022. Left wrist fracture is now clinically and radiographically healed. She has excellent range of motion and continues with mobility and strength. Her mild right wrist and hand discomfort appears compensatory in nature and should improve given more time with motion and strengthening. She may return in 6 to 8 weeks or anytime for further treatment. 1. Other closed extra-articular fracture of distal end of left radius with routine healing, subsequent encounter  -     XR WRIST LT AP/LAT/OBL MIN 3V; Future  2. Left wrist pain  3. Right wrist pain      Return for Follow-up in 6 to 8 weeks or as needed. An electronic signature was used to authenticate this note.   -- Nori Ibarra MD

## 2022-08-25 ENCOUNTER — OFFICE VISIT (OUTPATIENT)
Dept: ORTHOPEDIC SURGERY | Age: 67
End: 2022-08-25
Payer: MEDICARE

## 2022-08-25 DIAGNOSIS — M25.532 LEFT WRIST PAIN: ICD-10-CM

## 2022-08-25 DIAGNOSIS — S52.552D OTHER CLOSED EXTRA-ARTICULAR FRACTURE OF DISTAL END OF LEFT RADIUS WITH ROUTINE HEALING, SUBSEQUENT ENCOUNTER: Primary | ICD-10-CM

## 2022-08-25 DIAGNOSIS — M25.531 RIGHT WRIST PAIN: ICD-10-CM

## 2022-08-25 PROCEDURE — 99024 POSTOP FOLLOW-UP VISIT: CPT | Performed by: ORTHOPAEDIC SURGERY

## 2022-08-25 NOTE — LETTER
8/25/2022    Patient: Jefferson Krause   YOB: 1955   Date of Visit: 8/25/2022     Ana Henao MD  [address]    Dear Ana Henao MD,      Thank you for referring Ms. Sanket Ortiz to [facility] for evaluation. My notes for this consultation are attached. If you have questions, please do not hesitate to call me. I look forward to following your patient along with you.       Sincerely,    Jw Arzate MD

## 2022-08-25 NOTE — PATIENT INSTRUCTIONS
Wrist: Exercises  Introduction  Here are some examples of exercises for you to try. The exercises may be suggested for a condition or for rehabilitation. Start each exercise slowly. Ease off the exercises if you start to have pain. You will be told when to start these exercises and which ones will work best for you. How to do the exercises  Prayer stretch    Start with your palms together in front of your chest just below your chin. Slowly lower your hands toward your waistline, keeping your hands close to your stomach and your palms together until you feel a mild to moderate stretch under your forearms. Hold for at least 15 to 30 seconds. Repeat 2 to 4 times. Wrist flexor stretch    Extend your arm in front of you with your palm up. Bend your wrist, pointing your hand toward the floor. With your other hand, gently bend your wrist farther until you feel a mild to moderate stretch in your forearm. Hold for at least 15 to 30 seconds. Repeat 2 to 4 times. Wrist extensor stretch    Repeat steps 1 through 4 of the stretch above, but begin with your extended hand palm down. Follow-up care is a key part of your treatment and safety. Be sure to make and go to all appointments, and call your doctor if you are having problems. It's also a good idea to know your test results and keep a list of the medicines you take. Where can you learn more? Go to http://www.gray.com/  Enter Z598 in the search box to learn more about \"Wrist: Exercises. \"  Current as of: July 1, 2021               Content Version: 13.2  © 4489-8058 [employer]. Care instructions adapted under license by [employer] (which disclaims liability or warranty for this information). If you have questions about a medical condition or this instruction, always ask your healthcare professional. [place] [address] any warranty or liability for your use of this information.

## 2022-09-21 DIAGNOSIS — E03.9 HYPOTHYROIDISM, ADULT: ICD-10-CM

## 2022-09-21 RX ORDER — LEVOTHYROXINE SODIUM 75 UG/1
75 TABLET ORAL
Qty: 90 TABLET | Refills: 2 | Status: SHIPPED | OUTPATIENT
Start: 2022-09-21

## 2022-09-21 NOTE — TELEPHONE ENCOUNTER
Patient call for refill Unithroid. Thanks, Marie Velazquez    Last Visit: 6/9/22 MD MICHELE, labs completed  Next Appointment: 11/1/22 MD MICHELE  Previous Refill Encounter(s): 8/31/21 90 + 3    Requested Prescriptions     Pending Prescriptions Disp Refills    Unithroid 75 mcg tablet 90 Tablet 3     Sig: Take 1 Tablet by mouth Daily (before breakfast). For [facility] in place:   Recommendation Provided To:    Intervention Detail: New Rx: 1, reason: Patient Preference  Gap Closed?:   Intervention Accepted By:   Time Spent (min): 5

## 2022-09-23 DIAGNOSIS — I10 BENIGN ESSENTIAL HYPERTENSION: ICD-10-CM

## 2022-09-23 DIAGNOSIS — R60.9 DEPENDENT EDEMA: ICD-10-CM

## 2022-09-24 RX ORDER — SPIRONOLACTONE 100 MG/1
TABLET, FILM COATED ORAL
Qty: 90 TABLET | Refills: 0 | Status: SHIPPED | OUTPATIENT
Start: 2022-09-24

## 2022-09-24 RX ORDER — METOPROLOL SUCCINATE 50 MG/1
TABLET, EXTENDED RELEASE ORAL
Qty: 90 TABLET | Refills: 0 | Status: SHIPPED | OUTPATIENT
Start: 2022-09-24

## 2022-11-01 ENCOUNTER — OFFICE VISIT (OUTPATIENT)
Dept: FAMILY MEDICINE CLINIC | Age: 67
End: 2022-11-01
Payer: MEDICARE

## 2022-11-01 VITALS
TEMPERATURE: 98.1 F | HEART RATE: 63 BPM | HEIGHT: 66 IN | WEIGHT: 236.4 LBS | OXYGEN SATURATION: 99 % | SYSTOLIC BLOOD PRESSURE: 134 MMHG | DIASTOLIC BLOOD PRESSURE: 74 MMHG | BODY MASS INDEX: 37.99 KG/M2 | RESPIRATION RATE: 16 BRPM

## 2022-11-01 DIAGNOSIS — R39.15 URINARY URGENCY: ICD-10-CM

## 2022-11-01 DIAGNOSIS — E83.52 HYPERCALCEMIA: ICD-10-CM

## 2022-11-01 DIAGNOSIS — J45.998 SEASONAL ASTHMA: ICD-10-CM

## 2022-11-01 DIAGNOSIS — I10 BENIGN ESSENTIAL HYPERTENSION: ICD-10-CM

## 2022-11-01 DIAGNOSIS — J45.20 MILD INTERMITTENT ASTHMA WITHOUT COMPLICATION: ICD-10-CM

## 2022-11-01 DIAGNOSIS — Z00.00 MEDICARE ANNUAL WELLNESS VISIT, SUBSEQUENT: Primary | ICD-10-CM

## 2022-11-01 DIAGNOSIS — E11.9 DIABETES MELLITUS TYPE 2, NONINSULIN DEPENDENT (HCC): ICD-10-CM

## 2022-11-01 DIAGNOSIS — E78.2 MIXED HYPERLIPIDEMIA: ICD-10-CM

## 2022-11-01 PROCEDURE — 99214 OFFICE O/P EST MOD 30 MIN: CPT | Performed by: FAMILY MEDICINE

## 2022-11-01 PROCEDURE — G0439 PPPS, SUBSEQ VISIT: HCPCS | Performed by: FAMILY MEDICINE

## 2022-11-01 PROCEDURE — G8536 NO DOC ELDER MAL SCRN: HCPCS | Performed by: FAMILY MEDICINE

## 2022-11-01 PROCEDURE — G8427 DOCREV CUR MEDS BY ELIG CLIN: HCPCS | Performed by: FAMILY MEDICINE

## 2022-11-01 PROCEDURE — 1101F PT FALLS ASSESS-DOCD LE1/YR: CPT | Performed by: FAMILY MEDICINE

## 2022-11-01 PROCEDURE — 1123F ACP DISCUSS/DSCN MKR DOCD: CPT | Performed by: FAMILY MEDICINE

## 2022-11-01 PROCEDURE — G0463 HOSPITAL OUTPT CLINIC VISIT: HCPCS | Performed by: FAMILY MEDICINE

## 2022-11-01 PROCEDURE — 3044F HG A1C LEVEL LT 7.0%: CPT | Performed by: FAMILY MEDICINE

## 2022-11-01 PROCEDURE — G8400 PT W/DXA NO RESULTS DOC: HCPCS | Performed by: FAMILY MEDICINE

## 2022-11-01 PROCEDURE — G8417 CALC BMI ABV UP PARAM F/U: HCPCS | Performed by: FAMILY MEDICINE

## 2022-11-01 PROCEDURE — 2022F DILAT RTA XM EVC RTNOPTHY: CPT | Performed by: FAMILY MEDICINE

## 2022-11-01 PROCEDURE — 1090F PRES/ABSN URINE INCON ASSESS: CPT | Performed by: FAMILY MEDICINE

## 2022-11-01 PROCEDURE — G8510 SCR DEP NEG, NO PLAN REQD: HCPCS | Performed by: FAMILY MEDICINE

## 2022-11-01 PROCEDURE — G8754 DIAS BP LESS 90: HCPCS | Performed by: FAMILY MEDICINE

## 2022-11-01 PROCEDURE — G9899 SCRN MAM PERF RSLTS DOC: HCPCS | Performed by: FAMILY MEDICINE

## 2022-11-01 PROCEDURE — 3078F DIAST BP <80 MM HG: CPT | Performed by: FAMILY MEDICINE

## 2022-11-01 PROCEDURE — G8752 SYS BP LESS 140: HCPCS | Performed by: FAMILY MEDICINE

## 2022-11-01 PROCEDURE — 3074F SYST BP LT 130 MM HG: CPT | Performed by: FAMILY MEDICINE

## 2022-11-01 PROCEDURE — 3017F COLORECTAL CA SCREEN DOC REV: CPT | Performed by: FAMILY MEDICINE

## 2022-11-01 NOTE — PROGRESS NOTES
This is the Subsequent Medicare Annual Wellness Exam, performed 12 months or more after the Initial AWV or the last Subsequent AWV    I have reviewed the patient's medical history in detail and updated the computerized patient record. Assessment/Plan   Education and counseling provided:  Are appropriate based on today's review and evaluation    1. Medicare annual wellness visit, subsequent         Depression Risk Factor Screening     3 most recent PHQ Screens 11/1/2022   PHQ Not Done -   Little interest or pleasure in doing things Not at all   Feeling down, depressed, irritable, or hopeless Not at all   Total Score PHQ 2 0       Alcohol & Drug Abuse Risk Screen    Do you average more than 1 drink per night or more than 7 drinks a week:  No    On any one occasion in the past three months have you have had more than 3 drinks containing alcohol:  No          Functional Ability and Level of Safety    Hearing: Hearing is good. Activities of Daily Living:  Patient does total self care  ADL Assessment 11/1/2022   Feeding yourself No Help Needed   Getting from bed to chair No Help Needed   Getting dressed No Help Needed   Bathing or showering No Help Needed   Walk across the room (includes cane/walker) No Help Needed   Using the telphone No Help Needed   Taking your medications No Help Needed   Preparing meals No Help Needed   Managing money (expenses/bills) No Help Needed   Moderately strenuous housework (laundry) No Help Needed   Shopping for personal items (toiletries/medicines) No Help Needed   Shopping for groceries No Help Needed   Driving No Help Needed   Climbing a flight of stairs No Help Needed   Getting to places beyond walking distances No Help Needed         Ambulation: with no difficulty     Fall Risk:  Fall Risk Assessment, last 12 mths 11/1/2022   Able to walk? Yes   Fall in past 12 months? 1   Do you feel unsteady? 0   Are you worried about falling 0   Is TUG test greater than 12 seconds?  0   Is the gait abnormal? 0   Number of falls in past 12 months 1   Fall with injury?  0      Abuse Screen:  Patient is not abused       Cognitive Screening    Has your family/caregiver stated any concerns about your memory: no         Health Maintenance Due     Health Maintenance Due   Topic Date Due    Bone Densitometry (Dexa) Screening  Never done    MICROALBUMIN Q1  2022    COVID-19 Vaccine (5 - Booster) 2022       Patient Care Team   Patient Care Team:  Nory Coronado MD as PCP - General (Family Medicine)  Nory Coronado MD as PCP - [facility]  Tadeo Vann MD (Cardiovascular Disease Physician)  Kj Bonilla DPM (Podiatry)  Delaney Odonnell MD (Ophthalmology)  Jonathan Mary MD (Colon and Rectal Surgery)  Sophia Parker MD as Physician (Orthopedic Surgery)  Kaleb Becerra (Orthopedic Surgery)    History     Patient Active Problem List   Diagnosis Code    Metabolic syndrome S03.77    AR (allergic rhinitis) J30.9    GERD (gastroesophageal reflux disease) K21.9    H/O Mild, Intermittent Asthma J45.909    Postmenopause, LMP , age 47 yo, s/p HRT x 6 months Z78.0    Ovarian cyst N83.209     (normal spontaneous vaginal delivery) O80    Vitamin D deficiency E55.9    FHx: breast cancer Z80.3    History of benign left ovarian cyst,  Z87.42    Skin cancer screening Z12.83    H/O Optic disc edema, 9725-6732 H47.10    Abdominal wall bulge R19.00    Actinic keratosis L57.0    Hypothyroidism E03.9    Diabetes mellitus type 2, noninsulin dependent ([facility] [address] P23.9    Umbilical hernia without obstruction and without gangrene K42.9    Mixed hyperlipidemia E78.2    Benign essential hypertension I10    Morbid obesity with BMI of 45.0-49.9, adult ([address] E66.01, Z68.42    Spider veins of both lower extremities I83.93    Varicose vein of leg I83.90    History of rheumatoid arthritis Z87.39    Chronic sinusitis J32.9    Hyperparathyroidism ([facility] [address] E21.3    Dependent edema R60.9 Pain of left heel M79.672     Past Medical History:   Diagnosis Date    AR (allergic rhinitis)     Asthma     mild    Benign essential hypertension 2016    Dependent edema 2022    Dependent B ankle edema    Diabetes mellitus type 2, noninsulin dependent ([facility] [address] 2015; Podiatry Dr Ann Arnold;  Eye doctor: Dr Jeremiah Garcia; was prescribed insulin therapy  Dr. Yas Carlson, patient self dc'd     FHx: breast cancer 3/2/2012    GERD (gastroesophageal reflux disease) 11/00    H/O Mild, Intermittent Asthma     H/O Optic disc edema, 0055-2876 3/2/2012    History of benign left ovarian cyst 3/2/2012    Hx of complete eye exam 2017    Dr Mckeon Generous intraoular pressure and no evidence of diabetic retinopathy    Hyperlipidemia 2002    Hyperparathyroidism ([facility] [address] 2022    Hypothyroidism     Menopause     Mixed hyperlipidemia 2016    Morbid obesity with BMI of 45.0-49.9, adult ([facility] [address]      (normal spontaneous vaginal delivery)         Ovarian cyst 2000    Left, benign    Pain of left heel 2022 Podiatrist Dr. Whitlock Nearing     LMP  (HRT x 6months)    Primary hyperparathyroidism ([address] 2022    Referred to Endo -    Rheumatoid arthritis(714.0)     S/P colonoscopy     Normal (10yrs)    Skin cancer screening exams, Derm Dr Alison Madrigal 3/2/2012    Vitamin D deficiency 3/2/2012      Past Surgical History:   Procedure Laterality Date    DEXA BONE DENSITY STUDY AXIAL      Normal    HX BREAST BIOPSY Left     Benign Aspiration    HX CERVICAL POLYPECTOMY  14, Dr Nita Andre    Benign    HX COLONOSCOPY  2014    Dr Beka Lambert polyps x 3; repeat     HX COLONOSCOPY  2015    poor prep, repeat in one year    HX COLONOSCOPY  2016    Normal, follow up 10 yrs    HX COLONOSCOPY  2005    Normal, ok x 10 yrs, Dr Luiza Bernabe    childbirth    HX HEENT  2010    tumor on vocal cord removed    [address],  [place]    robotic assisted umbilical hernia repair with mesh     HX LIPOMA RESECTION  ~2005    removed from left thigh    HX ORTHOPAEDIC  1985    tumor remved left leg    HX OTHER SURGICAL  11/5/15, Dr Rosa Mays    2 masses removed left lower leg    HX REFRACTIVE SURGERY Bilateral 08/28/2019    HX WISDOM TEETH EXTRACTION      MISAEL MAMMOGRAM DIGITAL BILATERAL  annually per Gyn    at [facility]  2007    1-15% Stenosis Bilaterally     Current Outpatient Medications   Medication Sig Dispense Refill    albuterol sulfate (ProAir RespiClick) 90 mcg/actuation breath activated inhaler Take 2 Puffs by inhalation every six (6) hours as needed (sob/wheeze). 1 Each 1    metoprolol succinate (TOPROL-XL) 50 mg XL tablet TAKE 1 TABLET BY MOUTH EVERY DAY FOR BLOOD PRESSURE 90 Tablet 0    spironolactone (ALDACTONE) 100 mg tablet TAKE 1 TABLET BY MOUTH EVERY DAY 90 Tablet 0    Unithroid 75 mcg tablet Take 1 Tablet by mouth Daily (before breakfast). 90 Tablet 2    FreeStyle Lite Strips strip USE TO TEST BLOOD GLUCOSE UP TO THREE TIMES DAILY. DX: E11.9, Z79.4 300 Strip 3    simvastatin (ZOCOR) 20 mg tablet TAKE 1 TABLET NIGHTLY 90 Tablet 0    famotidine (PEPCID) 40 mg tablet TAKE 1 TABLET NIGHTLY 90 Tablet 0    coenzyme Q-10 (CO Q-10) 200 mg capsule Take 200 mg by mouth daily. Blood-Glucose Meter monitoring kit Test up to 3x daily for type 2 diabetes uncontrolled on insulin, (E11.9,  Z79.4) Type 2 diabetes mellitus with insulin therapy 1 Kit 0    triamcinolone acetonide (KENALOG) 0.1 % topical cream use thin layer on back twice daily 45 g 1    CINNAMON BARK (CINNAMON PO) Take 350 mg by mouth daily.        Allergies   Allergen Reactions    Metformin Diarrhea and Nausea Only     Stomach cramps    Victoza [Liraglutide] Nausea Only    Avelox [Moxifloxacin] Rash and Itching    Bactrim [Sulfamethoxazole-Trimethoprim] Rash    Doxycycline Other (comments)     Tongue discomfort, swollen glands    Lisinopril Diarrhea Norvasc [Amlodipine] Other (comments)     edema    Penicillin G Hives, Shortness of Breath and Swelling       Family History   Problem Relation Age of Onset    Breast Cancer Mother         diagnosed age 72, survived it     Arthritis-rheumatoid Mother     Stroke Mother     Parkinsonism Mother          in nursing from colon perf or GI bleed age 80    Hypertension Mother     Migraines Mother     Breast Cancer Maternal Grandmother          in her late 42's    Dementia Father     Heart Disease Father         CAD, PTCA    Stroke Father     Cancer Brother 68        soft tissue cancer    Cancer Brother 68        kidney and lungs    Hypertension Sister     Dementia Sister     Heart Disease Sister     Breast Cancer Maternal Aunt     High Cholesterol Son     Cancer Cousin         maternal first cousin w/ sarcoma of leg    Breast Cancer Maternal Aunt     Ovarian Cancer Maternal Aunt     Anesth Problems Neg Hx      Social History     Tobacco Use    Smoking status: Never    Smokeless tobacco: Never   Substance Use Topics    Alcohol use: No     Alcohol/week: 0.0 standard drinks     Comment: very rare, 1-2 drinks a year         Rox Song MD

## 2022-11-01 NOTE — PROGRESS NOTES
Chief Complaint   Patient presents with    Annual Wellness Visit    Diabetes     Fasting    Cholesterol Problem       HISTORY OF PRESENT ILLNESS   Fasting follow up Type 2 NIDDM, Hypercholesterolemia, Hypertension, labs and medication check. Complying routinely w/ medications as updated below, tolerating w/o reaction or side effects. No longer on any diabetes related medications, working on diet and exercise alone. Checks BS's 3-4 x a week. Fasting in AM runs . 2 hrs after meals runs 140's  Diet: since late 2020/early 2021 has cut back on sugars/sweets, then since summer 2022 has also cut back on milk & fruits and snacking; since 9-2022 has only been eating about one meal a day per a meal plan program she has been trying to incorporate  Exercise: 50 crunches daily, walks 3 x a week x 2 miles; will be starting a fitness program 11-2-22 that incorporates more cardio at moderate level  Weight: working on losing weight; since 8-2020 has been increasing her activity, walking more, and trying to make healthier food choices in general; weight down from 270's to 254 lbs as of 8-2022 and now down to 236 lbs as of 11/1/22; personal goal wt is 175 lbs  Feeling well in general and pleased w/ her progress     REVIEW OF SYMPTOMS   Review of Systems   Constitutional:  Negative for chills, diaphoresis and fever. Eyes: Negative. Respiratory:  Negative for cough and sputum production. Mild intermittent and seasonal asthma, flares in the fall time, uses inhaler a few x a month during change of season. Cardiovascular: Negative. Negative for chest pain, palpitations, claudication and leg swelling. Gastrointestinal: Negative. Genitourinary:  Positive for urgency (x 1 week). Negative for dysuria, frequency and hematuria. H/o stress incontinence if she has a full bladder, otherwise no incontinence or leaking   Musculoskeletal:  Negative for myalgias. Neurological: Negative.     Endo/Heme/Allergies: Negative. PROBLEM LIST/MEDICAL HISTORY     Problem List  Date Reviewed: 11/1/2022            Codes Class Noted    Pain of left heel ICD-10-CM: M79.672  ICD-9-CM: 729.5  6/9/2022    Overview Signed 6/9/2022  8:49 AM by Chelsea Biswas MD     4/2022 Podiatrist Dr. Sparrow              Dependent edema ICD-10-CM: R60.9  ICD-9-CM: 782.3  4/27/2022    Overview Signed 4/27/2022  1:55 PM by Chelsea Biswas MD     Dependent B ankle edema             Hyperparathyroidism ([facility] [address] ICD-10-CM: E21.3  ICD-9-CM: 252.00  1/9/2022    Overview Addendum 4/27/2022  4:12 PM by Chelsea Biswas MD     Referred to Endo 1-2022 (didn't go yet) will reassess at follow up labs 5-2022             Chronic sinusitis ICD-10-CM: J32.9  ICD-9-CM: 473.9  2/4/2020        History of rheumatoid arthritis ICD-10-CM: Z87.39  ICD-9-CM: V13.4  10/11/2019    Overview Signed 10/11/2019  3:11 PM by Juani Epperson     In her 20s/30s             Spider veins of both lower extremities ICD-10-CM: I83.93  ICD-9-CM: 454.9  5/17/2018        Varicose vein of leg ICD-10-CM: I83.90  ICD-9-CM: 454.9  5/17/2018        Morbid obesity with BMI of 45.0-49.9, adult ([facility] [address] (Chronic) ICD-10-CM: E66.01, Z68.42  ICD-9-CM: 278.01, V85.42  10/7/2017        Mixed hyperlipidemia ICD-10-CM: E78.2  ICD-9-CM: 272.2  5/31/2016        Benign essential hypertension ICD-10-CM: I10  ICD-9-CM: 401.1  0/41/6924        Umbilical hernia without obstruction and without gangrene ICD-10-CM: K42.9  ICD-9-CM: 553.1  4/22/2016        Diabetes mellitus type 2, noninsulin dependent ([facility] ICD-10-CM: E11.9  ICD-9-CM: 250.00  12/3/2015    Overview Addendum 7/7/2021  7:38 PM by Chelsea Biswas MD     12/2013; Podiatry Dr Andrews Roth;  Eye doctor: Dr Edwin Camargo; was prescribed insulin therapy 2018 Dr. Mansi Avalos, patient self dc'd 2020/2021             Hypothyroidism ICD-10-CM: E03.9  ICD-9-CM: 244.9  7/30/2015    Overview Signed 7/30/2015 11:40 AM by Edil Sun, Deondre Valenzuela MD                  Actinic keratosis ICD-10-CM: L57.0  ICD-9-CM: 702.0  2014        Abdominal wall bulge ICD-10-CM: R19.00  ICD-9-CM: 789.30  3/25/2013        Vitamin D deficiency ICD-10-CM: E55.9  ICD-9-CM: 268.9  3/2/2012    Overview Addendum 3/2/2012  9:46 AM by Jannet Holman MD     2011, s/p 1 month Rx Vit D             FHx: breast cancer ICD-10-CM: Z80.3  ICD-9-CM: V16.3  3/2/2012    Overview Signed 3/2/2012  9:43 AM by Jannet Holman MD     Several maternal relatives             History of benign left ovarian cyst,  ICD-10-CM: Z87.42  ICD-9-CM: V13.29  3/2/2012        Skin cancer screening ICD-10-CM: Z12.83  ICD-9-CM: V76.43  3/2/2012    Overview Signed 10/7/2021  8:36 AM by Jannet Holman MD     Derm, previously Dr. Ashley Henao, changed to Dr. Fany Garcia             H/O Optic disc edema, 1028-1025 ICD-10-CM: H47.10  ICD-9-CM: 377.00  3/2/2012    Overview Signed 3/2/2012  1:14 PM by Jannet Holman MD     Optho Dr Nevaeh Kim, MRI orbits             AR (allergic rhinitis) ICD-10-CM: J30.9  ICD-9-CM: 477.9  Unknown        GERD (gastroesophageal reflux disease) ICD-10-CM: K21.9  ICD-9-CM: 530.81  Unknown    Overview Signed 3/2/2012  1:12 PM by Jannet Holman MD                  H/O Mild, Intermittent Asthma ICD-10-CM: J45.909  ICD-9-CM: 493.90  Unknown    Overview Signed 3/2/2012  1:11 PM by Jannet Holman MD     ~1252, no hosp or ER             Postmenopause, LMP 2005, age 49 yo, s/p HRT x 6 months ICD-10-CM: Z78.0  ICD-9-CM: V49.81  Unknown    Overview Signed 3/15/2010  9:30 AM by Jannet Holman MD     LMP  (HRT x 6months)             Ovarian cyst ICD-10-CM: N83.209  ICD-9-CM: 620.2  Unknown    Overview Signed 3/15/2010  9:30 AM by Jannet Holman MD     Left, benign              (normal spontaneous vaginal delivery) ICD-10-CM: O80  ICD-9-CM: 052  Unknown    Overview Signed 3/15/2010  9:30 AM by Jannet Holman MD JUSTIN                  Metabolic syndrome [facility]-38-RL: E88.81  ICD-9-CM: 277.7  2010               PAST SURGICAL HISTORY     Past Surgical History:   Procedure Laterality Date    DEXA BONE DENSITY STUDY AXIAL  2006    Normal    HX BREAST BIOPSY Left     Benign Aspiration    HX CERVICAL POLYPECTOMY  14, Dr Indu Price    Benign    HX COLONOSCOPY  2014    Dr Menchaca Flatter polyps x 3; repeat     HX COLONOSCOPY  2015    poor prep, repeat in one year    HX COLONOSCOPY  2016    Normal, follow up 10 yrs    HX COLONOSCOPY  2005    Normal, ok x 10 yrs, Dr Veronica Fung    childbirth    HX HEENT  2010    tumor on vocal cord removed    HX HERNIA REPAIR  16, Dr Jessika Gil    robotic assisted umbilical hernia repair with mesh     HX LIPOMA RESECTION  ~    removed from left thigh    HX ORTHOPAEDIC      tumor remved left leg    HX OTHER SURGICAL  11/5/15, Dr Lewis Cornell    2 masses removed left lower leg    HX REFRACTIVE SURGERY Bilateral 2019    HX WISDOM TEETH EXTRACTION      MISAEL MAMMOGRAM DIGITAL BILATERAL  annually per Gyn    at [facility]  2007    1-15% Stenosis Bilaterally         MEDICATIONS     Current Outpatient Medications   Medication Sig    albuterol sulfate (ProAir RespiClick) 90 mcg/actuation breath activated inhaler Take 2 Puffs by inhalation every six (6) hours as needed (sob/wheeze). metoprolol succinate (TOPROL-XL) 50 mg XL tablet TAKE 1 TABLET BY MOUTH EVERY DAY FOR BLOOD PRESSURE    spironolactone (ALDACTONE) 100 mg tablet TAKE 1 TABLET BY MOUTH EVERY DAY    Unithroid 75 mcg tablet Take 1 Tablet by mouth Daily (before breakfast). FreeStyle Lite Strips strip USE TO TEST BLOOD GLUCOSE UP TO THREE TIMES DAILY. DX: E11.9, Z79.4    simvastatin (ZOCOR) 20 mg tablet TAKE 1 TABLET NIGHTLY    famotidine (PEPCID) 40 mg tablet TAKE 1 TABLET NIGHTLY    coenzyme Q-10 (CO Q-10) 200 mg capsule Take 200 mg by mouth daily.     Blood-Glucose Meter monitoring kit Test up to 3x daily for type 2 diabetes uncontrolled on insulin, (E11.9,  Z79.4) Type 2 diabetes mellitus with insulin therapy    triamcinolone acetonide (KENALOG) 0.1 % topical cream use thin layer on back twice daily    CINNAMON BARK (CINNAMON PO) Take 350 mg by mouth daily. No current facility-administered medications for this visit.           ALLERGIES     Allergies   Allergen Reactions    Metformin Diarrhea and Nausea Only     Stomach cramps    Victoza [Liraglutide] Nausea Only    Avelox [Moxifloxacin] Rash and Itching    Bactrim [Sulfamethoxazole-Trimethoprim] Rash    Doxycycline Other (comments)     Tongue discomfort, swollen glands    Lisinopril Diarrhea    Norvasc [Amlodipine] Other (comments)     edema    Penicillin G Hives, Shortness of Breath and Swelling          SOCIAL HISTORY     Social History     Tobacco Use    Smoking status: Never    Smokeless tobacco: Never   Substance Use Topics    Alcohol use: No     Alcohol/week: 0.0 standard drinks     Comment: very rare, 1-2 drinks a year     Social History     Social History Narrative    ,   2022 sudden MI    1 son and one 10 yo grand daughter both local and she helps out w/ her alot    Worked as a  for [employer]    Retired     Stays active w/ her grand daughter, goes to [place], takes her a lot of places    Stays socially engaged w/ friends as well    Catholic on Sundays    Traveling a lot this summer to various amusement [place] w/ her son and grand daughter        Diet: since late /early  has cut back on sugars/sweets, then since summer 2022 has also cut back on milk & fruits and snacking; since  has only been eating about one meal a day per a meal plan program she has been trying to incorporate    Exercise: 50 crunches daily, walks 3 x a week x 2 miles; will be starting a fitness program 22 that incorporates more cardio at moderate level    Weight: working on losing weight; since  has been increasing her activity, walking more, and trying to make healthier food choices in general; weight down from 270's to 254 lbs as of  and now down to 236 lbs as of 22; personal goal wt is 175 lbs     Social History     Substance and Sexual Activity   Sexual Activity Not Currently    Partners: Male    Comment:          IMMUNIZATIONS     Immunization History   Administered Date(s) Administered     Influenza, [address] (age 72 y+), High Dose 2020    COVID-19, PFIZER PURPLE top, DILUTE for use, (age 15 y+), IM, 30mcg/0.3mL 2021, 2021, 2021, 2022    Hepatitis A Vaccine 2001    Influenza High Dose Vaccine PF 2020, 2021, 2022    Influenza Vaccine 10/22/2013, 2016    Influenza, FLUARIX, FLULAVAL, FLUZONE (age 10 mo+) AND AFLURIA, (age 1 y+), PF, 0.5mL 2018    Influenza, FLUCELVAX, (age 10 mo+), MDCK, PF 10/11/2019    Pneumococcal Conjugate (PCV-13) 2021    Pneumococcal Polysaccharide (PPSV-23) 2016    TD Vaccine 2000    TDAP Vaccine 2011    Zoster Vaccine, Live 2017         FAMILY HISTORY     Family History   Problem Relation Age of Onset    Breast Cancer Mother         diagnosed age 72, survived it     Arthritis-rheumatoid Mother     Stroke Mother     Parkinsonism Mother          in nursing from colon perf or GI bleed age 80    Hypertension Mother     Migraines Mother     Breast Cancer Maternal Grandmother          in her late 42's    Dementia Father     Heart Disease Father         CAD, PTCA    Stroke Father     Cancer Brother 68        soft tissue cancer    Cancer Brother 68        kidney and lungs    Hypertension Sister     Dementia Sister     Heart Disease Sister     Breast Cancer Maternal Aunt     High Cholesterol Son     Cancer Cousin         maternal first cousin w/ sarcoma of leg    Breast Cancer Maternal Aunt     Ovarian Cancer Maternal Aunt     Anesth Problems Neg Hx VITALS   Visit Vitals  /74 (BP 1 Location: Left upper arm, BP Patient Position: Sitting, BP Cuff Size: Large adult)   Pulse 63   Temp 98.1 °F (36.7 °C) (Oral)   Resp 16   Ht 5' 6\" (1.676 m)   Wt 236 lb 6.4 oz (107.2 kg)   LMP 12/24/2007   SpO2 99%   BMI 38.16 kg/m²          PHYSICAL EXAMINATION   Physical Exam  Vitals reviewed. Constitutional:       General: She is not in acute distress. Cardiovascular:      Rate and Rhythm: Normal rate and regular rhythm. Pulses: Normal pulses. Dorsalis pedis pulses are 2+ on the right side and 2+ on the left side. Posterior tibial pulses are 2+ on the right side and 2+ on the left side. Heart sounds: Normal heart sounds. No murmur heard. No gallop. Pulmonary:      Effort: Pulmonary effort is normal.      Breath sounds: Normal breath sounds. No wheezing or rales. Abdominal:      Palpations: Abdomen is soft. Tenderness: There is no abdominal tenderness. Musculoskeletal:         General: No swelling or tenderness. Normal range of motion. Cervical back: Neck supple. Right lower leg: No edema. Left lower leg: No edema. Feet:      Right foot:      Protective Sensation: 5 sites tested. 5 sites sensed. Skin integrity: Skin integrity normal.      Toenail Condition: Right toenails are abnormally thick. Left foot:      Protective Sensation: 5 sites tested. 5 sites sensed. Skin integrity: Skin integrity normal.      Toenail Condition: Left toenails are abnormally thick. Comments: Gets periodic pedicures, also sees her podiatrist annually or more prn, h/o fungal nail disease treated by podiatrist  Lymphadenopathy:      Cervical: No cervical adenopathy. Skin:     General: Skin is warm and dry. Neurological:      General: No focal deficit present.       Gait: Gait normal.   Psychiatric:         Cognition and Memory: Cognition and memory normal.              LABORATORY DATA/ANCILLARY/IMAGING Results for orders placed or performed in visit on 06/09/22   PTH INTACT   Result Value Ref Range    Calcium 10.6 (H) 8.5 - 10.1 MG/DL    PTH, Intact 82.1 18.4 - 88.0 pg/mL   T4, FREE   Result Value Ref Range    T4, Free 1.2 0.8 - 1.5 NG/DL   TSH 3RD GENERATION   Result Value Ref Range    TSH 1.24 0.36 - 3.74 uIU/mL   VITAMIN D, 25 HYDROXY   Result Value Ref Range    Vitamin D 25-Hydroxy 29.7 (L) 30 - 760 ng/mL   METABOLIC PANEL, BASIC   Result Value Ref Range    Sodium 142 136 - 145 mmol/L    Potassium 4.4 3.5 - 5.1 mmol/L    Chloride 112 (H) 97 - 108 mmol/L    CO2 27 21 - 32 mmol/L    Anion gap 3 (L) 5 - 15 mmol/L    Glucose 102 (H) 65 - 100 mg/dL    BUN 23 (H) 6 - 20 MG/DL    Creatinine 1.03 (H) 0.55 - 1.02 MG/DL    BUN/Creatinine ratio 22 (H) 12 - 20      GFR est AA >60 >60 ml/min/1.73m2    GFR est non-AA 54 (L) >60 ml/min/1.73m2    Calcium 10.6 (H) 8.5 - 10.1 MG/DL   AMB POC HEMOGLOBIN A1C   Result Value Ref Range    Hemoglobin A1c (POC) 6.3 %       Lab Results   Component Value Date/Time    TSH 1.24 06/09/2022 09:06 AM    T4, Free 1.2 06/09/2022 09:06 AM          ASSESSMENT & PLAN   Diagnoses and all orders for this visit:    1. Medicare annual wellness visit, subsequent  See separate note under this same encounter visit for Medicare Wellness note. Age/risk based screening recommendations, health maintenance & prevention counseling. Cancer screening USPTFS guidelines reviewed w/ pt today. Discussed benefits/positive/negative outcomes of screening based on age/risk stratification. Informed consent for/against screening based on pt's personal hx/risk factors. Updated in history above and health maintenance. 2. Diabetes mellitus type 2, noninsulin dependent ([facility]  Improving over time w/ dietary modifications, exercise and weight reduction on her own  -     METABOLIC PANEL, COMPREHENSIVE; Future  -     LIPID PANEL; Future  -     HEMOGLOBIN A1C WITH EAG;  Future  -     MICROALBUMIN, UR, RAND W/ MICROALB/CREAT RATIO; Future  -      DIABETES FOOT EXAM    3. Mixed hyperlipidemia  Continue Zocor 20 mg daily  -     LIPID PANEL; Future    4. Benign essential hypertension  Controlled, stable  Continue Aldactone 100 mg dialy and Toprol XL 50 mg daily  -     CBC W/O DIFF; Future  -     METABOLIC PANEL, COMPREHENSIVE; Future  -     LIPID PANEL; Future    5. Hypercalcemia  Stopped calcium containing supplements after her visit in 4-2022. She discovered some more supplements she was taking that had some low amounts of calcium in them as well so she recently stopped those about 2 weeks ago  Since the summer she has cut out milk  -     METABOLIC PANEL, COMPREHENSIVE; Future    6. Urinary urgency  -     URINALYSIS W/ REFLEX CULTURE; Future    7. Mild intermittent asthma without complication  -     albuterol sulfate (ProAir RespiClick) 90 mcg/actuation breath activated inhaler; Take 2 Puffs by inhalation every six (6) hours as needed (sob/wheeze). 8. Seasonal asthma  -     albuterol sulfate (ProAir RespiClick) 90 mcg/actuation breath activated inhaler; Take 2 Puffs by inhalation every six (6) hours as needed (sob/wheeze). Fasting labs checked today  Cardiovascular risk and specific lipid/LDL/HgbA1c goals assessed  Reviewed diet, nutrition, exercise, weight management, BMI/goals. Commended on her progress thus far  Reviewed medications and side effects  Further follow up & other recommendations pending review of labs.  If all remains good and stable, follow up in 6 months, sooner prn

## 2022-11-01 NOTE — PROGRESS NOTES
Chief Complaint   Patient presents with    Annual Wellness Visit    Diabetes     Fasting    Cholesterol Problem     1. \"Have you been to the ER, urgent care clinic since your last visit? Hospitalized since your last visit? \" Urgent Care for Covid 6/25/22, [facility] last month    2. \"Have you seen or consulted any other health care providers outside of the [address] [place] [address] since your last visit? \" NO    3. For patients aged 39-70: Has the patient had a colonoscopy / FIT/ Cologuard? Yes - no Care Gap present Last year Dr Angelina Nguyen      If the patient is female:    4. For patients aged 41-77: Has the patient had a mammogram within the past 2 years? Yes - no Care Gap present 1/2022      5. For patients aged 21-65: Has the patient had a pap smear?  Yes - no Care Gap present gyn @ [address]

## 2022-11-01 NOTE — PATIENT INSTRUCTIONS
Medicare Wellness Visit, Female     The best way to live healthy is to have a lifestyle where you eat a well-balanced diet, exercise regularly, limit alcohol use, and quit all forms of tobacco/nicotine, if applicable. Regular preventive services are another way to keep healthy. Preventive services (vaccines, screening tests, monitoring & exams) can help personalize your care plan, which helps you manage your own care. Screening tests can find health problems at the earliest stages, when they are easiest to treat. [facility] follows the current, evidence-based guidelines published by the [facility] Derek Marcus ([facility]) when recommending preventive services for our patients. Because we follow these guidelines, sometimes recommendations change over time as research supports it. (For example, mammograms used to be recommended annually. Even though Medicare will still pay for an annual mammogram, the newer guidelines recommend a mammogram every two years for women of average risk). Of course, you and your doctor may decide to screen more often for some diseases, based on your risk and your co-morbidities (chronic disease you are already diagnosed with). Preventive services for you include:  - Medicare offers their members a free annual wellness visit, which is time for you and your primary care provider to discuss and plan for your preventive service needs. Take advantage of this benefit every year!  -All adults over the age of 72 should receive the recommended pneumonia vaccines. Current USPSTF guidelines recommend a series of two vaccines for the best pneumonia protection.   -All adults should have a flu vaccine yearly and a tetanus vaccine every 10 years.   -All adults age 48 and older should receive the shingles vaccines (series of two vaccines).       -All adults age 38-68 who are overweight should have a diabetes screening test once every three years.   -All adults born between 80 and 1965 should be screened once for Hepatitis C.  -Other screening tests and preventive services for persons with diabetes include: an eye exam to screen for diabetic retinopathy, a kidney function test, a foot exam, and stricter control over your cholesterol.   -Cardiovascular screening for adults with routine risk involves an electrocardiogram (ECG) at intervals determined by your doctor.   -Colorectal cancer screenings should be done for adults age 54-65 with no increased risk factors for colorectal cancer. There are a number of acceptable methods of screening for this type of cancer. Each test has its own benefits and drawbacks. Discuss with your doctor what is most appropriate for you during your annual wellness visit. The different tests include: colonoscopy (considered the best screening method), a fecal occult blood test, a fecal DNA test, and sigmoidoscopy.    -A bone mass density test is recommended when a woman turns 65 to screen for osteoporosis. This test is only recommended one time, as a screening. Some providers will use this same test as a disease monitoring tool if you already have osteoporosis. -Breast cancer screenings are recommended every other year for women of normal risk, age 54-69.  -Cervical cancer screenings for women over age 72 are only recommended with certain risk factors.      Here is a list of your current Health Maintenance items (your personalized list of preventive services) with a due date:  Health Maintenance Due   Topic Date Due    Bone Mineral Density   Never done    Albumin Urine Test  07/07/2022    COVID-19 Vaccine (5 - Booster) 11/04/2022

## 2022-11-02 LAB
ALBUMIN SERPL-MCNC: 4.4 G/DL (ref 3.5–5)
ALBUMIN/GLOB SERPL: 1.3 {RATIO} (ref 1.1–2.2)
ALP SERPL-CCNC: 91 U/L (ref 45–117)
ALT SERPL-CCNC: 32 U/L (ref 12–78)
AMORPH CRY URNS QL MICRO: ABNORMAL
ANION GAP SERPL CALC-SCNC: 10 MMOL/L (ref 5–15)
APPEARANCE UR: ABNORMAL
AST SERPL-CCNC: 22 U/L (ref 15–37)
BACTERIA URNS QL MICRO: NEGATIVE /HPF
BILIRUB SERPL-MCNC: 0.6 MG/DL (ref 0.2–1)
BILIRUB UR QL: NEGATIVE
BUN SERPL-MCNC: 27 MG/DL (ref 6–20)
BUN/CREAT SERPL: 21 (ref 12–20)
CALCIUM SERPL-MCNC: 10.8 MG/DL (ref 8.5–10.1)
CAOX CRY URNS QL MICRO: ABNORMAL
CHLORIDE SERPL-SCNC: 108 MMOL/L (ref 97–108)
CHOLEST SERPL-MCNC: 153 MG/DL
CO2 SERPL-SCNC: 24 MMOL/L (ref 21–32)
COLOR UR: ABNORMAL
CREAT SERPL-MCNC: 1.3 MG/DL (ref 0.55–1.02)
CREAT UR-MCNC: 268 MG/DL
EPITH CASTS URNS QL MICRO: ABNORMAL /LPF
ERYTHROCYTE [DISTWIDTH] IN BLOOD BY AUTOMATED COUNT: 13 % (ref 11.5–14.5)
EST. AVERAGE GLUCOSE BLD GHB EST-MCNC: 143 MG/DL
GLOBULIN SER CALC-MCNC: 3.4 G/DL (ref 2–4)
GLUCOSE SERPL-MCNC: 91 MG/DL (ref 65–100)
GLUCOSE UR STRIP.AUTO-MCNC: NEGATIVE MG/DL
HBA1C MFR BLD: 6.6 % (ref 4–5.6)
HCT VFR BLD AUTO: 39.4 % (ref 35–47)
HDLC SERPL-MCNC: 41 MG/DL
HDLC SERPL: 3.7 {RATIO} (ref 0–5)
HGB BLD-MCNC: 12.6 G/DL (ref 11.5–16)
HGB UR QL STRIP: NEGATIVE
KETONES UR QL STRIP.AUTO: ABNORMAL MG/DL
LDLC SERPL CALC-MCNC: 78.2 MG/DL (ref 0–100)
LEUKOCYTE ESTERASE UR QL STRIP.AUTO: ABNORMAL
MCH RBC QN AUTO: 30.1 PG (ref 26–34)
MCHC RBC AUTO-ENTMCNC: 32 G/DL (ref 30–36.5)
MCV RBC AUTO: 94 FL (ref 80–99)
MICROALBUMIN UR-MCNC: 1.74 MG/DL
MICROALBUMIN/CREAT UR-RTO: 6 MG/G (ref 0–30)
NITRITE UR QL STRIP.AUTO: NEGATIVE
NRBC # BLD: 0 K/UL (ref 0–0.01)
NRBC BLD-RTO: 0 PER 100 WBC
PH UR STRIP: 5 [PH] (ref 5–8)
PLATELET # BLD AUTO: 134 K/UL (ref 150–400)
POTASSIUM SERPL-SCNC: 4.6 MMOL/L (ref 3.5–5.1)
PROT SERPL-MCNC: 7.8 G/DL (ref 6.4–8.2)
PROT UR STRIP-MCNC: NEGATIVE MG/DL
RBC # BLD AUTO: 4.19 M/UL (ref 3.8–5.2)
RBC #/AREA URNS HPF: ABNORMAL /HPF (ref 0–5)
SODIUM SERPL-SCNC: 142 MMOL/L (ref 136–145)
SP GR UR REFRACTOMETRY: 1.02 (ref 1–1.03)
TRIGL SERPL-MCNC: 169 MG/DL (ref ?–150)
UA: UC IF INDICATED,UAUC: ABNORMAL
UROBILINOGEN UR QL STRIP.AUTO: 0.2 EU/DL (ref 0.2–1)
VLDLC SERPL CALC-MCNC: 33.8 MG/DL
WBC # BLD AUTO: 6.7 K/UL (ref 3.6–11)
WBC URNS QL MICRO: ABNORMAL /HPF (ref 0–4)

## 2022-11-04 DIAGNOSIS — R60.9 DEPENDENT EDEMA: ICD-10-CM

## 2022-11-04 DIAGNOSIS — I10 BENIGN ESSENTIAL HYPERTENSION: ICD-10-CM

## 2022-11-05 ENCOUNTER — PATIENT MESSAGE (OUTPATIENT)
Dept: FAMILY MEDICINE CLINIC | Age: 67
End: 2022-11-05

## 2022-11-05 DIAGNOSIS — E83.52 HYPERCALCEMIA: ICD-10-CM

## 2022-11-05 DIAGNOSIS — N28.9 RENAL INSUFFICIENCY: ICD-10-CM

## 2022-11-05 DIAGNOSIS — E21.3 HYPERPARATHYROIDISM (HCC): Primary | ICD-10-CM

## 2022-11-05 RX ORDER — METOPROLOL SUCCINATE 50 MG/1
TABLET, EXTENDED RELEASE ORAL
Qty: 90 TABLET | Refills: 0 | OUTPATIENT
Start: 2022-11-05

## 2022-11-05 RX ORDER — SPIRONOLACTONE 100 MG/1
TABLET, FILM COATED ORAL
Qty: 90 TABLET | Refills: 0 | OUTPATIENT
Start: 2022-11-05

## 2022-11-09 ENCOUNTER — TELEPHONE (OUTPATIENT)
Dept: FAMILY MEDICINE CLINIC | Age: 67
End: 2022-11-09

## 2022-11-09 DIAGNOSIS — N28.9 RENAL INSUFFICIENCY: ICD-10-CM

## 2022-11-09 DIAGNOSIS — E21.3 HYPERPARATHYROIDISM (HCC): Primary | ICD-10-CM

## 2022-11-09 NOTE — TELEPHONE ENCOUNTER
----- Message from Alexis Merlin, MD sent at 11/8/2022 10:17 PM EST -----  Regarding: FW: Lab Results & Recommendations  1) Yes, I referred her to Endo Dr. Rawls [place] back in January. That is still pended in the system. He is w/ [address] [place] [address]. Tell her she can take soonest available w/ anyone in the group however. 2) As for kidneys, increase water intake and I want to check renal ultrasound. Back to me after r/w pt so I can place order  3) I already have her follow up lab orders pended in system  -    Pt responded to above message. I gave her the # for [facility]. She will drink for fluids.

## 2022-11-28 ENCOUNTER — TRANSCRIBE ORDER (OUTPATIENT)
Dept: SCHEDULING | Age: 67
End: 2022-11-28

## 2022-11-28 ENCOUNTER — OFFICE VISIT (OUTPATIENT)
Dept: ENDOCRINOLOGY | Age: 67
End: 2022-11-28
Payer: MEDICARE

## 2022-11-28 VITALS
HEIGHT: 66 IN | WEIGHT: 230 LBS | DIASTOLIC BLOOD PRESSURE: 74 MMHG | SYSTOLIC BLOOD PRESSURE: 111 MMHG | BODY MASS INDEX: 36.96 KG/M2 | HEART RATE: 61 BPM

## 2022-11-28 DIAGNOSIS — Z13.820 OSTEOPOROSIS SCREENING: ICD-10-CM

## 2022-11-28 DIAGNOSIS — E55.9 VITAMIN D DEFICIENCY: ICD-10-CM

## 2022-11-28 DIAGNOSIS — E83.52 HYPERCALCEMIA: Primary | ICD-10-CM

## 2022-11-28 PROCEDURE — G8536 NO DOC ELDER MAL SCRN: HCPCS | Performed by: INTERNAL MEDICINE

## 2022-11-28 PROCEDURE — G8752 SYS BP LESS 140: HCPCS | Performed by: INTERNAL MEDICINE

## 2022-11-28 PROCEDURE — 1101F PT FALLS ASSESS-DOCD LE1/YR: CPT | Performed by: INTERNAL MEDICINE

## 2022-11-28 PROCEDURE — 99204 OFFICE O/P NEW MOD 45 MIN: CPT | Performed by: INTERNAL MEDICINE

## 2022-11-28 PROCEDURE — 3074F SYST BP LT 130 MM HG: CPT | Performed by: INTERNAL MEDICINE

## 2022-11-28 PROCEDURE — 1090F PRES/ABSN URINE INCON ASSESS: CPT | Performed by: INTERNAL MEDICINE

## 2022-11-28 PROCEDURE — G8417 CALC BMI ABV UP PARAM F/U: HCPCS | Performed by: INTERNAL MEDICINE

## 2022-11-28 PROCEDURE — G9899 SCRN MAM PERF RSLTS DOC: HCPCS | Performed by: INTERNAL MEDICINE

## 2022-11-28 PROCEDURE — G8432 DEP SCR NOT DOC, RNG: HCPCS | Performed by: INTERNAL MEDICINE

## 2022-11-28 PROCEDURE — 1123F ACP DISCUSS/DSCN MKR DOCD: CPT | Performed by: INTERNAL MEDICINE

## 2022-11-28 PROCEDURE — G8400 PT W/DXA NO RESULTS DOC: HCPCS | Performed by: INTERNAL MEDICINE

## 2022-11-28 PROCEDURE — G8754 DIAS BP LESS 90: HCPCS | Performed by: INTERNAL MEDICINE

## 2022-11-28 PROCEDURE — 3017F COLORECTAL CA SCREEN DOC REV: CPT | Performed by: INTERNAL MEDICINE

## 2022-11-28 PROCEDURE — G8427 DOCREV CUR MEDS BY ELIG CLIN: HCPCS | Performed by: INTERNAL MEDICINE

## 2022-11-28 PROCEDURE — 3078F DIAST BP <80 MM HG: CPT | Performed by: INTERNAL MEDICINE

## 2022-11-28 NOTE — LETTER
11/28/2022    Patient: Abner Nolasco   YOB: 1955   Date of Visit: 11/28/2022     Jackson Edmondson MD  [address] [facility]    Dear Jackson Edmondson MD,      Thank you for referring Ms. Wiley Mcgraw to [facility] for evaluation. My notes for this consultation are attached. If you have questions, please do not hesitate to call me. I look forward to following your patient along with you.       Sincerely,    Gregg Aguilar MD

## 2022-11-28 NOTE — PROGRESS NOTES
Chief Complaint   Patient presents with    Elevated Blood Calcium       * New Patient Visit     General:    Here for high calcium  - mild and intermittent over the years - more consistent recently  No kidney stones  BMD years ago - no bone loss   Just broke 3 bones in wrist - trip on a step - fell   No weakness, memory loss, abd pain, constipation   No HCTZ or Lithium  No Ca supplements - stopped 6/22  No Fhx of Ca issues or kidney stones    Was drinking diet soda until 6 weeks ago  Now artificially sweetened carbonated drinks    Labs/Studies    Lab Results   Component Value Date/Time    TSH 1.24 06/09/2022 09:06 AM    T4, Free 1.2 06/09/2022 09:06 AM          Lab Results   Component Value Date/Time    Vitamin D 25-Hydroxy 29.7 (L) 06/09/2022 09:06 AM          Lab Results   Component Value Date/Time    Calcium 10.8 (H) 11/01/2022 12:25 PM    Calcium 10.6 (H) 06/09/2022 09:06 AM    Calcium 10.6 (H) 06/09/2022 09:06 AM    Calcium 10.0 01/06/2022 08:41 AM    Calcium 10.8 (H) 01/06/2022 08:41 AM    Calcium 10.2 (H) 07/07/2021 11:28 AM    Calcium 10.0 01/14/2020 12:37 PM    Calcium 9.9 11/05/2019 10:16 AM    Calcium 10.4 (H) 06/25/2019 10:07 AM    Calcium 10.6 (H) 09/21/2018 11:21 AM    Calcium 10.6 (H) 05/17/2018 09:02 AM    Calcium 10.1 02/06/2018 10:50 AM    Calcium 10.0 07/13/2017 08:43 AM    Calcium 10.0 03/29/2017 08:58 AM    Calcium 10.4 (H) 12/29/2016 10:05 AM    Calcium 10.0 05/31/2016 01:05 PM    Calcium 9.8 04/15/2016 12:52 PM    Calcium 10.1 11/12/2015 08:52 AM    Calcium 10.2 08/24/2015 01:06 PM    Calcium 10.1 03/19/2015 08:49 AM    Calcium 9.9 10/06/2014 09:23 AM    Calcium 10.5 (H) 07/01/2014 09:10 AM    Calcium 9.8 02/17/2014 08:40 AM    Calcium 9.5 10/22/2013 08:44 AM    Calcium 9.8 02/26/2013 08:51 AM    Calcium 9.8 08/01/2012 08:38 AM    Calcium 9.1 03/02/2012 10:05 AM    Calcium 9.9 09/20/2011 11:41 AM     Lab Results   Component Value Date/Time    PTH, Intact 82.1 06/09/2022 09:06 AM    PTH, Intact 90. 5 (H) 2022 08:41 AM    PTH, Intact 51 2017 08:58 AM      Lab Results   Component Value Date/Time    Calcium, ionized 5.5 2017 08:58 AM        Lab Results   Component Value Date/Time    GFR est non-AA 54 (L) 2022 09:06 AM    GFR est non-AA >60 2022 08:41 AM    GFR est non-AA >60 2021 11:28 AM    GFR est non-AA 90 2020 12:37 PM    GFR est non-AA 83 2019 10:16 AM    GFR est non-AA 76 2019 10:07 AM    GFR est non-AA 75 2018 11:21 AM    GFR est non-AA 82 2018 09:02 AM    GFR est non-AA 77 2018 10:50 AM    GFR est non-AA 80 2017 08:43 AM    eGFR 45 (L) 2022 12:25 PM           Past Medical History:   Diagnosis Date    AR (allergic rhinitis)     Asthma     mild    Benign essential hypertension 2016    Dependent edema 2022    Dependent B ankle edema    Diabetes mellitus type 2, noninsulin dependent ([facility] [address] 2015; Podiatry Dr Dary Hamm;  Eye doctor: Dr Manpreet Lane; was prescribed insulin therapy  Dr. Tamika Bateman, patient self dc'd     FHx: breast cancer 3/2/2012    GERD (gastroesophageal reflux disease) 11/00    H/O Mild, Intermittent Asthma     H/O Optic disc edema, [id number] 3/2/2012    History of benign left ovarian cyst 3/2/2012    Hx of complete eye exam 2017    Dr Jessica Mckeon intraoular pressure and no evidence of diabetic retinopathy    Hyperlipidemia 2002    Hyperparathyroidism ([facility] [address] 2022    Hypothyroidism     Menopause     Mixed hyperlipidemia 2016    Morbid obesity with BMI of 45.0-49.9, adult ([address]      (normal spontaneous vaginal delivery)         Ovarian cyst     Left, benign    Pain of left heel 2022 Podiatrist Dr. Marisol Villalobos     LMP  (HRT x 6months)    Primary hyperparathyroidism ([address] 2022    Referred to Endo -    Rheumatoid arthritis(714.0)     S/P colonoscopy     Normal (10yrs)    Skin cancer screening exams, Derm Dr Chaparrita Guillen 3/2/2012    Vitamin D deficiency 3/2/2012        Blood pressure 111/74, pulse 61, height 5' 6\" (1.676 m), weight 230 lb (104.3 kg), last menstrual period 12/24/2007. Weight Metrics 11/28/2022 11/1/2022 8/2/2022 6/14/2022 6/9/2022 5/27/2022 4/27/2022   Weight 230 lb 236 lb 6.4 oz 254 lb 254 lb 252 lb 9.6 oz 252 lb 253 lb 12.8 oz   BMI 37.12 kg/m2 38.16 kg/m2 41 kg/m2 41 kg/m2 40.77 kg/m2 40.67 kg/m2 42.23 kg/m2        EXAM:  - not done today       Assessment/Plan:   1. Hypercalcemia  mild and intermittent over the years - more consistent recently  PTH elevated x 3 indicating possible primary hyperparathyroidism   Vit D no low enough to be primary cause  [address] less likely since years of normal levels before started to rise  No HCTZ or Lithium  Did have a fall and fractured  wrist  Repeat labs, assess for kidney stone and bone risk with spot U Ca and BMD (including wrist)    2. Osteoporosis screening    3. Vitamin D deficiency  check current status    Orders Placed This Encounter    DEXA BONE DENSITY STUDY AXIAL     Standing Status:   Future     Standing Expiration Date:   2/25/2023     Scheduling Instructions:      INCLUDE RIGHT WRIST     Order Specific Question:   Reason for Exam     Answer:   evaluate for bone loss - INCLUDE RIGHT WRIST    CALCIUM, IONIZED    METABOLIC PANEL, BASIC    VITAMIN D, 25 HYDROXY    PTH INTACT    PHOSPHORUS    CALCIUM, UR, RANDOM    CREATININE, UR, RANDOM    VITAMIN A          Follow-up and Dispositions    Return for --- follow up to be determined once labs are back.

## 2022-11-29 ENCOUNTER — TRANSCRIBE ORDER (OUTPATIENT)
Dept: SCHEDULING | Age: 67
End: 2022-11-29

## 2022-11-29 DIAGNOSIS — M54.16 LUMBAR RADICULOPATHY: Primary | ICD-10-CM

## 2022-11-29 DIAGNOSIS — E55.9 VITAMIN D DEFICIENCY: ICD-10-CM

## 2022-11-29 DIAGNOSIS — Z13.820 SCREENING FOR OSTEOPOROSIS: Primary | ICD-10-CM

## 2022-11-29 DIAGNOSIS — E83.52 HYPERCALCEMIA: Primary | ICD-10-CM

## 2022-11-29 DIAGNOSIS — M47.816 LUMBAR SPONDYLOSIS: ICD-10-CM

## 2022-11-29 DIAGNOSIS — N28.9 KIDNEY DYSFUNCTION: Primary | ICD-10-CM

## 2022-11-29 DIAGNOSIS — Z13.820 OSTEOPOROSIS SCREENING: ICD-10-CM

## 2022-11-29 DIAGNOSIS — E21.3 HYPERPARATHYROIDISM (HCC): ICD-10-CM

## 2022-11-29 DIAGNOSIS — N28.9 ACUTE RENAL INSUFFICIENCY: Primary | ICD-10-CM

## 2022-11-29 NOTE — PROGRESS NOTES
Kris Omalley MD  Good morning       Dr Rafal Harris       Try these  diagnose codes if applicable to patient as follows       N28.9   N29   R31.9     Place new order into connect care and you should not get a error message for [place] this helps     Please let me know     Moraima Lehman           Previous Messages     ----- Message -----   From: Lala Pacheco MD   Sent: 11/28/2022   9:31 PM EST   To: Krystina Riggins   Subject: RE: US RETROPERITONEUM  need a new Diagnose *     Thank you. But the primary diagnosis code of Renal insufficiency N28.9 593.9 is all I have to use. If still denied, patient will need to see a nephrologist. Thank you       ----- Message -----   From: Elvis Mathews   Sent: 11/28/2022  11:23 AM EST   To: Rebekah Saunders MD   Subject: US RETROPERITONEUM  need a new Diagnose code*     Good morning     ABN triggered on the order   due the Diagnose supplied / therefore a new order with a different Diagnose code is needed to meet Medicare Medical Guidelines for the test.   Please review and place updated new order ASAP the patient is scheduled for tomorrow morning   Therefore, the order is needed ASAP with updated diagnose code     Please reply to me once corrected/updated in [address] [facility] so that I could remove the ABN trigger. If no order is received the patient will be contacted and her appointment canceled.      Best regards       Krystina Riggins

## 2022-12-01 ENCOUNTER — TELEPHONE (OUTPATIENT)
Dept: ENDOCRINOLOGY | Age: 67
End: 2022-12-01

## 2022-12-01 LAB
25(OH)D3+25(OH)D2 SERPL-MCNC: 25.3 NG/ML (ref 30–100)
BUN SERPL-MCNC: 20 MG/DL (ref 8–27)
BUN/CREAT SERPL: 16 (ref 12–28)
CA-I SERPL ISE-MCNC: 5.6 MG/DL (ref 4.5–5.6)
CALCIUM 24H UR-MCNC: 3.3 MG/DL
CALCIUM SERPL-MCNC: 10.9 MG/DL (ref 8.7–10.3)
CHLORIDE SERPL-SCNC: 105 MMOL/L (ref 96–106)
CO2 SERPL-SCNC: 21 MMOL/L (ref 20–29)
CREAT SERPL-MCNC: 1.23 MG/DL (ref 0.57–1)
CREAT UR-MCNC: 67.1 MG/DL
EGFR: 48 ML/MIN/1.73
GLUCOSE SERPL-MCNC: 104 MG/DL (ref 70–99)
PHOSPHATE SERPL-MCNC: 3.3 MG/DL (ref 3–4.3)
POTASSIUM SERPL-SCNC: 5 MMOL/L (ref 3.5–5.2)
SODIUM SERPL-SCNC: 143 MMOL/L (ref 134–144)
VIT A SERPL-MCNC: 57.2 UG/DL (ref 22–69.5)

## 2022-12-01 NOTE — TELEPHONE ENCOUNTER
12/1/2022  8:57 AM    Pt called and left message at 8.51 stating that she went to get her renal ultrasound done this morning and their systems were offline and were unable to complete it. They told her that the diagnosis code needs to be changed for her insurance to cover it. Please give her a call back at [phone]- and call them to reschedule and to update code at [phone] and speak with kallie.

## 2022-12-02 DIAGNOSIS — N95.9 MENOPAUSAL PROBLEM: Primary | ICD-10-CM

## 2022-12-02 DIAGNOSIS — E21.0 PRIMARY HYPERPARATHYROIDISM (HCC): ICD-10-CM

## 2022-12-02 NOTE — TELEPHONE ENCOUNTER
I returned this call and let Ms Reilly Mccain know that after talking with Dr. Jeannette Keene, she did not order the Renal US, the PCP did. I told her to contact them and have them redo the order with another DX Code. She said she would do this.   Greg Rodriguez

## 2022-12-02 NOTE — TELEPHONE ENCOUNTER
I ordered the BMD, not renal US  I have reordered this twice with the codes they said they wanted so not sure why this is still an issue

## 2022-12-29 DIAGNOSIS — I10 BENIGN ESSENTIAL HYPERTENSION: ICD-10-CM

## 2022-12-29 DIAGNOSIS — R60.9 DEPENDENT EDEMA: ICD-10-CM

## 2022-12-31 PROBLEM — E21.0 PRIMARY HYPERPARATHYROIDISM (HCC): Status: ACTIVE | Noted: 2022-01-09

## 2022-12-31 RX ORDER — METOPROLOL SUCCINATE 50 MG/1
TABLET, EXTENDED RELEASE ORAL
Qty: 90 TABLET | Refills: 0 | Status: SHIPPED | OUTPATIENT
Start: 2022-12-31

## 2022-12-31 RX ORDER — SPIRONOLACTONE 100 MG/1
TABLET, FILM COATED ORAL
Qty: 90 TABLET | Refills: 0 | Status: SHIPPED | OUTPATIENT
Start: 2022-12-31

## 2023-01-05 DIAGNOSIS — E78.2 MIXED HYPERLIPIDEMIA: ICD-10-CM

## 2023-01-05 NOTE — TELEPHONE ENCOUNTER
Patient would like to get these from a local pharmacy rather than mailorder. Last Visit: 11/1/22 with MD Otf Carlson  Next Appointment: 2/1/23 with MD Howard    Requested Prescriptions     Pending Prescriptions Disp Refills    famotidine (PEPCID) 40 mg tablet 90 Tablet 0     Sig: Take 1 Tablet by mouth nightly. simvastatin (ZOCOR) 20 mg tablet 90 Tablet 0     Sig: Take 1 Tablet by mouth nightly. For Pharmacy Admin Tracking Only    Program: Medication Refill  CPA in place:   Recommendation Provided To:    Intervention Detail: New Rx: 2, reason: Patient Preference  Intervention Accepted By:   Brandi Travis Closed?:   Time Spent (min): 5

## 2023-01-06 DIAGNOSIS — R60.9 DEPENDENT EDEMA: ICD-10-CM

## 2023-01-06 DIAGNOSIS — I10 BENIGN ESSENTIAL HYPERTENSION: ICD-10-CM

## 2023-01-06 RX ORDER — FAMOTIDINE 40 MG/1
40 TABLET, FILM COATED ORAL
Qty: 90 TABLET | Refills: 0 | Status: SHIPPED | OUTPATIENT
Start: 2023-01-06

## 2023-01-06 RX ORDER — SPIRONOLACTONE 100 MG/1
TABLET, FILM COATED ORAL
Qty: 90 TABLET | Refills: 0 | Status: CANCELLED | OUTPATIENT
Start: 2023-01-06

## 2023-01-06 RX ORDER — SIMVASTATIN 20 MG/1
20 TABLET, FILM COATED ORAL
Qty: 90 TABLET | Refills: 0 | Status: SHIPPED | OUTPATIENT
Start: 2023-01-06

## 2023-01-07 RX ORDER — SPIRONOLACTONE 100 MG/1
TABLET, FILM COATED ORAL
Qty: 90 TABLET | Refills: 0 | OUTPATIENT
Start: 2023-01-07

## 2023-01-07 RX ORDER — METOPROLOL SUCCINATE 50 MG/1
TABLET, EXTENDED RELEASE ORAL
Qty: 90 TABLET | Refills: 0 | OUTPATIENT
Start: 2023-01-07

## 2023-01-15 ENCOUNTER — PATIENT MESSAGE (OUTPATIENT)
Dept: FAMILY MEDICINE CLINIC | Age: 68
End: 2023-01-15

## 2023-01-15 DIAGNOSIS — N28.9 RENAL INSUFFICIENCY: ICD-10-CM

## 2023-01-15 DIAGNOSIS — E83.52 HYPERCALCEMIA: Primary | ICD-10-CM

## 2023-01-19 NOTE — TELEPHONE ENCOUNTER
----- Message from Humberto Turcios MD sent at 1/16/2023 10:02 PM EST -----  Regarding: FW: RENAL TEST   Ok please refer to nephrology for further evaluation for now instead. Diagnoses: Renal Insufficiency, Hypercalcemia.   ----- Message -----  From: Orion Rankin LPN  Sent: 3/59/1223   2:39 PM EST  To: Humberto Turcios MD  Subject: FW: RENAL TEST                                     ----- Message -----  From: Criselda Carrel  Sent: 1/15/2023  11:57 PM EST  To: [facility]  Subject: RENAL TEST                                       In December I had an appointment for the renal test.  When I arrived the office informed me that they could not see me because the code on the request was incorrect and Medicare would not pay for the test.  They indicated that I would need to contact my doctor. Thank you as always. ..   Gudelia Hanson [phone]

## 2023-01-19 NOTE — TELEPHONE ENCOUNTER
PC to report that she went to schedule that renal Us and she was told that Medicare wouldn't cover it b/c of Dx code. Per Dr Margo Quesada need to change code to Renal Insufficiency, Hypercalcemia.

## 2023-01-19 NOTE — TELEPHONE ENCOUNTER
----- Message from Madisyn Pryor MD sent at 1/16/2023 10:02 PM EST -----  Regarding: FW: RENAL TEST   Ok please refer to nephrology for further evaluation for now instead. Diagnoses: Renal Insufficiency, Hypercalcemia.   ----- Message -----  From: Francisca Garland LPN  Sent: 3/98/0407   2:39 PM EST  To: Madisyn Pryor MD  Subject: FW: RENAL TEST                                     ----- Message -----  From: Radha Treviño  Sent: 1/15/2023  11:57 PM EST  To: [facility]  Subject: RENAL TEST                                       In December I had an appointment for the renal test.  When I arrived the office informed me that they could not see me because the code on the request was incorrect and Medicare would not pay for the test.  They indicated that I would need to contact my doctor. Thank you as always. ..   Ramsey Garcia [phone]

## 2023-02-01 ENCOUNTER — OFFICE VISIT (OUTPATIENT)
Dept: FAMILY MEDICINE CLINIC | Age: 68
End: 2023-02-01
Payer: MEDICARE

## 2023-02-01 VITALS
WEIGHT: 225.6 LBS | OXYGEN SATURATION: 99 % | HEIGHT: 66 IN | RESPIRATION RATE: 16 BRPM | DIASTOLIC BLOOD PRESSURE: 64 MMHG | TEMPERATURE: 98.2 F | SYSTOLIC BLOOD PRESSURE: 122 MMHG | HEART RATE: 77 BPM | BODY MASS INDEX: 36.26 KG/M2

## 2023-02-01 DIAGNOSIS — E11.9 DIABETES MELLITUS TYPE 2, NONINSULIN DEPENDENT (HCC): Primary | ICD-10-CM

## 2023-02-01 DIAGNOSIS — E78.2 MIXED HYPERLIPIDEMIA: ICD-10-CM

## 2023-02-01 DIAGNOSIS — E21.0 PRIMARY HYPERPARATHYROIDISM (HCC): ICD-10-CM

## 2023-02-01 DIAGNOSIS — Z12.31 SCREENING MAMMOGRAM FOR BREAST CANCER: ICD-10-CM

## 2023-02-01 PROCEDURE — G8427 DOCREV CUR MEDS BY ELIG CLIN: HCPCS | Performed by: FAMILY MEDICINE

## 2023-02-01 PROCEDURE — G9899 SCRN MAM PERF RSLTS DOC: HCPCS | Performed by: FAMILY MEDICINE

## 2023-02-01 PROCEDURE — G8400 PT W/DXA NO RESULTS DOC: HCPCS | Performed by: FAMILY MEDICINE

## 2023-02-01 PROCEDURE — G8417 CALC BMI ABV UP PARAM F/U: HCPCS | Performed by: FAMILY MEDICINE

## 2023-02-01 PROCEDURE — 99214 OFFICE O/P EST MOD 30 MIN: CPT | Performed by: FAMILY MEDICINE

## 2023-02-01 PROCEDURE — 3078F DIAST BP <80 MM HG: CPT | Performed by: FAMILY MEDICINE

## 2023-02-01 PROCEDURE — 3074F SYST BP LT 130 MM HG: CPT | Performed by: FAMILY MEDICINE

## 2023-02-01 PROCEDURE — 1101F PT FALLS ASSESS-DOCD LE1/YR: CPT | Performed by: FAMILY MEDICINE

## 2023-02-01 PROCEDURE — G8536 NO DOC ELDER MAL SCRN: HCPCS | Performed by: FAMILY MEDICINE

## 2023-02-01 PROCEDURE — G0463 HOSPITAL OUTPT CLINIC VISIT: HCPCS | Performed by: FAMILY MEDICINE

## 2023-02-01 PROCEDURE — 3046F HEMOGLOBIN A1C LEVEL >9.0%: CPT | Performed by: FAMILY MEDICINE

## 2023-02-01 PROCEDURE — 2022F DILAT RTA XM EVC RTNOPTHY: CPT | Performed by: FAMILY MEDICINE

## 2023-02-01 PROCEDURE — 1123F ACP DISCUSS/DSCN MKR DOCD: CPT | Performed by: FAMILY MEDICINE

## 2023-02-01 PROCEDURE — 1090F PRES/ABSN URINE INCON ASSESS: CPT | Performed by: FAMILY MEDICINE

## 2023-02-01 PROCEDURE — G8510 SCR DEP NEG, NO PLAN REQD: HCPCS | Performed by: FAMILY MEDICINE

## 2023-02-01 PROCEDURE — 3017F COLORECTAL CA SCREEN DOC REV: CPT | Performed by: FAMILY MEDICINE

## 2023-02-01 NOTE — PROGRESS NOTES
Chief Complaint   Patient presents with    Diabetes     Fasting follow up    Cholesterol Problem     1. \"Have you been to the ER, urgent care clinic since your last visit? Hospitalized since your last visit? \" No    2. \"Have you seen or consulted any other health care providers outside of the [address] [place] [address] since your last visit? \" Yes Dr Juancho Cowart    3. For patients aged 39-70: Has the patient had a colonoscopy / FIT/ Cologuard? Yes - no Care Gap present pt said that she had one 2021 Dr Nabil Rinaldi      If the patient is female:    4. For patients aged 41-77: Has the patient had a mammogram within the past 2 years? Yes - no Care Gap present 1/2022       5. For patients aged 21-65: Has the patient had a pap smear?  Yes - no Care Gap present 11/2021

## 2023-02-02 LAB
ALBUMIN SERPL-MCNC: 4 G/DL (ref 3.5–5)
ALBUMIN/GLOB SERPL: 1.3 (ref 1.1–2.2)
ALP SERPL-CCNC: 82 U/L (ref 45–117)
ALT SERPL-CCNC: 22 U/L (ref 12–78)
ANION GAP SERPL CALC-SCNC: 4 MMOL/L (ref 5–15)
AST SERPL-CCNC: 10 U/L (ref 15–37)
BILIRUB SERPL-MCNC: 0.5 MG/DL (ref 0.2–1)
BUN SERPL-MCNC: 22 MG/DL (ref 6–20)
BUN/CREAT SERPL: 19 (ref 12–20)
CALCIUM SERPL-MCNC: 10.4 MG/DL (ref 8.5–10.1)
CHLORIDE SERPL-SCNC: 109 MMOL/L (ref 97–108)
CHOLEST SERPL-MCNC: 170 MG/DL
CO2 SERPL-SCNC: 29 MMOL/L (ref 21–32)
CREAT SERPL-MCNC: 1.14 MG/DL (ref 0.55–1.02)
EST. AVERAGE GLUCOSE BLD GHB EST-MCNC: 117 MG/DL
GLOBULIN SER CALC-MCNC: 3.2 G/DL (ref 2–4)
GLUCOSE SERPL-MCNC: 105 MG/DL (ref 65–100)
HBA1C MFR BLD: 5.7 % (ref 4–5.6)
HDLC SERPL-MCNC: 42 MG/DL
HDLC SERPL: 4 (ref 0–5)
LDLC SERPL CALC-MCNC: 87.4 MG/DL (ref 0–100)
POTASSIUM SERPL-SCNC: 5.1 MMOL/L (ref 3.5–5.1)
PROT SERPL-MCNC: 7.2 G/DL (ref 6.4–8.2)
SODIUM SERPL-SCNC: 142 MMOL/L (ref 136–145)
TRIGL SERPL-MCNC: 203 MG/DL (ref ?–150)
VLDLC SERPL CALC-MCNC: 40.6 MG/DL

## 2023-02-02 NOTE — PROGRESS NOTES
Chief Complaint   Patient presents with    Diabetes     Fasting follow up    Cholesterol Problem       HISTORY OF PRESENT ILLNESS   3 month fasting follow up Type 2 NIDDM, Hyperlipidemia, labs and medication check. Complying routinely w/ medication regimen updated below and tolerating w/o reaction or side effects. Has been monitoring home BS's a few x a week  Fasting in the AM runs 125-130  Random midday readings 110-120  No hypoglycemia  Feeling really well  Diet: since late 2020/early 2021 has cut back on sugars/sweets, then since summer 2022 has also cut back on milk & fruits and snacking; since 9-2022 has only been eating about one meal a day per a meal plan program she has been trying to incorporate; admits eating habits werent as good over the holidays bc she had some cheat days eating sweets w/ her grandkids but now getting back on track  Exercise: walks 2 x a week x 1 hr; started a fitness program 11-2-22 :TRX class x 50 minutes once a week, HIT class x 50 minutes once a week; didn't do any for the month of December and just restarted 2 weeks ago  Weight: working on losing weight; since 8-2020 has been increasing her activity, walking more, and trying to make healthier food choices in general; weight down from 270's to 254 lbs as of 8-2022 and now down to 225 lbs as of 1-2023; personal goal wt is 175 lbs   REVIEW OF SYMPTOMS   Review of Systems   Constitutional: Negative. Eyes: Negative. Respiratory: Negative. Cardiovascular: Negative. Gastrointestinal: Negative. Genitourinary: Negative. Musculoskeletal:  Negative for myalgias. Neurological: Negative. Endo/Heme/Allergies: Negative.           PROBLEM LIST/MEDICAL HISTORY     Problem List  Date Reviewed: 2/1/2023            Codes Class Noted    Pain of left heel ICD-10-CM: M79.672  ICD-9-CM: 729.5  6/9/2022    Overview Signed 6/9/2022  8:49 AM by Nory Coronado MD     4/2022 Podiatrist Dr. Kylie Martinez             Dependent edema ICD-10-CM: R60.9  ICD-9-CM: 782.3  4/27/2022    Overview Signed 4/27/2022  1:55 PM by Sergio Tomlin MD     Dependent B ankle edema             Primary hyperparathyroidism ([facility] ICD-10-CM: E21.0  ICD-9-CM: 252.01  1/9/2022    Overview Addendum 2/1/2023 10:10 AM by Sergio Tomlin MD     Referred to Endo 1-2022; Dr. Cora Mann consultation ; off calcium supplements since 6-2022             Chronic sinusitis ICD-10-CM: J32.9  ICD-9-CM: 473.9  2/4/2020        History of rheumatoid arthritis ICD-10-CM: Z87.39  ICD-9-CM: V13.4  10/11/2019    Overview Signed 10/11/2019  3:11 PM by Polo Paredes     In her 20s/30s             Spider veins of both lower extremities ICD-10-CM: I83.93  ICD-9-CM: 454.9  5/17/2018        Varicose vein of leg ICD-10-CM: I83.90  ICD-9-CM: 454.9  5/17/2018        Mixed hyperlipidemia ICD-10-CM: E78.2  ICD-9-CM: 272.2  5/31/2016        Benign essential hypertension ICD-10-CM: I10  ICD-9-CM: 401.1  5/00/4905        Umbilical hernia without obstruction and without gangrene ICD-10-CM: K42.9  ICD-9-CM: 553.1  4/22/2016        Diabetes mellitus type 2, noninsulin dependent ([facility] ICD-10-CM: E11.9  ICD-9-CM: 250.00  12/3/2015    Overview Addendum 7/7/2021  7:38 PM by Sergio Tomlin MD     12/2013; Podiatry Dr Jesse Tyler;  Eye doctor: Dr Emigdio Brock; was prescribed insulin therapy 2018 Dr. Jenna Paulino, patient self dc'd 2020/2021             Hypothyroidism ICD-10-CM: E03.9  ICD-9-CM: 244.9  7/30/2015    Overview Signed 7/30/2015 11:40 AM by Sergio Tomlin MD     2002             Actinic keratosis ICD-10-CM: L57.0  ICD-9-CM: 702.0  11/20/2014        Vitamin D deficiency ICD-10-CM: E55.9  ICD-9-CM: 268.9  3/2/2012    Overview Addendum 3/2/2012  9:46 AM by Sergio Tomlin MD     9/2011, s/p 1 month Rx Vit D             FHx: breast cancer ICD-10-CM: Z80.3  ICD-9-CM: V16.3  3/2/2012    Overview Signed 3/2/2012  9:43 AM by Sergio Tomlin MD     Several maternal relatives             History of benign left ovarian cyst, 2000 ICD-10-CM: Z87.42  ICD-9-CM: V13.29  3/2/2012        Skin cancer screening ICD-10-CM: Z12.83  ICD-9-CM: V76.43  3/2/2012    Overview Signed 10/7/2021  8:36 AM by Orestes Haney MD     Derm, previously Dr. Young Buitrago, changed to Dr. Mary Odom             H/O Optic disc edema, [id number] ICD-10-CM: H47.10  ICD-9-CM: 377.00  3/2/2012    Overview Signed 3/2/2012  1:14 PM by Orestes Haney MD     Optho Dr Hermila De La Rosa, MRI orbits             AR (allergic rhinitis) ICD-10-CM: J30.9  ICD-9-CM: 477.9  Unknown        GERD (gastroesophageal reflux disease) ICD-10-CM: K21.9  ICD-9-CM: 530.81  Unknown    Overview Signed 3/2/2012  1:12 PM by Orestes Haney MD     2000             H/O Mild, Intermittent Asthma ICD-10-CM: J45.909  ICD-9-CM: 493.90  Unknown    Overview Signed 3/2/2012  1:11 PM by Orestes Haney MD     ~2335, no hosp or ER             Postmenopause, LMP 2005, age 49 yo, s/p HRT x 6 months ICD-10-CM: Z78.0  ICD-9-CM: V49.81  Unknown    Overview Signed 3/15/2010  9:30 AM by Orestes Haney MD     LMP 12/05 (HRT x 6months)             Ovarian cyst ICD-10-CM: N83.209  ICD-9-CM: 620.2  Unknown    Overview Signed 3/15/2010  9:30 AM by Orestes Haney MD     Left, benign             Metabolic syndrome VZQ-89-UR: E88.81  ICD-9-CM: 277.7  2/22/2010               PAST SURGICAL HISTORY     Past Surgical History:   Procedure Laterality Date    DEXA BONE DENSITY STUDY AXIAL  2006    Normal    HX BREAST BIOPSY Left     Benign Aspiration    HX CERVICAL POLYPECTOMY  7/2/14, Dr Arpita Canada    Benign    HX COLONOSCOPY  5/2014    Dr Natalee Aldridge polyps x 3; repeat 2015    HX COLONOSCOPY  05/20/2015    poor prep, repeat in one year    HX COLONOSCOPY  08/2016    Normal, follow up 10 yrs    HX COLONOSCOPY  07/2005    Normal, ok x 10 yrs, Dr Theodora Azul    childbirth    HX HEENT  5/2010    tumor on vocal cord removed    HX HERNIA REPAIR  4/22/16, Dr Yariel Leonard    robotic assisted umbilical hernia repair with mesh     HX LIPOMA RESECTION  ~2005    removed from left thigh    HX ORTHOPAEDIC  1985    tumor remved left leg    HX OTHER SURGICAL  11/5/15, Dr Viviana Piña    2 masses removed left lower leg    HX REFRACTIVE SURGERY Bilateral 08/28/2019    HX WISDOM TEETH EXTRACTION      MISAEL MAMMOGRAM DIGITAL BILATERAL  annually per Gyn    at [facility]  2007    1-15% Stenosis Bilaterally         MEDICATIONS     Current Outpatient Medications   Medication Sig    famotidine (PEPCID) 40 mg tablet Take 1 Tablet by mouth nightly. simvastatin (ZOCOR) 20 mg tablet Take 1 Tablet by mouth nightly. metoprolol succinate (TOPROL-XL) 50 mg XL tablet TAKE 1 TABLET BY MOUTH EVERY DAY FOR BLOOD PRESSURE    spironolactone (ALDACTONE) 100 mg tablet TAKE 1 TABLET BY MOUTH EVERY DAY    vitamin E acetate (VITAMIN E PO) Take  by mouth daily. albuterol sulfate (ProAir RespiClick) 90 mcg/actuation breath activated inhaler Take 2 Puffs by inhalation every six (6) hours as needed (sob/wheeze). Unithroid 75 mcg tablet Take 1 Tablet by mouth Daily (before breakfast). FreeStyle Lite Strips strip USE TO TEST BLOOD GLUCOSE UP TO THREE TIMES DAILY. DX: E11.9, Z79.4    coenzyme Q-10 (CO Q-10) 200 mg capsule Take 200 mg by mouth daily. Blood-Glucose Meter monitoring kit Test up to 3x daily for type 2 diabetes uncontrolled on insulin, (E11.9,  Z79.4) Type 2 diabetes mellitus with insulin therapy    triamcinolone acetonide (KENALOG) 0.1 % topical cream use thin layer on back twice daily    CINNAMON BARK (CINNAMON PO) Take 350 mg by mouth daily. No current facility-administered medications for this visit.           ALLERGIES     Allergies   Allergen Reactions    Metformin Diarrhea and Nausea Only     Stomach cramps    Victoza [Liraglutide] Nausea Only    Avelox [Moxifloxacin] Rash and Itching    Bactrim [Sulfamethoxazole-Trimethoprim] Rash Doxycycline Other (comments)     Tongue discomfort, swollen glands    Lisinopril Diarrhea    Norvasc [Amlodipine] Other (comments)     edema    Penicillin G Hives, Shortness of Breath and Swelling          SOCIAL HISTORY     Social History     Tobacco Use    Smoking status: Never    Smokeless tobacco: Never   Substance Use Topics    Alcohol use: No     Alcohol/week: 0.0 standard drinks     Comment: very rare, 1-2 drinks a year     Social History     Social History Narrative    ,   2022 sudden MI    1 son and one 10 yo grand daughter both local and she helps out w/ her alcalos    Worked as a  for [employer]    Retired     Stays active w/ her grand daughter, goes to [place], takes her a lot of places    Stays socially engaged w/ friends as well    Hoahaoism on Sundays    Traveling a lot this summer to various amusement [place] w/ her son and grand daughter        Diet: since late /early  has cut back on sugars/sweets, then since summer 2022 has also cut back on milk & fruits and snacking; since  has only been eating about one meal a day per a meal plan program she has been trying to incorporate; admits eating habits werent as good over the holidays bc she had some cheat days eating sweets w/ her grandkids but now getting back on track    Exercise: walks 2 x a week x 1 hr; started a fitness program 22 :TRX class x 50 minutes once a week, HIT class x 50 minutes once a week; didn't do any for the month of December and just restarted 2 weeks ago    Weight: working on losing weight; since  has been increasing her activity, walking more, and trying to make healthier food choices in general; weight down from 270's to 254 lbs as of  and now down to 225 lbs as of ; personal goal wt is 175 lbs     Social History     Substance and Sexual Activity   Sexual Activity Not Currently    Partners: Male    Comment:          IMMUNIZATIONS     Immunization History   Administered Date(s) Administered     Influenza, FLUZONE (age 72 y+), High Dose 2020    COVID-19, PFIZER PURPLE top, DILUTE for use, (age 15 y+), IM, 30mcg/0.3mL 2021, 2021, 2021, 2022    Hepatitis A Vaccine 2001    Influenza High Dose Vaccine PF 2020, 2021, 2022    Influenza Vaccine 10/22/2013, 2016    Influenza, FLUAD, (age 72 y+), Adjuvanted 2022    Influenza, FLUARIX, FLULAVAL, FLUZONE (age 10 mo+) AND AFLURIA, (age 1 y+), PF, 0.5mL 2018    Influenza, FLUCELVAX, (age 10 mo+), MDCK, PF 10/11/2019    Pneumococcal Conjugate (PCV-13) 2021    Pneumococcal Polysaccharide (PPSV-23) 2016    TD Vaccine 2000    TDAP Vaccine 2011    Zoster Vaccine, Live 2017         FAMILY HISTORY     Family History   Problem Relation Age of Onset    Breast Cancer Mother         diagnosed age 72, survived it     Arthritis-rheumatoid Mother     Stroke Mother     Parkinsonism Mother          in nursing from colon perf or GI bleed age 80    Hypertension Mother     Migraines Mother     Breast Cancer Maternal Grandmother          in her late 42's    Dementia Father     Heart Disease Father         CAD, PTCA    Stroke Father     Cancer Brother 68        soft tissue cancer    Cancer Brother 68        kidney and lungs    Hypertension Sister     Dementia Sister     Heart Disease Sister     Breast Cancer Maternal Aunt     High Cholesterol Son     Cancer Cousin         maternal first cousin w/ sarcoma of leg    Breast Cancer Maternal Aunt     Ovarian Cancer Maternal Aunt     Anesth Problems Neg Hx          VITALS   Visit Vitals  /64 (BP 1 Location: Left upper arm, BP Patient Position: Sitting, BP Cuff Size: Large adult)   Pulse 77   Temp 98.2 °F (36.8 °C) (Oral)   Resp 16   Ht 5' 6\" (1.676 m)   Wt 225 lb 9.6 oz (102.3 kg)   LMP 2007   SpO2 99%   BMI 36.41 kg/m²          PHYSICAL EXAMINATION   Physical Exam  Vitals reviewed. Constitutional:       General: She is not in acute distress. Neck:      Thyroid: No thyroid mass, thyromegaly or thyroid tenderness. Vascular: No carotid bruit. Cardiovascular:      Rate and Rhythm: Normal rate and regular rhythm. Heart sounds: Normal heart sounds. Pulmonary:      Effort: Pulmonary effort is normal.      Breath sounds: Normal breath sounds. Abdominal:      Palpations: Abdomen is soft. Tenderness: There is no abdominal tenderness. Musculoskeletal:         General: No swelling or tenderness. Cervical back: Neck supple. Right lower leg: No edema. Left lower leg: No edema. Lymphadenopathy:      Cervical: No cervical adenopathy. Neurological:      Mental Status: She is alert.               LABORATORY DATA/ANCILLARY/IMAGING     Results for orders placed or performed in visit on 11/28/22   CALCIUM, IONIZED   Result Value Ref Range    Calcium, ionized 5.6 4.5 - 5.6 mg/dL   METABOLIC PANEL, BASIC   Result Value Ref Range    Glucose 104 (H) 70 - 99 mg/dL    BUN 20 8 - 27 mg/dL    Creatinine 1.23 (H) 0.57 - 1.00 mg/dL    eGFR 48 (L) >59 mL/min/1.73    BUN/Creatinine ratio 16 12 - 28    Sodium 143 134 - 144 mmol/L    Potassium 5.0 3.5 - 5.2 mmol/L    Chloride 105 96 - 106 mmol/L    CO2 21 20 - 29 mmol/L    Calcium 10.9 (H) 8.7 - 10.3 mg/dL   VITAMIN D, 25 HYDROXY   Result Value Ref Range    VITAMIN D, 25-HYDROXY 25.3 (L) 30.0 - 100.0 ng/mL   PHOSPHORUS   Result Value Ref Range    Phosphorus 3.3 3.0 - 4.3 mg/dL   CALCIUM, UR, RANDOM   Result Value Ref Range    Calcium,urine mg/dL 3.3 Not Estab. mg/dL   CREATININE, UR, RANDOM   Result Value Ref Range    Creatinine, urine random 67.1 Not Estab. mg/dL   VITAMIN A   Result Value Ref Range    VITAMIN A 57.2 22.0 - 69.5 ug/dL       Lab Results   Component Value Date/Time    WBC 6.7 11/01/2022 12:25 PM    HGB 12.6 11/01/2022 12:25 PM    HCT 39.4 11/01/2022 12:25 PM    PLATELET 336 (L) 48/91/6101 12:25 PM    MCV 94.0 11/01/2022 12:25 PM     Lab Results   Component Value Date/Time    Hemoglobin A1c 6.6 (H) 11/01/2022 12:25 PM    Hemoglobin A1c 7.3 (H) 01/06/2022 08:41 AM    Hemoglobin A1c 7.1 (H) 07/07/2021 11:28 AM    Glucose 104 (H) 11/28/2022 12:00 AM    Glucose (POC) 120 (H) 04/22/2016 05:03 PM    Microalbumin/Creat ratio (mg/g creat) 6 11/01/2022 12:25 PM    Microalbumin,urine random 1.74 11/01/2022 12:25 PM    LDL, calculated 78.2 11/01/2022 12:25 PM    Creatinine 1.23 (H) 11/28/2022 12:00 AM      Lab Results   Component Value Date/Time    Cholesterol, total 153 11/01/2022 12:25 PM    HDL Cholesterol 41 11/01/2022 12:25 PM    LDL, calculated 78.2 11/01/2022 12:25 PM    Triglyceride 169 (H) 11/01/2022 12:25 PM    CHOL/HDL Ratio 3.7 11/01/2022 12:25 PM     Lab Results   Component Value Date/Time    GFR est non-AA 54 (L) 06/09/2022 09:06 AM    GFR est AA >60 06/09/2022 09:06 AM    Creatinine 1.23 (H) 11/28/2022 12:00 AM    BUN 20 11/28/2022 12:00 AM    Sodium 143 11/28/2022 12:00 AM    Potassium 5.0 11/28/2022 12:00 AM    Chloride 105 11/28/2022 12:00 AM    CO2 21 11/28/2022 12:00 AM    Phosphorus 3.3 11/28/2022 12:00 AM    PTH, Intact 82.1 06/09/2022 09:06 AM     Lab Results   Component Value Date/Time    TSH 1.24 06/09/2022 09:06 AM    T4, Free 1.2 06/09/2022 09:06 AM      Lab Results   Component Value Date/Time    Sodium 143 11/28/2022 12:00 AM    Potassium 5.0 11/28/2022 12:00 AM    Chloride 105 11/28/2022 12:00 AM    CO2 21 11/28/2022 12:00 AM    Anion gap 10 11/01/2022 12:25 PM    Glucose 104 (H) 11/28/2022 12:00 AM    BUN 20 11/28/2022 12:00 AM    Creatinine 1.23 (H) 11/28/2022 12:00 AM    BUN/Creatinine ratio 16 11/28/2022 12:00 AM    GFR est AA >60 06/09/2022 09:06 AM    GFR est non-AA 54 (L) 06/09/2022 09:06 AM    Calcium 10.9 (H) 11/28/2022 12:00 AM    Bilirubin, total 0.6 11/01/2022 12:25 PM    ALT (SGPT) 32 11/01/2022 12:25 PM    Alk.  phosphatase 91 11/01/2022 12:25 PM    Protein, total 7.8 11/01/2022 12:25 PM Albumin 4.4 11/01/2022 12:25 PM    Globulin 3.4 11/01/2022 12:25 PM    A-G Ratio 1.3 11/01/2022 12:25 PM          ASSESSMENT & PLAN   Diagnoses and all orders for this visit:    1. Diabetes mellitus type 2, noninsulin dependent ([facility]  Improving over time w/ dietary modification, exercise, weight reduction and has been off any anti-hyperglycemic agents the past year and doing well  She has had mild renal insufficiency w/ rising Cr, declining GFR  She avoids Nsaids and has not taken any since last summer  She has no proteinuria  Insurance denied Renal US  She was referred to Nephrology for consultation and has information for scheduling based on today's lab results  -     LIPID PANEL; Future  -     METABOLIC PANEL, COMPREHENSIVE; Future  -     HEMOGLOBIN A1C WITH EAG; Future    2. Mixed hyperlipidemia  Maintained on Zocor 20 mg daily w/ LDL at goal  -     LIPID PANEL; Future    3. Screening mammogram for breast cancer  Scheduled for 2-7-23, order placed  -     [facility] 3D BECK W MAMMO BI SCREENING INCL CAD; Future    4. Primary hyperparathyroidism ([facility]  Followed by Endo Dr. Davey Guerrero ordered, patient scheduling (had insurance issues, now approved)  Mild hypercalcemia, at baseline and off all calcium supplements since 6-2022          Fasting labs checked today  Specific lipid/LDL/HgbA1c goals assessed  Reviewed diet, nutrition, exercise, weight management, BMI/goals. Age/risk based screening recommendations, health maintenance & prevention counseling. Cancer screening USPTFS guidelines reviewed w/ pt today. Discussed benefits/positive/negative outcomes of screening based on age/risk stratification. Informed consent for/against screening based on pt's personal hx/risk factors. Updated in history above and health maintenance. Had pap per gyn 2021 and to repeat later this year  Further follow up & other recommendations pending review of labs.    If all remains good and stable, follow up in 3 months, sooner prn  (She is scheduled for 5-2023)

## 2023-02-07 ENCOUNTER — HOSPITAL ENCOUNTER (OUTPATIENT)
Dept: MAMMOGRAPHY | Age: 68
Discharge: HOME | End: 2023-02-07
Attending: INTERNAL MEDICINE
Payer: MEDICARE

## 2023-02-07 DIAGNOSIS — E55.9 VITAMIN D DEFICIENCY: ICD-10-CM

## 2023-02-07 DIAGNOSIS — E21.0 PRIMARY HYPERPARATHYROIDISM (HCC): ICD-10-CM

## 2023-02-07 DIAGNOSIS — E83.52 HYPERCALCEMIA: Primary | ICD-10-CM

## 2023-02-07 DIAGNOSIS — N95.9 MENOPAUSAL PROBLEM: ICD-10-CM

## 2023-02-07 PROCEDURE — 77080 DXA BONE DENSITY AXIAL: CPT

## 2023-02-27 ENCOUNTER — OFFICE VISIT (OUTPATIENT)
Dept: FAMILY MEDICINE CLINIC | Age: 68
End: 2023-02-27
Payer: MEDICARE

## 2023-02-27 VITALS
BODY MASS INDEX: 35.26 KG/M2 | WEIGHT: 219.4 LBS | RESPIRATION RATE: 16 BRPM | SYSTOLIC BLOOD PRESSURE: 101 MMHG | DIASTOLIC BLOOD PRESSURE: 65 MMHG | TEMPERATURE: 97.5 F | HEIGHT: 66 IN | OXYGEN SATURATION: 99 % | HEART RATE: 74 BPM

## 2023-02-27 DIAGNOSIS — J06.9 VIRAL URI WITH COUGH: Primary | ICD-10-CM

## 2023-02-27 DIAGNOSIS — A08.4 VIRAL GASTROENTERITIS: ICD-10-CM

## 2023-02-27 DIAGNOSIS — L30.9 ECZEMA, UNSPECIFIED TYPE: ICD-10-CM

## 2023-02-27 PROCEDURE — G8427 DOCREV CUR MEDS BY ELIG CLIN: HCPCS | Performed by: NURSE PRACTITIONER

## 2023-02-27 PROCEDURE — 1123F ACP DISCUSS/DSCN MKR DOCD: CPT | Performed by: NURSE PRACTITIONER

## 2023-02-27 PROCEDURE — G8432 DEP SCR NOT DOC, RNG: HCPCS | Performed by: NURSE PRACTITIONER

## 2023-02-27 PROCEDURE — 3078F DIAST BP <80 MM HG: CPT | Performed by: NURSE PRACTITIONER

## 2023-02-27 PROCEDURE — 1101F PT FALLS ASSESS-DOCD LE1/YR: CPT | Performed by: NURSE PRACTITIONER

## 2023-02-27 PROCEDURE — G8536 NO DOC ELDER MAL SCRN: HCPCS | Performed by: NURSE PRACTITIONER

## 2023-02-27 PROCEDURE — G0463 HOSPITAL OUTPT CLINIC VISIT: HCPCS | Performed by: NURSE PRACTITIONER

## 2023-02-27 PROCEDURE — G9899 SCRN MAM PERF RSLTS DOC: HCPCS | Performed by: NURSE PRACTITIONER

## 2023-02-27 PROCEDURE — G8399 PT W/DXA RESULTS DOCUMENT: HCPCS | Performed by: NURSE PRACTITIONER

## 2023-02-27 PROCEDURE — G8417 CALC BMI ABV UP PARAM F/U: HCPCS | Performed by: NURSE PRACTITIONER

## 2023-02-27 PROCEDURE — 3074F SYST BP LT 130 MM HG: CPT | Performed by: NURSE PRACTITIONER

## 2023-02-27 PROCEDURE — 99213 OFFICE O/P EST LOW 20 MIN: CPT | Performed by: NURSE PRACTITIONER

## 2023-02-27 PROCEDURE — 3017F COLORECTAL CA SCREEN DOC REV: CPT | Performed by: NURSE PRACTITIONER

## 2023-02-27 PROCEDURE — 1090F PRES/ABSN URINE INCON ASSESS: CPT | Performed by: NURSE PRACTITIONER

## 2023-02-27 RX ORDER — TRIAMCINOLONE ACETONIDE 1 MG/G
CREAM TOPICAL
Qty: 45 G | Refills: 0 | Status: SHIPPED | OUTPATIENT
Start: 2023-02-27

## 2023-02-27 RX ORDER — LANCETS 28 GAUGE
EACH MISCELLANEOUS
COMMUNITY

## 2023-02-27 NOTE — PROGRESS NOTES
Chief Complaint   Patient presents with    Diarrhea     \"Started 2/15/23\"    Nasal Congestion     \"Yellow mucus\"         1. \"Have you been to the ER, urgent care clinic since your last visit? Hospitalized since your last visit? \" No    2. \"Have you seen or consulted any other health care providers outside of the [address] [place] [address] since your last visit? \" No     3. For patients aged 39-70: Has the patient had a colonoscopy / FIT/ Cologuard? Yes - no Care Gap present      If the patient is female:    4. For patients aged 41-77: Has the patient had a mammogram within the past 2 years? No      5. For patients aged 21-65: Has the patient had a pap smear?  NA - based on age or sex         3 most recent PHQ Screens 2/27/2023   PHQ Not Done -   Little interest or pleasure in doing things Not at all   Feeling down, depressed, irritable, or hopeless Not at all   Total Score PHQ 2 0       Health Maintenance Due   Topic Date Due    COVID-19 Vaccine (5 - Booster) 11/04/2022    Breast Cancer Screen Mammogram  01/21/2023

## 2023-02-27 NOTE — PROGRESS NOTES
HISTORY OF PRESENT ILLNESS  Saleem Martin is a 79 y.o. female. HPI  Acute visit. C/o cough and chest congestion x 12 days. Has been using mucinex DM and tessalon perles with some symptom relief. Tested negative several times for COVID. Reports symptoms finally began to approve over past few days. C/o diarrhea over past 3 days. No abdominal pain. No fever/chills. Requesting refill on kenalog for itchy rash on back. Reports history of eczema. Patient Active Problem List   Diagnosis Code    Metabolic syndrome K38.48    AR (allergic rhinitis) J30.9    GERD (gastroesophageal reflux disease) K21.9    H/O Mild, Intermittent Asthma J45.909    Postmenopause, LMP 2005, age 47 yo, s/p HRT x 6 months Z78.0    Ovarian cyst N83.209    Vitamin D deficiency E55.9    FHx: breast cancer Z80.3    History of benign left ovarian cyst, 2000 Z87.42    Skin cancer screening Z12.83    H/O Optic disc edema, 3135-0342 H47.10    Actinic keratosis L57.0    Hypothyroidism E03.9    Diabetes mellitus type 2, noninsulin dependent ([facility] O54.3    Umbilical hernia without obstruction and without gangrene K42.9    Mixed hyperlipidemia E78.2    Benign essential hypertension I10    Spider veins of both lower extremities I83.93    Varicose vein of leg I83.90    History of rheumatoid arthritis Z87.39    Chronic sinusitis J32.9    Primary hyperparathyroidism (HCC) E21.0    Dependent edema R60.9    Pain of left heel M79.672     Current Outpatient Medications   Medication Sig    lancets 28 gauge misc FreeStyle Lancets 28 gauge   TEST UP TO 3 TIMES DAILY FOR TYPE 2 DIABETES UNCONTROLLED ON INSULIN, (E11.9, Z79.4)    triamcinolone acetonide (KENALOG) 0.1 % topical cream use thin layer on back twice daily    famotidine (PEPCID) 40 mg tablet Take 1 Tablet by mouth nightly. simvastatin (ZOCOR) 20 mg tablet Take 1 Tablet by mouth nightly.     metoprolol succinate (TOPROL-XL) 50 mg XL tablet TAKE 1 TABLET BY MOUTH EVERY DAY FOR BLOOD PRESSURE spironolactone (ALDACTONE) 100 mg tablet TAKE 1 TABLET BY MOUTH EVERY DAY    vitamin E acetate (VITAMIN E PO) Take  by mouth daily. albuterol sulfate (ProAir RespiClick) 90 mcg/actuation breath activated inhaler Take 2 Puffs by inhalation every six (6) hours as needed (sob/wheeze). Unithroid 75 mcg tablet Take 1 Tablet by mouth Daily (before breakfast). coenzyme Q-10 (CO Q-10) 200 mg capsule Take 200 mg by mouth daily. CINNAMON BARK (CINNAMON PO) Take 350 mg by mouth daily. FreeStyle Lite Strips strip USE TO TEST BLOOD GLUCOSE UP TO THREE TIMES DAILY. DX: E11.9, Z79.4    Blood-Glucose Meter monitoring kit Test up to 3x daily for type 2 diabetes uncontrolled on insulin, (E11.9,  Z79.4) Type 2 diabetes mellitus with insulin therapy     No current facility-administered medications for this visit. Social History     Tobacco Use    Smoking status: Never    Smokeless tobacco: Never   Substance Use Topics    Alcohol use: No     Alcohol/week: 0.0 standard drinks     Comment: very rare, 1-2 drinks a year    Drug use: No     Blood pressure 101/65, pulse 74, temperature 97.5 °F (36.4 °C), temperature source Temporal, resp. rate 16, height 5' 6\" (1.676 m), weight 219 lb 6.4 oz (99.5 kg), last menstrual period 12/24/2007, SpO2 99 %. Review of Systems   Constitutional:  Negative for chills, fever and malaise/fatigue. Respiratory:  Positive for cough and sputum production. Negative for hemoptysis, shortness of breath and wheezing. Cardiovascular:  Negative for chest pain and palpitations. Gastrointestinal:  Positive for diarrhea. Negative for abdominal pain, blood in stool, melena, nausea and vomiting. Skin:  Positive for itching and rash. All other systems reviewed and are negative. Physical Exam  Constitutional:       General: She is not in acute distress.   HENT:      Right Ear: Tympanic membrane and ear canal normal.      Left Ear: Tympanic membrane and ear canal normal.      Mouth/Throat: Mouth: Mucous membranes are moist.      Pharynx: Oropharynx is clear. Cardiovascular:      Rate and Rhythm: Normal rate and regular rhythm. Heart sounds: Normal heart sounds. Pulmonary:      Effort: Pulmonary effort is normal.      Breath sounds: Normal breath sounds. No wheezing or rales. Abdominal:      General: There is no distension. Palpations: Abdomen is soft. There is no mass. Tenderness: There is no abdominal tenderness. There is no guarding. Musculoskeletal:      Cervical back: Neck supple. Lymphadenopathy:      Cervical: No cervical adenopathy. Skin:     General: Skin is warm and dry. Neurological:      Mental Status: She is alert. ASSESSMENT and PLAN  Diagnoses and all orders for this visit:    1. Viral URI with cough  Symptoms improving. Rest and fluids. Continue mucinex DM as directed. 2. Viral gastroenteritis  No signs of acute abdomen. Recommend bowel rest, increase fluids. Immodium prn.  3. Eczema, unspecified type  -     triamcinolone acetonide (KENALOG) 0.1 % topical cream; use thin layer on back twice daily  Recommend moisturize daily. May continue kenalog for flare ups as directed. Follow up prn. We discussed the expected course, resolution and complications of the diagnosis in detail. Medication risks, benefits, costs, interactions and side effects were discussed. The patient was advised to contact the office for worsening of condition or FTI as anticipated. The patient expressed understanding of the diagnosis and plan.

## 2023-03-15 DIAGNOSIS — R60.9 DEPENDENT EDEMA: ICD-10-CM

## 2023-03-15 DIAGNOSIS — I10 BENIGN ESSENTIAL HYPERTENSION: ICD-10-CM

## 2023-03-15 DIAGNOSIS — E78.2 MIXED HYPERLIPIDEMIA: ICD-10-CM

## 2023-03-15 RX ORDER — FAMOTIDINE 40 MG/1
40 TABLET, FILM COATED ORAL
Qty: 90 TABLET | Refills: 1 | Status: SHIPPED | OUTPATIENT
Start: 2023-03-15

## 2023-03-15 RX ORDER — SPIRONOLACTONE 100 MG/1
TABLET, FILM COATED ORAL
Qty: 90 TABLET | Refills: 1 | Status: SHIPPED | OUTPATIENT
Start: 2023-03-15

## 2023-03-15 RX ORDER — METOPROLOL SUCCINATE 50 MG/1
TABLET, EXTENDED RELEASE ORAL
Qty: 90 TABLET | Refills: 1 | Status: SHIPPED | OUTPATIENT
Start: 2023-03-15

## 2023-03-15 RX ORDER — SIMVASTATIN 20 MG/1
20 TABLET, FILM COATED ORAL
Qty: 90 TABLET | Refills: 1 | Status: SHIPPED | OUTPATIENT
Start: 2023-03-15

## 2023-04-22 DIAGNOSIS — E83.52 HYPERCALCEMIA: Primary | ICD-10-CM

## 2023-04-22 DIAGNOSIS — M47.816 LUMBAR SPONDYLOSIS: ICD-10-CM

## 2023-04-22 DIAGNOSIS — M54.16 LUMBAR RADICULOPATHY: Primary | ICD-10-CM

## 2023-04-23 DIAGNOSIS — E83.52 HYPERCALCEMIA: Primary | ICD-10-CM

## 2023-04-23 DIAGNOSIS — E55.9 VITAMIN D DEFICIENCY: ICD-10-CM

## 2023-04-24 DIAGNOSIS — E83.52 HYPERCALCEMIA: Primary | ICD-10-CM

## 2023-05-04 NOTE — TELEPHONE ENCOUNTER
MD Howard,    Request from patient to send to you for refill. Patient is due for appt. Will send to front office to schedule also. Thanks, Elayne Strange    Last Visit: VV 5/5/20 MD Kaufman  Next Appointment: Not scheduled- appt due  Previous Refill Encounter(s): 5/1/20 180    Requested Prescriptions     Pending Prescriptions Disp Refills    naproxen (NAPROSYN) 500 mg tablet 180 Tab 0     Sig: Take 1 Tab by mouth two (2) times daily as needed for Pain.
Outbound call to patient, was unable to reach patient. Left voicemail stating to patient to return call to the office to schedule a follow up visit and blood work.
Patient overdue follow up and nothing scheduled. She was advised by Dr. July Rider back in 5-2020 to have a fasting follow up 3 months but patient never did and still has nothing scheduled. Her labs are also overdue, last done 1-2020. I am now getting a 90 day refill request for one of her meds. Please get a local pharm for us to send a short supply for now until she gets an appointment for an in person office visit and fasting labs same day.
oral

## 2023-05-19 ENCOUNTER — TELEPHONE (OUTPATIENT)
Age: 68
End: 2023-05-19

## 2023-06-07 DIAGNOSIS — E83.52 HYPERCALCEMIA: ICD-10-CM

## 2023-06-07 DIAGNOSIS — E55.9 VITAMIN D DEFICIENCY: ICD-10-CM

## 2023-06-27 DIAGNOSIS — I10 ESSENTIAL (PRIMARY) HYPERTENSION: ICD-10-CM

## 2023-06-27 DIAGNOSIS — R60.9 EDEMA, UNSPECIFIED: ICD-10-CM

## 2023-06-27 DIAGNOSIS — E78.2 MIXED HYPERLIPIDEMIA: ICD-10-CM

## 2023-06-27 NOTE — TELEPHONE ENCOUNTER
PCP: Noni Lazo MD    Last appt: 2/27/2023     Future Appointments   Date Time Provider [address]   7/17/2023 10:20 AM Mira Botello MD [facility] BS AMB       Requested Prescriptions     Pending Prescriptions Disp Refills    metoprolol succinate (TOPROL XL) 50 MG extended release tablet [Pharmacy Med Name: METOPROLOL SUCC ER 50 MG TAB] 90 tablet 1     Sig: TAKE 1 TABLET BY MOUTH EVERY DAY FOR BLOOD PRESSURE    simvastatin (ZOCOR) 20 MG tablet [Pharmacy Med Name: SIMVASTATIN 20 MG TABLET] 90 tablet 1     Sig: TAKE 1 TABLET BY MOUTH NIGHTLY    spironolactone (ALDACTONE) 100 MG tablet [Pharmacy Med Name: SPIRONOLACTONE 100 MG TABLET] 90 tablet 1     Sig: TAKE 1 TABLET BY MOUTH EVERY DAY    famotidine (PEPCID) 40 MG tablet [Pharmacy Med Name: FAMOTIDINE 40 MG TABLET] 90 tablet 1     Sig: TAKE 1 TABLET BY MOUTH NIGHTLY       Prior labs and Blood pressures:  BP Readings from Last 3 Encounters:   02/27/23 101/65   02/01/23 122/64   11/28/22 111/74     Lab Results   Component Value Date/Time     02/01/2023 10:14 AM    K 5.1 02/01/2023 10:14 AM     02/01/2023 10:14 AM    CO2 29 02/01/2023 10:14 AM    BUN 22 02/01/2023 10:14 AM    GFRAA >60 06/09/2022 09:06 AM     Lab Results   Component Value Date/Time    WYB8KFAK 6.3 06/09/2022 08:50 AM     Lab Results   Component Value Date/Time    CHOL 170 02/01/2023 10:14 AM    HDL 42 02/01/2023 10:14 AM     No results found for: VITD3, VD3RIA    Lab Results   Component Value Date/Time    TSH 1.24 06/09/2022 09:06 AM

## 2023-06-28 NOTE — TELEPHONE ENCOUNTER
These were all done 3-15-23 for #90 plus a refill which means good through mid Sept. She was scheduled for 5-2023 but then she cancelled and rescheduled for July. So her refill should still be good. She just needs to check w/ her pharmacy to  the refill that is already on file.

## 2023-07-03 NOTE — TELEPHONE ENCOUNTER
MD HARRIS,    Patient just picked them all up 2 weeks ago. Rph stated it is the automated refill request trying to be proactive. Ugh! Can delete them all.   Thanks, [facility] Tracking Only    Program: Medication Refill  Intervention Detail: Discontinued Rx: 4, reason: Duplicate Therapy  Time Spent (min): 10

## 2023-07-04 RX ORDER — SIMVASTATIN 20 MG
TABLET ORAL
Qty: 90 TABLET | Refills: 1 | OUTPATIENT
Start: 2023-07-04

## 2023-07-04 RX ORDER — METOPROLOL SUCCINATE 50 MG/1
TABLET, EXTENDED RELEASE ORAL
Qty: 90 TABLET | Refills: 1 | OUTPATIENT
Start: 2023-07-04

## 2023-07-04 RX ORDER — SPIRONOLACTONE 100 MG/1
TABLET, FILM COATED ORAL
Qty: 90 TABLET | Refills: 1 | OUTPATIENT
Start: 2023-07-04

## 2023-07-04 RX ORDER — FAMOTIDINE 40 MG/1
TABLET, FILM COATED ORAL
Qty: 90 TABLET | Refills: 1 | OUTPATIENT
Start: 2023-07-04

## 2023-07-05 DIAGNOSIS — E03.9 HYPOTHYROIDISM, UNSPECIFIED: ICD-10-CM

## 2023-07-07 RX ORDER — LEVOTHYROXINE SODIUM 75 UG/1
TABLET ORAL
Qty: 30 TABLET | Refills: 0 | Status: SHIPPED | OUTPATIENT
Start: 2023-07-07

## 2023-07-07 NOTE — TELEPHONE ENCOUNTER
Chief Complaint   Patient presents with    Medication Refill     Last Office visit 02/27/2023  Next follow Up 07/17/2023

## 2023-07-17 ENCOUNTER — OFFICE VISIT (OUTPATIENT)
Age: 68
End: 2023-07-17
Payer: MEDICARE

## 2023-07-17 VITALS
SYSTOLIC BLOOD PRESSURE: 122 MMHG | HEIGHT: 66 IN | HEART RATE: 76 BPM | OXYGEN SATURATION: 98 % | TEMPERATURE: 98.1 F | WEIGHT: 222.2 LBS | RESPIRATION RATE: 16 BRPM | BODY MASS INDEX: 35.71 KG/M2 | DIASTOLIC BLOOD PRESSURE: 70 MMHG

## 2023-07-17 DIAGNOSIS — Z23 NEED FOR PROPHYLACTIC VACCINATION AGAINST STREPTOCOCCUS PNEUMONIAE (PNEUMOCOCCUS): ICD-10-CM

## 2023-07-17 DIAGNOSIS — Z12.31 SCREENING MAMMOGRAM FOR BREAST CANCER: ICD-10-CM

## 2023-07-17 DIAGNOSIS — E03.9 ACQUIRED HYPOTHYROIDISM: ICD-10-CM

## 2023-07-17 DIAGNOSIS — E78.2 MIXED HYPERLIPIDEMIA: ICD-10-CM

## 2023-07-17 DIAGNOSIS — E11.9 DIABETES MELLITUS TYPE 2, NONINSULIN DEPENDENT (HCC): Primary | ICD-10-CM

## 2023-07-17 DIAGNOSIS — I10 BENIGN ESSENTIAL HYPERTENSION: ICD-10-CM

## 2023-07-17 LAB
ALBUMIN SERPL-MCNC: 4 G/DL (ref 3.5–5)
ALBUMIN/GLOB SERPL: 1.3 (ref 1.1–2.2)
ALP SERPL-CCNC: 75 U/L (ref 45–117)
ALT SERPL-CCNC: 24 U/L (ref 12–78)
ANION GAP SERPL CALC-SCNC: 5 MMOL/L (ref 5–15)
AST SERPL-CCNC: 18 U/L (ref 15–37)
BILIRUB SERPL-MCNC: 0.5 MG/DL (ref 0.2–1)
BUN SERPL-MCNC: 19 MG/DL (ref 6–20)
BUN/CREAT SERPL: 17 (ref 12–20)
CALCIUM SERPL-MCNC: 10.7 MG/DL (ref 8.5–10.1)
CHLORIDE SERPL-SCNC: 107 MMOL/L (ref 97–108)
CHOLEST SERPL-MCNC: 186 MG/DL
CO2 SERPL-SCNC: 27 MMOL/L (ref 21–32)
CREAT SERPL-MCNC: 1.15 MG/DL (ref 0.55–1.02)
CREAT UR-MCNC: 286 MG/DL
EST. AVERAGE GLUCOSE BLD GHB EST-MCNC: 120 MG/DL
GLOBULIN SER CALC-MCNC: 3.2 G/DL (ref 2–4)
GLUCOSE SERPL-MCNC: 99 MG/DL (ref 65–100)
HBA1C MFR BLD: 5.8 % (ref 4–5.6)
HDLC SERPL-MCNC: 45 MG/DL
HDLC SERPL: 4.1 (ref 0–5)
LDLC SERPL CALC-MCNC: 102.6 MG/DL (ref 0–100)
MICROALBUMIN UR-MCNC: 1.38 MG/DL
MICROALBUMIN/CREAT UR-RTO: 5 MG/G (ref 0–30)
POTASSIUM SERPL-SCNC: 4.7 MMOL/L (ref 3.5–5.1)
PROT SERPL-MCNC: 7.2 G/DL (ref 6.4–8.2)
SODIUM SERPL-SCNC: 139 MMOL/L (ref 136–145)
TRIGL SERPL-MCNC: 192 MG/DL
TSH SERPL DL<=0.05 MIU/L-ACNC: 1.39 UIU/ML (ref 0.36–3.74)
VLDLC SERPL CALC-MCNC: 38.4 MG/DL

## 2023-07-17 PROCEDURE — G8427 DOCREV CUR MEDS BY ELIG CLIN: HCPCS | Performed by: FAMILY MEDICINE

## 2023-07-17 PROCEDURE — 3078F DIAST BP <80 MM HG: CPT | Performed by: FAMILY MEDICINE

## 2023-07-17 PROCEDURE — 2022F DILAT RTA XM EVC RTNOPTHY: CPT | Performed by: FAMILY MEDICINE

## 2023-07-17 PROCEDURE — PBSHW PNEUMOCOCCAL, PPSV23, PNEUMOVAX 23, (AGE 2 YRS+), SC/IM: Performed by: FAMILY MEDICINE

## 2023-07-17 PROCEDURE — 1090F PRES/ABSN URINE INCON ASSESS: CPT | Performed by: FAMILY MEDICINE

## 2023-07-17 PROCEDURE — G8417 CALC BMI ABV UP PARAM F/U: HCPCS | Performed by: FAMILY MEDICINE

## 2023-07-17 PROCEDURE — 90732 PPSV23 VACC 2 YRS+ SUBQ/IM: CPT | Performed by: FAMILY MEDICINE

## 2023-07-17 PROCEDURE — 1036F TOBACCO NON-USER: CPT | Performed by: FAMILY MEDICINE

## 2023-07-17 PROCEDURE — G8399 PT W/DXA RESULTS DOCUMENT: HCPCS | Performed by: FAMILY MEDICINE

## 2023-07-17 PROCEDURE — PBSHW PR ADMIN PNEUMOCOCCAL VACCINE: Performed by: FAMILY MEDICINE

## 2023-07-17 PROCEDURE — 99214 OFFICE O/P EST MOD 30 MIN: CPT | Performed by: FAMILY MEDICINE

## 2023-07-17 PROCEDURE — 3044F HG A1C LEVEL LT 7.0%: CPT | Performed by: FAMILY MEDICINE

## 2023-07-17 PROCEDURE — 1123F ACP DISCUSS/DSCN MKR DOCD: CPT | Performed by: FAMILY MEDICINE

## 2023-07-17 PROCEDURE — 3017F COLORECTAL CA SCREEN DOC REV: CPT | Performed by: FAMILY MEDICINE

## 2023-07-17 PROCEDURE — 3074F SYST BP LT 130 MM HG: CPT | Performed by: FAMILY MEDICINE

## 2023-07-17 SDOH — ECONOMIC STABILITY: FOOD INSECURITY: WITHIN THE PAST 12 MONTHS, YOU WORRIED THAT YOUR FOOD WOULD RUN OUT BEFORE YOU GOT MONEY TO BUY MORE.: NEVER TRUE

## 2023-07-17 SDOH — ECONOMIC STABILITY: HOUSING INSECURITY
IN THE LAST 12 MONTHS, WAS THERE A TIME WHEN YOU DID NOT HAVE A STEADY PLACE TO SLEEP OR SLEPT IN A SHELTER (INCLUDING NOW)?: NO

## 2023-07-17 SDOH — ECONOMIC STABILITY: FOOD INSECURITY: WITHIN THE PAST 12 MONTHS, THE FOOD YOU BOUGHT JUST DIDN'T LAST AND YOU DIDN'T HAVE MONEY TO GET MORE.: NEVER TRUE

## 2023-07-17 SDOH — ECONOMIC STABILITY: INCOME INSECURITY: HOW HARD IS IT FOR YOU TO PAY FOR THE VERY BASICS LIKE FOOD, HOUSING, MEDICAL CARE, AND HEATING?: NOT HARD AT ALL

## 2023-07-17 ASSESSMENT — PATIENT HEALTH QUESTIONNAIRE - PHQ9
2. FEELING DOWN, DEPRESSED OR HOPELESS: 0
SUM OF ALL RESPONSES TO PHQ QUESTIONS 1-9: 0
SUM OF ALL RESPONSES TO PHQ QUESTIONS 1-9: 0
1. LITTLE INTEREST OR PLEASURE IN DOING THINGS: 0
SUM OF ALL RESPONSES TO PHQ9 QUESTIONS 1 & 2: 0
SUM OF ALL RESPONSES TO PHQ QUESTIONS 1-9: 0
SUM OF ALL RESPONSES TO PHQ QUESTIONS 1-9: 0

## 2023-07-17 ASSESSMENT — ENCOUNTER SYMPTOMS
GASTROINTESTINAL NEGATIVE: 1
RESPIRATORY NEGATIVE: 1

## 2023-07-17 NOTE — PROGRESS NOTES
Chief Complaint   Patient presents with    Diabetes     Fasting follow up    Cholesterol Problem    Hypertension    Thyroid Problem     HISTORY OF PRESENT ILLNESS   Fasting follow up Type 2 NIDDM, Hyperlipidemia, Hypertension, Hypothyroidism, labs and medication check. Complying routinely w/ medication regimen updated below and tolerating w/o reaction or side effects. She has been able to control her Diabetes w/o insulin or oral medications the past few years since making healthier dietary modifications, increasing exercise and getting her weight down. Checks BS's 2-3 x a week. Fasting in AM runs in the 90's. 1.5 hrs after meals runs . Feels great. Diet: since late 2020/early 2021 has cut back on sugars/sweets, making healthier food choices, more fresh veggies, fruits only in moderation now; also since summer 2022 has also cut back on milk and snacking; since 9-2022 strict reduced calorie diet meal plan program     Exercise: joined new gym at [address] [place] [address] 4-2023: cardio/aerobic classes x 45 minutes 4 x a week and weight training classes x 45 minutes; prior to that was walking 2 x a week x 1 hr and 11-2-22 started doing TRX class x 50 minutes once a week, HIT class x 50 minutes once a week; since 6/7/23 she has only been able to go to the [place] gym about once a week bc she is keeping her grand daughter all day but plans to go back to 4 x a week in 8-2023 when school restarts     Weight: happy her weight has really come down nicely since eating healthier and exercising more since 8-2020; weight down from 270's to 254 lbs in 8-2022, 225 lbs in 1-2023, 222 lbs in 7-2023; personal goal wt is 175 lbs   REVIEW OF SYMPTOMS   Review of Systems   Constitutional: Negative. Respiratory: Negative. Cardiovascular: Negative. Gastrointestinal: Negative. Endocrine: Negative. Genitourinary: Negative. Musculoskeletal:  Negative for myalgias. Neurological: Negative.             PROBLEM

## 2023-07-17 NOTE — PROGRESS NOTES
Chief Complaint   Patient presents with    Cholesterol Problem     fasting    Diabetes    Hypertension    Thyroid Problem     1. \"Have you been to the ER, urgent care clinic since your last visit? Hospitalized since your last visit? \" No    2. \"Have you seen or consulted any other health care providers outside of the [address] [facility] [address] since your last visit? \" Derm with [facility]    3. For patients aged 43-73: Has the patient had a colonoscopy / FIT/ Cologuard? Yes - no Care Gap present had colonoscopy a couple years ago Dr Yoel Loyola      If the patient is female:    4. For patients aged 43-66: Has the patient had a mammogram within the past 2 years? Yes - no Care Gap present 1/2022      5. For patients aged 21-65: Has the patient had a pap smear?  Yes - no Care Gap present gyn @ [facility] 77[facility]75 couple years ago continuous bladder irrigation

## 2023-08-03 DIAGNOSIS — E03.9 HYPOTHYROIDISM, UNSPECIFIED: ICD-10-CM

## 2023-08-03 RX ORDER — LEVOTHYROXINE SODIUM 75 UG/1
TABLET ORAL
Qty: 30 TABLET | Refills: 2 | Status: SHIPPED | OUTPATIENT
Start: 2023-08-03

## 2023-09-13 RX ORDER — SPIRONOLACTONE 100 MG/1
TABLET, FILM COATED ORAL
Qty: 90 TABLET | Refills: 1 | Status: SHIPPED | OUTPATIENT
Start: 2023-09-13

## 2023-09-13 RX ORDER — METOPROLOL SUCCINATE 50 MG/1
TABLET, EXTENDED RELEASE ORAL
Qty: 90 TABLET | Refills: 1 | Status: SHIPPED | OUTPATIENT
Start: 2023-09-13

## 2023-09-13 RX ORDER — SIMVASTATIN 20 MG
20 TABLET ORAL NIGHTLY
Qty: 90 TABLET | Refills: 1 | Status: SHIPPED | OUTPATIENT
Start: 2023-09-13

## 2023-09-13 RX ORDER — FAMOTIDINE 40 MG/1
40 TABLET, FILM COATED ORAL NIGHTLY
Qty: 90 TABLET | Refills: 1 | Status: SHIPPED | OUTPATIENT
Start: 2023-09-13

## 2023-11-01 DIAGNOSIS — E03.9 HYPOTHYROIDISM, UNSPECIFIED: ICD-10-CM

## 2023-11-01 RX ORDER — LEVOTHYROXINE SODIUM 75 UG/1
75 TABLET ORAL
Qty: 90 TABLET | OUTPATIENT
Start: 2023-11-01

## 2023-11-09 ENCOUNTER — OFFICE VISIT (OUTPATIENT)
Age: 68
End: 2023-11-09
Payer: MEDICARE

## 2023-11-09 VITALS
DIASTOLIC BLOOD PRESSURE: 76 MMHG | OXYGEN SATURATION: 98 % | HEIGHT: 66 IN | RESPIRATION RATE: 16 BRPM | BODY MASS INDEX: 36.26 KG/M2 | TEMPERATURE: 98.3 F | HEART RATE: 75 BPM | WEIGHT: 225.6 LBS | SYSTOLIC BLOOD PRESSURE: 136 MMHG

## 2023-11-09 DIAGNOSIS — E78.2 MIXED HYPERLIPIDEMIA: ICD-10-CM

## 2023-11-09 DIAGNOSIS — I10 BENIGN ESSENTIAL HYPERTENSION: ICD-10-CM

## 2023-11-09 DIAGNOSIS — E03.9 ACQUIRED HYPOTHYROIDISM: ICD-10-CM

## 2023-11-09 DIAGNOSIS — E11.9 DIABETES MELLITUS TYPE 2, NONINSULIN DEPENDENT (HCC): Primary | ICD-10-CM

## 2023-11-09 DIAGNOSIS — Z23 ENCOUNTER FOR IMMUNIZATION: ICD-10-CM

## 2023-11-09 DIAGNOSIS — Z23 NEEDS FLU SHOT: ICD-10-CM

## 2023-11-09 PROCEDURE — G8399 PT W/DXA RESULTS DOCUMENT: HCPCS | Performed by: FAMILY MEDICINE

## 2023-11-09 PROCEDURE — G8427 DOCREV CUR MEDS BY ELIG CLIN: HCPCS | Performed by: FAMILY MEDICINE

## 2023-11-09 PROCEDURE — 3044F HG A1C LEVEL LT 7.0%: CPT | Performed by: FAMILY MEDICINE

## 2023-11-09 PROCEDURE — G8484 FLU IMMUNIZE NO ADMIN: HCPCS | Performed by: FAMILY MEDICINE

## 2023-11-09 PROCEDURE — 99214 OFFICE O/P EST MOD 30 MIN: CPT | Performed by: FAMILY MEDICINE

## 2023-11-09 PROCEDURE — 1036F TOBACCO NON-USER: CPT | Performed by: FAMILY MEDICINE

## 2023-11-09 PROCEDURE — 2022F DILAT RTA XM EVC RTNOPTHY: CPT | Performed by: FAMILY MEDICINE

## 2023-11-09 PROCEDURE — 3075F SYST BP GE 130 - 139MM HG: CPT | Performed by: FAMILY MEDICINE

## 2023-11-09 PROCEDURE — 1090F PRES/ABSN URINE INCON ASSESS: CPT | Performed by: FAMILY MEDICINE

## 2023-11-09 PROCEDURE — PBSHW PR ADMIN INFLUENZA VIRUS VAC: Performed by: FAMILY MEDICINE

## 2023-11-09 PROCEDURE — 3078F DIAST BP <80 MM HG: CPT | Performed by: FAMILY MEDICINE

## 2023-11-09 PROCEDURE — 90694 VACC AIIV4 NO PRSRV 0.5ML IM: CPT | Performed by: FAMILY MEDICINE

## 2023-11-09 PROCEDURE — PBSHW INFLUENZA, FLUAD, (AGE 65 Y+), IM, PF, 0.5 ML: Performed by: FAMILY MEDICINE

## 2023-11-09 PROCEDURE — G8417 CALC BMI ABV UP PARAM F/U: HCPCS | Performed by: FAMILY MEDICINE

## 2023-11-09 PROCEDURE — 3017F COLORECTAL CA SCREEN DOC REV: CPT | Performed by: FAMILY MEDICINE

## 2023-11-09 PROCEDURE — 1123F ACP DISCUSS/DSCN MKR DOCD: CPT | Performed by: FAMILY MEDICINE

## 2023-11-09 ASSESSMENT — PATIENT HEALTH QUESTIONNAIRE - PHQ9
1. LITTLE INTEREST OR PLEASURE IN DOING THINGS: 0
SUM OF ALL RESPONSES TO PHQ QUESTIONS 1-9: 0
SUM OF ALL RESPONSES TO PHQ9 QUESTIONS 1 & 2: 0
SUM OF ALL RESPONSES TO PHQ QUESTIONS 1-9: 0
2. FEELING DOWN, DEPRESSED OR HOPELESS: 0

## 2023-11-09 ASSESSMENT — ENCOUNTER SYMPTOMS
RESPIRATORY NEGATIVE: 1
GASTROINTESTINAL NEGATIVE: 1
EYES NEGATIVE: 1

## 2023-11-09 NOTE — PROGRESS NOTES
Chief Complaint   Patient presents with    Diabetes     Fasting follow up    Cholesterol Problem     1. \"Have you been to the ER, urgent care clinic since your last visit? Hospitalized since your last visit? \" No    2. \"Have you seen or consulted any other health care providers outside of the [address] [place] [address] Avenue since your last visit? \" Eye exam Dr Erasmo Chase    3. For patients aged 43-73: Has the patient had a colonoscopy / FIT/ Cologuard? Yes - no Care Gap present 2016 Normal, follow up 10 yrs      If the patient is female:    4. For patients aged 43-66: Has the patient had a mammogram within the past 2 years? Yes - no Care Gap present 1/2022       5. For patients aged 21-65: Has the patient had a pap smear?  Yes - no Care Gap present gyn couple of years ago [facility] 77[address]
Active Problem List    Diagnosis Date Noted    Pain of left heel 06/09/2022     Overview Note:     4/2022 Podiatrist Dr. Maria E Castaneda        Dependent edema 04/27/2022     Overview Note:     Dependent B ankle edema        Primary hyperparathyroidism ([address]) 01/09/2022     Overview Note:     Referred to Endo 1-2022; Dr. Bales Congress consultation [id number]; off calcium   supplements since 6-2022        Chronic sinusitis 02/04/2020    History of rheumatoid arthritis 10/11/2019     Overview Note:     In her 20s/30s        Spider veins of both lower extremities 05/17/2018    Varicose vein of leg 05/17/2018    Mixed hyperlipidemia 05/31/2016    Benign essential hypertension 44/50/2658    Umbilical hernia without obstruction and without gangrene 04/22/2016    Diabetes mellitus type 2, noninsulin dependent ([address]) 12/03/2015     Overview Note:     12/2013; [place] [facility] Dr Maria E Castaneda;  Eye doctor: Dr Erasmo Poe; was prescribed insulin therapy 2018 Dr. Neo Locke, patient self dc'd 2020/2021        Hypothyroidism, adult 07/30/2015     Overview Note:     2002        Actinic keratosis 11/20/2014    FHx: breast cancer 03/02/2012     Overview Note:     Several maternal relatives        Skin cancer screening 03/02/2012     Overview Note:     Derm, previously Dr. Koki Elkins, changed to Dr. Kym Goetz        Vitamin D deficiency 03/02/2012     Overview Note:     9/2011, s/p 1 month Rx Vit D        Optic disc edema 03/02/2012     Overview Note:     Optho Dr Lady Varela, MRI orbits        History of ovarian cyst 03/02/2012    GERD (gastroesophageal reflux disease)      Overview Note:     2000        Asthma      Overview Note:     ~1995, no hosp or ER        Postmenopause      Overview Note:     LMP 12/05 (HRT x 6months)        Ovarian cyst      Overview Note:     Left, benign        AR (allergic rhinitis)     Metabolic syndrome 69/44/5564           PAST SURGICAL HISTORY     Past Surgical History:   Procedure Laterality Date    BREAST BIOPSY Left     Benign Aspiration

## 2023-11-10 LAB
CHOLEST SERPL-MCNC: 182 MG/DL
EST. AVERAGE GLUCOSE BLD GHB EST-MCNC: 120 MG/DL
HBA1C MFR BLD: 5.8 % (ref 4–5.6)
HDLC SERPL-MCNC: 45 MG/DL
HDLC SERPL: 4 (ref 0–5)
LDLC SERPL CALC-MCNC: 89.6 MG/DL (ref 0–100)
TRIGL SERPL-MCNC: 237 MG/DL
VLDLC SERPL CALC-MCNC: 47.4 MG/DL

## 2023-11-29 ENCOUNTER — OFFICE VISIT (OUTPATIENT)
Age: 68
End: 2023-11-29

## 2023-11-29 VITALS
HEART RATE: 63 BPM | DIASTOLIC BLOOD PRESSURE: 75 MMHG | BODY MASS INDEX: 36 KG/M2 | HEIGHT: 66 IN | SYSTOLIC BLOOD PRESSURE: 159 MMHG | WEIGHT: 224 LBS

## 2023-11-29 DIAGNOSIS — E55.9 VITAMIN D DEFICIENCY: ICD-10-CM

## 2023-11-29 DIAGNOSIS — R22.1 LUMP ON NECK: Primary | ICD-10-CM

## 2023-11-29 DIAGNOSIS — E83.52 HYPERCALCEMIA: ICD-10-CM

## 2023-11-29 LAB
ANION GAP SERPL CALC-SCNC: 3 MMOL/L (ref 5–15)
BUN SERPL-MCNC: 25 MG/DL (ref 6–20)
BUN/CREAT SERPL: 20 (ref 12–20)
CALCIUM SERPL-MCNC: 10.4 MG/DL (ref 8.5–10.1)
CALCIUM SERPL-MCNC: 10.8 MG/DL (ref 8.5–10.1)
CHLORIDE SERPL-SCNC: 110 MMOL/L (ref 97–108)
CO2 SERPL-SCNC: 26 MMOL/L (ref 21–32)
CREAT SERPL-MCNC: 1.28 MG/DL (ref 0.55–1.02)
GLUCOSE SERPL-MCNC: 99 MG/DL (ref 65–100)
PHOSPHATE SERPL-MCNC: 2.9 MG/DL (ref 2.6–4.7)
POTASSIUM SERPL-SCNC: 4.9 MMOL/L (ref 3.5–5.1)
PTH-INTACT SERPL-MCNC: 109.3 PG/ML (ref 18.4–88)
SODIUM SERPL-SCNC: 139 MMOL/L (ref 136–145)

## 2023-11-29 NOTE — PROGRESS NOTES
3/2/2012    Vitamin D deficiency 3/2/2012      Blood pressure (!) 159/75, pulse 63, height 1.676 m (5' 6\"), weight 101.6 kg (224 lb). 11/29/2023     9:33 AM 11/9/2023     3:18 PM 7/17/2023    10:26 AM 2/27/2023     8:39 AM 2/1/2023     9:16 AM 11/28/2022    10:14 AM 11/1/2022    11:23 AM   Weight Metrics   Weight 224 lb 225 lb 9.6 oz 222 lb 3.2 oz 219 lb 6.4 oz 225 lb 9.6 oz 230 lb 236 lb 6.4 oz   BMI (Calculated) 36.2 kg/m2 36.5 kg/m2 35.9 kg/m2 35.5 kg/m2 36.5 kg/m2 37.2 kg/m2 38.2 kg/m2         EXAM:  - not done today     Assessment/Plan:   1. Hypercalcemia  mild and intermittent over the years - more consistent recently  PTH elevated x 3 indicating possible primary hyperparathyroidism   Vit D is normal; [address] unlikely since years of normal levels before started to rise  BMD is w/o bone loss, U Ca not high - so LOW RISK  Following over time -repeat levels    3. Vitamin D deficiency  Update status    4. Lump on neck   Not in thyroid region so will get US at hospital to determine what it is - LN or stone?     Orders Placed This Encounter   Procedures    US HEAD NECK SOFT TISSUE THYROID     Order Specific Question:   Reason for exam:     Answer:   1 cm bump at left submandibular area - evaluate via US    Calcium, Ionized    Basic Metabolic Panel    PTH, Intact    Vitamin D 25 Hydroxy    Phosphorus    Calcium, Ionized     Standing Status:   Future     Standing Expiration Date:   6/67/9019    Basic Metabolic Panel     Standing Status:   Future     Standing Expiration Date:   5/29/2025    PTH, Intact     Standing Status:   Future     Standing Expiration Date:   5/29/2025    Phosphorus     Standing Status:   Future     Standing Expiration Date:   5/29/2025    Vitamin D 25 Hydroxy     Standing Status:   Future     Standing Expiration Date:   7/72/2194    Basic Metabolic Panel     [id number]:[id number]*KXYOW METABOLIC PANEL    Calcium Ionized Serum

## 2023-11-30 LAB — 25(OH)D3 SERPL-MCNC: 36.8 NG/ML (ref 30–100)

## 2023-12-01 ENCOUNTER — TELEMEDICINE (OUTPATIENT)
Age: 68
End: 2023-12-01
Payer: MEDICARE

## 2023-12-01 DIAGNOSIS — Z00.00 MEDICARE ANNUAL WELLNESS VISIT, SUBSEQUENT: Primary | ICD-10-CM

## 2023-12-01 LAB — CA-I SERPL ISE-MCNC: 5.8 MG/DL (ref 4.5–5.6)

## 2023-12-01 PROCEDURE — 1123F ACP DISCUSS/DSCN MKR DOCD: CPT | Performed by: FAMILY MEDICINE

## 2023-12-01 PROCEDURE — 3017F COLORECTAL CA SCREEN DOC REV: CPT | Performed by: FAMILY MEDICINE

## 2023-12-01 PROCEDURE — G0439 PPPS, SUBSEQ VISIT: HCPCS | Performed by: FAMILY MEDICINE

## 2023-12-01 ASSESSMENT — PATIENT HEALTH QUESTIONNAIRE - PHQ9
2. FEELING DOWN, DEPRESSED OR HOPELESS: 0
SUM OF ALL RESPONSES TO PHQ QUESTIONS 1-9: 0
SUM OF ALL RESPONSES TO PHQ9 QUESTIONS 1 & 2: 0
SUM OF ALL RESPONSES TO PHQ QUESTIONS 1-9: 0
1. LITTLE INTEREST OR PLEASURE IN DOING THINGS: 0

## 2023-12-01 ASSESSMENT — LIFESTYLE VARIABLES
HOW OFTEN DO YOU HAVE A DRINK CONTAINING ALCOHOL: MONTHLY OR LESS
HOW MANY STANDARD DRINKS CONTAINING ALCOHOL DO YOU HAVE ON A TYPICAL DAY: 1 OR 2

## 2023-12-01 NOTE — PATIENT INSTRUCTIONS
lifestyle, illnesses that may run in your family, and various assessments and screenings as appropriate. After reviewing your medical record and screening and assessments performed today your provider may have ordered immunizations, labs, imaging, and/or referrals for you. A list of these orders (if applicable) as well as your Preventive Care list are included within your After Visit Summary for your review. Other Preventive Recommendations:    A preventive eye exam performed by an eye specialist is recommended every 1-2 years to screen for glaucoma; cataracts, macular degeneration, and other eye disorders. A preventive dental visit is recommended every 6 months. Try to get at least 150 minutes of exercise per week or 10,000 steps per day on a pedometer . Order or download the FREE \"Exercise & Physical Activity: Your Everyday Guide\" from [employer]. Call 1-748.453.8989 or search [employer] online. You need 4131-2527 mg of calcium and 5821-7583 IU of vitamin D per day. It is possible to meet your calcium requirement with diet alone, but a vitamin D supplement is usually necessary to meet this goal.  When exposed to the sun, use a sunscreen that protects against both UVA and UVB radiation with an SPF of 30 or greater. Reapply every 2 to 3 hours or after sweating, drying off with a towel, or swimming. Always wear a seat belt when traveling in a car. Always wear a helmet when riding a bicycle or motorcycle.

## 2023-12-01 NOTE — PROGRESS NOTES
Medicare Annual Wellness Visit    Marline Wyatt is here for Medicare AWV    Assessment & Plan   Medicare annual wellness visit, subsequent  Recommendations for Preventive Services Due: see orders and patient instructions/AVS.   Recommended screening schedule for the next 5-10 years is provided to the patient in written form: see Patient Instructions/AVS.  Mammogram order pended. Patient reminded to schedule. Number provided. She is scheduled to see me for fasting routine follow up, medication check and labs in 2-2024. Patient's complete Health Risk Assessment and screening values have been reviewed and are found in Flowsheets. The following problems were reviewed today and where indicated follow up appointments were made and/or referrals ordered. Positive Risk Factor Screenings with Interventions:                 Weight and Activity:  Physical Activity: Sufficiently Active (12/1/2023)    Exercise Vital Sign     Days of Exercise per Week: 4 days     Minutes of Exercise per Session: 50 min     On average, how many days per week do you engage in moderate to strenuous exercise (like a brisk walk)?: 4 days  Have you lost any weight without trying in the past 3 months?: No  There is no height or weight on file to calculate BMI. (!) Abnormal  Obesity Interventions:  See AVS for additional education material           Hearing Screen:  Do you or your family notice any trouble with your hearing that hasn't been managed with hearing aids?: (!) Yes (Had a hearing screen last year at [facility] and was told all is ok)         Safety:  Do you have either shower bars, grab bars, non-slip mats or non-slip surfaces in your shower or bathtub?: (!) No  Interventions: Patient counseled and advised.    See AVS for additional education material                       Patient Active Problem List   Diagnosis    FHx: breast cancer    Skin cancer screening    Mixed hyperlipidemia    Benign essential hypertension

## 2023-12-30 DIAGNOSIS — E03.9 HYPOTHYROIDISM, UNSPECIFIED: ICD-10-CM

## 2024-01-01 RX ORDER — SIMVASTATIN 20 MG
20 TABLET ORAL NIGHTLY
Qty: 90 TABLET | Refills: 1 | OUTPATIENT
Start: 2024-01-01

## 2024-01-01 RX ORDER — METOPROLOL SUCCINATE 50 MG/1
TABLET, EXTENDED RELEASE ORAL
Qty: 90 TABLET | Refills: 1 | OUTPATIENT
Start: 2024-01-01

## 2024-01-01 RX ORDER — FAMOTIDINE 40 MG/1
40 TABLET, FILM COATED ORAL NIGHTLY
Qty: 90 TABLET | Refills: 1 | OUTPATIENT
Start: 2024-01-01

## 2024-01-01 RX ORDER — SPIRONOLACTONE 100 MG/1
TABLET, FILM COATED ORAL
Qty: 90 TABLET | Refills: 1 | OUTPATIENT
Start: 2024-01-01

## 2024-01-02 DIAGNOSIS — E03.9 HYPOTHYROIDISM, UNSPECIFIED: ICD-10-CM

## 2024-01-02 RX ORDER — SIMVASTATIN 20 MG
20 TABLET ORAL NIGHTLY
Qty: 90 TABLET | Refills: 1 | Status: CANCELLED | OUTPATIENT
Start: 2024-01-02

## 2024-01-02 RX ORDER — FAMOTIDINE 40 MG/1
40 TABLET, FILM COATED ORAL NIGHTLY
Qty: 90 TABLET | Refills: 1 | Status: CANCELLED | OUTPATIENT
Start: 2024-01-02

## 2024-01-02 RX ORDER — LEVOTHYROXINE SODIUM 75 UG/1
75 TABLET ORAL
Qty: 90 TABLET | Refills: 1 | Status: SHIPPED | OUTPATIENT
Start: 2024-01-02

## 2024-01-02 RX ORDER — METOPROLOL SUCCINATE 50 MG/1
50 TABLET, EXTENDED RELEASE ORAL DAILY
Qty: 90 TABLET | Refills: 1 | Status: CANCELLED | OUTPATIENT
Start: 2024-01-02

## 2024-01-02 RX ORDER — LEVOTHYROXINE SODIUM 75 UG/1
75 TABLET ORAL
Qty: 90 TABLET | Refills: 1 | Status: CANCELLED | OUTPATIENT
Start: 2024-01-02

## 2024-01-02 RX ORDER — SPIRONOLACTONE 100 MG/1
100 TABLET, FILM COATED ORAL DAILY
Qty: 90 TABLET | Refills: 1 | Status: CANCELLED | OUTPATIENT
Start: 2024-01-02

## 2024-01-02 NOTE — TELEPHONE ENCOUNTER
Patient Comment: 12/30/23 I was able to  5 pills from the [facility] pharmacy on [address].  Please approve a new prescription.  Their number is [phone].  Thank you for your assistance.  HAPPY NEW YEAR!8     Last visit 12/01/23  Next appt 2/12/23

## 2024-01-02 NOTE — TELEPHONE ENCOUNTER
PCP: Mira Cabrera MD    Last appt: 12/1/2023   Future Appointments   Date Time Provider Department Center   1/12/2024  7:30 AM SPT US 1 SPTRUS [facility]   1/12/2024  8:00 AM SPT MAMMO 1 SPTMAMMO [facility]   2/12/2024  8:00 AM Mira Cabrera MD [facility] BS AMB   12/6/2024  8:10 AM Julia Chicas MD RDE [facility] BS AMB       Requested Prescriptions     Pending Prescriptions Disp Refills    UNITHROID 75 MCG tablet [Pharmacy Med Name: UNITHROID 75 MCG TABLET] 90 tablet      Sig: TAKE 1 TABLET BY MOUTH EVERY DAY BEFORE BREAKFAST         Prior labs and Blood pressures:  BP Readings from Last 3 Encounters:   11/29/23 (!) 159/75   11/09/23 136/76   07/17/23 122/70     Lab Results   Component Value Date/Time     11/29/2023 11:08 AM    K 4.9 11/29/2023 11:08 AM     11/29/2023 11:08 AM    CO2 26 11/29/2023 11:08 AM    BUN 25 11/29/2023 11:08 AM    GFRAA >60 06/09/2022 09:06 AM     Lab Results   Component Value Date/Time    WIE5RWLW 6.3 06/09/2022 08:50 AM     Lab Results   Component Value Date/Time    CHOL 182 11/09/2023 04:03 PM    HDL 45 11/09/2023 04:03 PM     No results found for: \"VITD3\", \"VD3RIA\"    Lab Results   Component Value Date/Time    TSH 1.24 06/09/2022 09:06 AM

## 2024-01-06 RX ORDER — METOPROLOL SUCCINATE 50 MG/1
TABLET, EXTENDED RELEASE ORAL
Qty: 90 TABLET | Refills: 1 | OUTPATIENT
Start: 2024-01-06

## 2024-01-06 RX ORDER — LEVOTHYROXINE SODIUM 75 UG/1
75 TABLET ORAL
Qty: 5 TABLET | Refills: 0 | OUTPATIENT
Start: 2024-01-06

## 2024-01-06 RX ORDER — SPIRONOLACTONE 100 MG/1
TABLET, FILM COATED ORAL
Qty: 90 TABLET | Refills: 1 | OUTPATIENT
Start: 2024-01-06

## 2024-01-08 DIAGNOSIS — E03.9 HYPOTHYROIDISM, UNSPECIFIED: ICD-10-CM

## 2024-01-08 RX ORDER — LEVOTHYROXINE SODIUM 75 UG/1
75 TABLET ORAL
Qty: 90 TABLET | Refills: 1 | Status: CANCELLED | OUTPATIENT
Start: 2024-01-08

## 2024-01-12 ENCOUNTER — HOSPITAL ENCOUNTER (OUTPATIENT)
Facility: HOSPITAL | Age: 69
End: 2024-01-12
Attending: INTERNAL MEDICINE
Payer: MEDICARE

## 2024-01-12 ENCOUNTER — HOSPITAL ENCOUNTER (OUTPATIENT)
Facility: HOSPITAL | Age: 69
End: 2024-01-12
Attending: FAMILY MEDICINE
Payer: MEDICARE

## 2024-01-12 DIAGNOSIS — Z12.31 SCREENING MAMMOGRAM FOR BREAST CANCER: ICD-10-CM

## 2024-01-12 DIAGNOSIS — R22.1 LUMP ON NECK: ICD-10-CM

## 2024-01-12 PROCEDURE — 77067 SCR MAMMO BI INCL CAD: CPT

## 2024-01-12 PROCEDURE — 76536 US EXAM OF HEAD AND NECK: CPT

## 2024-02-12 ENCOUNTER — OFFICE VISIT (OUTPATIENT)
Age: 69
End: 2024-02-12
Payer: MEDICARE

## 2024-02-12 VITALS
TEMPERATURE: 97.8 F | DIASTOLIC BLOOD PRESSURE: 72 MMHG | SYSTOLIC BLOOD PRESSURE: 128 MMHG | HEART RATE: 63 BPM | HEIGHT: 66 IN | WEIGHT: 220.8 LBS | OXYGEN SATURATION: 98 % | BODY MASS INDEX: 35.48 KG/M2 | RESPIRATION RATE: 16 BRPM

## 2024-02-12 DIAGNOSIS — E03.9 HYPOTHYROIDISM, ADULT: ICD-10-CM

## 2024-02-12 DIAGNOSIS — E11.22 TYPE 2 DIABETES MELLITUS WITH STAGE 3A CHRONIC KIDNEY DISEASE, WITHOUT LONG-TERM CURRENT USE OF INSULIN (HCC): Primary | ICD-10-CM

## 2024-02-12 DIAGNOSIS — E78.2 MIXED HYPERLIPIDEMIA: ICD-10-CM

## 2024-02-12 DIAGNOSIS — N18.31 TYPE 2 DIABETES MELLITUS WITH STAGE 3A CHRONIC KIDNEY DISEASE, WITHOUT LONG-TERM CURRENT USE OF INSULIN (HCC): Primary | ICD-10-CM

## 2024-02-12 DIAGNOSIS — E21.0 PRIMARY HYPERPARATHYROIDISM (HCC): ICD-10-CM

## 2024-02-12 DIAGNOSIS — I10 BENIGN ESSENTIAL HYPERTENSION: ICD-10-CM

## 2024-02-12 LAB
ALT SERPL-CCNC: 18 U/L (ref 12–78)
ANION GAP SERPL CALC-SCNC: 2 MMOL/L (ref 5–15)
AST SERPL-CCNC: 12 U/L (ref 15–37)
BUN SERPL-MCNC: 31 MG/DL (ref 6–20)
BUN/CREAT SERPL: 21 (ref 12–20)
CALCIUM SERPL-MCNC: 11.5 MG/DL (ref 8.5–10.1)
CHLORIDE SERPL-SCNC: 111 MMOL/L (ref 97–108)
CHOLEST SERPL-MCNC: 137 MG/DL
CO2 SERPL-SCNC: 26 MMOL/L (ref 21–32)
CREAT SERPL-MCNC: 1.46 MG/DL (ref 0.55–1.02)
GLUCOSE SERPL-MCNC: 100 MG/DL (ref 65–100)
HDLC SERPL-MCNC: 40 MG/DL
HDLC SERPL: 3.4 (ref 0–5)
LDLC SERPL CALC-MCNC: 57.4 MG/DL (ref 0–100)
POTASSIUM SERPL-SCNC: 5.1 MMOL/L (ref 3.5–5.1)
SODIUM SERPL-SCNC: 139 MMOL/L (ref 136–145)
TRIGL SERPL-MCNC: 198 MG/DL
VLDLC SERPL CALC-MCNC: 39.6 MG/DL

## 2024-02-12 PROCEDURE — 1090F PRES/ABSN URINE INCON ASSESS: CPT | Performed by: FAMILY MEDICINE

## 2024-02-12 PROCEDURE — 3074F SYST BP LT 130 MM HG: CPT | Performed by: FAMILY MEDICINE

## 2024-02-12 PROCEDURE — 2022F DILAT RTA XM EVC RTNOPTHY: CPT | Performed by: FAMILY MEDICINE

## 2024-02-12 PROCEDURE — 3078F DIAST BP <80 MM HG: CPT | Performed by: FAMILY MEDICINE

## 2024-02-12 PROCEDURE — 99214 OFFICE O/P EST MOD 30 MIN: CPT | Performed by: FAMILY MEDICINE

## 2024-02-12 PROCEDURE — G8427 DOCREV CUR MEDS BY ELIG CLIN: HCPCS | Performed by: FAMILY MEDICINE

## 2024-02-12 PROCEDURE — 3017F COLORECTAL CA SCREEN DOC REV: CPT | Performed by: FAMILY MEDICINE

## 2024-02-12 PROCEDURE — G8484 FLU IMMUNIZE NO ADMIN: HCPCS | Performed by: FAMILY MEDICINE

## 2024-02-12 PROCEDURE — 3046F HEMOGLOBIN A1C LEVEL >9.0%: CPT | Performed by: FAMILY MEDICINE

## 2024-02-12 PROCEDURE — 1036F TOBACCO NON-USER: CPT | Performed by: FAMILY MEDICINE

## 2024-02-12 PROCEDURE — G8417 CALC BMI ABV UP PARAM F/U: HCPCS | Performed by: FAMILY MEDICINE

## 2024-02-12 PROCEDURE — G8399 PT W/DXA RESULTS DOCUMENT: HCPCS | Performed by: FAMILY MEDICINE

## 2024-02-12 PROCEDURE — 1123F ACP DISCUSS/DSCN MKR DOCD: CPT | Performed by: FAMILY MEDICINE

## 2024-02-12 NOTE — PROGRESS NOTES
Chief Complaint   Patient presents with    Diabetes     Fasting follow up    Cholesterol Problem     1. \"Have you been to the ER, urgent care clinic since your last visit?  Hospitalized since your last visit?\" No    2. \"Have you seen or consulted any other health care providers outside of the [facility] since your last visit?\" No     3. For patients aged 45-75: Has the patient had a colonoscopy / FIT/ Cologuard? Yes - no Care Gap present 8/2016 Normal, follow up 10 yrs       If the patient is female:    4. For patients aged 40-74: Has the patient had a mammogram within the past 2 years? Yes - no Care Gap present 1/2024      5. For patients aged 21-65: Has the patient had a pap smear? NA - based on age or sex      
perf or GI bleed age 89    Hypertension Mother     Stroke Mother     Rheum Arthritis Mother     Breast Cancer Mother         diagnosed age 65, survived it     Migraines Mother     Stroke Father     Heart Disease Father         CAD, PTCA    Dementia Father     Heart Disease Sister     Dementia Sister     Hypertension Sister     Breast Cancer Sister 70    Cancer Brother 73        kidney and lungs    Cancer Brother 76        soft tissue cancer    High Cholesterol Son     Breast Cancer Maternal Grandmother          in her late 40's    Ovarian Cancer Maternal Aunt     Breast Cancer Maternal Aunt     Breast Cancer Maternal Aunt     Cancer Cousin         maternal first cousin w/ sarcoma of leg    Anesth Problems Neg Hx          VITALS   /72 (Site: Left Upper Arm, Position: Sitting, Cuff Size: Large Adult)   Pulse 63   Temp 97.8 °F (36.6 °C) (Oral)   Resp 16   Ht 1.676 m (5' 6\")   Wt 100.2 kg (220 lb 12.8 oz)   SpO2 98%   BMI 35.64 kg/m²     Vitals:    24 0811   BP: 128/72   Site: Left Upper Arm   Position: Sitting   Cuff Size: Large Adult   Pulse: 63   Resp: 16   Temp: 97.8 °F (36.6 °C)   TempSrc: Oral   SpO2: 98%   Weight: 100.2 kg (220 lb 12.8 oz)   Height: 1.676 m (5' 6\")          PHYSICAL EXAMINATION   Physical Exam  Vitals reviewed.   Constitutional:       General: She is not in acute distress.  Neck:      Vascular: No carotid bruit.   Cardiovascular:      Rate and Rhythm: Normal rate and regular rhythm.      Heart sounds: Normal heart sounds.   Pulmonary:      Effort: Pulmonary effort is normal.      Breath sounds: Normal breath sounds.   Abdominal:      Palpations: Abdomen is soft.      Tenderness: There is no abdominal tenderness.   Musculoskeletal:      Right lower leg: No edema.      Left lower leg: No edema.             ASSESSMENT & PLAN     1. Type 2 diabetes mellitus with stage 3a chronic kidney disease, without long-term current use of insulin (HCC)  Diabetes has improved over time and

## 2024-02-13 LAB
EST. AVERAGE GLUCOSE BLD GHB EST-MCNC: 108 MG/DL
HBA1C MFR BLD: 5.4 % (ref 4–5.6)

## 2024-04-03 ENCOUNTER — OFFICE VISIT (OUTPATIENT)
Age: 69
End: 2024-04-03
Payer: MEDICARE

## 2024-04-03 VITALS
TEMPERATURE: 98.1 F | OXYGEN SATURATION: 98 % | HEART RATE: 65 BPM | WEIGHT: 224.8 LBS | SYSTOLIC BLOOD PRESSURE: 132 MMHG | RESPIRATION RATE: 16 BRPM | BODY MASS INDEX: 36.13 KG/M2 | HEIGHT: 66 IN | DIASTOLIC BLOOD PRESSURE: 80 MMHG

## 2024-04-03 DIAGNOSIS — E11.22 TYPE 2 DIABETES MELLITUS WITH STAGE 3A CHRONIC KIDNEY DISEASE, WITHOUT LONG-TERM CURRENT USE OF INSULIN (HCC): ICD-10-CM

## 2024-04-03 DIAGNOSIS — E78.2 MIXED HYPERLIPIDEMIA: ICD-10-CM

## 2024-04-03 DIAGNOSIS — E21.0 PRIMARY HYPERPARATHYROIDISM (HCC): ICD-10-CM

## 2024-04-03 DIAGNOSIS — E03.9 ACQUIRED HYPOTHYROIDISM: ICD-10-CM

## 2024-04-03 DIAGNOSIS — N18.31 TYPE 2 DIABETES MELLITUS WITH STAGE 3A CHRONIC KIDNEY DISEASE, WITHOUT LONG-TERM CURRENT USE OF INSULIN (HCC): ICD-10-CM

## 2024-04-03 DIAGNOSIS — N28.9 RENAL INSUFFICIENCY: ICD-10-CM

## 2024-04-03 DIAGNOSIS — R60.9 DEPENDENT EDEMA: ICD-10-CM

## 2024-04-03 DIAGNOSIS — I10 BENIGN ESSENTIAL HYPERTENSION: Primary | ICD-10-CM

## 2024-04-03 LAB
ANION GAP SERPL CALC-SCNC: 4 MMOL/L (ref 5–15)
BUN SERPL-MCNC: 26 MG/DL (ref 6–20)
BUN/CREAT SERPL: 20 (ref 12–20)
CALCIUM SERPL-MCNC: 10.8 MG/DL (ref 8.5–10.1)
CHLORIDE SERPL-SCNC: 111 MMOL/L (ref 97–108)
CO2 SERPL-SCNC: 25 MMOL/L (ref 21–32)
CREAT SERPL-MCNC: 1.33 MG/DL (ref 0.55–1.02)
GLUCOSE SERPL-MCNC: 89 MG/DL (ref 65–100)
POTASSIUM SERPL-SCNC: 4.6 MMOL/L (ref 3.5–5.1)
SODIUM SERPL-SCNC: 140 MMOL/L (ref 136–145)

## 2024-04-03 PROCEDURE — 3078F DIAST BP <80 MM HG: CPT | Performed by: FAMILY MEDICINE

## 2024-04-03 PROCEDURE — G8417 CALC BMI ABV UP PARAM F/U: HCPCS | Performed by: FAMILY MEDICINE

## 2024-04-03 PROCEDURE — 1036F TOBACCO NON-USER: CPT | Performed by: FAMILY MEDICINE

## 2024-04-03 PROCEDURE — 99214 OFFICE O/P EST MOD 30 MIN: CPT | Performed by: FAMILY MEDICINE

## 2024-04-03 PROCEDURE — 3074F SYST BP LT 130 MM HG: CPT | Performed by: FAMILY MEDICINE

## 2024-04-03 PROCEDURE — 3017F COLORECTAL CA SCREEN DOC REV: CPT | Performed by: FAMILY MEDICINE

## 2024-04-03 PROCEDURE — 1123F ACP DISCUSS/DSCN MKR DOCD: CPT | Performed by: FAMILY MEDICINE

## 2024-04-03 PROCEDURE — 1090F PRES/ABSN URINE INCON ASSESS: CPT | Performed by: FAMILY MEDICINE

## 2024-04-03 PROCEDURE — G8427 DOCREV CUR MEDS BY ELIG CLIN: HCPCS | Performed by: FAMILY MEDICINE

## 2024-04-03 PROCEDURE — 3044F HG A1C LEVEL LT 7.0%: CPT | Performed by: FAMILY MEDICINE

## 2024-04-03 PROCEDURE — G8399 PT W/DXA RESULTS DOCUMENT: HCPCS | Performed by: FAMILY MEDICINE

## 2024-04-03 PROCEDURE — 2022F DILAT RTA XM EVC RTNOPTHY: CPT | Performed by: FAMILY MEDICINE

## 2024-04-03 ASSESSMENT — ENCOUNTER SYMPTOMS
SHORTNESS OF BREATH: 0
NAUSEA: 0
RESPIRATORY NEGATIVE: 1
EYES NEGATIVE: 1
GASTROINTESTINAL NEGATIVE: 1
CONSTIPATION: 0
DIARRHEA: 0
ABDOMINAL PAIN: 0
VOMITING: 0

## 2024-04-03 NOTE — PROGRESS NOTES
Chief Complaint   Patient presents with    Blood Pressure Check    labs     1. \"Have you been to the ER, urgent care clinic since your last visit?  Hospitalized since your last visit?\"     2. \"Have you seen or consulted any other health care providers outside of the [facility] since your last visit?\" Danyel Patel    3. For patients aged 45-75: Has the patient had a colonoscopy / FIT/ Cologuard? Yes - no Care Gap present 8/2016 Normal, follow up 10 yrs       If the patient is female:    4. For patients aged 40-74: Has the patient had a mammogram within the past 2 years? Yes - no Care Gap present 1/2024       5. For patients aged 21-65: Has the patient had a pap smear? NA - based on age or sex      
Date/Time    TRIG 198 02/12/2024 08:51 AM    HDL 40 02/12/2024 08:51 AM    LDLCALC 57.4 02/12/2024 08:51 AM     TSH:    Lab Results   Component Value Date/Time    TSH 1.39 07/17/2023        ASSESSMENT & PLAN     1. Benign essential hypertension  Controlled, stable. Currently maintained on regimen of Aldactone 100 mg 1/2 tablet daily and Toprol XL 50 mg daily. The Aldactone was decreased from a full to half of the 100 mg tab due to rise in creatinine on routine labs last visit 6 weeks ago and she is here today for recheck. Home and office BP's stable and dependent edema remains controlled at this time. Would not favor increasing Toprol dose for BP mgt due to already low resting heart rates in the 50's-60's.     2. Type 2 diabetes mellitus with stage 3a chronic kidney disease, without long-term current use of insulin ([facility])  Excellent diabetes control, nicely improved over time w/ dietary modification, exercise and wt mgt. Most recent Hgba1c 2-2024 was her personal best in years at 5.4 w/o use of any anti-hyperglycemic agents in quite some time now  -     Basic Metabolic Panel; Future    3. Worsening renal insufficiency  Continue Nsaid avoidance  Increase water intake which she admits she is not very good with  Recheck BMP today on reduced dose of Aldactone the past 6 weeks  -     Basic Metabolic Panel; Future    4. Dependent edema  Controlled, stable maintained on regimen of reduced dose of Aldactone from 100 to 50 mg 6 weeks ago due to decline in renal function. If back to baseline based on today's labs, will make no changes in regimen. If unchanged or progressed, can try dc Aldactone and try alternate antihypertensive and let manage edema control w/ prn low dose Lasix which may be favorable in light of hypercalcemia history, Endo following. Consider renal consult if progressive worsening    5. BMI 36.0-36.9,adult  Commended on diet/exercise/wt mgt over time    6. Mixed hyperlipidemia  LDL at goal maintained on Zocor

## 2024-04-10 DIAGNOSIS — R60.9 DEPENDENT EDEMA: ICD-10-CM

## 2024-04-10 DIAGNOSIS — I10 BENIGN ESSENTIAL HYPERTENSION: Primary | ICD-10-CM

## 2024-04-10 RX ORDER — BUMETANIDE 1 MG/1
1 TABLET ORAL DAILY PRN
Qty: 30 TABLET | Refills: 1 | Status: SHIPPED | OUTPATIENT
Start: 2024-04-10

## 2024-04-14 RX ORDER — FAMOTIDINE 40 MG/1
40 TABLET, FILM COATED ORAL NIGHTLY
Qty: 90 TABLET | Refills: 1 | Status: SHIPPED | OUTPATIENT
Start: 2024-04-14

## 2024-04-26 DIAGNOSIS — E03.9 HYPOTHYROIDISM, UNSPECIFIED: ICD-10-CM

## 2024-04-26 DIAGNOSIS — E03.9 ACQUIRED HYPOTHYROIDISM: Primary | ICD-10-CM

## 2024-04-26 DIAGNOSIS — I10 BENIGN ESSENTIAL HYPERTENSION: ICD-10-CM

## 2024-04-26 RX ORDER — LEVOTHYROXINE SODIUM 75 UG/1
75 TABLET ORAL
Qty: 90 TABLET | Refills: 1 | OUTPATIENT
Start: 2024-04-26

## 2024-04-26 RX ORDER — METOPROLOL SUCCINATE 50 MG/1
TABLET, EXTENDED RELEASE ORAL
Qty: 90 TABLET | Refills: 1 | OUTPATIENT
Start: 2024-04-26

## 2024-04-26 RX ORDER — SIMVASTATIN 20 MG
20 TABLET ORAL NIGHTLY
Qty: 90 TABLET | Refills: 1 | OUTPATIENT
Start: 2024-04-26

## 2024-04-26 NOTE — TELEPHONE ENCOUNTER
PCP: Mira Cabrera MD    Last appt: 4/3/2024   Future Appointments   Date Time Provider Department Center   5/28/2024  2:40 PM Mira Cabrera MD [facility] BS AMB   7/23/2024  1:20 PM Mira Cabrera MD [facility] BS AMB   12/6/2024  8:10 AM Julia Chicas MD [facility] AMB       Requested Prescriptions     Pending Prescriptions Disp Refills    simvastatin (ZOCOR) 20 MG tablet 90 tablet 1     Sig: Take 1 tablet by mouth nightly    metoprolol succinate (TOPROL XL) 50 MG extended release tablet 90 tablet 1     Sig: Take 1 tablet by mouth daily for blood pressure         Prior labs and Blood pressures:  BP Readings from Last 3 Encounters:   04/03/24 132/80   02/12/24 128/72   11/29/23 (!) 159/75     Lab Results   Component Value Date/Time     04/03/2024 02:31 PM    K 4.6 04/03/2024 02:31 PM     04/03/2024 02:31 PM    CO2 25 04/03/2024 02:31 PM    BUN 26 04/03/2024 02:31 PM    GFRAA >60 06/09/2022 09:06 AM     Lab Results   Component Value Date/Time    BUU1EDTZ 6.3 06/09/2022 08:50 AM     Lab Results   Component Value Date/Time    CHOL 137 02/12/2024 08:51 AM    HDL 40 02/12/2024 08:51 AM     No results found for: \"VITD3\", \"VD3RIA\"    Lab Results   Component Value Date/Time    TSH 1.24 06/09/2022 09:06 AM

## 2024-04-26 NOTE — TELEPHONE ENCOUNTER
PCP: Mira Cabrera MD    Last appt: 4/3/2024   Future Appointments   Date Time Provider Department Center   5/28/2024  2:40 PM Mira Cabrera MD [facility] BS AMB   7/23/2024  1:20 PM Mira Cabrera MD [facility] BS AMB   12/6/2024  8:10 AM Julia Chicas MD [facility] AMB       Requested Prescriptions     Pending Prescriptions Disp Refills    UNITHROID 75 MCG tablet 90 tablet 1     Sig: Take 1 tablet by mouth every morning (before breakfast)         Prior labs and Blood pressures:  BP Readings from Last 3 Encounters:   04/03/24 132/80   02/12/24 128/72   11/29/23 (!) 159/75     Lab Results   Component Value Date/Time     04/03/2024 02:31 PM    K 4.6 04/03/2024 02:31 PM     04/03/2024 02:31 PM    CO2 25 04/03/2024 02:31 PM    BUN 26 04/03/2024 02:31 PM    GFRAA >60 06/09/2022 09:06 AM     Lab Results   Component Value Date/Time    TMU6FSUR 6.3 06/09/2022 08:50 AM     Lab Results   Component Value Date/Time    CHOL 137 02/12/2024 08:51 AM    HDL 40 02/12/2024 08:51 AM     No results found for: \"VITD3\", \"VD3RIA\"    Lab Results   Component Value Date/Time    TSH 1.24 06/09/2022 09:06 AM

## 2024-04-27 RX ORDER — LEVOTHYROXINE SODIUM 75 UG/1
75 TABLET ORAL
Qty: 90 TABLET | Refills: 0 | Status: SHIPPED | OUTPATIENT
Start: 2024-04-27 | End: 2024-06-01

## 2024-04-27 RX ORDER — METOPROLOL SUCCINATE 50 MG/1
50 TABLET, EXTENDED RELEASE ORAL DAILY
Qty: 90 TABLET | Refills: 1 | Status: SHIPPED | OUTPATIENT
Start: 2024-04-27

## 2024-04-27 RX ORDER — SIMVASTATIN 20 MG
20 TABLET ORAL NIGHTLY
Qty: 90 TABLET | Refills: 1 | Status: SHIPPED | OUTPATIENT
Start: 2024-04-27

## 2024-05-04 DIAGNOSIS — I10 BENIGN ESSENTIAL HYPERTENSION: ICD-10-CM

## 2024-05-04 DIAGNOSIS — R60.9 DEPENDENT EDEMA: ICD-10-CM

## 2024-05-05 RX ORDER — BUMETANIDE 1 MG/1
TABLET ORAL
Qty: 30 TABLET | Refills: 0 | Status: SHIPPED | OUTPATIENT
Start: 2024-05-05

## 2024-05-28 ENCOUNTER — OFFICE VISIT (OUTPATIENT)
Age: 69
End: 2024-05-28
Payer: MEDICARE

## 2024-05-28 VITALS
HEIGHT: 66 IN | SYSTOLIC BLOOD PRESSURE: 132 MMHG | OXYGEN SATURATION: 98 % | WEIGHT: 228 LBS | TEMPERATURE: 97.8 F | HEART RATE: 65 BPM | RESPIRATION RATE: 16 BRPM | DIASTOLIC BLOOD PRESSURE: 78 MMHG | BODY MASS INDEX: 36.64 KG/M2

## 2024-05-28 DIAGNOSIS — E03.9 ACQUIRED HYPOTHYROIDISM: ICD-10-CM

## 2024-05-28 DIAGNOSIS — M79.89 PAIN AND SWELLING OF TOE OF LEFT FOOT: ICD-10-CM

## 2024-05-28 DIAGNOSIS — E78.2 MIXED HYPERLIPIDEMIA: ICD-10-CM

## 2024-05-28 DIAGNOSIS — M79.675 PAIN AND SWELLING OF TOE OF LEFT FOOT: ICD-10-CM

## 2024-05-28 DIAGNOSIS — N18.31 TYPE 2 DIABETES MELLITUS WITH STAGE 3A CHRONIC KIDNEY DISEASE, WITHOUT LONG-TERM CURRENT USE OF INSULIN (HCC): Primary | ICD-10-CM

## 2024-05-28 DIAGNOSIS — E11.22 TYPE 2 DIABETES MELLITUS WITH STAGE 3A CHRONIC KIDNEY DISEASE, WITHOUT LONG-TERM CURRENT USE OF INSULIN (HCC): Primary | ICD-10-CM

## 2024-05-28 DIAGNOSIS — I10 BENIGN ESSENTIAL HYPERTENSION: ICD-10-CM

## 2024-05-28 DIAGNOSIS — I87.2 VENOUS INSUFFICIENCY OF BOTH LOWER EXTREMITIES: ICD-10-CM

## 2024-05-28 LAB
ALBUMIN SERPL-MCNC: 3.8 G/DL (ref 3.5–5)
ALBUMIN/GLOB SERPL: 1.1 (ref 1.1–2.2)
ALP SERPL-CCNC: 95 U/L (ref 45–117)
ALT SERPL-CCNC: 18 U/L (ref 12–78)
ANION GAP SERPL CALC-SCNC: 4 MMOL/L (ref 5–15)
AST SERPL-CCNC: 15 U/L (ref 15–37)
BILIRUB SERPL-MCNC: 0.5 MG/DL (ref 0.2–1)
BUN SERPL-MCNC: 20 MG/DL (ref 6–20)
BUN/CREAT SERPL: 12 (ref 12–20)
CALCIUM SERPL-MCNC: 10.7 MG/DL (ref 8.5–10.1)
CHLORIDE SERPL-SCNC: 110 MMOL/L (ref 97–108)
CO2 SERPL-SCNC: 26 MMOL/L (ref 21–32)
CREAT SERPL-MCNC: 1.71 MG/DL (ref 0.55–1.02)
GLOBULIN SER CALC-MCNC: 3.6 G/DL (ref 2–4)
GLUCOSE SERPL-MCNC: 96 MG/DL (ref 65–100)
POTASSIUM SERPL-SCNC: 4.6 MMOL/L (ref 3.5–5.1)
PROT SERPL-MCNC: 7.4 G/DL (ref 6.4–8.2)
SODIUM SERPL-SCNC: 140 MMOL/L (ref 136–145)
URATE SERPL-MCNC: 8.1 MG/DL (ref 2.6–6)

## 2024-05-28 PROCEDURE — 1036F TOBACCO NON-USER: CPT | Performed by: FAMILY MEDICINE

## 2024-05-28 PROCEDURE — 3075F SYST BP GE 130 - 139MM HG: CPT | Performed by: FAMILY MEDICINE

## 2024-05-28 PROCEDURE — 3017F COLORECTAL CA SCREEN DOC REV: CPT | Performed by: FAMILY MEDICINE

## 2024-05-28 PROCEDURE — G8417 CALC BMI ABV UP PARAM F/U: HCPCS | Performed by: FAMILY MEDICINE

## 2024-05-28 PROCEDURE — 2022F DILAT RTA XM EVC RTNOPTHY: CPT | Performed by: FAMILY MEDICINE

## 2024-05-28 PROCEDURE — G8399 PT W/DXA RESULTS DOCUMENT: HCPCS | Performed by: FAMILY MEDICINE

## 2024-05-28 PROCEDURE — G8427 DOCREV CUR MEDS BY ELIG CLIN: HCPCS | Performed by: FAMILY MEDICINE

## 2024-05-28 PROCEDURE — 3044F HG A1C LEVEL LT 7.0%: CPT | Performed by: FAMILY MEDICINE

## 2024-05-28 PROCEDURE — 99215 OFFICE O/P EST HI 40 MIN: CPT | Performed by: FAMILY MEDICINE

## 2024-05-28 PROCEDURE — 1090F PRES/ABSN URINE INCON ASSESS: CPT | Performed by: FAMILY MEDICINE

## 2024-05-28 PROCEDURE — 3078F DIAST BP <80 MM HG: CPT | Performed by: FAMILY MEDICINE

## 2024-05-28 PROCEDURE — 1123F ACP DISCUSS/DSCN MKR DOCD: CPT | Performed by: FAMILY MEDICINE

## 2024-05-28 ASSESSMENT — ENCOUNTER SYMPTOMS
RESPIRATORY NEGATIVE: 1
GASTROINTESTINAL NEGATIVE: 1

## 2024-05-28 NOTE — PROGRESS NOTES
Chief Complaint   Patient presents with    Blood Pressure Check    Medication Check     1. \"Have you been to the ER, urgent care clinic since your last visit?  Hospitalized since your last visit?\" [facility]     2. \"Have you seen or consulted any other health care providers outside of the [facility] since your last visit?\" [facility].  Derm @ [facility] Fox     3. For patients aged 45-75: Has the patient had a colonoscopy / FIT/ Cologuard? Yes - no Care Gap present 8/2016 Normal, follow up 10 yrs       If the patient is female:    4. For patients aged 40-74: Has the patient had a mammogram within the past 2 years? Yes - no Care Gap present 1/2024        5. For patients aged 21-65: Has the patient had a pap smear? NA - based on age or sex      
Alcohol Abuse Father     Heart Disease Sister     Dementia Sister     Hypertension Sister     Breast Cancer Sister 70    Cancer Brother 73        kidney and lungs    Cancer Brother 76        soft tissue cancer    Breast Cancer Maternal Grandmother          in her late 40's    High Cholesterol Son     Ovarian Cancer Maternal Aunt     Breast Cancer Maternal Aunt     Breast Cancer Maternal Aunt     Cancer Cousin         maternal first cousin w/ sarcoma of leg    Anesth Problems Neg Hx          VITALS   /78 (Site: Left Upper Arm, Position: Sitting, Cuff Size: Large Adult)   Pulse 65   Temp 97.8 °F (36.6 °C) (Oral)   Resp 16   Ht 1.676 m (5' 6\")   Wt 103.4 kg (228 lb)   SpO2 98%   BMI 36.80 kg/m²     Vitals:    24 1451 24 1910   BP: 132/78 132/78   Site: Left Upper Arm Left Upper Arm   Position: Sitting Sitting   Cuff Size: Large Adult Large Adult   Pulse: 65    Resp: 16    Temp: 97.8 °F (36.6 °C)    TempSrc: Oral    SpO2: 98%    Weight: 103.4 kg (228 lb)    Height: 1.676 m (5' 6\")           PHYSICAL EXAMINATION   Physical Exam  Vitals reviewed.   Constitutional:       General: She is not in acute distress.  Cardiovascular:      Rate and Rhythm: Normal rate and regular rhythm.   Pulmonary:      Effort: Pulmonary effort is normal. No respiratory distress.      Breath sounds: Normal breath sounds.   Abdominal:      Palpations: Abdomen is soft.      Tenderness: There is no abdominal tenderness.   Musculoskeletal:      Comments: BLE venous insufficiency edema, L>R. Scattered spider veins of BLE. Moderate edema and tenderness dorsal aspect left foot. Moderate edema left great toe and mild erythema. No warmth. Gait antalgic due to pain w/ fully weight bearing on left side.    Neurological:      Mental Status: She is alert.                 ASSESSMENT & PLAN     1. Type 2 diabetes mellitus with stage 3a chronic kidney disease, without long-term current use of insulin ([facility])  A1c at goal, down into

## 2024-05-30 DIAGNOSIS — E79.0 HYPERURICEMIA: Primary | ICD-10-CM

## 2024-05-30 DIAGNOSIS — M10.372 ACUTE GOUT DUE TO RENAL IMPAIRMENT INVOLVING LEFT FOOT: ICD-10-CM

## 2024-05-30 RX ORDER — ALLOPURINOL 100 MG/1
50 TABLET ORAL DAILY
Qty: 90 TABLET | Refills: 0 | Status: SHIPPED | OUTPATIENT
Start: 2024-05-30

## 2024-05-30 RX ORDER — PREDNISONE 20 MG/1
TABLET ORAL
Qty: 20 TABLET | Refills: 0 | Status: SHIPPED | OUTPATIENT
Start: 2024-05-30

## 2024-05-31 DIAGNOSIS — I10 BENIGN ESSENTIAL HYPERTENSION: ICD-10-CM

## 2024-05-31 DIAGNOSIS — R60.9 DEPENDENT EDEMA: ICD-10-CM

## 2024-05-31 DIAGNOSIS — E03.9 ACQUIRED HYPOTHYROIDISM: ICD-10-CM

## 2024-05-31 NOTE — TELEPHONE ENCOUNTER
Last appointment: 5/28/24 MD HARRIS  Next appointment: 7/3/24 MD HARRIS  Previous refill encounter(s):   Unithroid 4/27/24 90  Bumetanide 5/5/24 30    Requested Prescriptions     Pending Prescriptions Disp Refills    UNITHROID 75 MCG tablet [Pharmacy Med Name: UNITHROID 75 MCG TABLET] 90 tablet 0     Sig: TAKE 1 TABLET BY MOUTH EVERY DAY BEFORE BREAKFAST    bumetanide (BUMEX) 1 MG tablet [Pharmacy Med Name: BUMETANIDE 1 MG TABLET] 30 tablet 0     Sig: TAKE 1 TAB DAILY AS NEEDED FOR ANKLE SWELLING/FLUID RETENTION WITH POTASSIUM RICH FOOD/DRINK     For Pharmacy Admin Tracking Only    Program: Medication Refill  CPA in place:    Recommendation Provided To:   Intervention Detail: New Rx: 2, reason: Patient Preference  Intervention Accepted By:   Gap Closed?:    Time Spent (min): 5

## 2024-06-01 RX ORDER — BUMETANIDE 1 MG/1
TABLET ORAL
Qty: 30 TABLET | Refills: 0 | OUTPATIENT
Start: 2024-06-01

## 2024-06-01 RX ORDER — LEVOTHYROXINE SODIUM 75 UG/1
75 TABLET ORAL
Qty: 90 TABLET | Refills: 0 | Status: SHIPPED | OUTPATIENT
Start: 2024-06-01

## 2024-06-04 ENCOUNTER — TELEPHONE (OUTPATIENT)
Age: 69
End: 2024-06-04

## 2024-06-04 NOTE — TELEPHONE ENCOUNTER
----- Message from Azra Barcenas sent at 6/2/2024  7:13 AM EDT -----  Regarding: Gout  Contact: [phone]  Blood pressure is 120/66.

## 2024-06-12 DIAGNOSIS — M10.372 ACUTE GOUT DUE TO RENAL IMPAIRMENT INVOLVING LEFT FOOT: ICD-10-CM

## 2024-06-13 ENCOUNTER — TELEPHONE (OUTPATIENT)
Age: 69
End: 2024-06-13

## 2024-06-13 ENCOUNTER — OFFICE VISIT (OUTPATIENT)
Age: 69
End: 2024-06-13

## 2024-06-13 VITALS
HEIGHT: 66 IN | BODY MASS INDEX: 37.12 KG/M2 | OXYGEN SATURATION: 99 % | HEART RATE: 63 BPM | SYSTOLIC BLOOD PRESSURE: 136 MMHG | DIASTOLIC BLOOD PRESSURE: 86 MMHG | WEIGHT: 231 LBS | TEMPERATURE: 97.7 F

## 2024-06-13 DIAGNOSIS — M10.9 ACUTE GOUT OF LEFT FOOT, UNSPECIFIED CAUSE: Primary | ICD-10-CM

## 2024-06-13 DIAGNOSIS — E66.01 SEVERE OBESITY (BMI 35.0-39.9) WITH COMORBIDITY (HCC): ICD-10-CM

## 2024-06-13 RX ORDER — PREDNISONE 20 MG/1
TABLET ORAL
Qty: 20 TABLET | Refills: 0 | Status: SHIPPED | OUTPATIENT
Start: 2024-06-13

## 2024-06-13 ASSESSMENT — ENCOUNTER SYMPTOMS: COLOR CHANGE: 1

## 2024-06-13 NOTE — TELEPHONE ENCOUNTER
Called pt back. I LVM apologizing  that I didn't get back to her sooner.  It looks like pt has gone to Urgent Care and been treated.  I asked her to call me back prn.

## 2024-06-13 NOTE — TELEPHONE ENCOUNTER
Patient called stating that she is having another gout flare up, and s hoping to get a possible increase in medication. I was unable to put the patient with Dr. Melva Castillo, as she guzman snot have anything today. Patient will be leaving town early tomorrow, and is really hoping to have something done for this today. Patient is willing to see someone else, but I am unable to do so since she is hoping for an increase on a current medication and since this flare up is recently reoccurring. Patient is hoping to get a call back as soon as possible.      Best call back number  [phone]

## 2024-06-13 NOTE — PATIENT INSTRUCTIONS
Thank you for visiting [facility] today.    -Reduce sugar intake  -Diet high in low fait dairy, protein plant sources, less saturated fats and more complex carbohydrates  -If given steroids, take as directed and avoid ibuprofen as this may cause GI bleeding    Follow up with your PCP in 5-7 days.

## 2024-06-13 NOTE — PROGRESS NOTES
both lower extremities 2024    Edema and spider veins    Vitamin D deficiency 3/2/2012       Past Surgical History:   Procedure Laterality Date    BREAST BIOPSY Left     Benign Aspiration    CERVICAL POLYP REMOVAL  14, Dr Damian    Benign    COLONOSCOPY  2005    Normal, ok x 10 yrs, Dr Maddox    COLONOSCOPY  2016    Normal, follow up 10 yrs    COLONOSCOPY  2015    poor prep, repeat in one year    COLONOSCOPY  2014    Dr Quiñones/ polyps x 3; repeat     DEXA BONE DENSITY AXIAL SKELETON      Normal    GYN  1981    childbirth    HEENT  2010    tumor on vocal cord removed    HERNIA REPAIR  16, Dr Baker    robotic assisted umbilical hernia repair with mesh     LIPOMA RESECTION          SUSHMA MAMMOGRAM DIGITAL BILATERAL  annually per Gyn    at [facility]    ORTHOPEDIC SURGERY  1985    tumor remved left leg    OTHER SURGICAL HISTORY  11/5/15, Dr Brown    2 masses removed left lower leg    REFRACTIVE SURGERY Bilateral 2019    VAS CAROTID DUPLEX BILATERAL      1-15% Stenosis Bilaterally    WISDOM TOOTH EXTRACTION         Family History   Problem Relation Age of Onset    Parkinsonism Mother          in nursing from colon perf or GI bleed age 92    Hypertension Mother     Stroke Mother     Rheum Arthritis Mother     Breast Cancer Mother         diagnosed age 65, survived it     Migraines Mother     Stroke Father     Heart Disease Father         CAD, PTCA    Dementia Father     Gout Father     Alcohol Abuse Father     Heart Disease Sister     Dementia Sister     Hypertension Sister     Breast Cancer Sister 70    Cancer Brother 73        kidney and lungs    Cancer Brother 76        soft tissue cancer    Breast Cancer Maternal Grandmother          in her late 40's    High Cholesterol Son     Ovarian Cancer Maternal Aunt     Breast Cancer Maternal Aunt     Breast Cancer Maternal Aunt     Cancer Cousin         maternal first cousin w/ sarcoma of leg    Anesth Problems Neg Hx

## 2024-06-14 LAB — URATE SERPL-MCNC: 6.7 MG/DL (ref 2.6–6)

## 2024-06-14 RX ORDER — PREDNISONE 20 MG/1
TABLET ORAL
Qty: 20 TABLET | Refills: 0 | OUTPATIENT
Start: 2024-06-14

## 2024-06-21 ENCOUNTER — TELEMEDICINE (OUTPATIENT)
Age: 69
End: 2024-06-21
Payer: MEDICARE

## 2024-06-21 DIAGNOSIS — M1A.3720 CHRONIC GOUT OF LEFT FOOT DUE TO RENAL IMPAIRMENT WITHOUT TOPHUS: Primary | ICD-10-CM

## 2024-06-21 DIAGNOSIS — E11.22 TYPE 2 DIABETES MELLITUS WITH STAGE 3A CHRONIC KIDNEY DISEASE, WITHOUT LONG-TERM CURRENT USE OF INSULIN (HCC): ICD-10-CM

## 2024-06-21 DIAGNOSIS — N18.31 TYPE 2 DIABETES MELLITUS WITH STAGE 3A CHRONIC KIDNEY DISEASE, WITHOUT LONG-TERM CURRENT USE OF INSULIN (HCC): ICD-10-CM

## 2024-06-21 DIAGNOSIS — E79.0 HYPERURICEMIA: ICD-10-CM

## 2024-06-21 PROCEDURE — 2022F DILAT RTA XM EVC RTNOPTHY: CPT | Performed by: FAMILY MEDICINE

## 2024-06-21 PROCEDURE — 1123F ACP DISCUSS/DSCN MKR DOCD: CPT | Performed by: FAMILY MEDICINE

## 2024-06-21 PROCEDURE — 3017F COLORECTAL CA SCREEN DOC REV: CPT | Performed by: FAMILY MEDICINE

## 2024-06-21 PROCEDURE — G8399 PT W/DXA RESULTS DOCUMENT: HCPCS | Performed by: FAMILY MEDICINE

## 2024-06-21 PROCEDURE — 99214 OFFICE O/P EST MOD 30 MIN: CPT | Performed by: FAMILY MEDICINE

## 2024-06-21 PROCEDURE — G8427 DOCREV CUR MEDS BY ELIG CLIN: HCPCS | Performed by: FAMILY MEDICINE

## 2024-06-21 PROCEDURE — 1090F PRES/ABSN URINE INCON ASSESS: CPT | Performed by: FAMILY MEDICINE

## 2024-06-21 PROCEDURE — 3044F HG A1C LEVEL LT 7.0%: CPT | Performed by: FAMILY MEDICINE

## 2024-06-21 ASSESSMENT — ENCOUNTER SYMPTOMS
GASTROINTESTINAL NEGATIVE: 1
RESPIRATORY NEGATIVE: 1

## 2024-06-21 NOTE — PROGRESS NOTES
VIRTUAL VISIT    Patient seen via virtual visit today for the following:  Chief Complaint   Patient presents with    Gout Follow Up       HISTORY OF PRESENT ILLNESS   HPI  Patient with history of Type 2 NIDDM, Stage 3a CKD, Hyperuricemia and Recurrent Gout presents for gout follow up and medication evaluation. When I saw her one month ago for her routine diabetes check she had reported being seen by [facility] a few times in April for acute, recurrent pain and swelling in the left foot.  X-rays of the foot and ankle during that time were negative and she was diagnosed and treated with prednisone for tendinitis and was placed in a cam walking boot.  She had an excellent response to the courses of prednisone.  When I saw her at her routine visit with me on 5/28 she had reported recurrence in the pain and swelling in the top of the left foot closest to the left great toe.  At the time she had denied a personal history of gout but stated that her father had gout but was also a heavy drinker. Patient at the time had admittedly been eating a lot of seafood vacationing the weeks prior to her symptoms.    At her appt w/ me 5/28 I checked a uric acid level along w/ her metabolic panel. It was elevated at 8.1. Her creatinine had gone up to 1.71 up from her baseline range 1.28-1.4. I started her on Allopurinol renal dosing of 50 mg by having her take 1/2 of the 100 mg tablet and referred her to Nephrology.  She is scheduled for appt w/ Dr. Cassidy 7-11-24. I also started her on another course of prednisone but longer taper which was very effective at controlling her symptoms. Her symptoms were improved and stable but gradually started worsening again (not as severe as before) so she ended up going to urgent care on 6/13 to request a repeat uric acid level and another round of prednisone to head it off. Her uric acid level there was down to 6.7. she was prescribed another round of prednisone taper as before and is

## 2024-06-24 ENCOUNTER — TELEPHONE (OUTPATIENT)
Age: 69
End: 2024-06-24

## 2024-06-24 NOTE — TELEPHONE ENCOUNTER
Call and spoke to patient, two ID confirmed.    Patient stated that cough is better no medication needed.

## 2024-06-24 NOTE — TELEPHONE ENCOUNTER
----- Message from Azra Barcenas sent at 6/21/2024  8:51 AM EDT -----  Regarding: Chest cold  Contact: [phone]  I have really thick yellow mucous that I am coughing up.  What am I allowed to take with my current meds?  Pic attached.

## 2024-06-24 NOTE — TELEPHONE ENCOUNTER
Call and spoke to patient, two ID confirmed.   Finish prednisone and taking a whole gout pill started on Friday. She notice that this AM her left foot was tender around ankle so took one of prednisone that was left and she will take another one on Tuesday  and the last Prednisone 20 mg on Wednesday .   She wants to make sure it is ok.

## 2024-06-25 NOTE — TELEPHONE ENCOUNTER
Call and spoke to patient, two ID confirmed.  Writer informed patient that MD stated that it is ok for her to finish taking Prednisolone like she said that she would. Pt verbalized understanding of information discussed w/ no further questions at this time.   Iwona Soliz LPN

## 2024-07-03 ENCOUNTER — OFFICE VISIT (OUTPATIENT)
Age: 69
End: 2024-07-03
Payer: MEDICARE

## 2024-07-03 VITALS
HEIGHT: 66 IN | SYSTOLIC BLOOD PRESSURE: 122 MMHG | WEIGHT: 231.8 LBS | HEART RATE: 69 BPM | TEMPERATURE: 97.3 F | OXYGEN SATURATION: 95 % | RESPIRATION RATE: 16 BRPM | BODY MASS INDEX: 37.25 KG/M2 | DIASTOLIC BLOOD PRESSURE: 75 MMHG

## 2024-07-03 DIAGNOSIS — E11.22 TYPE 2 DIABETES MELLITUS WITH STAGE 3A CHRONIC KIDNEY DISEASE, WITHOUT LONG-TERM CURRENT USE OF INSULIN (HCC): ICD-10-CM

## 2024-07-03 DIAGNOSIS — N18.31 TYPE 2 DIABETES MELLITUS WITH STAGE 3A CHRONIC KIDNEY DISEASE, WITHOUT LONG-TERM CURRENT USE OF INSULIN (HCC): ICD-10-CM

## 2024-07-03 DIAGNOSIS — M1A.3720 CHRONIC GOUT OF LEFT FOOT DUE TO RENAL IMPAIRMENT WITHOUT TOPHUS: ICD-10-CM

## 2024-07-03 DIAGNOSIS — E79.0 HYPERURICEMIA: ICD-10-CM

## 2024-07-03 DIAGNOSIS — E79.0 HYPERURICEMIA: Primary | ICD-10-CM

## 2024-07-03 DIAGNOSIS — E21.0 PRIMARY HYPERPARATHYROIDISM (HCC): ICD-10-CM

## 2024-07-03 LAB
ANION GAP SERPL CALC-SCNC: 3 MMOL/L (ref 5–15)
BUN SERPL-MCNC: 14 MG/DL (ref 6–20)
BUN/CREAT SERPL: 12 (ref 12–20)
CALCIUM SERPL-MCNC: 9.7 MG/DL (ref 8.5–10.1)
CHLORIDE SERPL-SCNC: 111 MMOL/L (ref 97–108)
CO2 SERPL-SCNC: 27 MMOL/L (ref 21–32)
CREAT SERPL-MCNC: 1.21 MG/DL (ref 0.55–1.02)
GLUCOSE SERPL-MCNC: 120 MG/DL (ref 65–100)
POTASSIUM SERPL-SCNC: 4.1 MMOL/L (ref 3.5–5.1)
SODIUM SERPL-SCNC: 141 MMOL/L (ref 136–145)
URATE SERPL-MCNC: 6.7 MG/DL (ref 2.6–6)

## 2024-07-03 PROCEDURE — 99214 OFFICE O/P EST MOD 30 MIN: CPT | Performed by: FAMILY MEDICINE

## 2024-07-03 PROCEDURE — G8417 CALC BMI ABV UP PARAM F/U: HCPCS | Performed by: FAMILY MEDICINE

## 2024-07-03 PROCEDURE — G8399 PT W/DXA RESULTS DOCUMENT: HCPCS | Performed by: FAMILY MEDICINE

## 2024-07-03 PROCEDURE — G8427 DOCREV CUR MEDS BY ELIG CLIN: HCPCS | Performed by: FAMILY MEDICINE

## 2024-07-03 PROCEDURE — 2022F DILAT RTA XM EVC RTNOPTHY: CPT | Performed by: FAMILY MEDICINE

## 2024-07-03 PROCEDURE — 3074F SYST BP LT 130 MM HG: CPT | Performed by: FAMILY MEDICINE

## 2024-07-03 PROCEDURE — 3078F DIAST BP <80 MM HG: CPT | Performed by: FAMILY MEDICINE

## 2024-07-03 PROCEDURE — 1090F PRES/ABSN URINE INCON ASSESS: CPT | Performed by: FAMILY MEDICINE

## 2024-07-03 PROCEDURE — 1036F TOBACCO NON-USER: CPT | Performed by: FAMILY MEDICINE

## 2024-07-03 PROCEDURE — 1123F ACP DISCUSS/DSCN MKR DOCD: CPT | Performed by: FAMILY MEDICINE

## 2024-07-03 PROCEDURE — 3017F COLORECTAL CA SCREEN DOC REV: CPT | Performed by: FAMILY MEDICINE

## 2024-07-03 PROCEDURE — 3044F HG A1C LEVEL LT 7.0%: CPT | Performed by: FAMILY MEDICINE

## 2024-07-03 ASSESSMENT — PATIENT HEALTH QUESTIONNAIRE - PHQ9
SUM OF ALL RESPONSES TO PHQ9 QUESTIONS 1 & 2: 0
SUM OF ALL RESPONSES TO PHQ QUESTIONS 1-9: 0
SUM OF ALL RESPONSES TO PHQ QUESTIONS 1-9: 0
1. LITTLE INTEREST OR PLEASURE IN DOING THINGS: NOT AT ALL
2. FEELING DOWN, DEPRESSED OR HOPELESS: NOT AT ALL
SUM OF ALL RESPONSES TO PHQ QUESTIONS 1-9: 0
SUM OF ALL RESPONSES TO PHQ QUESTIONS 1-9: 0

## 2024-07-03 ASSESSMENT — ENCOUNTER SYMPTOMS: RESPIRATORY NEGATIVE: 1

## 2024-07-03 NOTE — PROGRESS NOTES
Chief Complaint   Patient presents with    Gout     Follow Up    Kidney Problem     Came to do labs as per provider's request     \"Have you been to the ER, urgent care clinic since your last visit?  Hospitalized since your last visit?\"    NO    “Have you seen or consulted any other health care providers outside of [facility] since your last visit?”    NO            Click Here for Release of Records Request             7/3/2024     2:10 PM   PHQ-9    Little interest or pleasure in doing things 0   Feeling down, depressed, or hopeless 0   PHQ-2 Score 0   PHQ-9 Total Score 0           Financial Resource Strain: Low Risk  (7/17/2023)    Overall Financial Resource Strain (CARDIA)     Difficulty of Paying Living Expenses: Not hard at all      Food Insecurity: Not on file (7/17/2023)          Health Maintenance Due   Topic Date Due    COVID-19 Vaccine (5 - 2023-24 season) 09/01/2023    Diabetic Alb to Cr ratio (uACR) test  07/17/2024        
needs to be particularly mindful of her diet and limiting steroid usage  I had referred her to nephrology last month for worsening renal function with her creatinine going up to 1.71 from her baseline range of 1.2-1.4 and GFR decreased to 32.  She is scheduled for initial consultation/evaluation appointment with Dr. Cassidy on 7-. He has requested a follow up BMP prior to that appt, and she will have that done on 7-8-24 at our walk in lab station at [facility]. I have pended order.      Reviewed diet, nutrition, exercise, weight management/goals, risk reduction, cardiovascular goals for age and associated health risks, medications, effects, risks, benefits, potential interactions and possible side effects.      4. Primary hyperparathyroidism (HCC)  Followed by Marisela Chicas, next appt 12/6/24  Clinically stable and last labs stable  Remain off calcium supplements    She is already scheduled for next follow up w/ me on 7-23-24.

## 2024-07-08 ENCOUNTER — HOSPITAL ENCOUNTER (OUTPATIENT)
Age: 69
Discharge: HOME | End: 2024-07-11
Payer: MEDICARE

## 2024-07-08 ENCOUNTER — TELEPHONE (OUTPATIENT)
Age: 69
End: 2024-07-08

## 2024-07-08 DIAGNOSIS — E79.0 HYPERURICEMIA: Primary | ICD-10-CM

## 2024-07-08 DIAGNOSIS — R60.9 DEPENDENT EDEMA: ICD-10-CM

## 2024-07-08 DIAGNOSIS — M1A.3720 CHRONIC GOUT OF LEFT FOOT DUE TO RENAL IMPAIRMENT WITHOUT TOPHUS: ICD-10-CM

## 2024-07-08 DIAGNOSIS — M79.672 LEFT FOOT PAIN: ICD-10-CM

## 2024-07-08 DIAGNOSIS — I10 BENIGN ESSENTIAL HYPERTENSION: ICD-10-CM

## 2024-07-08 DIAGNOSIS — M10.372 ACUTE GOUT DUE TO RENAL IMPAIRMENT INVOLVING LEFT FOOT: ICD-10-CM

## 2024-07-08 DIAGNOSIS — E03.9 ACQUIRED HYPOTHYROIDISM: ICD-10-CM

## 2024-07-08 DIAGNOSIS — S90.32XA CONTUSION OF LEFT FOOT, INITIAL ENCOUNTER: ICD-10-CM

## 2024-07-08 PROCEDURE — 73718 MRI LOWER EXTREMITY W/O DYE: CPT

## 2024-07-08 RX ORDER — BUMETANIDE 1 MG/1
TABLET ORAL
Qty: 30 TABLET | Refills: 0 | OUTPATIENT
Start: 2024-07-08

## 2024-07-08 RX ORDER — LEVOTHYROXINE SODIUM 75 UG/1
75 TABLET ORAL
Qty: 90 TABLET | Refills: 1 | OUTPATIENT
Start: 2024-07-08

## 2024-07-08 RX ORDER — LEVOTHYROXINE SODIUM 75 UG/1
75 TABLET ORAL
Qty: 90 TABLET | Refills: 0 | Status: SHIPPED | OUTPATIENT
Start: 2024-07-08

## 2024-07-08 RX ORDER — ALLOPURINOL 100 MG/1
50 TABLET ORAL DAILY
Qty: 90 TABLET | Refills: 0 | Status: CANCELLED | OUTPATIENT
Start: 2024-07-08

## 2024-07-08 NOTE — TELEPHONE ENCOUNTER
PCP: Mira Cabrera MD    Last appt: 7/3/2024   Future Appointments   Date Time Provider Department Center   7/23/2024  1:20 PM Mira Cabrera MD [facility] BS AMB   12/6/2024  8:10 AM Julia Chicas MD [facility] BS AMB       Requested Prescriptions     Pending Prescriptions Disp Refills    UNITHROID 75 MCG tablet 90 tablet 0     Sig: Take 1 tablet by mouth every morning (before breakfast)         Prior labs and Blood pressures:  BP Readings from Last 3 Encounters:   07/03/24 122/75   06/13/24 136/86   05/28/24 132/78     Lab Results   Component Value Date/Time     07/03/2024 04:29 PM    K 4.1 07/03/2024 04:29 PM     07/03/2024 04:29 PM    CO2 27 07/03/2024 04:29 PM    BUN 14 07/03/2024 04:29 PM    GFRAA >60 06/09/2022 09:06 AM     Lab Results   Component Value Date/Time    DAT3YHGA 6.3 06/09/2022 08:50 AM     Lab Results   Component Value Date/Time    CHOL 137 02/12/2024 08:51 AM    HDL 40 02/12/2024 08:51 AM    LDL 57.4 02/12/2024 08:51 AM    VLDL 39.6 02/12/2024 08:51 AM     No results found for: \"VITD3\"    Lab Results   Component Value Date/Time    TSH 1.24 06/09/2022 09:06 AM

## 2024-07-09 RX ORDER — ALLOPURINOL 100 MG/1
200 TABLET ORAL DAILY
Qty: 60 TABLET | Refills: 1 | Status: SHIPPED | OUTPATIENT
Start: 2024-07-09 | End: 2024-08-02

## 2024-07-09 NOTE — TELEPHONE ENCOUNTER
PCP: Mira Cabrera MD      Future Appointments   Date Time Provider Department [place]   7/23/2024  1:20 PM Mira Cabrera MD [facility] BS [facility]   12/6/2024  8:10 AM Julia Chicas MD E [facility]       Requested Prescriptions     Pending Prescriptions Disp Refills    allopurinol (ZYLOPRIM) 100 MG tablet 90 tablet 0     Sig: Take 0.5 tablets by mouth daily

## 2024-07-09 NOTE — TELEPHONE ENCOUNTER
Back on 7/6 I sent patient a [employer] message about her recent lab results and medication recommendations. It shows that she read it. I advised her to increase the Allopurinol 100 mg to TWO tablets daily. I did not mention anything about sending in a 90 day supply of that right now because I am not sure if the dose might need to be changed again once she sees the Nephrologist or follows up w/ me. Please call and advise pt of this and that I will be sending in an updated prescription for the TWO tabs once daily for 30 day supply for now. Also tell her that her [employer] messages are getting to be excessive, back to back, and hard for staff to keep up w/ and that lots of her messages are getting missed because of it. It is best for her to CALL for anything urgent and to reserve updates and or non-urgent questions for her office visit appointments.

## 2024-07-09 NOTE — TELEPHONE ENCOUNTER
Spoke to pt and advised of Dr Jonathan tapia.  She states understanding.    She reports that she went to [facility] Sunday.  Her pain was so bad and she begged them to order the MRI.  MRI was done yesterday.  She saw the results but she hasn't discussed it with anyone.  It didn't show any fx's.  Today her pain isn't as bad but still having a lot of pain.  The last 2 day's have been the worse pain ever.  She was in tears on Sunday.  She isn't getting out of bed very much.  She started taking 2 of the allopurinol 100 mg on Sunday.  She is only taking tylenol 2 500 mg tabs twice a day.  Advised that she can take TID prn.  She is scheduled to see podiatrist Dr Drew Joseph tomorrow. And neurologist Dr MELL Cassidy on Thurs and ortho Dr Chowdhury on 7/23.   She will send a copy of the report from Dr Joseph just to keep us in the loop.

## 2024-07-23 ENCOUNTER — OFFICE VISIT (OUTPATIENT)
Age: 69
End: 2024-07-23
Payer: MEDICARE

## 2024-07-23 VITALS
SYSTOLIC BLOOD PRESSURE: 122 MMHG | HEIGHT: 66 IN | HEART RATE: 98 BPM | RESPIRATION RATE: 16 BRPM | BODY MASS INDEX: 36.35 KG/M2 | TEMPERATURE: 98 F | OXYGEN SATURATION: 98 % | DIASTOLIC BLOOD PRESSURE: 74 MMHG | WEIGHT: 226.2 LBS

## 2024-07-23 DIAGNOSIS — E79.0 HYPERURICEMIA: ICD-10-CM

## 2024-07-23 DIAGNOSIS — M1A.3720 CHRONIC GOUT OF LEFT FOOT DUE TO RENAL IMPAIRMENT WITHOUT TOPHUS: ICD-10-CM

## 2024-07-23 DIAGNOSIS — E03.9 ACQUIRED HYPOTHYROIDISM: ICD-10-CM

## 2024-07-23 DIAGNOSIS — I10 BENIGN ESSENTIAL HYPERTENSION: ICD-10-CM

## 2024-07-23 DIAGNOSIS — E21.0 PRIMARY HYPERPARATHYROIDISM (HCC): ICD-10-CM

## 2024-07-23 DIAGNOSIS — N18.31 TYPE 2 DIABETES MELLITUS WITH STAGE 3A CHRONIC KIDNEY DISEASE, WITHOUT LONG-TERM CURRENT USE OF INSULIN (HCC): Primary | ICD-10-CM

## 2024-07-23 DIAGNOSIS — I87.2 VENOUS INSUFFICIENCY OF BOTH LOWER EXTREMITIES: ICD-10-CM

## 2024-07-23 DIAGNOSIS — R77.8 ABNORMAL SERUM PROTEIN ELECTROPHORESIS: ICD-10-CM

## 2024-07-23 DIAGNOSIS — E11.22 TYPE 2 DIABETES MELLITUS WITH STAGE 3A CHRONIC KIDNEY DISEASE, WITHOUT LONG-TERM CURRENT USE OF INSULIN (HCC): Primary | ICD-10-CM

## 2024-07-23 DIAGNOSIS — E78.2 MIXED HYPERLIPIDEMIA: ICD-10-CM

## 2024-07-23 PROCEDURE — 2022F DILAT RTA XM EVC RTNOPTHY: CPT | Performed by: FAMILY MEDICINE

## 2024-07-23 PROCEDURE — G8427 DOCREV CUR MEDS BY ELIG CLIN: HCPCS | Performed by: FAMILY MEDICINE

## 2024-07-23 PROCEDURE — 1090F PRES/ABSN URINE INCON ASSESS: CPT | Performed by: FAMILY MEDICINE

## 2024-07-23 PROCEDURE — 3044F HG A1C LEVEL LT 7.0%: CPT | Performed by: FAMILY MEDICINE

## 2024-07-23 PROCEDURE — 3017F COLORECTAL CA SCREEN DOC REV: CPT | Performed by: FAMILY MEDICINE

## 2024-07-23 PROCEDURE — 1036F TOBACCO NON-USER: CPT | Performed by: FAMILY MEDICINE

## 2024-07-23 PROCEDURE — 1123F ACP DISCUSS/DSCN MKR DOCD: CPT | Performed by: FAMILY MEDICINE

## 2024-07-23 PROCEDURE — G8399 PT W/DXA RESULTS DOCUMENT: HCPCS | Performed by: FAMILY MEDICINE

## 2024-07-23 PROCEDURE — 99215 OFFICE O/P EST HI 40 MIN: CPT | Performed by: FAMILY MEDICINE

## 2024-07-23 PROCEDURE — 3074F SYST BP LT 130 MM HG: CPT | Performed by: FAMILY MEDICINE

## 2024-07-23 PROCEDURE — G8417 CALC BMI ABV UP PARAM F/U: HCPCS | Performed by: FAMILY MEDICINE

## 2024-07-23 PROCEDURE — 3078F DIAST BP <80 MM HG: CPT | Performed by: FAMILY MEDICINE

## 2024-07-23 RX ORDER — LEVOFLOXACIN 500 MG/1
500 TABLET, FILM COATED ORAL DAILY
COMMUNITY
Start: 2024-07-19

## 2024-07-23 ASSESSMENT — ENCOUNTER SYMPTOMS
VOMITING: 0
GASTROINTESTINAL NEGATIVE: 1
NAUSEA: 0
RESPIRATORY NEGATIVE: 1
DIARRHEA: 0
ABDOMINAL PAIN: 0

## 2024-07-23 NOTE — PROGRESS NOTES
Chief Complaint   Patient presents with    Diabetes     6 month follow up    Follow Up from Specialists      CKD and Gout    Blood Work     HISTORY OF PRESENT ILLNESS   HPI  Patient with History of Type 2 NIDDM, Stage 3a CKD, Benign Essential HTN, Mixed Hyperlipidemia, Hypothyroidism, Primary Hyperparathyroidism, Chronic BLE Edema due to Venous Insufficiency, BMI >30, Hyperuricemia, and Chronic Gout Left Foot presents for follow up, medication evaluation, lab check and referral follow up from various specialists.      Shortly after her last visit w/ me she ended up reaching back out to [facility] who had done her initial evaluation.  She requested an order for the MRI of the foot which was originally planned that. She had the MRI done on 7/8 which revealed mild arthritis of the first MTP joint, mild edema in the medial sesamoid bone, and subcutaneous edema over the dorsum of the foot.    Since her last visit with me 1 month ago she has had her initial consultation with nephrologist Dr. Cassidy on 7/11.  Additional labs, urine testing, and renal ultrasound were ordered.  The renal ultrasound is scheduled for tomorrow.  She brought in a copy of the lab results which have been filed to scanned to EMR.  Findings were significant for an abnormal SPEP which revealed an M spike and she was subsequently referred to heme-onc at [facility].  She is awaiting that appointment with Dr. Mcgrath.  She saw podiatry Dr. Joseph on 7/10 who prescribed colchicine 0.6 mg daily.  Patient states she was only able to take it for about 4 to 5 days then stopped it due to severe diarrhea.  At her last follow-up visit with Dr. Joseph on 7/19 she got a cortisone injection in the left great toe which she states has helped significantly.  She was advised that she could just reserve the colchicine for as needed usage but she has not needed or used it since that time.  She states that the toe and foot are getting remarkably better since getting the

## 2024-07-23 NOTE — PROGRESS NOTES
Chief Complaint   Patient presents with    Diabetes    Medication Check     1. \"Have you been to the ER, urgent care clinic since your last visit?  Hospitalized since your last visit?\" No    2. \"Have you seen or consulted any other health care providers outside of the [facility] since your last visit?\" Podiatrist Dr Joseph, neph Dr Cassidy     3. For patients aged 45-75: Has the patient had a colonoscopy / FIT/ Cologuard? Yes - no Care Gap present 8/2016 Normal, follow up 10 yrs       If the patient is female:    4. For patients aged 40-74: Has the patient had a mammogram within the past 2 years? Yes - no Care Gap present 1/2024       5. For patients aged 21-65: Has the patient had a pap smear? NA - based on age or sex

## 2024-07-24 ENCOUNTER — HOSPITAL ENCOUNTER (OUTPATIENT)
Facility: HOSPITAL | Age: 69
Discharge: HOME | End: 2024-07-27
Attending: INTERNAL MEDICINE
Payer: MEDICARE

## 2024-07-24 DIAGNOSIS — N18.31 CHRONIC KIDNEY DISEASE, STAGE 3A (HCC): ICD-10-CM

## 2024-07-24 LAB
ANION GAP SERPL CALC-SCNC: 7 MMOL/L (ref 5–15)
BUN SERPL-MCNC: 21 MG/DL (ref 6–20)
BUN/CREAT SERPL: 17 (ref 12–20)
CALCIUM SERPL-MCNC: 10.2 MG/DL (ref 8.5–10.1)
CHLORIDE SERPL-SCNC: 107 MMOL/L (ref 97–108)
CO2 SERPL-SCNC: 26 MMOL/L (ref 21–32)
CREAT SERPL-MCNC: 1.25 MG/DL (ref 0.55–1.02)
EST. AVERAGE GLUCOSE BLD GHB EST-MCNC: 114 MG/DL
GLUCOSE SERPL-MCNC: 105 MG/DL (ref 65–100)
HBA1C MFR BLD: 5.6 % (ref 4–5.6)
POTASSIUM SERPL-SCNC: 3.9 MMOL/L (ref 3.5–5.1)
SODIUM SERPL-SCNC: 140 MMOL/L (ref 136–145)
URATE SERPL-MCNC: 5.4 MG/DL (ref 2.6–6)

## 2024-07-24 PROCEDURE — 76770 US EXAM ABDO BACK WALL COMP: CPT

## 2024-08-02 DIAGNOSIS — M1A.3720 CHRONIC GOUT OF LEFT FOOT DUE TO RENAL IMPAIRMENT WITHOUT TOPHUS: ICD-10-CM

## 2024-08-02 DIAGNOSIS — E79.0 HYPERURICEMIA: ICD-10-CM

## 2024-08-02 RX ORDER — ALLOPURINOL 100 MG/1
TABLET ORAL
Qty: 180 TABLET | Refills: 0 | Status: SHIPPED | OUTPATIENT
Start: 2024-08-02

## 2024-08-02 NOTE — TELEPHONE ENCOUNTER
Requesting 90 d/s.  Ryne Peg    Last appointment: 7/23/24 MD HARRIS  Next appointment: 10/23/24 MD HARRIS  Previous refill encounter(s): 7/9/24 60 + 1    Requested Prescriptions     Pending Prescriptions Disp Refills    allopurinol (ZYLOPRIM) 100 MG tablet [Pharmacy Med Name: ALLOPURINOL 100 MG TABLET] 180 tablet 0     Sig: Take 1 tablet by mouth in the morning and at bedtime.     For Pharmacy Admin Tracking Only    Program: Medication Refill  CPA in place:    Recommendation Provided To:   Intervention Detail: New Rx: 1, reason: Patient Preference  Intervention Accepted By:   Gap Closed?:    Time Spent (min): 5

## 2024-08-08 RX ORDER — BLOOD SUGAR DIAGNOSTIC
STRIP MISCELLANEOUS
COMMUNITY
Start: 2022-08-07 | End: 2024-08-08 | Stop reason: SDUPTHER

## 2024-08-08 NOTE — TELEPHONE ENCOUNTER
----- Message from Azra Barcenas sent at 8/6/2024  3:42 PM EDT -----  Regarding: Freestyle Lite Test Strips  Contact: [phone]  I have attached a screenshot of the test strips.  [facility] said it has been too long since they last received a prescription request to fill; therefore, they need a new prescription from Dr. Castillo.  Two years ago my brother-in-law passed and he had six boxes left so I just ran out last week while on vacation.  Thank you for your assistance and have a wonderful week.  Azra  [phone]

## 2024-08-11 RX ORDER — BLOOD SUGAR DIAGNOSTIC
STRIP MISCELLANEOUS
Qty: 100 EACH | Refills: 3 | Status: SHIPPED | OUTPATIENT
Start: 2024-08-11

## 2024-08-12 ENCOUNTER — TELEPHONE (OUTPATIENT)
Age: 69
End: 2024-08-12

## 2024-08-12 DIAGNOSIS — N18.31 TYPE 2 DIABETES MELLITUS WITH STAGE 3A CHRONIC KIDNEY DISEASE, WITHOUT LONG-TERM CURRENT USE OF INSULIN (HCC): Primary | ICD-10-CM

## 2024-08-12 DIAGNOSIS — E11.22 TYPE 2 DIABETES MELLITUS WITH STAGE 3A CHRONIC KIDNEY DISEASE, WITHOUT LONG-TERM CURRENT USE OF INSULIN (HCC): Primary | ICD-10-CM

## 2024-08-12 NOTE — TELEPHONE ENCOUNTER
Call and spoke to pharmacy, two ID confirmed.  I tried to call in order, after seeing order and they would not let me do it. It has to be E-prescribed  is the only way insurance will pay for it.  They informed writer that they did not have the order for Test Strips.   He said for Medicare Requirement   Need to have   Quantity  Directions  And DX: Code.   He said that Generic order is fine.

## 2024-08-14 RX ORDER — GLUCOSAMINE HCL/CHONDROITIN SU 500-400 MG
CAPSULE ORAL
Qty: 100 STRIP | Refills: 3 | Status: SHIPPED | OUTPATIENT
Start: 2024-08-14

## 2024-08-16 DIAGNOSIS — R60.9 DEPENDENT EDEMA: ICD-10-CM

## 2024-08-16 DIAGNOSIS — I10 BENIGN ESSENTIAL HYPERTENSION: ICD-10-CM

## 2024-08-16 DIAGNOSIS — E03.9 ACQUIRED HYPOTHYROIDISM: ICD-10-CM

## 2024-08-16 RX ORDER — BUMETANIDE 1 MG/1
TABLET ORAL
Qty: 30 TABLET | Refills: 0 | OUTPATIENT
Start: 2024-08-16

## 2024-08-16 RX ORDER — LEVOTHYROXINE SODIUM 75 UG/1
75 TABLET ORAL
Qty: 30 TABLET | Refills: 0 | Status: SHIPPED | OUTPATIENT
Start: 2024-08-16

## 2024-08-16 RX ORDER — SIMVASTATIN 20 MG
20 TABLET ORAL NIGHTLY
Qty: 90 TABLET | Refills: 1 | OUTPATIENT
Start: 2024-08-16

## 2024-08-16 RX ORDER — METOPROLOL SUCCINATE 50 MG/1
50 TABLET, EXTENDED RELEASE ORAL DAILY
Qty: 90 TABLET | Refills: 1 | OUTPATIENT
Start: 2024-08-16

## 2024-08-16 NOTE — TELEPHONE ENCOUNTER
PCP: Mira Cabrera MD    Last appt: 7/23/2024     Future Appointments   Date Time Provider Department Center   10/23/2024  9:00 AM Mira Cabrera MD [facility]   12/6/2024  8:10 AM Julia Chicas MD [facility]       Requested Prescriptions     Pending Prescriptions Disp Refills    simvastatin (ZOCOR) 20 MG tablet [Pharmacy Med Name: SIMVASTATIN 20 MG TABLET] 90 tablet 1     Sig: TAKE 1 TABLET BY MOUTH EVERY DAY AT NIGHT    bumetanide (BUMEX) 1 MG tablet [Pharmacy Med Name: BUMETANIDE 1 MG TABLET] 30 tablet 0     Sig: TAKE 1 TAB DAILY AS NEEDED FOR ANKLE SWELLING/FLUID RETENTION WITH POTASSIUM RICH FOOD/DRINK    UNITHROID 75 MCG tablet [Pharmacy Med Name: UNITHROID 75 MCG TABLET] 90 tablet 1     Sig: TAKE 1 TABLET BY MOUTH EVERY DAY BEFORE BREAKFAST    metoprolol succinate (TOPROL XL) 50 MG extended release tablet [Pharmacy Med Name: METOPROLOL SUCC ER 50 MG TAB] 90 tablet 1     Sig: TAKE 1 TABLET BY MOUTH EVERY DAY FOR BLOOD PRESSURE       Prior labs and Blood pressures:  BP Readings from Last 3 Encounters:   07/23/24 122/74   07/03/24 122/75   06/13/24 136/86     Lab Results   Component Value Date/Time     07/23/2024 02:11 PM    K 3.9 07/23/2024 02:11 PM     07/23/2024 02:11 PM    CO2 26 07/23/2024 02:11 PM    BUN 21 07/23/2024 02:11 PM    GFRAA >60 06/09/2022 09:06 AM     Lab Results   Component Value Date/Time    BNV8XWFR 6.3 06/09/2022 08:50 AM     Lab Results   Component Value Date/Time    CHOL 137 02/12/2024 08:51 AM    HDL 40 02/12/2024 08:51 AM    LDL 57.4 02/12/2024 08:51 AM    VLDL 39.6 02/12/2024 08:51 AM     No results found for: \"VITD3\"    Lab Results   Component Value Date/Time    TSH 1.39 07/17/2023 11:17 AM

## 2024-09-23 ENCOUNTER — TELEPHONE (OUTPATIENT)
Age: 69
End: 2024-09-23

## 2024-10-18 RX ORDER — FAMOTIDINE 40 MG/1
40 TABLET, FILM COATED ORAL NIGHTLY
Qty: 90 TABLET | Refills: 1 | OUTPATIENT
Start: 2024-10-18

## 2024-10-29 DIAGNOSIS — E55.9 VITAMIN D DEFICIENCY: ICD-10-CM

## 2024-10-29 DIAGNOSIS — E83.52 HYPERCALCEMIA: ICD-10-CM

## 2024-11-01 DIAGNOSIS — E79.0 HYPERURICEMIA: ICD-10-CM

## 2024-11-01 DIAGNOSIS — M1A.3720 CHRONIC GOUT OF LEFT FOOT DUE TO RENAL IMPAIRMENT WITHOUT TOPHUS: ICD-10-CM

## 2024-11-01 NOTE — TELEPHONE ENCOUNTER
Last appointment: 7/23/24 MD HARRIS  Next appointment: 11/6/24 MD HARRIS  Previous refill encounter(s): 8/2/24 180    Requested Prescriptions     Pending Prescriptions Disp Refills    allopurinol (ZYLOPRIM) 100 MG tablet [Pharmacy Med Name: ALLOPURINOL 100 MG TABLET] 180 tablet 0     Sig: Take 1 tablet by mouth in the morning and in the evening.     For Pharmacy Admin Tracking Only    Program: Medication Refill  CPA in place:    Recommendation Provided To:   Intervention Detail: New Rx: 1, reason: Patient Preference  Intervention Accepted By:   Gap Closed?:    Time Spent (min): 5

## 2024-11-02 RX ORDER — ALLOPURINOL 100 MG/1
TABLET ORAL
Qty: 180 TABLET | Refills: 1 | Status: SHIPPED | OUTPATIENT
Start: 2024-11-02

## 2024-11-07 DIAGNOSIS — E03.9 ACQUIRED HYPOTHYROIDISM: ICD-10-CM

## 2024-11-07 NOTE — TELEPHONE ENCOUNTER
Last appointment: 7/23/24 MD HARRIS  Next appointment: 11/12/24 MD HARRIS (appt has been cancelled/rescheduled 3 times)  Previous refill encounter(s): 8/16/24 30    Requested Prescriptions     Pending Prescriptions Disp Refills    levothyroxine (UNITHROID) 75 MCG tablet [Pharmacy Med Name: UNITHROID 75 MCG TABLET] 30 tablet 0     Sig: Take 1 tablet by mouth every morning (before breakfast)     For Pharmacy Admin Tracking Only    Program: Medication Refill  CPA in place:    Recommendation Provided To:   Intervention Detail: New Rx: 1, reason: Patient Preference  Intervention Accepted By:   Gap Closed?:    Time Spent (min): 5

## 2024-11-08 RX ORDER — LEVOTHYROXINE SODIUM 75 UG/1
75 TABLET ORAL
Qty: 30 TABLET | Refills: 0 | Status: SHIPPED | OUTPATIENT
Start: 2024-11-08

## 2024-11-12 NOTE — TELEPHONE ENCOUNTER
Last appointment: 7/23/24 MD HARRIS, lipid 2/2024  Next appointment: 11/18/24 MD HARRIS  Previous refill encounter(s): 4/27/24 90 + 1    Requested Prescriptions     Pending Prescriptions Disp Refills    simvastatin (ZOCOR) 20 MG tablet [Pharmacy Med Name: SIMVASTATIN 20 MG TABLET] 90 tablet 1     Sig: Take 1 tablet by mouth nightly     For Pharmacy Admin Tracking Only    Program: Medication Refill  CPA in place:    Recommendation Provided To:   Intervention Detail: New Rx: 1, reason: Patient Preference  Intervention Accepted By:   Gap Closed?:    Time Spent (min): 5

## 2024-11-13 RX ORDER — SIMVASTATIN 20 MG
20 TABLET ORAL NIGHTLY
Qty: 90 TABLET | Refills: 0 | Status: SHIPPED | OUTPATIENT
Start: 2024-11-13

## 2024-11-14 DIAGNOSIS — E03.9 ACQUIRED HYPOTHYROIDISM: ICD-10-CM

## 2024-11-14 DIAGNOSIS — I10 BENIGN ESSENTIAL HYPERTENSION: ICD-10-CM

## 2024-11-14 DIAGNOSIS — R60.9 DEPENDENT EDEMA: ICD-10-CM

## 2024-11-15 RX ORDER — FAMOTIDINE 40 MG/1
40 TABLET, FILM COATED ORAL NIGHTLY
Qty: 90 TABLET | Refills: 1 | OUTPATIENT
Start: 2024-11-15

## 2024-11-15 RX ORDER — METOPROLOL SUCCINATE 50 MG/1
50 TABLET, EXTENDED RELEASE ORAL DAILY
Qty: 90 TABLET | Refills: 1 | OUTPATIENT
Start: 2024-11-15

## 2024-11-15 RX ORDER — LEVOTHYROXINE SODIUM 75 UG/1
75 TABLET ORAL
Qty: 30 TABLET | Refills: 0 | OUTPATIENT
Start: 2024-11-15

## 2024-11-15 RX ORDER — BUMETANIDE 1 MG/1
TABLET ORAL
Qty: 30 TABLET | Refills: 0 | OUTPATIENT
Start: 2024-11-15

## 2024-11-15 NOTE — TELEPHONE ENCOUNTER
LOV 7/23/24, scheduled for 11/26/24.  I called pt to see if she had enough of her medication to cover her.  She thinks that she does.  She reported that she only takes the bumex prn, no longer taking the metoprolol. If she needs anything it might be the unithroid.  She will send me a mychart message if she needs anything.

## 2024-11-21 RX ORDER — SIMVASTATIN 20 MG
20 TABLET ORAL NIGHTLY
Qty: 90 TABLET | Refills: 0 | OUTPATIENT
Start: 2024-11-21

## 2024-11-21 NOTE — TELEPHONE ENCOUNTER
Duplicate request.  Rx sent for simvastatin on 11/13/24 for 90.  Thanksnayely    For Pharmacy Admin Tracking Only    Program: Medication Refill  CPA in place:    Recommendation Provided To:   Intervention Detail: Discontinued Rx: 1, reason: Duplicate Therapy  Intervention Accepted By:   Gap Closed?:    Time Spent (min): 5

## 2024-11-26 ENCOUNTER — OFFICE VISIT (OUTPATIENT)
Age: 69
End: 2024-11-26
Payer: MEDICARE

## 2024-11-26 VITALS
TEMPERATURE: 98 F | RESPIRATION RATE: 16 BRPM | HEIGHT: 66 IN | BODY MASS INDEX: 37.38 KG/M2 | DIASTOLIC BLOOD PRESSURE: 68 MMHG | HEART RATE: 72 BPM | WEIGHT: 232.6 LBS | OXYGEN SATURATION: 99 % | SYSTOLIC BLOOD PRESSURE: 124 MMHG

## 2024-11-26 DIAGNOSIS — R77.8 ABNORMAL SERUM PROTEIN ELECTROPHORESIS: ICD-10-CM

## 2024-11-26 DIAGNOSIS — E79.0 HYPERURICEMIA: ICD-10-CM

## 2024-11-26 DIAGNOSIS — E03.9 ACQUIRED HYPOTHYROIDISM: ICD-10-CM

## 2024-11-26 DIAGNOSIS — N18.31 TYPE 2 DIABETES MELLITUS WITH STAGE 3A CHRONIC KIDNEY DISEASE, WITHOUT LONG-TERM CURRENT USE OF INSULIN (HCC): Primary | ICD-10-CM

## 2024-11-26 DIAGNOSIS — E21.0 PRIMARY HYPERPARATHYROIDISM (HCC): ICD-10-CM

## 2024-11-26 DIAGNOSIS — E11.22 TYPE 2 DIABETES MELLITUS WITH STAGE 3A CHRONIC KIDNEY DISEASE, WITHOUT LONG-TERM CURRENT USE OF INSULIN (HCC): Primary | ICD-10-CM

## 2024-11-26 DIAGNOSIS — E78.2 MIXED HYPERLIPIDEMIA: ICD-10-CM

## 2024-11-26 DIAGNOSIS — I10 BENIGN ESSENTIAL HYPERTENSION: ICD-10-CM

## 2024-11-26 PROBLEM — Z86.2 HISTORY OF THROMBOCYTOPENIA: Status: ACTIVE | Noted: 2024-11-26

## 2024-11-26 PROCEDURE — 99214 OFFICE O/P EST MOD 30 MIN: CPT | Performed by: FAMILY MEDICINE

## 2024-11-26 ASSESSMENT — ENCOUNTER SYMPTOMS
RESPIRATORY NEGATIVE: 1
GASTROINTESTINAL NEGATIVE: 1

## 2024-11-26 NOTE — PROGRESS NOTES
Chief Complaint   Patient presents with    3 Month Follow-Up     Not fasting today    Diabetes    Cholesterol Problem    Hypothyroidism     HISTORY OF PRESENT ILLNESS     Patient with History of Type 2 NIDDM, Stage 3a CKD, Benign Essential HTN, Mixed Hyperlipidemia, Hypothyroidism, Primary Hyperparathyroidism, Chronic BLE Edema due to Venous Insufficiency, BMI >30, Hyperuricemia, and Chronic Gout Left Foot presents for 3 month follow up, medication check, labs, and update from interim follow up w/ various specialists which has been updated in problem list below.      At her last appt w/ her nephrologist Dr. Cassidy back in 9/2024 she reports that he discontinued her Toprol-XL 50 mg daily due to low blood pressures.  She has been monitoring her blood pressures routinely since that time and her readings have been much better and in a good range without any antihypertensive medicines.    She has not been checking her home blood sugars for about the past 3 weeks now.  Previously when she was checking them sporadically either 1 hour after lunch or dinner she would get readings in the 110s to 125 range.  She is no longer on any antihyper glycemic medications.  No signs or symptoms of hypo or hyperglycemia.    Diet: started Weight Watchers 1-2024: eating a lot more fruit now; since late 2020/early 2021 has cut back on sugars/sweets, making healthier food choices, more fresh veggies; also since summer 2022 has cut back on milk and snacking; since 9-2022 strict reduced calorie diet meal plan program     Exercise: had been on hold since 4-2024 due to severe gout left foot; got [facility] podiatry 7-19-24 and as of 7-20-24 has restarted walking, slowly and low impact qd x 20 minutes but the past month or 2 only walking 2 x a week x 13k steps; prior to all this had joined new gym at [employer] [facility] 4-2023: was doing cardio/aerobic classes x 45 minutes 3-4 x a week and weight training classes x 45 minutes    Her left foot

## 2024-11-26 NOTE — PROGRESS NOTES
Chief Complaint   Patient presents with    Follow-up     1. \"Have you been to the ER, urgent care clinic since your last visit?  Hospitalized since your last visit?\" No    2. \"Have you seen or consulted any other health care providers outside of the [facility] since your last visit?\" Hematologist Janee Sher NP 11/21/24    3. For patients aged 45-75: Has the patient had a colonoscopy / FIT/ Cologuard? Yes - no Care Gap present 8/2016 Normal, follow up 10 yrs       If the patient is female:    4. For patients aged 40-74: Has the patient had a mammogram within the past 2 years? Yes - no Care Gap present 1/2024      5. For patients aged 21-65: Has the patient had a pap smear? NA - based on age or sex

## 2024-11-27 LAB
25(OH)D3 SERPL-MCNC: 15.9 NG/ML (ref 30–100)
ALT SERPL-CCNC: 22 U/L (ref 12–78)
ANION GAP SERPL CALC-SCNC: 5 MMOL/L (ref 2–12)
AST SERPL-CCNC: 18 U/L (ref 15–37)
BUN SERPL-MCNC: 13 MG/DL (ref 6–20)
BUN/CREAT SERPL: 12 (ref 12–20)
CALCIUM SERPL-MCNC: 10.7 MG/DL (ref 8.5–10.1)
CALCIUM SERPL-MCNC: 10.7 MG/DL (ref 8.5–10.1)
CHLORIDE SERPL-SCNC: 110 MMOL/L (ref 97–108)
CHOLEST SERPL-MCNC: 155 MG/DL
CO2 SERPL-SCNC: 28 MMOL/L (ref 21–32)
CREAT SERPL-MCNC: 1.05 MG/DL (ref 0.55–1.02)
EST. AVERAGE GLUCOSE BLD GHB EST-MCNC: 123 MG/DL
GLUCOSE SERPL-MCNC: 111 MG/DL (ref 65–100)
HBA1C MFR BLD: 5.9 % (ref 4–5.6)
HDLC SERPL-MCNC: 50 MG/DL
HDLC SERPL: 3.1 (ref 0–5)
LDLC SERPL CALC-MCNC: 67.2 MG/DL (ref 0–100)
PHOSPHATE SERPL-MCNC: 2.6 MG/DL (ref 2.6–4.7)
POTASSIUM SERPL-SCNC: 4.8 MMOL/L (ref 3.5–5.1)
PTH-INTACT SERPL-MCNC: 118.8 PG/ML (ref 18.4–88)
SODIUM SERPL-SCNC: 143 MMOL/L (ref 136–145)
TRIGL SERPL-MCNC: 189 MG/DL
TSH SERPL DL<=0.05 MIU/L-ACNC: 0.1 UIU/ML (ref 0.36–3.74)
URATE SERPL-MCNC: 5.3 MG/DL (ref 2.6–6)
VLDLC SERPL CALC-MCNC: 37.8 MG/DL

## 2024-11-29 DIAGNOSIS — E03.9 ACQUIRED HYPOTHYROIDISM: Primary | ICD-10-CM

## 2024-11-29 LAB — CA-I SERPL ISE-MCNC: 5.6 MG/DL (ref 4.5–5.6)

## 2024-12-01 DIAGNOSIS — E03.9 ACQUIRED HYPOTHYROIDISM: ICD-10-CM

## 2024-12-01 RX ORDER — LEVOTHYROXINE SODIUM 50 UG/1
50 TABLET ORAL
Qty: 90 TABLET | Refills: 0 | Status: SHIPPED | OUTPATIENT
Start: 2024-12-01

## 2024-12-04 DIAGNOSIS — E03.9 ACQUIRED HYPOTHYROIDISM: ICD-10-CM

## 2024-12-05 RX ORDER — LEVOTHYROXINE SODIUM 50 UG/1
50 TABLET ORAL
Qty: 90 TABLET | Refills: 1 | OUTPATIENT
Start: 2024-12-05

## 2024-12-05 NOTE — TELEPHONE ENCOUNTER
This is duplicate request for 50mcg.      Contacted [facility] and confirmed 50mcg received on 12/1/24.  (Confirmed the 75mcg was d/c'd)    [facility] stating they had to order 50mcg and waiting to come in.    Thanksnayely    For Pharmacy Admin Tracking Only    Program: Medication Refill  CPA in place:    Recommendation Provided To:   Intervention Detail: Discontinued Rx: 1, reason: Duplicate Therapy  Intervention Accepted By:   Gap Closed?:    Time Spent (min): 5

## 2024-12-06 ENCOUNTER — OFFICE VISIT (OUTPATIENT)
Age: 69
End: 2024-12-06

## 2024-12-06 VITALS
BODY MASS INDEX: 37.28 KG/M2 | DIASTOLIC BLOOD PRESSURE: 75 MMHG | WEIGHT: 232 LBS | SYSTOLIC BLOOD PRESSURE: 131 MMHG | HEART RATE: 73 BPM | HEIGHT: 66 IN

## 2024-12-06 DIAGNOSIS — R79.89 ELEVATED PARATHYROID HORMONE: ICD-10-CM

## 2024-12-06 DIAGNOSIS — E83.52 HYPERCALCEMIA: Primary | ICD-10-CM

## 2024-12-06 DIAGNOSIS — E55.9 VITAMIN D DEFICIENCY: ICD-10-CM

## 2024-12-06 RX ORDER — COLCHICINE 0.6 MG/1
0.6 TABLET ORAL 2 TIMES DAILY
COMMUNITY
Start: 2024-11-19

## 2024-12-06 NOTE — PROGRESS NOTES
lb).        12/6/2024     8:09 AM 11/26/2024     2:09 PM 7/23/2024     1:22 PM 7/3/2024     2:12 PM 6/13/2024    11:01 AM 5/28/2024     2:51 PM 4/3/2024     1:59 PM   Weight Metrics   Weight 232 lb 232 lb 9.6 oz 226 lb 3.2 oz 231 lb 12.8 oz 231 lb 228 lb 224 lb 12.8 oz   BMI (Calculated) 37.5 kg/m2 37.6 kg/m2 36.6 kg/m2 37.5 kg/m2 37.4 kg/m2 36.9 kg/m2 36.4 kg/m2       EXAM:  - not done today     Assessment/Plan:   1. Hypercalcemia  mild and intermittent over the years - more consistent recently  PTH elevated x 4 indicating possible primary hyperparathyroidism   Vit D had been normal - is now low this year (See below)  FHH unlikely since years of normal levels before started to rise  BMD is w/o bone loss, U Ca not high - so LOW RISK  Following over time -stable this visit    Does not want surgery unless absolutely has to     2. Elevated PTH level  See above    2. Vitamin D deficiency  Low this year - has at home but not taking  Will add 5k/wk or 1k/d and have PCP recent in upcoming months    Orders Placed This Encounter   Procedures    Calcium, Ionized     Standing Status:   Future     Standing Expiration Date:   6/6/2026    Basic Metabolic Panel     Standing Status:   Future     Standing Expiration Date:   6/6/2026    PTH, Intact     Standing Status:   Future     Standing Expiration Date:   6/6/2026    Vitamin D 25 Hydroxy     Standing Status:   Future     Standing Expiration Date:   6/6/2026    Phosphorus     Standing Status:   Future     Standing Expiration Date:   6/6/2026      No orders of the defined types were placed in this encounter.       Return in about 1 year (around 12/6/2025).

## 2024-12-08 RX ORDER — FAMOTIDINE 40 MG/1
40 TABLET, FILM COATED ORAL NIGHTLY
Qty: 90 TABLET | Refills: 1 | Status: SHIPPED | OUTPATIENT
Start: 2024-12-08

## 2025-01-26 DIAGNOSIS — E79.0 HYPERURICEMIA: ICD-10-CM

## 2025-01-26 DIAGNOSIS — M1A.3720 CHRONIC GOUT OF LEFT FOOT DUE TO RENAL IMPAIRMENT WITHOUT TOPHUS: ICD-10-CM

## 2025-01-26 RX ORDER — ALLOPURINOL 100 MG/1
TABLET ORAL
Qty: 45 TABLET | Refills: 1 | OUTPATIENT
Start: 2025-01-26

## 2025-01-27 DIAGNOSIS — E79.0 HYPERURICEMIA: ICD-10-CM

## 2025-01-27 DIAGNOSIS — M1A.3720 CHRONIC GOUT OF LEFT FOOT DUE TO RENAL IMPAIRMENT WITHOUT TOPHUS: ICD-10-CM

## 2025-01-29 RX ORDER — ALLOPURINOL 100 MG/1
TABLET ORAL
Qty: 180 TABLET | Refills: 1 | OUTPATIENT
Start: 2025-01-29

## 2025-01-31 DIAGNOSIS — E03.9 ACQUIRED HYPOTHYROIDISM: ICD-10-CM

## 2025-02-02 RX ORDER — LEVOTHYROXINE SODIUM 50 UG/1
50 TABLET ORAL
Qty: 90 TABLET | Refills: 0 | OUTPATIENT
Start: 2025-02-02

## 2025-02-03 ENCOUNTER — TELEPHONE (OUTPATIENT)
Age: 70
End: 2025-02-03

## 2025-02-03 DIAGNOSIS — E03.9 ACQUIRED HYPOTHYROIDISM: ICD-10-CM

## 2025-02-03 NOTE — TELEPHONE ENCOUNTER
Pt went to her pharmacy to pick-up her Thyroid Medication,they informed her it was refused.Pt is calling to see why it's refused since she is coming in on 2/18/25? Pt is now out and would like if she could get a temporary supply of the 50 mg.

## 2025-02-04 ENCOUNTER — TELEPHONE (OUTPATIENT)
Age: 70
End: 2025-02-04

## 2025-02-04 NOTE — TELEPHONE ENCOUNTER
Called, left vm for pt to return call to office.  Message left for patient to call due to her concerns. Patient has 3 month of Thyroid medication waiting.

## 2025-02-05 RX ORDER — LEVOTHYROXINE SODIUM 50 UG/1
50 TABLET ORAL
Qty: 90 TABLET | Refills: 0 | OUTPATIENT
Start: 2025-02-05

## 2025-02-09 RX ORDER — SIMVASTATIN 20 MG
20 TABLET ORAL NIGHTLY
Qty: 90 TABLET | Refills: 0 | Status: SHIPPED | OUTPATIENT
Start: 2025-02-09

## 2025-02-15 SDOH — ECONOMIC STABILITY: FOOD INSECURITY: WITHIN THE PAST 12 MONTHS, YOU WORRIED THAT YOUR FOOD WOULD RUN OUT BEFORE YOU GOT MONEY TO BUY MORE.: NEVER TRUE

## 2025-02-15 SDOH — ECONOMIC STABILITY: TRANSPORTATION INSECURITY
IN THE PAST 12 MONTHS, HAS LACK OF TRANSPORTATION KEPT YOU FROM MEETINGS, WORK, OR FROM GETTING THINGS NEEDED FOR DAILY LIVING?: NO

## 2025-02-15 SDOH — ECONOMIC STABILITY: FOOD INSECURITY: WITHIN THE PAST 12 MONTHS, THE FOOD YOU BOUGHT JUST DIDN'T LAST AND YOU DIDN'T HAVE MONEY TO GET MORE.: NEVER TRUE

## 2025-02-15 SDOH — ECONOMIC STABILITY: INCOME INSECURITY: IN THE LAST 12 MONTHS, WAS THERE A TIME WHEN YOU WERE NOT ABLE TO PAY THE MORTGAGE OR RENT ON TIME?: NO

## 2025-02-15 SDOH — ECONOMIC STABILITY: TRANSPORTATION INSECURITY
IN THE PAST 12 MONTHS, HAS THE LACK OF TRANSPORTATION KEPT YOU FROM MEDICAL APPOINTMENTS OR FROM GETTING MEDICATIONS?: NO

## 2025-02-15 SDOH — HEALTH STABILITY: PHYSICAL HEALTH: ON AVERAGE, HOW MANY MINUTES DO YOU ENGAGE IN EXERCISE AT THIS LEVEL?: 100 MIN

## 2025-02-15 SDOH — HEALTH STABILITY: PHYSICAL HEALTH: ON AVERAGE, HOW MANY DAYS PER WEEK DO YOU ENGAGE IN MODERATE TO STRENUOUS EXERCISE (LIKE A BRISK WALK)?: 3 DAYS

## 2025-02-15 ASSESSMENT — PATIENT HEALTH QUESTIONNAIRE - PHQ9
SUM OF ALL RESPONSES TO PHQ9 QUESTIONS 1 & 2: 0
SUM OF ALL RESPONSES TO PHQ QUESTIONS 1-9: 0
2. FEELING DOWN, DEPRESSED OR HOPELESS: NOT AT ALL
SUM OF ALL RESPONSES TO PHQ QUESTIONS 1-9: 0
1. LITTLE INTEREST OR PLEASURE IN DOING THINGS: NOT AT ALL

## 2025-02-15 ASSESSMENT — LIFESTYLE VARIABLES
HOW OFTEN DO YOU HAVE SIX OR MORE DRINKS ON ONE OCCASION: 1
HOW MANY STANDARD DRINKS CONTAINING ALCOHOL DO YOU HAVE ON A TYPICAL DAY: 0
HOW OFTEN DO YOU HAVE A DRINK CONTAINING ALCOHOL: 1
HOW OFTEN DO YOU HAVE A DRINK CONTAINING ALCOHOL: NEVER
HOW MANY STANDARD DRINKS CONTAINING ALCOHOL DO YOU HAVE ON A TYPICAL DAY: PATIENT DOES NOT DRINK

## 2025-02-17 DIAGNOSIS — E03.9 ACQUIRED HYPOTHYROIDISM: ICD-10-CM

## 2025-02-17 DIAGNOSIS — E03.9 HYPOTHYROIDISM, ADULT: Primary | ICD-10-CM

## 2025-02-18 ENCOUNTER — OFFICE VISIT (OUTPATIENT)
Age: 70
End: 2025-02-18
Payer: MEDICARE

## 2025-02-18 VITALS
TEMPERATURE: 97.9 F | WEIGHT: 228.8 LBS | HEART RATE: 70 BPM | HEIGHT: 65 IN | OXYGEN SATURATION: 97 % | BODY MASS INDEX: 38.12 KG/M2 | SYSTOLIC BLOOD PRESSURE: 128 MMHG | RESPIRATION RATE: 16 BRPM | DIASTOLIC BLOOD PRESSURE: 70 MMHG

## 2025-02-18 DIAGNOSIS — E21.0 PRIMARY HYPERPARATHYROIDISM: ICD-10-CM

## 2025-02-18 DIAGNOSIS — N18.31 TYPE 2 DIABETES MELLITUS WITH STAGE 3A CHRONIC KIDNEY DISEASE, WITHOUT LONG-TERM CURRENT USE OF INSULIN (HCC): ICD-10-CM

## 2025-02-18 DIAGNOSIS — E11.22 TYPE 2 DIABETES MELLITUS WITH STAGE 3A CHRONIC KIDNEY DISEASE, WITHOUT LONG-TERM CURRENT USE OF INSULIN (HCC): ICD-10-CM

## 2025-02-18 DIAGNOSIS — Z00.00 MEDICARE ANNUAL WELLNESS VISIT, SUBSEQUENT: Primary | ICD-10-CM

## 2025-02-18 DIAGNOSIS — E78.2 MIXED HYPERLIPIDEMIA: ICD-10-CM

## 2025-02-18 DIAGNOSIS — I10 BENIGN ESSENTIAL HYPERTENSION: ICD-10-CM

## 2025-02-18 DIAGNOSIS — E79.0 HYPERURICEMIA: ICD-10-CM

## 2025-02-18 DIAGNOSIS — E03.9 ACQUIRED HYPOTHYROIDISM: ICD-10-CM

## 2025-02-18 DIAGNOSIS — Z12.31 SCREENING MAMMOGRAM FOR BREAST CANCER: ICD-10-CM

## 2025-02-18 DIAGNOSIS — R77.8 ABNORMAL SERUM PROTEIN ELECTROPHORESIS: ICD-10-CM

## 2025-02-18 PROCEDURE — 99214 OFFICE O/P EST MOD 30 MIN: CPT | Performed by: FAMILY MEDICINE

## 2025-02-18 ASSESSMENT — ENCOUNTER SYMPTOMS
GASTROINTESTINAL NEGATIVE: 1
RESPIRATORY NEGATIVE: 1
EYES NEGATIVE: 1

## 2025-02-18 NOTE — TELEPHONE ENCOUNTER
Last appointment: Today- 2/18/25 MD HARRIS, labs today  Next appointment: 5/29/25 MD HARRIS  Previous refill encounter(s): 12/1/24 90    Requested Prescriptions     Pending Prescriptions Disp Refills    levothyroxine (UNITHROID) 50 MCG tablet [Pharmacy Med Name: UNITHROID 50 MCG TABLET] 90 tablet 2     Sig: Take 1 tablet by mouth every morning (before breakfast)     For Pharmacy Admin Tracking Only    Program: Medication Refill  CPA in place:    Recommendation Provided To:   Intervention Detail: New Rx: 1, reason: Patient Preference  Intervention Accepted By:   Gap Closed?:    Time Spent (min): 5

## 2025-02-18 NOTE — PROGRESS NOTES
Medicare Annual Wellness Visit    Azra Barcenas is here for Medicare AWV (), Cholesterol Problem (Fasting), Diabetes, Hypertension, and Thyroid Problem    Assessment & Plan   Medicare annual wellness visit, subsequent  Recommendations for Preventive Services Due: see orders and patient instructions/AVS.  Recommended screening schedule for the next 5-10 years is provided to the patient in written form: see Patient Instructions/AVS.    Screening mammogram for breast cancer  -     [facility] DESIREE DIGITAL SCREEN BILATERAL; Future    Type 2 diabetes mellitus with stage 3a chronic kidney disease, without long-term current use of insulin (HCC)  -Diabetes has improved very nicely over time with dietary modifications, increased exercise, and weight reduction. She has been able to wean off all of her previous antihyperglycemic medications over time and maintain HgbA1c at goal   -Followed by nephrology Dr. Cassidy ~ q 6 months. Last visit ~ 12/2024.  -     CBC; Future  -     Comprehensive Metabolic Panel; Future  -     Hemoglobin A1C; Future  -     Albumin/Creatinine Ratio, Urine; Future    Mixed hyperlipidemia  LDL at goal maintained on statin therapy  Continue Zocor 20 mg daily, fish oil 2 caps daily  -     Lipid Panel; Future    Benign essential hypertension  Stable w/o antihypertensive medications since her nephrologist dc'd Toprol ~ 8/2024 due to low BP's. She has also been treated with various diuretics in the past including Hydrochlorothiazide, Bumex, and Aldactone but each of these was discontinued either due to hypercalcemia, hyperuricemia, or worsening renal function. Her leg edema is currently well controlled w/ dietary modifications, restricted sodium intake and weight reduction over time. She is gradually rebuilding exercise now as well which will help. Continue to manage supportively for now. Encourage usage of support hose prn   -     Comprehensive Metabolic Panel; Future    Hyperuricemia  Improved on

## 2025-02-18 NOTE — PROGRESS NOTES
Chief Complaint   Patient presents with    Medicare AWV         Cholesterol Problem    Diabetes    Hypertension    Thyroid Problem     1. \"Have you been to the ER, urgent care clinic since your last visit?  Hospitalized since your last visit?\" No    2. \"Have you seen or consulted any other health care providers outside of the [facility] since your last visit?\" Eye exam Dr Seamus Butler last week,     3. For patients aged 45-75: Has the patient had a colonoscopy / FIT/ Cologuard? Yes - no Care Gap present 8/2016 Normal, follow up 10 yrs       If the patient is female:    4. For patients aged 40-74: Has the patient had a mammogram within the past 2 years? Yes - no Care Gap present 1/2024       5. For patients aged 21-65: Has the patient had a pap smear? NA - based on age or sex

## 2025-02-19 LAB
ALBUMIN SERPL-MCNC: 4.2 G/DL (ref 3.5–5)
ALBUMIN/GLOB SERPL: 1.4 (ref 1.1–2.2)
ALP SERPL-CCNC: 113 U/L (ref 45–117)
ALT SERPL-CCNC: 22 U/L (ref 12–78)
ANION GAP SERPL CALC-SCNC: 7 MMOL/L (ref 2–12)
AST SERPL-CCNC: 15 U/L (ref 15–37)
BILIRUB SERPL-MCNC: 0.5 MG/DL (ref 0.2–1)
BUN SERPL-MCNC: 19 MG/DL (ref 6–20)
BUN/CREAT SERPL: 18 (ref 12–20)
CALCIUM SERPL-MCNC: 11 MG/DL (ref 8.5–10.1)
CHLORIDE SERPL-SCNC: 107 MMOL/L (ref 97–108)
CHOLEST SERPL-MCNC: 174 MG/DL
CO2 SERPL-SCNC: 26 MMOL/L (ref 21–32)
CREAT SERPL-MCNC: 1.03 MG/DL (ref 0.55–1.02)
CREAT UR-MCNC: 224 MG/DL
ERYTHROCYTE [DISTWIDTH] IN BLOOD BY AUTOMATED COUNT: 12.9 % (ref 11.5–14.5)
EST. AVERAGE GLUCOSE BLD GHB EST-MCNC: 126 MG/DL
GLOBULIN SER CALC-MCNC: 3 G/DL (ref 2–4)
GLUCOSE SERPL-MCNC: 89 MG/DL (ref 65–100)
HBA1C MFR BLD: 6 % (ref 4–5.6)
HCT VFR BLD AUTO: 39 % (ref 35–47)
HDLC SERPL-MCNC: 51 MG/DL
HDLC SERPL: 3.4 (ref 0–5)
HGB BLD-MCNC: 12.5 G/DL (ref 11.5–16)
LDLC SERPL CALC-MCNC: 83.8 MG/DL (ref 0–100)
MCH RBC QN AUTO: 29.8 PG (ref 26–34)
MCHC RBC AUTO-ENTMCNC: 32.1 G/DL (ref 30–36.5)
MCV RBC AUTO: 93.1 FL (ref 80–99)
MICROALBUMIN UR-MCNC: 1.57 MG/DL
MICROALBUMIN/CREAT UR-RTO: 7 MG/G (ref 0–30)
NRBC # BLD: 0 K/UL (ref 0–0.01)
NRBC BLD-RTO: 0 PER 100 WBC
PLATELET # BLD AUTO: 155 K/UL (ref 150–400)
PMV BLD AUTO: 12.7 FL (ref 8.9–12.9)
POTASSIUM SERPL-SCNC: 4 MMOL/L (ref 3.5–5.1)
PROT SERPL-MCNC: 7.2 G/DL (ref 6.4–8.2)
RBC # BLD AUTO: 4.19 M/UL (ref 3.8–5.2)
SODIUM SERPL-SCNC: 140 MMOL/L (ref 136–145)
TRIGL SERPL-MCNC: 196 MG/DL
TSH SERPL DL<=0.05 MIU/L-ACNC: 0.86 UIU/ML (ref 0.36–3.74)
URATE SERPL-MCNC: 4.7 MG/DL (ref 2.6–6)
VLDLC SERPL CALC-MCNC: 39.2 MG/DL
WBC # BLD AUTO: 6.7 K/UL (ref 3.6–11)

## 2025-02-21 ENCOUNTER — HOSPITAL ENCOUNTER (OUTPATIENT)
Facility: HOSPITAL | Age: 70
Discharge: HOME | End: 2025-02-24
Attending: FAMILY MEDICINE
Payer: MEDICARE

## 2025-02-21 DIAGNOSIS — Z12.31 SCREENING MAMMOGRAM FOR BREAST CANCER: ICD-10-CM

## 2025-02-21 PROCEDURE — 77063 BREAST TOMOSYNTHESIS BI: CPT

## 2025-02-24 RX ORDER — LEVOTHYROXINE SODIUM 50 UG/1
50 TABLET ORAL
Qty: 90 TABLET | Refills: 2 | OUTPATIENT
Start: 2025-02-24

## 2025-02-24 RX ORDER — LEVOTHYROXINE SODIUM 100 UG/1
50 TABLET ORAL DAILY
Qty: 45 TABLET | Refills: 3 | Status: SHIPPED | OUTPATIENT
Start: 2025-02-24

## 2025-04-26 DIAGNOSIS — M1A.3720 CHRONIC GOUT OF LEFT FOOT DUE TO RENAL IMPAIRMENT WITHOUT TOPHUS: ICD-10-CM

## 2025-04-26 DIAGNOSIS — E79.0 HYPERURICEMIA: ICD-10-CM

## 2025-04-27 RX ORDER — ALLOPURINOL 100 MG/1
TABLET ORAL
Qty: 180 TABLET | Refills: 1 | Status: SHIPPED | OUTPATIENT
Start: 2025-04-27

## 2025-05-03 DIAGNOSIS — E03.9 ACQUIRED HYPOTHYROIDISM: ICD-10-CM

## 2025-05-05 NOTE — TELEPHONE ENCOUNTER
PCP: Mira Cabrera MD    Last appt: 2/18/2025     Future Appointments   Date Time Provider Department Center   5/29/2025  8:20 AM Mira Cabrera MD [facility]   12/8/2025  8:30 AM Julia Chicas MD [facility]       Requested Prescriptions     Pending Prescriptions Disp Refills    simvastatin (ZOCOR) 20 MG tablet [Pharmacy Med Name: SIMVASTATIN 20 MG TABLET] 90 tablet 0     Sig: TAKE 1 TABLET BY MOUTH EVERY DAY AT NIGHT    levothyroxine (UNITHROID) 100 MCG tablet 45 tablet 3     Sig: Take 0.5 tablets by mouth Daily       Prior labs and Blood pressures:  BP Readings from Last 3 Encounters:   02/18/25 128/70   12/06/24 131/75   11/26/24 124/68     Lab Results   Component Value Date/Time     02/18/2025 02:38 PM    K 4.0 02/18/2025 02:38 PM     02/18/2025 02:38 PM    CO2 26 02/18/2025 02:38 PM    BUN 19 02/18/2025 02:38 PM    GFRAA >60 06/09/2022 09:06 AM     Lab Results   Component Value Date/Time    SBE8KHKD 6.3 06/09/2022 08:50 AM     Lab Results   Component Value Date/Time    CHOL 174 02/18/2025 02:38 PM    HDL 51 02/18/2025 02:38 PM    LDL 83.8 02/18/2025 02:38 PM    LDL 57.4 02/12/2024 08:51 AM    VLDL 39.2 02/18/2025 02:38 PM     No results found for: \"VITD3\"    Lab Results   Component Value Date/Time    TSH 0.86 02/18/2025 02:38 PM

## 2025-05-06 RX ORDER — LEVOTHYROXINE SODIUM 100 UG/1
50 TABLET ORAL DAILY
Qty: 45 TABLET | Refills: 3 | OUTPATIENT
Start: 2025-05-06

## 2025-05-06 RX ORDER — SIMVASTATIN 20 MG
20 TABLET ORAL NIGHTLY
Qty: 90 TABLET | Refills: 0 | OUTPATIENT
Start: 2025-05-06

## 2025-05-06 NOTE — TELEPHONE ENCOUNTER
Unithroid 100 mcg  was sent on 2/24/25 for a year supply.  The zocor was done 2/9/25 for 90 day supply.  Pt is scheduled for 3 month f/u on 5/29/25.  Called pt to see if she has enough of the zocor to cover until her appt.  She said that she has enough of the zocor.    Pt does say that when she gets the new refill she will pour the refill into her old bottle.  I recommended that she not do that so her rx # is the same.    I spoke to pharmacist and he did see the refill from 2/24/25 and not sure why request was sent.  Let him know that pt had plenty of the zocor and we would send over refill once pt is seen and has labs done.

## 2025-05-29 ENCOUNTER — OFFICE VISIT (OUTPATIENT)
Age: 70
End: 2025-05-29
Payer: MEDICARE

## 2025-05-29 VITALS
SYSTOLIC BLOOD PRESSURE: 118 MMHG | BODY MASS INDEX: 37.36 KG/M2 | WEIGHT: 224.2 LBS | HEART RATE: 63 BPM | TEMPERATURE: 97.3 F | HEIGHT: 65 IN | DIASTOLIC BLOOD PRESSURE: 64 MMHG | OXYGEN SATURATION: 99 % | RESPIRATION RATE: 13 BRPM

## 2025-05-29 DIAGNOSIS — E11.22 TYPE 2 DIABETES MELLITUS WITH STAGE 3A CHRONIC KIDNEY DISEASE, WITHOUT LONG-TERM CURRENT USE OF INSULIN (HCC): Primary | ICD-10-CM

## 2025-05-29 DIAGNOSIS — E03.9 ACQUIRED HYPOTHYROIDISM: ICD-10-CM

## 2025-05-29 DIAGNOSIS — E21.0 PRIMARY HYPERPARATHYROIDISM: ICD-10-CM

## 2025-05-29 DIAGNOSIS — E78.2 MIXED HYPERLIPIDEMIA: ICD-10-CM

## 2025-05-29 DIAGNOSIS — E79.0 HYPERURICEMIA: ICD-10-CM

## 2025-05-29 DIAGNOSIS — N18.31 TYPE 2 DIABETES MELLITUS WITH STAGE 3A CHRONIC KIDNEY DISEASE, WITHOUT LONG-TERM CURRENT USE OF INSULIN (HCC): Primary | ICD-10-CM

## 2025-05-29 DIAGNOSIS — J30.89 ENVIRONMENTAL AND SEASONAL ALLERGIES: ICD-10-CM

## 2025-05-29 DIAGNOSIS — I10 BENIGN ESSENTIAL HYPERTENSION: ICD-10-CM

## 2025-05-29 LAB
EST. AVERAGE GLUCOSE BLD GHB EST-MCNC: 117 MG/DL
HBA1C MFR BLD: 5.7 % (ref 4–5.6)

## 2025-05-29 PROCEDURE — 99214 OFFICE O/P EST MOD 30 MIN: CPT | Performed by: FAMILY MEDICINE

## 2025-05-29 PROCEDURE — 3017F COLORECTAL CA SCREEN DOC REV: CPT | Performed by: FAMILY MEDICINE

## 2025-05-29 PROCEDURE — 1160F RVW MEDS BY RX/DR IN RCRD: CPT | Performed by: FAMILY MEDICINE

## 2025-05-29 PROCEDURE — 3044F HG A1C LEVEL LT 7.0%: CPT | Performed by: FAMILY MEDICINE

## 2025-05-29 PROCEDURE — G8399 PT W/DXA RESULTS DOCUMENT: HCPCS | Performed by: FAMILY MEDICINE

## 2025-05-29 PROCEDURE — G8417 CALC BMI ABV UP PARAM F/U: HCPCS | Performed by: FAMILY MEDICINE

## 2025-05-29 PROCEDURE — G8427 DOCREV CUR MEDS BY ELIG CLIN: HCPCS | Performed by: FAMILY MEDICINE

## 2025-05-29 PROCEDURE — 1126F AMNT PAIN NOTED NONE PRSNT: CPT | Performed by: FAMILY MEDICINE

## 2025-05-29 PROCEDURE — 1159F MED LIST DOCD IN RCRD: CPT | Performed by: FAMILY MEDICINE

## 2025-05-29 PROCEDURE — 1090F PRES/ABSN URINE INCON ASSESS: CPT | Performed by: FAMILY MEDICINE

## 2025-05-29 PROCEDURE — 3074F SYST BP LT 130 MM HG: CPT | Performed by: FAMILY MEDICINE

## 2025-05-29 PROCEDURE — G2211 COMPLEX E/M VISIT ADD ON: HCPCS | Performed by: FAMILY MEDICINE

## 2025-05-29 PROCEDURE — 1036F TOBACCO NON-USER: CPT | Performed by: FAMILY MEDICINE

## 2025-05-29 PROCEDURE — 2022F DILAT RTA XM EVC RTNOPTHY: CPT | Performed by: FAMILY MEDICINE

## 2025-05-29 PROCEDURE — 1123F ACP DISCUSS/DSCN MKR DOCD: CPT | Performed by: FAMILY MEDICINE

## 2025-05-29 PROCEDURE — 3078F DIAST BP <80 MM HG: CPT | Performed by: FAMILY MEDICINE

## 2025-05-29 ASSESSMENT — ENCOUNTER SYMPTOMS
GASTROINTESTINAL NEGATIVE: 1
SHORTNESS OF BREATH: 0
WHEEZING: 0

## 2025-05-29 NOTE — PROGRESS NOTES
Chief Complaint   Patient presents with    3 Month Follow Up Diabetes      HISTORY OF PRESENT ILLNESS   HPI  Patient with History of Type 2 NIDDM, Stage 3a CKD, Benign Essential HTN, Mixed Hyperlipidemia, Hypothyroidism, Primary Hyperparathyroidism, Chronic BLE Edema due to Venous Insufficiency, BMI >30, Hyperuricemia, and Chronic Gout Left Foot presents for 3 month follow up Diabetes check. On no related medications. Has been able to control diabetes w/ diet, exercise and weight mgt alone in recent years. Seldom checks home BS's anymore, maybe sporadically once a month. Last check was about 6-8 weeks ago midday in the 90's. Feels well. No signs/symptoms of hyper or hypoglycemia.     Diet: did Weight Watchers 1/2024-3/2025 and now just making healthy choices on her own, following low carb, high protein diet    Exercise: since 12/1/2024 restarted back at the gym at [facility] [place] doing cardio/aerobic classes x 45 minutes and/or wt training classes x 45 minutes 3-4 x a week; since 3/2025 goes 5 x a week x 1.5 hrs; on the other days she walks in neighborhood x up to 45-60 minutes or walks on treadmill at home; gets ~ 13-17 k steps a day     Weight: happy her weight has really come down nicely since eating healthier and exercising more since 8-2020; weight down from 270's to 254 lbs in 8-2022, 225 lbs in 1-2023, 222 lbs in 7-2023 and now maintaining in the 220's-230's since then; losing some weight since increased exercise, down from 233 lbs to 224 lbs so far; personal short term goal wt is 200 lbs and long term goal is 175 lbs   REVIEW OF SYMPTOMS   Review of Systems   Constitutional: Negative.    HENT:          1 month nasal congestion, runny nose, post nasal drainage, cough w/o purulent sputum and no fevers; taking Mucinex prn which really helps a lot; took Afrin NS x 5 days and no more since then   Respiratory:  Negative for shortness of breath and wheezing.    Cardiovascular: Negative.

## 2025-05-29 NOTE — PROGRESS NOTES
Chief Complaint   Patient presents with    Follow-up     3 month         \"Have you been to the ER, urgent care clinic since your last visit?  Hospitalized since your last visit?\"    NO    “Have you seen or consulted any other health care providers outside of [facility] since your last visit?”    YES - When: approximately 3  weeks ago.  Where and Why: podiatrist, pt has posted info to "1,2,3 List[employer]"..            Click Here for Release of Records Request           2/15/2025    10:05 PM   PHQ-9    Little interest or pleasure in doing things 0   Feeling down, depressed, or hopeless 0   PHQ-2 Score 0    PHQ-9 Total Score 0        Patient-reported           Financial Resource Strain: Medium Risk (7/20/2024)    Overall Financial Resource Strain (CARDIA)     Difficulty of Paying Living Expenses: Somewhat hard      Food Insecurity: No Food Insecurity (2/15/2025)    Hunger Vital Sign     Worried About Running Out of Food in the Last Year: Never true     Ran Out of Food in the Last Year: Never true          Health Maintenance Due   Topic Date Due    Diabetic retinal exam  10/03/2024

## 2025-05-31 ENCOUNTER — RESULTS FOLLOW-UP (OUTPATIENT)
Age: 70
End: 2025-05-31

## 2025-06-07 RX ORDER — FAMOTIDINE 40 MG/1
40 TABLET, FILM COATED ORAL NIGHTLY
Qty: 90 TABLET | Refills: 3 | Status: SHIPPED | OUTPATIENT
Start: 2025-06-07

## 2025-06-12 NOTE — TELEPHONE ENCOUNTER
PCP: Mira Cabrera MD    Last appt: 5/29/2025   Future Appointments   Date Time Provider Department Center   12/8/2025  8:30 AM Julia Chicas MD [facility]       Requested Prescriptions     Pending Prescriptions Disp Refills    simvastatin (ZOCOR) 20 MG tablet [Pharmacy Med Name: SIMVASTATIN 20 MG TABLET] 90 tablet 0     Sig: TAKE 1 TABLET BY MOUTH EVERY DAY AT NIGHT         Prior labs and Blood pressures:  BP Readings from Last 3 Encounters:   05/29/25 118/64   02/18/25 128/70   12/06/24 131/75     Lab Results   Component Value Date/Time     02/18/2025 02:38 PM    K 4.0 02/18/2025 02:38 PM     02/18/2025 02:38 PM    CO2 26 02/18/2025 02:38 PM    BUN 19 02/18/2025 02:38 PM    GFRAA >60 06/09/2022 09:06 AM     Lab Results   Component Value Date/Time    [id number] 6.3 06/09/2022 08:50 AM     Lab Results   Component Value Date/Time    CHOL 174 02/18/2025 02:38 PM    HDL 51 02/18/2025 02:38 PM    LDL 83.8 02/18/2025 02:38 PM    LDL 57.4 02/12/2024 08:51 AM    VLDL 39.2 02/18/2025 02:38 PM     No results found for: \"VITD3\"    Lab Results   Component Value Date/Time    TSH 0.86 02/18/2025 02:38 PM

## 2025-06-14 RX ORDER — SIMVASTATIN 20 MG
20 TABLET ORAL NIGHTLY
Qty: 90 TABLET | Refills: 2 | Status: SHIPPED | OUTPATIENT
Start: 2025-06-14